# Patient Record
Sex: FEMALE | Race: BLACK OR AFRICAN AMERICAN | NOT HISPANIC OR LATINO | Employment: OTHER | URBAN - METROPOLITAN AREA
[De-identification: names, ages, dates, MRNs, and addresses within clinical notes are randomized per-mention and may not be internally consistent; named-entity substitution may affect disease eponyms.]

---

## 2017-01-20 ENCOUNTER — INFUSION (OUTPATIENT)
Dept: ONCOLOGY | Facility: HOSPITAL | Age: 52
End: 2017-01-20

## 2017-01-20 VITALS
RESPIRATION RATE: 20 BRPM | HEIGHT: 67 IN | DIASTOLIC BLOOD PRESSURE: 79 MMHG | TEMPERATURE: 97.8 F | BODY MASS INDEX: 26.21 KG/M2 | WEIGHT: 167 LBS | SYSTOLIC BLOOD PRESSURE: 118 MMHG | HEART RATE: 94 BPM

## 2017-01-20 DIAGNOSIS — IMO0001 ADVERSE EFFECT OF IRON OR ITS COMPOUND, SUBSEQUENT ENCOUNTER: ICD-10-CM

## 2017-01-20 DIAGNOSIS — D50.9 IRON DEFICIENCY ANEMIA, UNSPECIFIED IRON DEFICIENCY ANEMIA TYPE: Primary | ICD-10-CM

## 2017-01-20 DIAGNOSIS — D50.9 IRON DEFICIENCY ANEMIA: ICD-10-CM

## 2017-01-20 LAB
ERYTHROCYTE [DISTWIDTH] IN BLOOD BY AUTOMATED COUNT: 13.9 % (ref 11.3–14.5)
HCT VFR BLD AUTO: 36.5 % (ref 34.5–44)
HGB BLD-MCNC: 11.9 G/DL (ref 11.5–15.5)
LYMPHOCYTES # BLD AUTO: 2.1 10*3/MM3 (ref 0.6–4.8)
LYMPHOCYTES NFR BLD AUTO: 47.5 % (ref 24–44)
MCH RBC QN AUTO: 27 PG (ref 27–31)
MCHC RBC AUTO-ENTMCNC: 32.7 G/DL (ref 32–36)
MCV RBC AUTO: 82.7 FL (ref 80–99)
MONOCYTES # BLD AUTO: 0.3 10*3/MM3 (ref 0–1)
MONOCYTES NFR BLD AUTO: 7 % (ref 0–12)
NEUTROPHILS # BLD AUTO: 2 10*3/MM3 (ref 1.5–8.3)
NEUTROPHILS NFR BLD AUTO: 45.5 % (ref 41–71)
PLATELET # BLD AUTO: 254 10*3/MM3 (ref 150–450)
PMV BLD AUTO: 7.8 FL (ref 6–12)
RBC # BLD AUTO: 4.42 10*6/MM3 (ref 3.89–5.14)
WBC NRBC COR # BLD: 4.4 10*3/MM3 (ref 3.5–10.8)

## 2017-01-20 PROCEDURE — 96523 IRRIG DRUG DELIVERY DEVICE: CPT

## 2017-01-20 PROCEDURE — 25010000002 HEPARIN FLUSH (PORCINE) 100 UNIT/ML SOLUTION: Performed by: NURSE PRACTITIONER

## 2017-01-20 PROCEDURE — 36591 DRAW BLOOD OFF VENOUS DEVICE: CPT

## 2017-01-20 RX ORDER — 0.9 % SODIUM CHLORIDE 0.9 %
10 VIAL (ML) INJECTION AS NEEDED
Status: CANCELLED | OUTPATIENT
Start: 2017-01-20

## 2017-01-20 RX ORDER — HEPARIN SODIUM (PORCINE) LOCK FLUSH IV SOLN 100 UNIT/ML 100 UNIT/ML
300 SOLUTION INTRAVENOUS ONCE
Status: CANCELLED | OUTPATIENT
Start: 2017-01-20

## 2017-01-20 RX ADMIN — HEPARIN 500 UNITS: 100 SYRINGE at 13:40

## 2017-02-24 ENCOUNTER — INFUSION (OUTPATIENT)
Dept: ONCOLOGY | Facility: HOSPITAL | Age: 52
End: 2017-02-24

## 2017-02-24 VITALS
HEART RATE: 93 BPM | SYSTOLIC BLOOD PRESSURE: 110 MMHG | DIASTOLIC BLOOD PRESSURE: 80 MMHG | BODY MASS INDEX: 26.53 KG/M2 | HEIGHT: 67 IN | WEIGHT: 169 LBS | RESPIRATION RATE: 20 BRPM | TEMPERATURE: 98.1 F

## 2017-02-24 DIAGNOSIS — D50.9 IRON DEFICIENCY ANEMIA, UNSPECIFIED IRON DEFICIENCY ANEMIA TYPE: Primary | ICD-10-CM

## 2017-02-24 DIAGNOSIS — IMO0001 ADVERSE EFFECT OF IRON OR ITS COMPOUND, SUBSEQUENT ENCOUNTER: ICD-10-CM

## 2017-02-24 DIAGNOSIS — D50.9 IRON DEFICIENCY ANEMIA: ICD-10-CM

## 2017-02-24 LAB
ERYTHROCYTE [DISTWIDTH] IN BLOOD BY AUTOMATED COUNT: 13.5 % (ref 11.3–14.5)
HCT VFR BLD AUTO: 37 % (ref 34.5–44)
HGB BLD-MCNC: 12 G/DL (ref 11.5–15.5)
LYMPHOCYTES # BLD AUTO: 2.1 10*3/MM3 (ref 0.6–4.8)
LYMPHOCYTES NFR BLD AUTO: 47.4 % (ref 24–44)
MCH RBC QN AUTO: 26.4 PG (ref 27–31)
MCHC RBC AUTO-ENTMCNC: 32.4 G/DL (ref 32–36)
MCV RBC AUTO: 81.6 FL (ref 80–99)
MONOCYTES # BLD AUTO: 0.3 10*3/MM3 (ref 0–1)
MONOCYTES NFR BLD AUTO: 6.1 % (ref 0–12)
NEUTROPHILS # BLD AUTO: 2.1 10*3/MM3 (ref 1.5–8.3)
NEUTROPHILS NFR BLD AUTO: 46.5 % (ref 41–71)
PLATELET # BLD AUTO: 163 10*3/MM3 (ref 150–450)
PMV BLD AUTO: 8 FL (ref 6–12)
RBC # BLD AUTO: 4.54 10*6/MM3 (ref 3.89–5.14)
WBC NRBC COR # BLD: 4.5 10*3/MM3 (ref 3.5–10.8)

## 2017-02-24 PROCEDURE — 36593 DECLOT VASCULAR DEVICE: CPT

## 2017-02-24 PROCEDURE — 25010000002 HEPARIN FLUSH (PORCINE) 100 UNIT/ML SOLUTION: Performed by: NURSE PRACTITIONER

## 2017-02-24 PROCEDURE — 25010000002 ALTEPLASE 2 MG RECONSTITUTED SOLUTION 1 EACH VIAL: Performed by: NURSE PRACTITIONER

## 2017-02-24 PROCEDURE — 96523 IRRIG DRUG DELIVERY DEVICE: CPT

## 2017-02-24 PROCEDURE — 36591 DRAW BLOOD OFF VENOUS DEVICE: CPT

## 2017-02-24 RX ORDER — 0.9 % SODIUM CHLORIDE 0.9 %
10 VIAL (ML) INJECTION AS NEEDED
Status: CANCELLED | OUTPATIENT
Start: 2017-02-24

## 2017-02-24 RX ORDER — HEPARIN SODIUM (PORCINE) LOCK FLUSH IV SOLN 100 UNIT/ML 100 UNIT/ML
300 SOLUTION INTRAVENOUS ONCE
Status: CANCELLED | OUTPATIENT
Start: 2017-02-24

## 2017-02-24 RX ADMIN — ALTEPLASE 2 MG: 2.2 INJECTION, POWDER, LYOPHILIZED, FOR SOLUTION INTRAVENOUS at 13:50

## 2017-02-24 RX ADMIN — HEPARIN 500 UNITS: 100 SYRINGE at 15:09

## 2017-03-31 ENCOUNTER — INFUSION (OUTPATIENT)
Dept: ONCOLOGY | Facility: HOSPITAL | Age: 52
End: 2017-03-31

## 2017-03-31 ENCOUNTER — OFFICE VISIT (OUTPATIENT)
Dept: ONCOLOGY | Facility: CLINIC | Age: 52
End: 2017-03-31

## 2017-03-31 VITALS
TEMPERATURE: 97.5 F | WEIGHT: 166 LBS | BODY MASS INDEX: 26.06 KG/M2 | HEART RATE: 101 BPM | SYSTOLIC BLOOD PRESSURE: 125 MMHG | RESPIRATION RATE: 20 BRPM | DIASTOLIC BLOOD PRESSURE: 57 MMHG | HEIGHT: 67 IN

## 2017-03-31 VITALS
BODY MASS INDEX: 26.06 KG/M2 | HEIGHT: 67 IN | HEART RATE: 101 BPM | TEMPERATURE: 97.5 F | DIASTOLIC BLOOD PRESSURE: 57 MMHG | RESPIRATION RATE: 20 BRPM | WEIGHT: 166 LBS | SYSTOLIC BLOOD PRESSURE: 125 MMHG

## 2017-03-31 DIAGNOSIS — D50.8 OTHER IRON DEFICIENCY ANEMIA: Primary | Chronic | ICD-10-CM

## 2017-03-31 DIAGNOSIS — D50.9 IRON DEFICIENCY ANEMIA, UNSPECIFIED IRON DEFICIENCY ANEMIA TYPE: Primary | ICD-10-CM

## 2017-03-31 DIAGNOSIS — C50.412 MALIGNANT NEOPLASM OF UPPER-OUTER QUADRANT OF LEFT FEMALE BREAST (HCC): ICD-10-CM

## 2017-03-31 DIAGNOSIS — D50.8 OTHER IRON DEFICIENCY ANEMIA: Primary | ICD-10-CM

## 2017-03-31 LAB
ALBUMIN SERPL-MCNC: 4.4 G/DL (ref 3.2–4.8)
ALBUMIN/GLOB SERPL: 1.5 G/DL (ref 1.5–2.5)
ALP SERPL-CCNC: 132 U/L (ref 25–100)
ALT SERPL W P-5'-P-CCNC: 10 U/L (ref 7–40)
ANION GAP SERPL CALCULATED.3IONS-SCNC: 6 MMOL/L (ref 3–11)
AST SERPL-CCNC: 16 U/L (ref 0–33)
BILIRUB SERPL-MCNC: 0.5 MG/DL (ref 0.3–1.2)
BUN BLD-MCNC: 16 MG/DL (ref 9–23)
BUN/CREAT SERPL: 16 (ref 7–25)
CALCIUM SPEC-SCNC: 10.2 MG/DL (ref 8.7–10.4)
CHLORIDE SERPL-SCNC: 105 MMOL/L (ref 99–109)
CO2 SERPL-SCNC: 27 MMOL/L (ref 20–31)
CREAT BLD-MCNC: 1 MG/DL (ref 0.6–1.3)
ERYTHROCYTE [DISTWIDTH] IN BLOOD BY AUTOMATED COUNT: 13.7 % (ref 11.3–14.5)
FERRITIN SERPL-MCNC: 117 NG/ML (ref 10–291)
GFR SERPL CREATININE-BSD FRML MDRD: 71 ML/MIN/1.73
GLOBULIN UR ELPH-MCNC: 3 GM/DL
GLUCOSE BLD-MCNC: 111 MG/DL (ref 70–100)
HCT VFR BLD AUTO: 37.4 % (ref 34.5–44)
HGB BLD-MCNC: 12 G/DL (ref 11.5–15.5)
IRON 24H UR-MRATE: 74 MCG/DL (ref 50–175)
IRON SATN MFR SERPL: 20 % (ref 15–50)
LYMPHOCYTES # BLD AUTO: 1.4 10*3/MM3 (ref 0.6–4.8)
LYMPHOCYTES NFR BLD AUTO: 30.9 % (ref 24–44)
MCH RBC QN AUTO: 26.7 PG (ref 27–31)
MCHC RBC AUTO-ENTMCNC: 32 G/DL (ref 32–36)
MCV RBC AUTO: 83.4 FL (ref 80–99)
MONOCYTES # BLD AUTO: 0.1 10*3/MM3 (ref 0–1)
MONOCYTES NFR BLD AUTO: 2.3 % (ref 0–12)
NEUTROPHILS # BLD AUTO: 3.1 10*3/MM3 (ref 1.5–8.3)
NEUTROPHILS NFR BLD AUTO: 66.8 % (ref 41–71)
PLATELET # BLD AUTO: 187 10*3/MM3 (ref 150–450)
PMV BLD AUTO: 8.1 FL (ref 6–12)
POTASSIUM BLD-SCNC: 4 MMOL/L (ref 3.5–5.5)
PROT SERPL-MCNC: 7.4 G/DL (ref 5.7–8.2)
RBC # BLD AUTO: 4.48 10*6/MM3 (ref 3.89–5.14)
SODIUM BLD-SCNC: 138 MMOL/L (ref 132–146)
TIBC SERPL-MCNC: 369 MCG/DL (ref 250–450)
WBC NRBC COR # BLD: 4.6 10*3/MM3 (ref 3.5–10.8)

## 2017-03-31 PROCEDURE — 25010000002 HEPARIN FLUSH (PORCINE) 100 UNIT/ML SOLUTION: Performed by: NURSE PRACTITIONER

## 2017-03-31 PROCEDURE — 99212 OFFICE O/P EST SF 10 MIN: CPT | Performed by: NURSE PRACTITIONER

## 2017-03-31 PROCEDURE — 36591 DRAW BLOOD OFF VENOUS DEVICE: CPT

## 2017-03-31 RX ORDER — 0.9 % SODIUM CHLORIDE 0.9 %
10 VIAL (ML) INJECTION AS NEEDED
Status: CANCELLED | OUTPATIENT
Start: 2017-03-31

## 2017-03-31 RX ORDER — HEPARIN SODIUM (PORCINE) LOCK FLUSH IV SOLN 100 UNIT/ML 100 UNIT/ML
300 SOLUTION INTRAVENOUS ONCE
Status: CANCELLED | OUTPATIENT
Start: 2017-03-31

## 2017-03-31 RX ORDER — 0.9 % SODIUM CHLORIDE 0.9 %
10 VIAL (ML) INJECTION AS NEEDED
Status: DISCONTINUED | OUTPATIENT
Start: 2017-03-31 | End: 2017-03-31 | Stop reason: HOSPADM

## 2017-03-31 RX ADMIN — SODIUM CHLORIDE, PRESERVATIVE FREE 10 ML: 5 INJECTION INTRAVENOUS at 10:48

## 2017-03-31 RX ADMIN — HEPARIN 500 UNITS: 100 SYRINGE at 10:48

## 2017-03-31 NOTE — PROGRESS NOTES
"      PROBLEM LIST:  1. Stage I breast cancer, left upper outer quadrant:   a) Original diagnosis 06/20/2010.   b) Left mastectomy with sentinel lymph node sampling and prophylactic right  mastectomy.   c) [id number] stage I.   d) Estrogen receptor and progesterone receptor positive and HER-2 negative  with low risk Oncotype.   e) Completed adjuvant endocrine therapy 07/08/2015.   2. Fibromyalgia and chronic pain.   3. Recurring iron deficiency anemia.   4. Gastroparesis.       CHIEF COMPLAINT: Here about anemia and follow-up for breast cancer.        Subjective     HISTORY OF PRESENT ILLNESS:   Mrs. Haro is here for follow up evaluation of history of breast cancer and iron deficiency anemia. She is maintaining her usual daily activities. She denies any cough or dyspnea. No chest pain or palpitations. No new long bone pain, severe headaches, diplopia, nose or gum bleeding. No melena hematochezia, or hematuria.     Past Medical History, Past Surgical History, Social History, Family History have been reviewed and are without significant changes except as mentioned.    Review of Systems   A comprehensive 14 point review of systems was performed and was negative except as mentioned.    Medications:  The current medication list was reviewed in the EMR    ALLERGIES:    Allergies   Allergen Reactions   • Aspirin    • Gabapentin    • Ibuprofen    • Morphine And Related        Objective      /57  Pulse 101  Temp 97.5 °F (36.4 °C)  Resp 20  Ht 67\" (170.2 cm)  Wt 166 lb (75.3 kg)  BMI 26 kg/m2         General: well appearing, in no acute distress   HEENT: sclera anicteric, oropharynx clear  Lymphatics: no cervical, supraclavicular, or axillary adenopathy  Cardiovascular: regular rate and rhythm, no murmurs  Lungs: clear to auscultation bilaterally  Abdomen: soft, nontender, nondistended.  No palpable masses or organomegaly  Extremeties: no lower extremity edema, cords or calf tenderness  Skin: no rashes, lesions, " bruising, or petechiae    RECENT LABS:  Hematology WBC   Date Value Ref Range Status   03/31/2017 4.60 3.50 - 10.80 10*3/mm3 Final     Hemoglobin   Date Value Ref Range Status   03/31/2017 12.0 11.5 - 15.5 g/dL Final     Hematocrit   Date Value Ref Range Status   03/31/2017 37.4 34.5 - 44.0 % Final     MCV   Date Value Ref Range Status   03/31/2017 83.4 80.0 - 99.0 fL Final     RDW   Date Value Ref Range Status   03/31/2017 13.7 11.3 - 14.5 % Final     MPV   Date Value Ref Range Status   03/31/2017 8.1 6.0 - 12.0 fL Final     Platelets   Date Value Ref Range Status   03/31/2017 187 150 - 450 10*3/mm3 Final     Neutrophils, Absolute   Date Value Ref Range Status   03/31/2017 3.10 1.50 - 8.30 10*3/mm3 Final     Lymphocytes, Absolute   Date Value Ref Range Status   03/31/2017 1.40 0.60 - 4.80 10*3/mm3 Final     Monocytes, Absolute   Date Value Ref Range Status   03/31/2017 0.10 0.00 - 1.00 10*3/mm3 Final     Eosinophils Absolute   Date Value Ref Range Status   05/04/2016 0.08 (L) 0.10 - 0.30 K/mcL Final     Basophils Absolute   Date Value Ref Range Status   05/04/2016 0.02 0.00 - 0.20 K/mcL Final     nRBC   Date Value Ref Range Status   05/14/2014 0.0  Final       Glucose   Date Value Ref Range Status   09/30/2016 83 70 - 100 mg/dL Final     Sodium   Date Value Ref Range Status   09/30/2016 140 132 - 146 mmol/L Final     Potassium   Date Value Ref Range Status   09/30/2016 4.1 3.5 - 5.5 mmol/L Final     CO2   Date Value Ref Range Status   09/30/2016 32.0 (H) 20.0 - 31.0 mmol/L Final     Chloride   Date Value Ref Range Status   09/30/2016 106 99 - 109 mmol/L Final     Anion Gap   Date Value Ref Range Status   09/30/2016 2.0 (L) 3.0 - 11.0 mmol/L Final     Creatinine   Date Value Ref Range Status   09/30/2016 1.00 0.60 - 1.30 mg/dL Final     BUN   Date Value Ref Range Status   09/30/2016 12 9 - 23 mg/dL Final     BUN/Creatinine Ratio   Date Value Ref Range Status   09/30/2016 12.0 7.0 - 25.0 Final     Calcium   Date  Value Ref Range Status   09/30/2016 9.8 8.7 - 10.4 mg/dL Final     Alkaline Phosphatase   Date Value Ref Range Status   09/30/2016 114 (H) 25 - 100 U/L Final     Total Protein   Date Value Ref Range Status   09/30/2016 6.9 5.7 - 8.2 g/dL Final     ALT (SGPT)   Date Value Ref Range Status   09/30/2016 6 (L) 7 - 40 U/L Final     AST (SGOT)   Date Value Ref Range Status   09/30/2016 12 0 - 33 U/L Final     Total Bilirubin   Date Value Ref Range Status   09/30/2016 0.2 (L) 0.3 - 1.2 mg/dL Final     Albumin   Date Value Ref Range Status   09/30/2016 4.00 3.20 - 4.80 g/dL Final     Globulin   Date Value Ref Range Status   09/30/2016 2.9 gm/dL Final     A/G Ratio   Date Value Ref Range Status   09/30/2016 1.4 g/dL Final       LDH   Date Value Ref Range Status   06/19/2015 174 120 - 246 Units/L Final          Assessment/Plan   IMPRESSION:   1. Anemia. Improved. She has had recurring iron deficiency anemia. She has responded to parenteral iron in the past.   2. History of breast cancer. She has done well after adjuvant endocrine therapy does not have any symptoms to suggest recurrence.   3. Other problems as listed above.       PLAN:   1. I will check her CBC, CMP, and iron studies today and call if they are adversely changed.   2. If these are acceptable she will check a CBC every other month along with port flush.    3. I will see her back in about 6 months or sooner if new problems or findings develop.                 Michelle Drake, [facility] and On[facility]    3/31/2017

## 2017-05-12 ENCOUNTER — INFUSION (OUTPATIENT)
Dept: ONCOLOGY | Facility: HOSPITAL | Age: 52
End: 2017-05-12

## 2017-05-12 DIAGNOSIS — D50.9 IRON DEFICIENCY ANEMIA, UNSPECIFIED IRON DEFICIENCY ANEMIA TYPE: Primary | ICD-10-CM

## 2017-05-12 DIAGNOSIS — D50.8 OTHER IRON DEFICIENCY ANEMIA: ICD-10-CM

## 2017-05-12 LAB
ERYTHROCYTE [DISTWIDTH] IN BLOOD BY AUTOMATED COUNT: 14.3 % (ref 11.3–14.5)
HCT VFR BLD AUTO: 38.3 % (ref 34.5–44)
HGB BLD-MCNC: 12 G/DL (ref 11.5–15.5)
LYMPHOCYTES # BLD AUTO: 1.7 10*3/MM3 (ref 0.6–4.8)
LYMPHOCYTES NFR BLD AUTO: 44.9 % (ref 24–44)
MCH RBC QN AUTO: 26.2 PG (ref 27–31)
MCHC RBC AUTO-ENTMCNC: 31.3 G/DL (ref 32–36)
MCV RBC AUTO: 83.5 FL (ref 80–99)
MONOCYTES # BLD AUTO: 0.3 10*3/MM3 (ref 0–1)
MONOCYTES NFR BLD AUTO: 8.9 % (ref 0–12)
NEUTROPHILS # BLD AUTO: 1.7 10*3/MM3 (ref 1.5–8.3)
NEUTROPHILS NFR BLD AUTO: 46.2 % (ref 41–71)
PLATELET # BLD AUTO: 218 10*3/MM3 (ref 150–450)
PMV BLD AUTO: 7.8 FL (ref 6–12)
RBC # BLD AUTO: 4.58 10*6/MM3 (ref 3.89–5.14)
WBC NRBC COR # BLD: 3.7 10*3/MM3 (ref 3.5–10.8)

## 2017-05-12 PROCEDURE — 25010000002 HEPARIN FLUSH (PORCINE) 100 UNIT/ML SOLUTION: Performed by: NURSE PRACTITIONER

## 2017-05-12 PROCEDURE — 85025 COMPLETE CBC W/AUTO DIFF WBC: CPT

## 2017-05-12 PROCEDURE — 96523 IRRIG DRUG DELIVERY DEVICE: CPT

## 2017-05-12 PROCEDURE — 36591 DRAW BLOOD OFF VENOUS DEVICE: CPT

## 2017-05-12 RX ORDER — 0.9 % SODIUM CHLORIDE 0.9 %
10 VIAL (ML) INJECTION AS NEEDED
Status: CANCELLED | OUTPATIENT
Start: 2017-05-12

## 2017-05-12 RX ORDER — HEPARIN SODIUM (PORCINE) LOCK FLUSH IV SOLN 100 UNIT/ML 100 UNIT/ML
300 SOLUTION INTRAVENOUS ONCE
Status: CANCELLED | OUTPATIENT
Start: 2017-05-12

## 2017-05-12 RX ADMIN — HEPARIN 500 UNITS: 100 SYRINGE at 13:18

## 2017-06-09 ENCOUNTER — INFUSION (OUTPATIENT)
Dept: ONCOLOGY | Facility: HOSPITAL | Age: 52
End: 2017-06-09

## 2017-06-09 VITALS
WEIGHT: 164 LBS | HEART RATE: 84 BPM | TEMPERATURE: 97.3 F | BODY MASS INDEX: 25.74 KG/M2 | HEIGHT: 67 IN | SYSTOLIC BLOOD PRESSURE: 117 MMHG | RESPIRATION RATE: 18 BRPM | DIASTOLIC BLOOD PRESSURE: 68 MMHG

## 2017-06-09 DIAGNOSIS — C50.412 MALIGNANT NEOPLASM OF UPPER-OUTER QUADRANT OF LEFT FEMALE BREAST (HCC): ICD-10-CM

## 2017-06-09 DIAGNOSIS — T82.598D MALFUNCTION OF PERIPHERAL INSERTED CENTRAL CATHETER, SUBSEQUENT ENCOUNTER: ICD-10-CM

## 2017-06-09 DIAGNOSIS — D50.8 OTHER IRON DEFICIENCY ANEMIA: Primary | ICD-10-CM

## 2017-06-09 PROCEDURE — 25010000002 HEPARIN FLUSH (PORCINE) 100 UNIT/ML SOLUTION: Performed by: INTERNAL MEDICINE

## 2017-06-09 PROCEDURE — 96523 IRRIG DRUG DELIVERY DEVICE: CPT

## 2017-06-09 RX ORDER — SODIUM CHLORIDE 0.9 % (FLUSH) 0.9 %
10 SYRINGE (ML) INJECTION AS NEEDED
Status: CANCELLED | OUTPATIENT
Start: 2017-06-09

## 2017-06-09 RX ADMIN — HEPARIN 500 UNITS: 100 SYRINGE at 13:35

## 2017-06-12 ENCOUNTER — OFFICE VISIT (OUTPATIENT)
Dept: ONCOLOGY | Facility: CLINIC | Age: 52
End: 2017-06-12

## 2017-06-12 VITALS
DIASTOLIC BLOOD PRESSURE: 79 MMHG | BODY MASS INDEX: 25.37 KG/M2 | HEART RATE: 94 BPM | WEIGHT: 162 LBS | TEMPERATURE: 97.6 F | SYSTOLIC BLOOD PRESSURE: 119 MMHG | RESPIRATION RATE: 18 BRPM

## 2017-06-12 DIAGNOSIS — M67.40 GANGLION CYST: Primary | ICD-10-CM

## 2017-06-12 PROCEDURE — 99212 OFFICE O/P EST SF 10 MIN: CPT | Performed by: NURSE PRACTITIONER

## 2017-06-12 NOTE — PROGRESS NOTES
PROBLEM LIST:  1. Stage I breast cancer, left upper outer quadrant:   a) Original diagnosis 06/20/2010.   b) Left mastectomy with sentinel lymph node sampling and prophylactic right  mastectomy.   c) [id number] stage I.   d) Estrogen receptor and progesterone receptor positive and HER-2 negative  with low risk Oncotype.   e) Completed adjuvant endocrine therapy 07/08/2015.   2. Fibromyalgia and chronic pain.   3. Recurring iron deficiency anemia.   4. Gastroparesis.       CHIEF COMPLAINT: Here about cyst on right foot.     Subjective     HISTORY OF PRESENT ILLNESS:   Mrs. Haro is here for interm visit regarding a cyst on the top of her right foot.  She repots the cyst has been enlarging over the past year. She says it causes pain with her shoes.      Past Medical History, Past Surgical History, Social History, Family History have been reviewed and are without significant changes except as mentioned.    Review of Systems   A comprehensive 14 point review of systems was performed and was negative except as mentioned.    Medications:  The current medication list was reviewed in the EMR    ALLERGIES:    Allergies   Allergen Reactions   • Aspirin    • Gabapentin    • Ibuprofen    • Morphine And Related        Objective      /79  Pulse 94  Temp 97.6 °F (36.4 °C)  Resp 18  Wt 162 lb (73.5 kg)  BMI 25.37 kg/m2         General: well appearing, in no acute distress   HEENT: sclera anicteric, oropharynx clear  Lymphatics: no cervical, supraclavicular, or axillary adenopathy  Cardiovascular: regular rate and rhythm, no murmurs  Lungs: clear to auscultation bilaterally  Abdomen: soft, nontender, nondistended.  No palpable masses or organomegaly  Extremeties: no lower extremity edema, cords or calf tenderness  Skin: no rashes, lesions, bruising, or petechiae. Soft fluctuant cyst on top of right foot.     RECENT LABS:  Hematology WBC   Date Value Ref Range Status   05/12/2017 3.70 3.50 - 10.80 10*3/mm3 Final      Hemoglobin   Date Value Ref Range Status   05/12/2017 12.0 11.5 - 15.5 g/dL Final     Hematocrit   Date Value Ref Range Status   05/12/2017 38.3 34.5 - 44.0 % Final     MCV   Date Value Ref Range Status   05/12/2017 83.5 80.0 - 99.0 fL Final     RDW   Date Value Ref Range Status   05/12/2017 14.3 11.3 - 14.5 % Final     MPV   Date Value Ref Range Status   05/12/2017 7.8 6.0 - 12.0 fL Final     Platelets   Date Value Ref Range Status   05/12/2017 218 150 - 450 10*3/mm3 Final     Neutrophils, Absolute   Date Value Ref Range Status   05/12/2017 1.70 1.50 - 8.30 10*3/mm3 Final     Lymphocytes, Absolute   Date Value Ref Range Status   05/12/2017 1.70 0.60 - 4.80 10*3/mm3 Final     Monocytes, Absolute   Date Value Ref Range Status   05/12/2017 0.30 0.00 - 1.00 10*3/mm3 Final     Eosinophils Absolute   Date Value Ref Range Status   05/04/2016 0.08 (L) 0.10 - 0.30 K/mcL Final     Basophils Absolute   Date Value Ref Range Status   05/04/2016 0.02 0.00 - 0.20 K/mcL Final     nRBC   Date Value Ref Range Status   05/14/2014 0.0  Final       Glucose   Date Value Ref Range Status   03/31/2017 111 (H) 70 - 100 mg/dL Final     Sodium   Date Value Ref Range Status   03/31/2017 138 132 - 146 mmol/L Final     Potassium   Date Value Ref Range Status   03/31/2017 4.0 3.5 - 5.5 mmol/L Final     CO2   Date Value Ref Range Status   03/31/2017 27.0 20.0 - 31.0 mmol/L Final     Chloride   Date Value Ref Range Status   03/31/2017 105 99 - 109 mmol/L Final     Anion Gap   Date Value Ref Range Status   03/31/2017 6.0 3.0 - 11.0 mmol/L Final     Creatinine   Date Value Ref Range Status   03/31/2017 1.00 0.60 - 1.30 mg/dL Final     BUN   Date Value Ref Range Status   03/31/2017 16 9 - 23 mg/dL Final     BUN/Creatinine Ratio   Date Value Ref Range Status   03/31/2017 16.0 7.0 - 25.0 Final     Calcium   Date Value Ref Range Status   03/31/2017 10.2 8.7 - 10.4 mg/dL Final     Alkaline Phosphatase   Date Value Ref Range Status   03/31/2017 132  (H) 25 - 100 U/L Final     Total Protein   Date Value Ref Range Status   03/31/2017 7.4 5.7 - 8.2 g/dL Final     ALT (SGPT)   Date Value Ref Range Status   03/31/2017 10 7 - 40 U/L Final     AST (SGOT)   Date Value Ref Range Status   03/31/2017 16 0 - 33 U/L Final     Total Bilirubin   Date Value Ref Range Status   03/31/2017 0.5 0.3 - 1.2 mg/dL Final     Albumin   Date Value Ref Range Status   03/31/2017 4.40 3.20 - 4.80 g/dL Final     Globulin   Date Value Ref Range Status   03/31/2017 3.0 gm/dL Final     A/G Ratio   Date Value Ref Range Status   03/31/2017 1.5 1.5 - 2.5 g/dL Final       LDH   Date Value Ref Range Status   06/19/2015 174 120 - 246 Units/L Final          Assessment/Plan   IMPRESSION/Plan:   1. Ganglion cyst. Will refer her to Dr. Jimena Baumann to see about having cyst removed.  I will have her keep her scheduled appointment with me in September to follow up on anemia and breast cancer.                     Michelle Drake, APRDALLIN  [facility] Hematology and Oncology    6/12/2017          CC:

## 2017-07-21 ENCOUNTER — INFUSION (OUTPATIENT)
Dept: ONCOLOGY | Facility: HOSPITAL | Age: 52
End: 2017-07-21

## 2017-07-21 VITALS
BODY MASS INDEX: 26.47 KG/M2 | TEMPERATURE: 97.6 F | RESPIRATION RATE: 16 BRPM | SYSTOLIC BLOOD PRESSURE: 105 MMHG | WEIGHT: 169 LBS | DIASTOLIC BLOOD PRESSURE: 75 MMHG | HEART RATE: 104 BPM

## 2017-07-21 DIAGNOSIS — D50.8 OTHER IRON DEFICIENCY ANEMIA: Primary | ICD-10-CM

## 2017-07-21 DIAGNOSIS — T82.598D MALFUNCTION OF PERIPHERAL INSERTED CENTRAL CATHETER, SUBSEQUENT ENCOUNTER: ICD-10-CM

## 2017-07-21 DIAGNOSIS — C50.412 MALIGNANT NEOPLASM OF UPPER-OUTER QUADRANT OF LEFT FEMALE BREAST (HCC): ICD-10-CM

## 2017-07-21 LAB
ERYTHROCYTE [DISTWIDTH] IN BLOOD BY AUTOMATED COUNT: 14.8 % (ref 11.3–14.5)
HCT VFR BLD AUTO: 36.1 % (ref 34.5–44)
HGB BLD-MCNC: 11.5 G/DL (ref 11.5–15.5)
LYMPHOCYTES # BLD AUTO: 2 10*3/MM3 (ref 0.6–4.8)
LYMPHOCYTES NFR BLD AUTO: 44.2 % (ref 24–44)
MCH RBC QN AUTO: 26.5 PG (ref 27–31)
MCHC RBC AUTO-ENTMCNC: 31.7 G/DL (ref 32–36)
MCV RBC AUTO: 83.5 FL (ref 80–99)
MONOCYTES # BLD AUTO: 0.3 10*3/MM3 (ref 0–1)
MONOCYTES NFR BLD AUTO: 7 % (ref 0–12)
NEUTROPHILS # BLD AUTO: 2.2 10*3/MM3 (ref 1.5–8.3)
NEUTROPHILS NFR BLD AUTO: 48.8 % (ref 41–71)
PLATELET # BLD AUTO: 213 10*3/MM3 (ref 150–450)
PMV BLD AUTO: 7.7 FL (ref 6–12)
RBC # BLD AUTO: 4.33 10*6/MM3 (ref 3.89–5.14)
WBC NRBC COR # BLD: 4.6 10*3/MM3 (ref 3.5–10.8)

## 2017-07-21 PROCEDURE — 85025 COMPLETE CBC W/AUTO DIFF WBC: CPT

## 2017-07-21 PROCEDURE — 36591 DRAW BLOOD OFF VENOUS DEVICE: CPT

## 2017-07-21 PROCEDURE — 25010000002 HEPARIN FLUSH (PORCINE) 100 UNIT/ML SOLUTION: Performed by: INTERNAL MEDICINE

## 2017-07-21 RX ORDER — SODIUM CHLORIDE 0.9 % (FLUSH) 0.9 %
10 SYRINGE (ML) INJECTION AS NEEDED
Status: CANCELLED | OUTPATIENT
Start: 2017-07-21

## 2017-07-21 RX ADMIN — HEPARIN 500 UNITS: 100 SYRINGE at 13:08

## 2017-08-02 ENCOUNTER — OFFICE VISIT (OUTPATIENT)
Dept: ORTHOPEDIC SURGERY | Facility: CLINIC | Age: 52
End: 2017-08-02

## 2017-08-02 VITALS
HEIGHT: 66 IN | HEART RATE: 77 BPM | WEIGHT: 170 LBS | BODY MASS INDEX: 27.32 KG/M2 | SYSTOLIC BLOOD PRESSURE: 102 MMHG | DIASTOLIC BLOOD PRESSURE: 74 MMHG

## 2017-08-02 DIAGNOSIS — F17.200 SMOKER: ICD-10-CM

## 2017-08-02 DIAGNOSIS — M19.079 ARTHRITIS OF FOOT: Primary | ICD-10-CM

## 2017-08-02 PROCEDURE — 20605 DRAIN/INJ JOINT/BURSA W/O US: CPT | Performed by: ORTHOPAEDIC SURGERY

## 2017-08-02 PROCEDURE — 99406 BEHAV CHNG SMOKING 3-10 MIN: CPT | Performed by: ORTHOPAEDIC SURGERY

## 2017-08-02 PROCEDURE — 99204 OFFICE O/P NEW MOD 45 MIN: CPT | Performed by: ORTHOPAEDIC SURGERY

## 2017-08-02 RX ORDER — CETIRIZINE HYDROCHLORIDE 10 MG/1
10 TABLET ORAL DAILY
COMMUNITY
End: 2020-11-16 | Stop reason: SDUPTHER

## 2017-08-02 RX ORDER — ESOMEPRAZOLE MAGNESIUM 40 MG/1
40 CAPSULE, DELAYED RELEASE ORAL 2 TIMES DAILY
COMMUNITY
End: 2019-03-07

## 2017-08-02 RX ORDER — METHYLPREDNISOLONE ACETATE 40 MG/ML
80 INJECTION, SUSPENSION INTRA-ARTICULAR; INTRALESIONAL; INTRAMUSCULAR; SOFT TISSUE
Status: COMPLETED | OUTPATIENT
Start: 2017-08-02 | End: 2017-08-02

## 2017-08-02 RX ORDER — BUPIVACAINE HYDROCHLORIDE 2.5 MG/ML
1 INJECTION, SOLUTION INFILTRATION; PERINEURAL
Status: COMPLETED | OUTPATIENT
Start: 2017-08-02 | End: 2017-08-02

## 2017-08-02 RX ADMIN — METHYLPREDNISOLONE ACETATE 80 MG: 40 INJECTION, SUSPENSION INTRA-ARTICULAR; INTRALESIONAL; INTRAMUSCULAR; SOFT TISSUE at 11:42

## 2017-08-02 RX ADMIN — BUPIVACAINE HYDROCHLORIDE 1 ML: 2.5 INJECTION, SOLUTION INFILTRATION; PERINEURAL at 11:42

## 2017-08-02 NOTE — PROGRESS NOTES
Procedure   Small Joint Arthrocentesis  Consent given by: patient  Site marked: site marked  Timeout: Immediately prior to procedure a time out was called to verify the correct patient, procedure, equipment, support staff and site/side marked as required   Supporting Documentation  Indications: pain   Procedure Details  Location: foot - R intertarsal  Needle size: 22 G  Medications administered: 1 mL bupivacaine; 80 mg methylPREDNISolone acetate 40 MG/ML  Patient tolerance: patient tolerated the procedure well with no immediate complications

## 2017-08-02 NOTE — PROGRESS NOTES
NEW PATIENT    Patient: Geneva Haro  : 1965    Primary Care Provider: No Known Provider    Requesting Provider: As above    Pain of the Right Foot (Ganglion cyst/)      History    Chief Complaint: Pain dorsal right foot, possible cyst    History of Present Illness: This is a 52-year-old woman here today with her .  She has a several year history of a mass on the dorsum of the right foot.  It rubs in her shoes and is painful.  She has had a ganglion cyst on her wrist and is wondering if this is the same thing.  She has a smaller nodule on the left foot.  She reports that when she is push on it the pain as a 10 out of 10.  It's sharp and aching.  She is wearing some shoes today that push directly on the mass and I suggested that was not a good idea.  We discussed shoes that will avoid pressure on that area.  We also showed her how to lace her athletic shoes so they don't push on that area.  She has not had any treatment for the foot.  She is followed by oncology for breast cancer.  She has chronic pain from fibromyalgia.    She smokes, I strongly recommended that she quit.I explained the risks of smoking, including hardening of the arteries, gangrene, amputation.  I explained smoking poisons bone cells and increases the risk of nonunion of fractures and fusions.  I explained that studies show that smokers have FOUR times the risk of the general population when they have foot or ankle surgery.  (5min)      Current Outpatient Prescriptions on File Prior to Visit   Medication Sig Dispense Refill   • citalopram (CeleXA) 20 MG tablet Take 20 mg by mouth daily.     • lidocaine-prilocaine (EMLA) 2.5-2.5 % cream Apply 1 application topically 1 (one) time.     • traMADol (ULTRAM) 50 MG tablet Take 50 mg by mouth 3 (three) times a day.       No current facility-administered medications on file prior to visit.       Allergies   Allergen Reactions   • Aspirin    • Gabapentin    • Ibuprofen    • Morphine And  "Related       Past Medical History:   Diagnosis Date   • Anxiety    • Arthritis    • Depression    • Diabetes mellitus    • Gall stones    • History of stomach ulcers    • Stomach problems      Past Surgical History:   Procedure Laterality Date   • CHOLECYSTECTOMY     • GASTRIC BYPASS      x2   • HERNIA REPAIR     • MASTECTOMY Bilateral     Left With sentinel lymph node sampling and prophylactic right mastectomy   • PACEMAKER IMPLANTATION     • PORTACATH PLACEMENT       Family History   Problem Relation Age of Onset   • Breast cancer Maternal Aunt    • Pancreatic cancer Cousin    • Breast cancer Cousin    • Breast cancer Cousin       Social History     Social History   • Marital status:      Spouse name: N/A   • Number of children: N/A   • Years of education: N/A     Occupational History   • Not on file.     Social History Main Topics   • Smoking status: Current Every Day Smoker   • Smokeless tobacco: Never Used   • Alcohol use No   • Drug use: Yes     Special: Marijuana   • Sexual activity: Not on file     Other Topics Concern   • Not on file     Social History Narrative        Review of Systems   Constitutional: Positive for activity change.   HENT: Negative.    Eyes: Negative.    Respiratory: Negative.    Cardiovascular: Negative.    Gastrointestinal: Negative.    Endocrine: Negative.    Genitourinary: Negative.    Musculoskeletal: Positive for arthralgias and joint swelling.   Skin: Negative.    Allergic/Immunologic: Positive for environmental allergies.   Neurological: Negative.    Hematological: Negative.    Psychiatric/Behavioral: Negative.        The following portions of the patient's history were reviewed and updated as appropriate: allergies, current medications, past family history, past medical history, past social history, past surgical history and problem list.    Physical Exam:   /74  Pulse 77  Ht 65.5\" (166.4 cm)  Wt 170 lb (77.1 kg)  BMI 27.86 kg/m2  GENERAL: Body habitus: normal " weight for height    Lower extremity edema: Left: none; Right: none    Varicose veins:  Left: none; Right: none    Gait: normal     Mental Status:  awake and alert; oriented to person, place, and time    Voice:  clear  SKIN:  Normal and warm and dry    Hair Growth:  Right:normal; Left:  normal  NAILS: Toenails: normal  HEENT: Head: Normocephalic, atraumatic,  without obvious abnormality.  eye: normal external eye, no icterus  ears: normal external ears  nose: normal external nose  PULM:  Repiratory effort normal  CV:  Dorsalis Pedis:  Right: 2+; Left:2+    Posterior Tibial: Right:2+; Left:2+    Capillary Refill:  Brisk  MSK:  Hand:right handed and sensation intact      Tibia:  Right:  non tender; Left:  non tender      Ankle:  Right: non tender, ROM  normal and symmetric and motor function  normal; Left:  non tender, ROM  normal and symmetric and motor function  normal      Foot:  Right:  She is tender over the second tarsometatarsal joint, there is a pseudocyst over osteophytes here.  Otherwise nontender.  She has pain here with a squeeze of the forefoot.  Mild Tinel's when I tap on the nodule.  It is not pulsatile.; Left:  non tender and She has a smaller nodule over the second tarsometatarsal joint, not tender      NEURO: Heel Walking:  Right:  normal; Left:  normal    Toe Walking:  Right:  limited ability, painful; Left:  limited ability, painful     [place]-Nate 5.07 monofilament test: normal    Lower extremity sensation: intact     Reflexes:  Biceps:  Right:  1+; Left:  1+           Quads:  Right:  2+; Left:  2+           Ankle:  Right:  1+; Left:  1+      Calf Atrophy:none    Motor Function: all 5/5         Medical Decision Making    Data Review:   ordered and reviewed x-rays today    Assessment and Plan/ Diagnosis/Treatment options:   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  I explained that the mass is not a ganglion cyst.  This is a pseudocyst, which is essentially  "thickening and swelling in the joint capsule due to the arthritis.  I explained that arthritis is a loss of cartilage.  The body often responds by creating a pseudocyst in the osteophytes.  We discussed the options.  One thing I think would be helpful is custom orthotics.  I gave her prescription for this.  I used the foot model to show her where the joints are.  We also discussed cortisone injection.  I explained that sometimes this will help the arthritis called down and will shrink the pseudocyst.  I explained that if it helps we can do it every 6 months.  I also explained that surgically you cannot just \"cut out\" the cyst.  If it came to surgery what he would have to do is fuse the joint.  Again this is because it is not a ganglion cyst, it's a pseudocyst off the arthritic joint.  If you do not take care of the arthritis with fusion, this is generally recurs.  Also, just removing the cyst can leave a very painful incision as it irritates the deep peroneal nerve.  I explained the risk of injection including a permanent white spot on her dark skin.  She would like to try this.  We gave her prescription for the orthotics, showed her how to change her laces etc.  After discussing the risks (including infection, skin atrophy, etc), time out was called,  the right tarsalmetatarsal joints were prepped and using sterile technique injected with 1cc of 0.5% marcaine and 2cc (80mg) DepoMedrol.  A band aid was applied.  No complications.     I also again recommended she quit smoking.  I explained that if it ever came to surgery she would have similarly have to quit.  I explained I do not do elective surgery on smokers.      I'll see her again if this does not help.                  "

## 2017-09-01 ENCOUNTER — INFUSION (OUTPATIENT)
Dept: ONCOLOGY | Facility: HOSPITAL | Age: 52
End: 2017-09-01

## 2017-09-01 VITALS
SYSTOLIC BLOOD PRESSURE: 109 MMHG | HEART RATE: 123 BPM | DIASTOLIC BLOOD PRESSURE: 84 MMHG | BODY MASS INDEX: 27.64 KG/M2 | WEIGHT: 172 LBS | RESPIRATION RATE: 16 BRPM | HEIGHT: 66 IN | TEMPERATURE: 97.9 F

## 2017-09-01 DIAGNOSIS — T82.598D MALFUNCTION OF PERIPHERAL INSERTED CENTRAL CATHETER, SUBSEQUENT ENCOUNTER: ICD-10-CM

## 2017-09-01 DIAGNOSIS — D50.8 OTHER IRON DEFICIENCY ANEMIA: Primary | ICD-10-CM

## 2017-09-01 DIAGNOSIS — C50.412 MALIGNANT NEOPLASM OF UPPER-OUTER QUADRANT OF LEFT FEMALE BREAST (HCC): ICD-10-CM

## 2017-09-01 PROCEDURE — 96523 IRRIG DRUG DELIVERY DEVICE: CPT

## 2017-09-01 PROCEDURE — 25010000002 HEPARIN FLUSH (PORCINE) 100 UNIT/ML SOLUTION: Performed by: INTERNAL MEDICINE

## 2017-09-01 RX ORDER — SODIUM CHLORIDE 0.9 % (FLUSH) 0.9 %
10 SYRINGE (ML) INJECTION AS NEEDED
Status: DISCONTINUED | OUTPATIENT
Start: 2017-09-01 | End: 2017-09-01 | Stop reason: HOSPADM

## 2017-09-01 RX ORDER — SODIUM CHLORIDE 0.9 % (FLUSH) 0.9 %
10 SYRINGE (ML) INJECTION AS NEEDED
Status: CANCELLED | OUTPATIENT
Start: 2017-09-01

## 2017-09-01 RX ADMIN — HEPARIN 500 UNITS: 100 SYRINGE at 13:14

## 2017-09-01 RX ADMIN — Medication 10 ML: at 13:14

## 2017-10-06 ENCOUNTER — INFUSION (OUTPATIENT)
Dept: ONCOLOGY | Facility: HOSPITAL | Age: 52
End: 2017-10-06

## 2017-10-06 ENCOUNTER — OFFICE VISIT (OUTPATIENT)
Dept: ONCOLOGY | Facility: CLINIC | Age: 52
End: 2017-10-06

## 2017-10-06 VITALS
DIASTOLIC BLOOD PRESSURE: 85 MMHG | HEART RATE: 18 BPM | RESPIRATION RATE: 18 BRPM | HEIGHT: 66 IN | WEIGHT: 169 LBS | SYSTOLIC BLOOD PRESSURE: 117 MMHG | TEMPERATURE: 97.8 F | BODY MASS INDEX: 27.16 KG/M2

## 2017-10-06 DIAGNOSIS — C50.412 MALIGNANT NEOPLASM OF UPPER-OUTER QUADRANT OF LEFT BREAST IN FEMALE, ESTROGEN RECEPTOR POSITIVE (HCC): ICD-10-CM

## 2017-10-06 DIAGNOSIS — Z17.0 MALIGNANT NEOPLASM OF UPPER-OUTER QUADRANT OF LEFT BREAST IN FEMALE, ESTROGEN RECEPTOR POSITIVE (HCC): Primary | ICD-10-CM

## 2017-10-06 DIAGNOSIS — D50.8 OTHER IRON DEFICIENCY ANEMIA: ICD-10-CM

## 2017-10-06 DIAGNOSIS — C50.412 MALIGNANT NEOPLASM OF UPPER-OUTER QUADRANT OF LEFT BREAST IN FEMALE, ESTROGEN RECEPTOR POSITIVE (HCC): Primary | ICD-10-CM

## 2017-10-06 DIAGNOSIS — Z17.0 MALIGNANT NEOPLASM OF UPPER-OUTER QUADRANT OF LEFT BREAST IN FEMALE, ESTROGEN RECEPTOR POSITIVE (HCC): ICD-10-CM

## 2017-10-06 DIAGNOSIS — D50.8 OTHER IRON DEFICIENCY ANEMIA: Primary | ICD-10-CM

## 2017-10-06 DIAGNOSIS — T82.598D MALFUNCTION OF PERIPHERAL INSERTED CENTRAL CATHETER, SUBSEQUENT ENCOUNTER: ICD-10-CM

## 2017-10-06 LAB
ALBUMIN SERPL-MCNC: 4.4 G/DL (ref 3.2–4.8)
ALBUMIN/GLOB SERPL: 1.6 G/DL (ref 1.5–2.5)
ALP SERPL-CCNC: 132 U/L (ref 25–100)
ALT SERPL W P-5'-P-CCNC: 10 U/L (ref 7–40)
ANION GAP SERPL CALCULATED.3IONS-SCNC: 12 MMOL/L (ref 3–11)
AST SERPL-CCNC: 15 U/L (ref 0–33)
BILIRUB SERPL-MCNC: 0.3 MG/DL (ref 0.3–1.2)
BUN BLD-MCNC: 16 MG/DL (ref 9–23)
BUN/CREAT SERPL: 13.3 (ref 7–25)
CALCIUM SPEC-SCNC: 9.3 MG/DL (ref 8.7–10.4)
CHLORIDE SERPL-SCNC: 102 MMOL/L (ref 99–109)
CO2 SERPL-SCNC: 26 MMOL/L (ref 20–31)
CREAT BLD-MCNC: 1.2 MG/DL (ref 0.6–1.3)
ERYTHROCYTE [DISTWIDTH] IN BLOOD BY AUTOMATED COUNT: 13.4 % (ref 11.3–14.5)
FERRITIN SERPL-MCNC: 104 NG/ML (ref 10–291)
GFR SERPL CREATININE-BSD FRML MDRD: 57 ML/MIN/1.73
GLOBULIN UR ELPH-MCNC: 2.8 GM/DL
GLUCOSE BLD-MCNC: 91 MG/DL (ref 70–100)
HCT VFR BLD AUTO: 41.5 % (ref 34.5–44)
HGB BLD-MCNC: 13.2 G/DL (ref 11.5–15.5)
IRON 24H UR-MRATE: 72 MCG/DL (ref 50–175)
IRON SATN MFR SERPL: 20 % (ref 15–50)
LYMPHOCYTES # BLD AUTO: 1.7 10*3/MM3 (ref 0.6–4.8)
LYMPHOCYTES NFR BLD AUTO: 36.1 % (ref 24–44)
MCH RBC QN AUTO: 26.8 PG (ref 27–31)
MCHC RBC AUTO-ENTMCNC: 31.8 G/DL (ref 32–36)
MCV RBC AUTO: 84.2 FL (ref 80–99)
MONOCYTES # BLD AUTO: 0.1 10*3/MM3 (ref 0–1)
MONOCYTES NFR BLD AUTO: 2.8 % (ref 0–12)
NEUTROPHILS # BLD AUTO: 2.9 10*3/MM3 (ref 1.5–8.3)
NEUTROPHILS NFR BLD AUTO: 61.1 % (ref 41–71)
PLATELET # BLD AUTO: 205 10*3/MM3 (ref 150–450)
PMV BLD AUTO: 7.7 FL (ref 6–12)
POTASSIUM BLD-SCNC: 3.4 MMOL/L (ref 3.5–5.5)
PROT SERPL-MCNC: 7.2 G/DL (ref 5.7–8.2)
RBC # BLD AUTO: 4.92 10*6/MM3 (ref 3.89–5.14)
SODIUM BLD-SCNC: 140 MMOL/L (ref 132–146)
TIBC SERPL-MCNC: 366 MCG/DL (ref 250–450)
WBC NRBC COR # BLD: 4.7 10*3/MM3 (ref 3.5–10.8)

## 2017-10-06 PROCEDURE — 80053 COMPREHEN METABOLIC PANEL: CPT | Performed by: NURSE PRACTITIONER

## 2017-10-06 PROCEDURE — 83540 ASSAY OF IRON: CPT | Performed by: NURSE PRACTITIONER

## 2017-10-06 PROCEDURE — 82728 ASSAY OF FERRITIN: CPT | Performed by: NURSE PRACTITIONER

## 2017-10-06 PROCEDURE — 85025 COMPLETE CBC W/AUTO DIFF WBC: CPT | Performed by: NURSE PRACTITIONER

## 2017-10-06 PROCEDURE — 96523 IRRIG DRUG DELIVERY DEVICE: CPT

## 2017-10-06 PROCEDURE — 83550 IRON BINDING TEST: CPT | Performed by: NURSE PRACTITIONER

## 2017-10-06 PROCEDURE — 36591 DRAW BLOOD OFF VENOUS DEVICE: CPT

## 2017-10-06 PROCEDURE — 25010000002 HEPARIN FLUSH (PORCINE) 100 UNIT/ML SOLUTION: Performed by: INTERNAL MEDICINE

## 2017-10-06 PROCEDURE — 99213 OFFICE O/P EST LOW 20 MIN: CPT | Performed by: NURSE PRACTITIONER

## 2017-10-06 RX ORDER — SODIUM CHLORIDE 0.9 % (FLUSH) 0.9 %
10 SYRINGE (ML) INJECTION AS NEEDED
Status: CANCELLED | OUTPATIENT
Start: 2017-10-06

## 2017-10-06 RX ADMIN — HEPARIN 500 UNITS: 100 SYRINGE at 14:23

## 2017-10-06 NOTE — PROGRESS NOTES
"      PROBLEM LIST:  1. Stage I breast cancer, left upper outer quadrant:   a) Original diagnosis 06/20/2010.   b) Left mastectomy with sentinel lymph node sampling and prophylactic right  mastectomy.   c) [id number] stage I.   d) Estrogen receptor and progesterone receptor positive and HER-2 negative  with low risk Oncotype.   e) Completed adjuvant endocrine therapy 07/08/2015.   2. Fibromyalgia and chronic pain.   3. Recurring iron deficiency anemia.   4. Gastroparesis.       CHIEF COMPLAINT:    Subjective     HISTORY OF PRESENT ILLNESS:   Mrs. Haro is here for follow up evaluation of history of breast cancer and iron deficiency anemia. She is maintaining her usual daily activities. She denies any cough or dyspnea. No chest pain or palpitations. No new long bone pain, severe headaches, diplopia, nose or gum bleeding. No melena hematochezia, or hematuria.     Past Medical History, Past Surgical History, Social History, Family History have been reviewed and are without significant changes except as mentioned.    Review of Systems   A comprehensive 14 point review of systems was performed and was negative except as mentioned.    Medications:  The current medication list was reviewed in the EMR    ALLERGIES:    Allergies   Allergen Reactions   • Aspirin    • Gabapentin    • Ibuprofen    • Morphine And Related        Objective      /85  Pulse (!) 18  Temp 97.8 °F (36.6 °C)  Resp 18  Ht 65.5\" (166.4 cm)  Wt 169 lb (76.7 kg)  BMI 27.7 kg/m2         General: well appearing, in no acute distress   HEENT: sclera anicteric, oropharynx clear  Lymphatics: no cervical, supraclavicular, or axillary adenopathy  Cardiovascular: regular rate and rhythm, no murmurs  Lungs: clear to auscultation bilaterally  Abdomen: soft, nontender, nondistended.  No palpable masses or organomegaly  Extremeties: no lower extremity edema, cords or calf tenderness  Skin: no rashes, lesions, bruising, or petechiae    RECENT " LABS:  Hematology WBC   Date Value Ref Range Status   07/21/2017 4.60 3.50 - 10.80 10*3/mm3 Final     Hemoglobin   Date Value Ref Range Status   07/21/2017 11.5 11.5 - 15.5 g/dL Final     Hematocrit   Date Value Ref Range Status   07/21/2017 36.1 34.5 - 44.0 % Final     MCV   Date Value Ref Range Status   07/21/2017 83.5 80.0 - 99.0 fL Final     RDW   Date Value Ref Range Status   07/21/2017 14.8 (H) 11.3 - 14.5 % Final     MPV   Date Value Ref Range Status   07/21/2017 7.7 6.0 - 12.0 fL Final     Platelets   Date Value Ref Range Status   07/21/2017 213 150 - 450 10*3/mm3 Final     Neutrophils, Absolute   Date Value Ref Range Status   07/21/2017 2.20 1.50 - 8.30 10*3/mm3 Final     Lymphocytes, Absolute   Date Value Ref Range Status   07/21/2017 2.00 0.60 - 4.80 10*3/mm3 Final     Monocytes, Absolute   Date Value Ref Range Status   07/21/2017 0.30 0.00 - 1.00 10*3/mm3 Final     Eosinophils Absolute   Date Value Ref Range Status   05/04/2016 0.08 (L) 0.10 - 0.30 K/mcL Final     Basophils Absolute   Date Value Ref Range Status   05/04/2016 0.02 0.00 - 0.20 K/mcL Final     nRBC   Date Value Ref Range Status   05/14/2014 0.0  Final       Glucose   Date Value Ref Range Status   03/31/2017 111 (H) 70 - 100 mg/dL Final     Sodium   Date Value Ref Range Status   03/31/2017 138 132 - 146 mmol/L Final     Potassium   Date Value Ref Range Status   03/31/2017 4.0 3.5 - 5.5 mmol/L Final     CO2   Date Value Ref Range Status   03/31/2017 27.0 20.0 - 31.0 mmol/L Final     Chloride   Date Value Ref Range Status   03/31/2017 105 99 - 109 mmol/L Final     Anion Gap   Date Value Ref Range Status   03/31/2017 6.0 3.0 - 11.0 mmol/L Final     Creatinine   Date Value Ref Range Status   03/31/2017 1.00 0.60 - 1.30 mg/dL Final     BUN   Date Value Ref Range Status   03/31/2017 16 9 - 23 mg/dL Final     BUN/Creatinine Ratio   Date Value Ref Range Status   03/31/2017 16.0 7.0 - 25.0 Final     Calcium   Date Value Ref Range Status   03/31/2017  10.2 8.7 - 10.4 mg/dL Final     Alkaline Phosphatase   Date Value Ref Range Status   03/31/2017 132 (H) 25 - 100 U/L Final     Total Protein   Date Value Ref Range Status   03/31/2017 7.4 5.7 - 8.2 g/dL Final     ALT (SGPT)   Date Value Ref Range Status   03/31/2017 10 7 - 40 U/L Final     AST (SGOT)   Date Value Ref Range Status   03/31/2017 16 0 - 33 U/L Final     Total Bilirubin   Date Value Ref Range Status   03/31/2017 0.5 0.3 - 1.2 mg/dL Final     Albumin   Date Value Ref Range Status   03/31/2017 4.40 3.20 - 4.80 g/dL Final     Globulin   Date Value Ref Range Status   03/31/2017 3.0 gm/dL Final     A/G Ratio   Date Value Ref Range Status   03/31/2017 1.5 1.5 - 2.5 g/dL Final       LDH   Date Value Ref Range Status   06/19/2015 174 120 - 246 Units/L Final          Assessment/Plan   IMPRESSION:   1. Anemia. Improved. She has had recurring iron deficiency anemia. She has responded to parenteral iron in the past.   2. History of breast cancer. She has done well after adjuvant endocrine therapy does not have any symptoms to suggest recurrence.        PLAN:   1. I will check her CBC, CMP, and iron studies today and call if they are adversely changed.   2. If these are acceptable she will check a CBC every other month along with port flush.    3. I will see her back in about 6 months or sooner if new problems or findings develop.                 Michelle Drake, [facility] Hematology and Oncology    10/6/2017          CC:

## 2017-11-03 ENCOUNTER — APPOINTMENT (OUTPATIENT)
Dept: ONCOLOGY | Facility: HOSPITAL | Age: 52
End: 2017-11-03

## 2017-11-10 ENCOUNTER — INFUSION (OUTPATIENT)
Dept: ONCOLOGY | Facility: HOSPITAL | Age: 52
End: 2017-11-10

## 2017-11-10 VITALS
DIASTOLIC BLOOD PRESSURE: 81 MMHG | BODY MASS INDEX: 27.64 KG/M2 | HEART RATE: 95 BPM | SYSTOLIC BLOOD PRESSURE: 129 MMHG | WEIGHT: 172 LBS | TEMPERATURE: 97.5 F | RESPIRATION RATE: 18 BRPM | HEIGHT: 66 IN

## 2017-11-10 DIAGNOSIS — T82.598D MALFUNCTION OF PERIPHERAL INSERTED CENTRAL CATHETER, SUBSEQUENT ENCOUNTER: ICD-10-CM

## 2017-11-10 DIAGNOSIS — Z17.0 MALIGNANT NEOPLASM OF UPPER-OUTER QUADRANT OF LEFT BREAST IN FEMALE, ESTROGEN RECEPTOR POSITIVE (HCC): ICD-10-CM

## 2017-11-10 DIAGNOSIS — D50.8 OTHER IRON DEFICIENCY ANEMIA: Primary | ICD-10-CM

## 2017-11-10 DIAGNOSIS — C50.412 MALIGNANT NEOPLASM OF UPPER-OUTER QUADRANT OF LEFT BREAST IN FEMALE, ESTROGEN RECEPTOR POSITIVE (HCC): ICD-10-CM

## 2017-11-10 LAB
ERYTHROCYTE [DISTWIDTH] IN BLOOD BY AUTOMATED COUNT: 13.5 % (ref 11.3–14.5)
HCT VFR BLD AUTO: 39.2 % (ref 34.5–44)
HGB BLD-MCNC: 12.7 G/DL (ref 11.5–15.5)
LYMPHOCYTES # BLD AUTO: 1.9 10*3/MM3 (ref 0.6–4.8)
LYMPHOCYTES NFR BLD AUTO: 41.3 % (ref 24–44)
MCH RBC QN AUTO: 27.2 PG (ref 27–31)
MCHC RBC AUTO-ENTMCNC: 32.4 G/DL (ref 32–36)
MCV RBC AUTO: 83.8 FL (ref 80–99)
MONOCYTES # BLD AUTO: 0.2 10*3/MM3 (ref 0–1)
MONOCYTES NFR BLD AUTO: 3.8 % (ref 0–12)
NEUTROPHILS # BLD AUTO: 2.5 10*3/MM3 (ref 1.5–8.3)
NEUTROPHILS NFR BLD AUTO: 54.9 % (ref 41–71)
PLATELET # BLD AUTO: 166 10*3/MM3 (ref 150–450)
PMV BLD AUTO: 8.3 FL (ref 6–12)
RBC # BLD AUTO: 4.68 10*6/MM3 (ref 3.89–5.14)
WBC NRBC COR # BLD: 4.5 10*3/MM3 (ref 3.5–10.8)

## 2017-11-10 PROCEDURE — 36591 DRAW BLOOD OFF VENOUS DEVICE: CPT

## 2017-11-10 PROCEDURE — 85025 COMPLETE CBC W/AUTO DIFF WBC: CPT

## 2017-11-10 RX ORDER — SODIUM CHLORIDE 0.9 % (FLUSH) 0.9 %
10 SYRINGE (ML) INJECTION AS NEEDED
Status: CANCELLED | OUTPATIENT
Start: 2017-11-10

## 2017-12-08 ENCOUNTER — INFUSION (OUTPATIENT)
Dept: ONCOLOGY | Facility: HOSPITAL | Age: 52
End: 2017-12-08

## 2017-12-08 VITALS
DIASTOLIC BLOOD PRESSURE: 81 MMHG | SYSTOLIC BLOOD PRESSURE: 117 MMHG | WEIGHT: 171 LBS | HEART RATE: 116 BPM | RESPIRATION RATE: 18 BRPM | TEMPERATURE: 97.7 F | BODY MASS INDEX: 27.48 KG/M2 | HEIGHT: 66 IN

## 2017-12-08 DIAGNOSIS — D50.8 OTHER IRON DEFICIENCY ANEMIA: Primary | ICD-10-CM

## 2017-12-08 DIAGNOSIS — C50.412 MALIGNANT NEOPLASM OF UPPER-OUTER QUADRANT OF LEFT BREAST IN FEMALE, ESTROGEN RECEPTOR POSITIVE (HCC): ICD-10-CM

## 2017-12-08 DIAGNOSIS — Z17.0 MALIGNANT NEOPLASM OF UPPER-OUTER QUADRANT OF LEFT BREAST IN FEMALE, ESTROGEN RECEPTOR POSITIVE (HCC): ICD-10-CM

## 2017-12-08 DIAGNOSIS — T82.598D MALFUNCTION OF PERIPHERAL INSERTED CENTRAL CATHETER, SUBSEQUENT ENCOUNTER: ICD-10-CM

## 2017-12-08 PROCEDURE — 25010000002 HEPARIN FLUSH (PORCINE) 100 UNIT/ML SOLUTION: Performed by: INTERNAL MEDICINE

## 2017-12-08 PROCEDURE — 96523 IRRIG DRUG DELIVERY DEVICE: CPT

## 2017-12-08 RX ORDER — SODIUM CHLORIDE 0.9 % (FLUSH) 0.9 %
10 SYRINGE (ML) INJECTION AS NEEDED
Status: DISCONTINUED | OUTPATIENT
Start: 2017-12-08 | End: 2017-12-08 | Stop reason: HOSPADM

## 2017-12-08 RX ORDER — SODIUM CHLORIDE 0.9 % (FLUSH) 0.9 %
10 SYRINGE (ML) INJECTION AS NEEDED
Status: CANCELLED | OUTPATIENT
Start: 2017-12-08

## 2017-12-08 RX ADMIN — Medication 10 ML: at 14:23

## 2017-12-08 RX ADMIN — HEPARIN 500 UNITS: 100 SYRINGE at 14:23

## 2018-01-19 ENCOUNTER — INFUSION (OUTPATIENT)
Dept: ONCOLOGY | Facility: HOSPITAL | Age: 53
End: 2018-01-19

## 2018-01-19 VITALS
RESPIRATION RATE: 20 BRPM | HEART RATE: 110 BPM | TEMPERATURE: 98.1 F | BODY MASS INDEX: 28.32 KG/M2 | HEIGHT: 65 IN | SYSTOLIC BLOOD PRESSURE: 137 MMHG | WEIGHT: 170 LBS | DIASTOLIC BLOOD PRESSURE: 95 MMHG

## 2018-01-19 DIAGNOSIS — T82.598D MALFUNCTION OF PERIPHERAL INSERTED CENTRAL CATHETER, SUBSEQUENT ENCOUNTER: ICD-10-CM

## 2018-01-19 DIAGNOSIS — D50.8 OTHER IRON DEFICIENCY ANEMIA: Primary | ICD-10-CM

## 2018-01-19 DIAGNOSIS — Z17.0 MALIGNANT NEOPLASM OF UPPER-OUTER QUADRANT OF LEFT BREAST IN FEMALE, ESTROGEN RECEPTOR POSITIVE (HCC): ICD-10-CM

## 2018-01-19 DIAGNOSIS — C50.412 MALIGNANT NEOPLASM OF UPPER-OUTER QUADRANT OF LEFT BREAST IN FEMALE, ESTROGEN RECEPTOR POSITIVE (HCC): ICD-10-CM

## 2018-01-19 PROCEDURE — 25010000002 HEPARIN FLUSH (PORCINE) 100 UNIT/ML SOLUTION: Performed by: INTERNAL MEDICINE

## 2018-01-19 PROCEDURE — 96523 IRRIG DRUG DELIVERY DEVICE: CPT

## 2018-01-19 RX ORDER — SODIUM CHLORIDE 0.9 % (FLUSH) 0.9 %
10 SYRINGE (ML) INJECTION AS NEEDED
Status: CANCELLED | OUTPATIENT
Start: 2018-01-19

## 2018-01-19 RX ORDER — SODIUM CHLORIDE 0.9 % (FLUSH) 0.9 %
10 SYRINGE (ML) INJECTION AS NEEDED
Status: DISCONTINUED | OUTPATIENT
Start: 2018-01-19 | End: 2018-01-19 | Stop reason: HOSPADM

## 2018-01-19 RX ADMIN — HEPARIN 500 UNITS: 100 SYRINGE at 14:24

## 2018-01-19 RX ADMIN — Medication 10 ML: at 14:23

## 2018-02-16 ENCOUNTER — APPOINTMENT (OUTPATIENT)
Dept: ONCOLOGY | Facility: HOSPITAL | Age: 53
End: 2018-02-16

## 2018-02-19 ENCOUNTER — INFUSION (OUTPATIENT)
Dept: ONCOLOGY | Facility: HOSPITAL | Age: 53
End: 2018-02-19

## 2018-02-19 VITALS
HEIGHT: 65 IN | BODY MASS INDEX: 27.99 KG/M2 | WEIGHT: 168 LBS | SYSTOLIC BLOOD PRESSURE: 122 MMHG | HEART RATE: 116 BPM | RESPIRATION RATE: 20 BRPM | TEMPERATURE: 97.3 F | DIASTOLIC BLOOD PRESSURE: 75 MMHG

## 2018-02-19 DIAGNOSIS — Z17.0 MALIGNANT NEOPLASM OF UPPER-OUTER QUADRANT OF LEFT BREAST IN FEMALE, ESTROGEN RECEPTOR POSITIVE (HCC): ICD-10-CM

## 2018-02-19 DIAGNOSIS — D50.8 OTHER IRON DEFICIENCY ANEMIA: Primary | ICD-10-CM

## 2018-02-19 DIAGNOSIS — C50.412 MALIGNANT NEOPLASM OF UPPER-OUTER QUADRANT OF LEFT BREAST IN FEMALE, ESTROGEN RECEPTOR POSITIVE (HCC): ICD-10-CM

## 2018-02-19 DIAGNOSIS — T82.598D MALFUNCTION OF PERIPHERAL INSERTED CENTRAL CATHETER, SUBSEQUENT ENCOUNTER: ICD-10-CM

## 2018-02-19 PROCEDURE — 96523 IRRIG DRUG DELIVERY DEVICE: CPT

## 2018-02-19 PROCEDURE — 25010000002 HEPARIN FLUSH (PORCINE) 100 UNIT/ML SOLUTION: Performed by: INTERNAL MEDICINE

## 2018-02-19 RX ORDER — SODIUM CHLORIDE 0.9 % (FLUSH) 0.9 %
10 SYRINGE (ML) INJECTION AS NEEDED
Status: CANCELLED | OUTPATIENT
Start: 2018-02-19

## 2018-02-19 RX ADMIN — HEPARIN 500 UNITS: 100 SYRINGE at 15:37

## 2018-04-23 ENCOUNTER — INFUSION (OUTPATIENT)
Dept: ONCOLOGY | Facility: HOSPITAL | Age: 53
End: 2018-04-23

## 2018-04-23 ENCOUNTER — OFFICE VISIT (OUTPATIENT)
Dept: ONCOLOGY | Facility: CLINIC | Age: 53
End: 2018-04-23

## 2018-04-23 VITALS
HEIGHT: 65 IN | SYSTOLIC BLOOD PRESSURE: 100 MMHG | DIASTOLIC BLOOD PRESSURE: 70 MMHG | WEIGHT: 166 LBS | HEART RATE: 74 BPM | BODY MASS INDEX: 27.66 KG/M2 | RESPIRATION RATE: 16 BRPM | TEMPERATURE: 97.3 F

## 2018-04-23 VITALS — WEIGHT: 163 LBS | BODY MASS INDEX: 27.12 KG/M2

## 2018-04-23 DIAGNOSIS — T82.598D MALFUNCTION OF PERIPHERAL INSERTED CENTRAL CATHETER, SUBSEQUENT ENCOUNTER: ICD-10-CM

## 2018-04-23 DIAGNOSIS — D50.8 OTHER IRON DEFICIENCY ANEMIA: Primary | ICD-10-CM

## 2018-04-23 DIAGNOSIS — C50.412 MALIGNANT NEOPLASM OF UPPER-OUTER QUADRANT OF LEFT BREAST IN FEMALE, ESTROGEN RECEPTOR POSITIVE (HCC): ICD-10-CM

## 2018-04-23 DIAGNOSIS — Z17.0 MALIGNANT NEOPLASM OF UPPER-OUTER QUADRANT OF LEFT BREAST IN FEMALE, ESTROGEN RECEPTOR POSITIVE (HCC): Primary | ICD-10-CM

## 2018-04-23 DIAGNOSIS — C50.412 MALIGNANT NEOPLASM OF UPPER-OUTER QUADRANT OF LEFT BREAST IN FEMALE, ESTROGEN RECEPTOR POSITIVE (HCC): Primary | ICD-10-CM

## 2018-04-23 DIAGNOSIS — D50.8 OTHER IRON DEFICIENCY ANEMIA: ICD-10-CM

## 2018-04-23 DIAGNOSIS — Z17.0 MALIGNANT NEOPLASM OF UPPER-OUTER QUADRANT OF LEFT BREAST IN FEMALE, ESTROGEN RECEPTOR POSITIVE (HCC): ICD-10-CM

## 2018-04-23 LAB
ALBUMIN SERPL-MCNC: 4.2 G/DL (ref 3.2–4.8)
ALBUMIN/GLOB SERPL: 1.7 G/DL (ref 1.5–2.5)
ALP SERPL-CCNC: 140 U/L (ref 25–100)
ALT SERPL W P-5'-P-CCNC: 13 U/L (ref 7–40)
ANION GAP SERPL CALCULATED.3IONS-SCNC: 5 MMOL/L (ref 3–11)
AST SERPL-CCNC: 18 U/L (ref 0–33)
BILIRUB SERPL-MCNC: 0.3 MG/DL (ref 0.3–1.2)
BUN BLD-MCNC: 10 MG/DL (ref 9–23)
BUN/CREAT SERPL: 11.1 (ref 7–25)
CALCIUM SPEC-SCNC: 9.5 MG/DL (ref 8.7–10.4)
CHLORIDE SERPL-SCNC: 105 MMOL/L (ref 99–109)
CO2 SERPL-SCNC: 30 MMOL/L (ref 20–31)
CREAT BLD-MCNC: 0.9 MG/DL (ref 0.6–1.3)
ERYTHROCYTE [DISTWIDTH] IN BLOOD BY AUTOMATED COUNT: 13.5 % (ref 11.3–14.5)
FERRITIN SERPL-MCNC: 82 NG/ML (ref 10–291)
GFR SERPL CREATININE-BSD FRML MDRD: 79 ML/MIN/1.73
GLOBULIN UR ELPH-MCNC: 2.5 GM/DL
GLUCOSE BLD-MCNC: 88 MG/DL (ref 70–100)
HCT VFR BLD AUTO: 38 % (ref 34.5–44)
HGB BLD-MCNC: 12 G/DL (ref 11.5–15.5)
IRON 24H UR-MRATE: 72 MCG/DL (ref 50–175)
IRON SATN MFR SERPL: 20 % (ref 15–50)
LYMPHOCYTES # BLD AUTO: 1.3 10*3/MM3 (ref 0.6–4.8)
LYMPHOCYTES NFR BLD AUTO: 37 % (ref 24–44)
MCH RBC QN AUTO: 26.3 PG (ref 27–31)
MCHC RBC AUTO-ENTMCNC: 31.5 G/DL (ref 32–36)
MCV RBC AUTO: 83.4 FL (ref 80–99)
MONOCYTES # BLD AUTO: 0.2 10*3/MM3 (ref 0–1)
MONOCYTES NFR BLD AUTO: 6.2 % (ref 0–12)
NEUTROPHILS # BLD AUTO: 1.9 10*3/MM3 (ref 1.5–8.3)
NEUTROPHILS NFR BLD AUTO: 56.8 % (ref 41–71)
PLATELET # BLD AUTO: 148 10*3/MM3 (ref 150–450)
PMV BLD AUTO: 8.4 FL (ref 6–12)
POTASSIUM BLD-SCNC: 4.4 MMOL/L (ref 3.5–5.5)
PROT SERPL-MCNC: 6.7 G/DL (ref 5.7–8.2)
RBC # BLD AUTO: 4.56 10*6/MM3 (ref 3.89–5.14)
SODIUM BLD-SCNC: 140 MMOL/L (ref 132–146)
TIBC SERPL-MCNC: 366 MCG/DL (ref 250–450)
WBC NRBC COR # BLD: 3.4 10*3/MM3 (ref 3.5–10.8)

## 2018-04-23 PROCEDURE — 80053 COMPREHEN METABOLIC PANEL: CPT | Performed by: NURSE PRACTITIONER

## 2018-04-23 PROCEDURE — 85025 COMPLETE CBC W/AUTO DIFF WBC: CPT | Performed by: NURSE PRACTITIONER

## 2018-04-23 PROCEDURE — 82728 ASSAY OF FERRITIN: CPT | Performed by: NURSE PRACTITIONER

## 2018-04-23 PROCEDURE — 36591 DRAW BLOOD OFF VENOUS DEVICE: CPT

## 2018-04-23 PROCEDURE — 25010000002 HEPARIN FLUSH (PORCINE) 100 UNIT/ML SOLUTION: Performed by: INTERNAL MEDICINE

## 2018-04-23 PROCEDURE — 83550 IRON BINDING TEST: CPT | Performed by: NURSE PRACTITIONER

## 2018-04-23 PROCEDURE — 99213 OFFICE O/P EST LOW 20 MIN: CPT | Performed by: NURSE PRACTITIONER

## 2018-04-23 PROCEDURE — 83540 ASSAY OF IRON: CPT | Performed by: NURSE PRACTITIONER

## 2018-04-23 RX ORDER — SODIUM CHLORIDE 0.9 % (FLUSH) 0.9 %
10 SYRINGE (ML) INJECTION AS NEEDED
Status: CANCELLED | OUTPATIENT
Start: 2018-04-23

## 2018-04-23 RX ORDER — SODIUM CHLORIDE 0.9 % (FLUSH) 0.9 %
10 SYRINGE (ML) INJECTION AS NEEDED
Status: DISCONTINUED | OUTPATIENT
Start: 2018-04-23 | End: 2018-04-23 | Stop reason: HOSPADM

## 2018-04-23 RX ADMIN — HEPARIN 500 UNITS: 100 SYRINGE at 12:30

## 2018-04-23 RX ADMIN — Medication 10 ML: at 12:30

## 2018-04-23 NOTE — PROGRESS NOTES
"      PROBLEM LIST:  1. Stage I breast cancer, left upper outer quadrant:   a) Original diagnosis 06/20/2010.   b) Left mastectomy with sentinel lymph node sampling and prophylactic right  mastectomy.   c) [id number] stage I.   d) Estrogen receptor and progesterone receptor positive and HER-2 negative  with low risk Oncotype.   e) Completed adjuvant endocrine therapy 07/08/2015.   2. Fibromyalgia and chronic pain.   3. Recurring iron deficiency anemia.   4. Gastroparesis.       CHIEF COMPLAINT:    Subjective     HISTORY OF PRESENT ILLNESS:   Mrs. Haro is here for follow up evaluation of history of breast cancer and iron deficiency anemia.  She complains of chronic bilateral hip pain, she states this is unchanged from previous exams.  She is maintaining her usual daily activities.  She reports a three-week history of sharp stabbing pain around her implanted port.  The pain lasted for approximately 3-4 minutes and goes away if she rubs the area.  She said the pain occurs 2-3 times a day.  She has had this port for approximately 3 years.  She has been more physical with her upper body over the last few weeks with more pulling in tugging motions.  She denies any persistent cough or dyspnea.  No chest pain or palpitations.  No nose or gum bleeding.  No melena or hematochezia.  No new long bone pain, severe headaches, or diplopia.    Past Medical History, Past Surgical History, Social History, Family History have been reviewed and are without significant changes except as mentioned.    Review of Systems   A comprehensive 14 point review of systems was performed and was negative except as mentioned.    Medications:  The current medication list was reviewed in the EMR    ALLERGIES:    Allergies   Allergen Reactions   • Aspirin    • Gabapentin    • Ibuprofen    • Morphine And Related        Objective      /70   Pulse 74   Temp 97.3 °F (36.3 °C)   Resp 16   Ht 165.1 cm (65\")   Wt 75.3 kg (166 lb)   BMI 27.62 kg/m² "          General: well appearing, in no acute distress   HEENT: sclera anicteric, oropharynx clear  Lymphatics: no cervical, supraclavicular, or axillary adenopathy  Cardiovascular: regular rate and rhythm, no murmurs  Lungs: clear to auscultation bilaterally  Abdomen: soft, nontender, nondistended.  No palpable masses or organomegaly  Extremeties: no lower extremity edema, cords or calf tenderness  Skin: no rashes, lesions, bruising, or petechiae    RECENT LABS:  Hematology WBC   Date Value Ref Range Status   11/10/2017 4.50 3.50 - 10.80 10*3/mm3 Final     Hemoglobin   Date Value Ref Range Status   11/10/2017 12.7 11.5 - 15.5 g/dL Final     Hematocrit   Date Value Ref Range Status   11/10/2017 39.2 34.5 - 44.0 % Final     MCV   Date Value Ref Range Status   11/10/2017 83.8 80.0 - 99.0 fL Final     RDW   Date Value Ref Range Status   11/10/2017 13.5 11.3 - 14.5 % Final     MPV   Date Value Ref Range Status   11/10/2017 8.3 6.0 - 12.0 fL Final     Platelets   Date Value Ref Range Status   11/10/2017 166 150 - 450 10*3/mm3 Final     Neutrophils, Absolute   Date Value Ref Range Status   11/10/2017 2.50 1.50 - 8.30 10*3/mm3 Final     Lymphocytes, Absolute   Date Value Ref Range Status   11/10/2017 1.90 0.60 - 4.80 10*3/mm3 Final     Monocytes, Absolute   Date Value Ref Range Status   11/10/2017 0.20 0.00 - 1.00 10*3/mm3 Final     Eosinophils Absolute   Date Value Ref Range Status   05/04/2016 0.08 (L) 0.10 - 0.30 K/mcL Final     Basophils Absolute   Date Value Ref Range Status   05/04/2016 0.02 0.00 - 0.20 K/mcL Final     nRBC   Date Value Ref Range Status   05/14/2014 0.0  Final       Glucose   Date Value Ref Range Status   10/06/2017 91 70 - 100 mg/dL Final     Sodium   Date Value Ref Range Status   10/06/2017 140 132 - 146 mmol/L Final     Potassium   Date Value Ref Range Status   10/06/2017 3.4 (L) 3.5 - 5.5 mmol/L Final     CO2   Date Value Ref Range Status   10/06/2017 26.0 20.0 - 31.0 mmol/L Final     Chloride    Date Value Ref Range Status   10/06/2017 102 99 - 109 mmol/L Final     Anion Gap   Date Value Ref Range Status   10/06/2017 12.0 (H) 3.0 - 11.0 mmol/L Final     Creatinine   Date Value Ref Range Status   10/06/2017 1.20 0.60 - 1.30 mg/dL Final     BUN   Date Value Ref Range Status   10/06/2017 16 9 - 23 mg/dL Final     BUN/Creatinine Ratio   Date Value Ref Range Status   10/06/2017 13.3 7.0 - 25.0 Final     Calcium   Date Value Ref Range Status   10/06/2017 9.3 8.7 - 10.4 mg/dL Final     Alkaline Phosphatase   Date Value Ref Range Status   10/06/2017 132 (H) 25 - 100 U/L Final     Total Protein   Date Value Ref Range Status   10/06/2017 7.2 5.7 - 8.2 g/dL Final     ALT (SGPT)   Date Value Ref Range Status   10/06/2017 10 7 - 40 U/L Final     AST (SGOT)   Date Value Ref Range Status   10/06/2017 15 0 - 33 U/L Final     Total Bilirubin   Date Value Ref Range Status   10/06/2017 0.3 0.3 - 1.2 mg/dL Final     Albumin   Date Value Ref Range Status   10/06/2017 4.40 3.20 - 4.80 g/dL Final     Globulin   Date Value Ref Range Status   10/06/2017 2.8 gm/dL Final     A/G Ratio   Date Value Ref Range Status   10/06/2017 1.6 1.5 - 2.5 g/dL Final       LDH   Date Value Ref Range Status   06/19/2015 174 120 - 246 Units/L Final          Assessment/Plan   IMPRESSION:   1. Anemia. Improved. She has had recurring iron deficiency anemia. She has responded to parenteral iron in the past. Last hemoglobin normal.   2. History of breast cancer. She has done well after adjuvant endocrine therapy. She currently does not have any symptoms to suggest recurrence.        PLAN:   1. I will check her CBC, CMP, and iron studies today and call if they are adversely changed.   2. If these are acceptable she will check a CBC every other month along with port flush.    3. I will see her back in about 6 months or sooner if new problems or findings develop.                 Michelle Drake, [facility]    4/23/2018          CC:

## 2018-05-12 ENCOUNTER — HOSPITAL ENCOUNTER (EMERGENCY)
Facility: HOSPITAL | Age: 53
Discharge: HOME | End: 2018-05-12
Attending: EMERGENCY MEDICINE | Admitting: EMERGENCY MEDICINE

## 2018-05-12 VITALS
DIASTOLIC BLOOD PRESSURE: 84 MMHG | RESPIRATION RATE: 16 BRPM | OXYGEN SATURATION: 100 % | WEIGHT: 163 LBS | SYSTOLIC BLOOD PRESSURE: 136 MMHG | HEART RATE: 64 BPM | HEIGHT: 67 IN | TEMPERATURE: 98 F | BODY MASS INDEX: 25.58 KG/M2

## 2018-05-12 DIAGNOSIS — K31.84 GASTROPARESIS: ICD-10-CM

## 2018-05-12 DIAGNOSIS — R11.15 INTRACTABLE CYCLICAL VOMITING WITH NAUSEA: Primary | ICD-10-CM

## 2018-05-12 DIAGNOSIS — R11.2 INTRACTABLE VOMITING WITH NAUSEA, UNSPECIFIED VOMITING TYPE: ICD-10-CM

## 2018-05-12 LAB
ALBUMIN SERPL-MCNC: 4.6 G/DL (ref 3.2–4.8)
ALBUMIN/GLOB SERPL: 1.3 G/DL (ref 1.5–2.5)
ALP SERPL-CCNC: 149 U/L (ref 25–100)
ALT SERPL W P-5'-P-CCNC: 22 U/L (ref 7–40)
ANION GAP SERPL CALCULATED.3IONS-SCNC: 9 MMOL/L (ref 3–11)
AST SERPL-CCNC: 20 U/L (ref 0–33)
BASOPHILS # BLD AUTO: 0.02 10*3/MM3 (ref 0–0.2)
BASOPHILS NFR BLD AUTO: 0.3 % (ref 0–1)
BILIRUB SERPL-MCNC: 0.4 MG/DL (ref 0.3–1.2)
BUN BLD-MCNC: 13 MG/DL (ref 9–23)
BUN/CREAT SERPL: 10.8 (ref 7–25)
CALCIUM SPEC-SCNC: 10.2 MG/DL (ref 8.7–10.4)
CHLORIDE SERPL-SCNC: 110 MMOL/L (ref 99–109)
CO2 SERPL-SCNC: 24 MMOL/L (ref 20–31)
CREAT BLD-MCNC: 1.2 MG/DL (ref 0.6–1.3)
DEPRECATED RDW RBC AUTO: 42.6 FL (ref 37–54)
EOSINOPHIL # BLD AUTO: 0.11 10*3/MM3 (ref 0–0.3)
EOSINOPHIL NFR BLD AUTO: 1.6 % (ref 0–3)
ERYTHROCYTE [DISTWIDTH] IN BLOOD BY AUTOMATED COUNT: 14.2 % (ref 11.3–14.5)
GFR SERPL CREATININE-BSD FRML MDRD: 57 ML/MIN/1.73
GLOBULIN UR ELPH-MCNC: 3.5 GM/DL
GLUCOSE BLD-MCNC: 141 MG/DL (ref 70–100)
HCT VFR BLD AUTO: 41.3 % (ref 34.5–44)
HGB BLD-MCNC: 13.9 G/DL (ref 11.5–15.5)
HOLD SPECIMEN: NORMAL
HOLD SPECIMEN: NORMAL
IMM GRANULOCYTES # BLD: 0.01 10*3/MM3 (ref 0–0.03)
IMM GRANULOCYTES NFR BLD: 0.1 % (ref 0–0.6)
LIPASE SERPL-CCNC: 25 U/L (ref 6–51)
LYMPHOCYTES # BLD AUTO: 1.37 10*3/MM3 (ref 0.6–4.8)
LYMPHOCYTES NFR BLD AUTO: 19.4 % (ref 24–44)
MCH RBC QN AUTO: 27.6 PG (ref 27–31)
MCHC RBC AUTO-ENTMCNC: 33.7 G/DL (ref 32–36)
MCV RBC AUTO: 81.9 FL (ref 80–99)
MONOCYTES # BLD AUTO: 0.41 10*3/MM3 (ref 0–1)
MONOCYTES NFR BLD AUTO: 5.8 % (ref 0–12)
NEUTROPHILS # BLD AUTO: 5.16 10*3/MM3 (ref 1.5–8.3)
NEUTROPHILS NFR BLD AUTO: 72.9 % (ref 41–71)
PLATELET # BLD AUTO: 201 10*3/MM3 (ref 150–450)
PMV BLD AUTO: 9.4 FL (ref 6–12)
POTASSIUM BLD-SCNC: 3.8 MMOL/L (ref 3.5–5.5)
PROT SERPL-MCNC: 8.1 G/DL (ref 5.7–8.2)
RBC # BLD AUTO: 5.04 10*6/MM3 (ref 3.89–5.14)
SODIUM BLD-SCNC: 143 MMOL/L (ref 132–146)
WBC NRBC COR # BLD: 7.07 10*3/MM3 (ref 3.5–10.8)
WHOLE BLOOD HOLD SPECIMEN: NORMAL
WHOLE BLOOD HOLD SPECIMEN: NORMAL

## 2018-05-12 PROCEDURE — 96376 TX/PRO/DX INJ SAME DRUG ADON: CPT

## 2018-05-12 PROCEDURE — 25010000002 PROCHLORPERAZINE EDISYLATE PER 10 MG: Performed by: EMERGENCY MEDICINE

## 2018-05-12 PROCEDURE — 99284 EMERGENCY DEPT VISIT MOD MDM: CPT

## 2018-05-12 PROCEDURE — 96372 THER/PROPH/DIAG INJ SC/IM: CPT

## 2018-05-12 PROCEDURE — 25010000002 HYDROMORPHONE PER 4 MG: Performed by: EMERGENCY MEDICINE

## 2018-05-12 PROCEDURE — 83690 ASSAY OF LIPASE: CPT | Performed by: EMERGENCY MEDICINE

## 2018-05-12 PROCEDURE — 25010000002 DICYCLOMINE PER 20 MG: Performed by: EMERGENCY MEDICINE

## 2018-05-12 PROCEDURE — 80053 COMPREHEN METABOLIC PANEL: CPT | Performed by: EMERGENCY MEDICINE

## 2018-05-12 PROCEDURE — 25010000002 HALOPERIDOL LACTATE PER 5 MG: Performed by: EMERGENCY MEDICINE

## 2018-05-12 PROCEDURE — 25010000002 ONDANSETRON PER 1 MG: Performed by: EMERGENCY MEDICINE

## 2018-05-12 PROCEDURE — 96375 TX/PRO/DX INJ NEW DRUG ADDON: CPT

## 2018-05-12 PROCEDURE — 96374 THER/PROPH/DIAG INJ IV PUSH: CPT

## 2018-05-12 PROCEDURE — 25010000002 PROMETHAZINE PER 50 MG: Performed by: EMERGENCY MEDICINE

## 2018-05-12 PROCEDURE — 85025 COMPLETE CBC W/AUTO DIFF WBC: CPT | Performed by: EMERGENCY MEDICINE

## 2018-05-12 RX ORDER — HYDROMORPHONE HYDROCHLORIDE 1 MG/ML
0.5 INJECTION, SOLUTION INTRAMUSCULAR; INTRAVENOUS; SUBCUTANEOUS ONCE
Status: COMPLETED | OUTPATIENT
Start: 2018-05-12 | End: 2018-05-12

## 2018-05-12 RX ORDER — METOCLOPRAMIDE 10 MG/1
10 TABLET ORAL
Qty: 21 TABLET | Refills: 0 | Status: SHIPPED | OUTPATIENT
Start: 2018-05-12 | End: 2018-06-18

## 2018-05-12 RX ORDER — SODIUM CHLORIDE 0.9 % (FLUSH) 0.9 %
10 SYRINGE (ML) INJECTION AS NEEDED
Status: DISCONTINUED | OUTPATIENT
Start: 2018-05-12 | End: 2018-05-12 | Stop reason: HOSPADM

## 2018-05-12 RX ORDER — HALOPERIDOL 5 MG/ML
2 INJECTION INTRAMUSCULAR ONCE
Status: COMPLETED | OUTPATIENT
Start: 2018-05-12 | End: 2018-05-12

## 2018-05-12 RX ORDER — ONDANSETRON 2 MG/ML
4 INJECTION INTRAMUSCULAR; INTRAVENOUS ONCE
Status: COMPLETED | OUTPATIENT
Start: 2018-05-12 | End: 2018-05-12

## 2018-05-12 RX ORDER — HALOPERIDOL 5 MG/ML
1 INJECTION INTRAMUSCULAR ONCE
Status: COMPLETED | OUTPATIENT
Start: 2018-05-12 | End: 2018-05-12

## 2018-05-12 RX ORDER — PROMETHAZINE HYDROCHLORIDE 25 MG/ML
12.5 INJECTION, SOLUTION INTRAMUSCULAR; INTRAVENOUS ONCE
Status: COMPLETED | OUTPATIENT
Start: 2018-05-12 | End: 2018-05-12

## 2018-05-12 RX ORDER — DICYCLOMINE HYDROCHLORIDE 10 MG/ML
20 INJECTION INTRAMUSCULAR ONCE
Status: COMPLETED | OUTPATIENT
Start: 2018-05-12 | End: 2018-05-12

## 2018-05-12 RX ADMIN — HALOPERIDOL LACTATE 1 MG: 5 INJECTION, SOLUTION INTRAMUSCULAR at 15:26

## 2018-05-12 RX ADMIN — SODIUM CHLORIDE 500 ML: 9 INJECTION, SOLUTION INTRAVENOUS at 14:03

## 2018-05-12 RX ADMIN — HALOPERIDOL LACTATE 2 MG: 5 INJECTION, SOLUTION INTRAMUSCULAR at 13:58

## 2018-05-12 RX ADMIN — ONDANSETRON 4 MG: 2 INJECTION INTRAMUSCULAR; INTRAVENOUS at 15:27

## 2018-05-12 RX ADMIN — PROCHLORPERAZINE EDISYLATE 10 MG: 5 INJECTION INTRAMUSCULAR; INTRAVENOUS at 14:00

## 2018-05-12 RX ADMIN — PROMETHAZINE HYDROCHLORIDE 12.5 MG: 25 INJECTION INTRAMUSCULAR; INTRAVENOUS at 17:57

## 2018-05-12 RX ADMIN — HYDROMORPHONE HYDROCHLORIDE 0.5 MG: 1 INJECTION, SOLUTION INTRAMUSCULAR; INTRAVENOUS; SUBCUTANEOUS at 15:28

## 2018-05-12 RX ADMIN — DICYCLOMINE HYDROCHLORIDE 20 MG: 20 INJECTION, SOLUTION INTRAMUSCULAR at 19:17

## 2018-05-12 NOTE — ED PROVIDER NOTES
Subjective   Ms. Geneva Haro is a 53 y.o. female with a hx of gastroparesis who presents to the ED with c/o vomiting with onset this morning. She reports that she has had nausea, vomiting, and abdominal pain, which is consistent with past episodes of gastroparesis. No other acute complaints at this time.  The patient reports that same as prior exacerbations of cyclic vomiting in gastroparesis.  She does not feel any further evaluation is necessary other than management of her symptoms.        History provided by:  Patient  Vomiting   The primary symptoms include abdominal pain, nausea and vomiting. The illness began today. The problem has not changed since onset.  The abdominal pain began today. The abdominal pain is generalized.   Nausea began today.   The vomiting began today.       Review of Systems   Gastrointestinal: Positive for abdominal pain, nausea and vomiting.   All other systems reviewed and are negative.      Past Medical History:   Diagnosis Date   • Anxiety    • Arthritis    • Depression    • Diabetes mellitus    • Gall stones    • History of stomach ulcers    • Stomach problems        Allergies   Allergen Reactions   • Aspirin    • Gabapentin    • Ibuprofen    • Morphine And Related        Past Surgical History:   Procedure Laterality Date   • CHOLECYSTECTOMY     • GASTRIC BYPASS      x2   • HERNIA REPAIR     • MASTECTOMY Bilateral     Left With sentinel lymph node sampling and prophylactic right mastectomy   • PACEMAKER IMPLANTATION     • PORTACATH PLACEMENT         Family History   Problem Relation Age of Onset   • Breast cancer Maternal Aunt    • Pancreatic cancer Cousin    • Breast cancer Cousin    • Breast cancer Cousin        Social History     Social History   • Marital status:      Social History Main Topics   • Smoking status: Current Every Day Smoker   • Smokeless tobacco: Never Used   • Alcohol use No   • Drug use:      Types: Marijuana     Other Topics Concern   • Not on file          Objective   Physical Exam   Constitutional: She is oriented to person, place, and time. She appears well-developed and well-nourished. No distress.   HENT:   Head: Normocephalic and atraumatic.   Nose: Nose normal.   Eyes: Conjunctivae are normal. No scleral icterus.   Neck: Normal range of motion. Neck supple.   Cardiovascular: Normal rate, regular rhythm, normal heart sounds and intact distal pulses.    No murmur heard.  Pulmonary/Chest: Effort normal and breath sounds normal. No respiratory distress.   Abdominal: Soft. There is tenderness. There is no rigidity and no guarding.   Mild diffuse abdominal tenderness.   Musculoskeletal: Normal range of motion.   Neurological: She is alert and oriented to person, place, and time.   Skin: Skin is warm and dry. She is not diaphoretic.   Psychiatric: She has a normal mood and affect. Her behavior is normal.   Nursing note and vitals reviewed.      Procedures         ED Course  ED Course   Comment By Time   The patient has rested comfortably throughout her ER course.  She has had minimal episodes of vomiting and only after we wake her up that she have any attempts at vomiting.  When she is asleep she states without any symptoms or issues.  At this point, I do not believe we will have any further benefit in further interventions with this patient.  We will discharge her home follow-up with her doctor as soon as possible.  She is to return the ER for concerns.  She was understanding and agrees. Willian Mcneal MD 05/12 1857     Recent Results (from the past 24 hour(s))   Comprehensive Metabolic Panel    Collection Time: 05/12/18  1:55 PM   Result Value Ref Range    Glucose 141 (H) 70 - 100 mg/dL    BUN 13 9 - 23 mg/dL    Creatinine 1.20 0.60 - 1.30 mg/dL    Sodium 143 132 - 146 mmol/L    Potassium 3.8 3.5 - 5.5 mmol/L    Chloride 110 (H) 99 - 109 mmol/L    CO2 24.0 20.0 - 31.0 mmol/L    Calcium 10.2 8.7 - 10.4 mg/dL    Total Protein 8.1 5.7 - 8.2 g/dL    Albumin  4.60 3.20 - 4.80 g/dL    ALT (SGPT) 22 7 - 40 U/L    AST (SGOT) 20 0 - 33 U/L    Alkaline Phosphatase 149 (H) 25 - 100 U/L    Total Bilirubin 0.4 0.3 - 1.2 mg/dL    eGFR  African Amer 57 (L) >60 mL/min/1.73    Globulin 3.5 gm/dL    A/G Ratio 1.3 (L) 1.5 - 2.5 g/dL    BUN/Creatinine Ratio 10.8 7.0 - 25.0    Anion Gap 9.0 3.0 - 11.0 mmol/L   Lipase    Collection Time: 05/12/18  1:55 PM   Result Value Ref Range    Lipase 25 6 - 51 U/L   Light Blue Top    Collection Time: 05/12/18  1:55 PM   Result Value Ref Range    Extra Tube hold for add-on    Green Top (Gel)    Collection Time: 05/12/18  1:55 PM   Result Value Ref Range    Extra Tube Hold for add-ons.    Lavender Top    Collection Time: 05/12/18  1:55 PM   Result Value Ref Range    Extra Tube hold for add-on    Gold Top - SST    Collection Time: 05/12/18  1:55 PM   Result Value Ref Range    Extra Tube Hold for add-ons.    CBC Auto Differential    Collection Time: 05/12/18  1:55 PM   Result Value Ref Range    WBC 7.07 3.50 - 10.80 10*3/mm3    RBC 5.04 3.89 - 5.14 10*6/mm3    Hemoglobin 13.9 11.5 - 15.5 g/dL    Hematocrit 41.3 34.5 - 44.0 %    MCV 81.9 80.0 - 99.0 fL    MCH 27.6 27.0 - 31.0 pg    MCHC 33.7 32.0 - 36.0 g/dL    RDW 14.2 11.3 - 14.5 %    RDW-SD 42.6 37.0 - 54.0 fl    MPV 9.4 6.0 - 12.0 fL    Platelets 201 150 - 450 10*3/mm3    Neutrophil % 72.9 (H) 41.0 - 71.0 %    Lymphocyte % 19.4 (L) 24.0 - 44.0 %    Monocyte % 5.8 0.0 - 12.0 %    Eosinophil % 1.6 0.0 - 3.0 %    Basophil % 0.3 0.0 - 1.0 %    Immature Grans % 0.1 0.0 - 0.6 %    Neutrophils, Absolute 5.16 1.50 - 8.30 10*3/mm3    Lymphocytes, Absolute 1.37 0.60 - 4.80 10*3/mm3    Monocytes, Absolute 0.41 0.00 - 1.00 10*3/mm3    Eosinophils, Absolute 0.11 0.00 - 0.30 10*3/mm3    Basophils, Absolute 0.02 0.00 - 0.20 10*3/mm3    Immature Grans, Absolute 0.01 0.00 - 0.03 10*3/mm3     Note: In addition to lab results from this visit, the labs listed above may include labs taken at another facility or during a  different encounter within the last 24 hours. Please correlate lab times with ED admission and discharge times for further clarification of the services performed during this visit.    No orders to display     Vitals:    05/12/18 1746 05/12/18 1845 05/12/18 1900 05/12/18 1915   BP:  138/93 144/88 136/84   BP Location:       Patient Position:       Pulse: 60 70  64   Resp:       Temp:       TempSrc:       SpO2:    100%   Weight:       Height:         Medications   sodium chloride 0.9 % flush 10 mL (not administered)   prochlorperazine (COMPAZINE) injection 10 mg (10 mg Intravenous Given 5/12/18 1400)   haloperidol lactate (HALDOL) injection 2 mg (2 mg Intravenous Given 5/12/18 1358)   sodium chloride 0.9 % bolus 500 mL (0 mL Intravenous Stopped 5/12/18 1754)   haloperidol lactate (HALDOL) injection 1 mg (1 mg Intravenous Given 5/12/18 1526)   ondansetron (ZOFRAN) injection 4 mg (4 mg Intravenous Given 5/12/18 1527)   HYDROmorphone (DILAUDID) injection 0.5 mg (0.5 mg Intravenous Given 5/12/18 1528)   promethazine (PHENERGAN) injection 12.5 mg (12.5 mg Intravenous Given 5/12/18 1757)   dicyclomine (BENTYL) injection 20 mg (20 mg Intramuscular Given 5/12/18 1917)     ECG/EMG Results (last 24 hours)     ** No results found for the last 24 hours. **                          MDM  Number of Diagnoses or Management Options  Gastroparesis:   Intractable cyclical vomiting with nausea:   Intractable vomiting with nausea, unspecified vomiting type:      Amount and/or Complexity of Data Reviewed  Clinical lab tests: reviewed  Review and summarize past medical records: yes        Final diagnoses:   Intractable cyclical vomiting with nausea   Gastroparesis   Intractable vomiting with nausea, unspecified vomiting type       Documentation assistance provided by erick May.  Information recorded by the scribcarroll was done at my direction and has been verified and validated by me.     Leeann May  05/12/18 1833       Willian  Vanessa Mcneal MD  05/12/18 1941

## 2018-05-12 NOTE — DISCHARGE INSTRUCTIONS
Follow up with one of the physician centers below to setup primary care.    [facility], [phone], [address], [address], [place] [address]    [facility], [phone], [address], [address] [place] [address], [phone], [address] [place] [address] [address] [place] [address];     [facility], [phone], [address] [place] [address]

## 2018-05-21 ENCOUNTER — INFUSION (OUTPATIENT)
Dept: ONCOLOGY | Facility: HOSPITAL | Age: 53
End: 2018-05-21

## 2018-05-21 VITALS
DIASTOLIC BLOOD PRESSURE: 63 MMHG | TEMPERATURE: 98 F | RESPIRATION RATE: 20 BRPM | SYSTOLIC BLOOD PRESSURE: 131 MMHG | BODY MASS INDEX: 26.37 KG/M2 | HEART RATE: 100 BPM | WEIGHT: 168 LBS | HEIGHT: 67 IN

## 2018-05-21 DIAGNOSIS — Z17.0 MALIGNANT NEOPLASM OF UPPER-OUTER QUADRANT OF LEFT BREAST IN FEMALE, ESTROGEN RECEPTOR POSITIVE (HCC): ICD-10-CM

## 2018-05-21 DIAGNOSIS — C50.412 MALIGNANT NEOPLASM OF UPPER-OUTER QUADRANT OF LEFT BREAST IN FEMALE, ESTROGEN RECEPTOR POSITIVE (HCC): ICD-10-CM

## 2018-05-21 DIAGNOSIS — T82.598D MALFUNCTION OF PERIPHERAL INSERTED CENTRAL CATHETER, SUBSEQUENT ENCOUNTER: ICD-10-CM

## 2018-05-21 DIAGNOSIS — D50.8 OTHER IRON DEFICIENCY ANEMIA: Primary | ICD-10-CM

## 2018-05-21 PROCEDURE — 25010000002 HEPARIN FLUSH (PORCINE) 100 UNIT/ML SOLUTION: Performed by: INTERNAL MEDICINE

## 2018-05-21 PROCEDURE — 96523 IRRIG DRUG DELIVERY DEVICE: CPT

## 2018-05-21 RX ORDER — SODIUM CHLORIDE 0.9 % (FLUSH) 0.9 %
10 SYRINGE (ML) INJECTION AS NEEDED
Status: DISCONTINUED | OUTPATIENT
Start: 2018-05-21 | End: 2018-05-21 | Stop reason: HOSPADM

## 2018-05-21 RX ORDER — SODIUM CHLORIDE 0.9 % (FLUSH) 0.9 %
10 SYRINGE (ML) INJECTION AS NEEDED
Status: CANCELLED | OUTPATIENT
Start: 2018-05-21

## 2018-05-21 RX ADMIN — Medication 10 ML: at 14:12

## 2018-05-21 RX ADMIN — SODIUM CHLORIDE, PRESERVATIVE FREE 500 UNITS: 5 INJECTION INTRAVENOUS at 14:12

## 2018-06-16 ENCOUNTER — APPOINTMENT (OUTPATIENT)
Dept: CT IMAGING | Facility: HOSPITAL | Age: 53
End: 2018-06-16

## 2018-06-16 ENCOUNTER — HOSPITAL ENCOUNTER (EMERGENCY)
Facility: HOSPITAL | Age: 53
Discharge: HOME | End: 2018-06-16
Attending: EMERGENCY MEDICINE | Admitting: EMERGENCY MEDICINE

## 2018-06-16 VITALS
WEIGHT: 160 LBS | DIASTOLIC BLOOD PRESSURE: 68 MMHG | RESPIRATION RATE: 16 BRPM | BODY MASS INDEX: 25.11 KG/M2 | OXYGEN SATURATION: 94 % | HEART RATE: 58 BPM | TEMPERATURE: 98.6 F | SYSTOLIC BLOOD PRESSURE: 93 MMHG | HEIGHT: 67 IN

## 2018-06-16 DIAGNOSIS — R31.9 URINARY TRACT INFECTION WITH HEMATURIA, SITE UNSPECIFIED: ICD-10-CM

## 2018-06-16 DIAGNOSIS — R11.2 NON-INTRACTABLE VOMITING WITH NAUSEA, UNSPECIFIED VOMITING TYPE: Primary | ICD-10-CM

## 2018-06-16 DIAGNOSIS — N39.0 URINARY TRACT INFECTION WITH HEMATURIA, SITE UNSPECIFIED: ICD-10-CM

## 2018-06-16 LAB
ALBUMIN SERPL-MCNC: 4 G/DL (ref 3.2–4.8)
ALBUMIN/GLOB SERPL: 1.4 G/DL (ref 1.5–2.5)
ALP SERPL-CCNC: 131 U/L (ref 25–100)
ALT SERPL W P-5'-P-CCNC: 14 U/L (ref 7–40)
ANION GAP SERPL CALCULATED.3IONS-SCNC: 7 MMOL/L (ref 3–11)
AST SERPL-CCNC: 17 U/L (ref 0–33)
B-HCG UR QL: NEGATIVE
BACTERIA UR QL AUTO: ABNORMAL /HPF
BASOPHILS # BLD AUTO: 0.01 10*3/MM3 (ref 0–0.2)
BASOPHILS NFR BLD AUTO: 0.2 % (ref 0–1)
BILIRUB SERPL-MCNC: 0.3 MG/DL (ref 0.3–1.2)
BILIRUB UR QL STRIP: NEGATIVE
BUN BLD-MCNC: 9 MG/DL (ref 9–23)
BUN/CREAT SERPL: 9.3 (ref 7–25)
CALCIUM SPEC-SCNC: 9.4 MG/DL (ref 8.7–10.4)
CHLORIDE SERPL-SCNC: 112 MMOL/L (ref 99–109)
CLARITY UR: CLEAR
CO2 SERPL-SCNC: 24 MMOL/L (ref 20–31)
COLOR UR: YELLOW
CREAT BLD-MCNC: 0.97 MG/DL (ref 0.6–1.3)
DEPRECATED RDW RBC AUTO: 41.8 FL (ref 37–54)
EOSINOPHIL # BLD AUTO: 0.18 10*3/MM3 (ref 0–0.3)
EOSINOPHIL NFR BLD AUTO: 3.8 % (ref 0–3)
ERYTHROCYTE [DISTWIDTH] IN BLOOD BY AUTOMATED COUNT: 13.7 % (ref 11.3–14.5)
GFR SERPL CREATININE-BSD FRML MDRD: 73 ML/MIN/1.73
GLOBULIN UR ELPH-MCNC: 2.8 GM/DL
GLUCOSE BLD-MCNC: 97 MG/DL (ref 70–100)
GLUCOSE UR STRIP-MCNC: NEGATIVE MG/DL
HCT VFR BLD AUTO: 37.5 % (ref 34.5–44)
HGB BLD-MCNC: 12.4 G/DL (ref 11.5–15.5)
HGB UR QL STRIP.AUTO: NEGATIVE
HOLD SPECIMEN: NORMAL
HOLD SPECIMEN: NORMAL
HYALINE CASTS UR QL AUTO: ABNORMAL /LPF
IMM GRANULOCYTES # BLD: 0 10*3/MM3 (ref 0–0.03)
IMM GRANULOCYTES NFR BLD: 0 % (ref 0–0.6)
INTERNAL NEGATIVE CONTROL: NEGATIVE
INTERNAL POSITIVE CONTROL: POSITIVE
KETONES UR QL STRIP: NEGATIVE
LEUKOCYTE ESTERASE UR QL STRIP.AUTO: ABNORMAL
LIPASE SERPL-CCNC: 23 U/L (ref 6–51)
LYMPHOCYTES # BLD AUTO: 1.76 10*3/MM3 (ref 0.6–4.8)
LYMPHOCYTES NFR BLD AUTO: 37.2 % (ref 24–44)
Lab: NORMAL
MCH RBC QN AUTO: 27.3 PG (ref 27–31)
MCHC RBC AUTO-ENTMCNC: 33.1 G/DL (ref 32–36)
MCV RBC AUTO: 82.6 FL (ref 80–99)
MONOCYTES # BLD AUTO: 0.39 10*3/MM3 (ref 0–1)
MONOCYTES NFR BLD AUTO: 8.2 % (ref 0–12)
NEUTROPHILS # BLD AUTO: 2.39 10*3/MM3 (ref 1.5–8.3)
NEUTROPHILS NFR BLD AUTO: 50.6 % (ref 41–71)
NITRITE UR QL STRIP: POSITIVE
PH UR STRIP.AUTO: 6.5 [PH] (ref 5–8)
PLATELET # BLD AUTO: 159 10*3/MM3 (ref 150–450)
PMV BLD AUTO: 10 FL (ref 6–12)
POTASSIUM BLD-SCNC: 4 MMOL/L (ref 3.5–5.5)
PROT SERPL-MCNC: 6.8 G/DL (ref 5.7–8.2)
PROT UR QL STRIP: NEGATIVE
RBC # BLD AUTO: 4.54 10*6/MM3 (ref 3.89–5.14)
RBC # UR: ABNORMAL /HPF
REF LAB TEST METHOD: ABNORMAL
SODIUM BLD-SCNC: 143 MMOL/L (ref 132–146)
SP GR UR STRIP: 1.01 (ref 1–1.03)
SQUAMOUS #/AREA URNS HPF: ABNORMAL /HPF
UROBILINOGEN UR QL STRIP: ABNORMAL
WBC NRBC COR # BLD: 4.73 10*3/MM3 (ref 3.5–10.8)
WBC UR QL AUTO: ABNORMAL /HPF
WHOLE BLOOD HOLD SPECIMEN: NORMAL
WHOLE BLOOD HOLD SPECIMEN: NORMAL

## 2018-06-16 PROCEDURE — 85025 COMPLETE CBC W/AUTO DIFF WBC: CPT | Performed by: EMERGENCY MEDICINE

## 2018-06-16 PROCEDURE — 74176 CT ABD & PELVIS W/O CONTRAST: CPT

## 2018-06-16 PROCEDURE — 25010000002 CEFTRIAXONE PER 250 MG: Performed by: EMERGENCY MEDICINE

## 2018-06-16 PROCEDURE — 83690 ASSAY OF LIPASE: CPT | Performed by: EMERGENCY MEDICINE

## 2018-06-16 PROCEDURE — 25010000002 HYDROMORPHONE PER 4 MG: Performed by: EMERGENCY MEDICINE

## 2018-06-16 PROCEDURE — 96365 THER/PROPH/DIAG IV INF INIT: CPT

## 2018-06-16 PROCEDURE — 96375 TX/PRO/DX INJ NEW DRUG ADDON: CPT

## 2018-06-16 PROCEDURE — 81001 URINALYSIS AUTO W/SCOPE: CPT | Performed by: EMERGENCY MEDICINE

## 2018-06-16 PROCEDURE — 80053 COMPREHEN METABOLIC PANEL: CPT | Performed by: EMERGENCY MEDICINE

## 2018-06-16 PROCEDURE — 25010000002 HEPARIN FLUSH (PORCINE) 100 UNIT/ML SOLUTION: Performed by: EMERGENCY MEDICINE

## 2018-06-16 PROCEDURE — 25010000002 PROMETHAZINE PER 50 MG: Performed by: EMERGENCY MEDICINE

## 2018-06-16 PROCEDURE — 99284 EMERGENCY DEPT VISIT MOD MDM: CPT

## 2018-06-16 PROCEDURE — 25010000002 METOCLOPRAMIDE PER 10 MG: Performed by: EMERGENCY MEDICINE

## 2018-06-16 RX ORDER — NITROFURANTOIN 25; 75 MG/1; MG/1
100 CAPSULE ORAL 2 TIMES DAILY
Qty: 14 CAPSULE | Refills: 0 | Status: SHIPPED | OUTPATIENT
Start: 2018-06-16 | End: 2019-01-17

## 2018-06-16 RX ORDER — SODIUM CHLORIDE 0.9 % (FLUSH) 0.9 %
10 SYRINGE (ML) INJECTION AS NEEDED
Status: DISCONTINUED | OUTPATIENT
Start: 2018-06-16 | End: 2018-06-16 | Stop reason: HOSPADM

## 2018-06-16 RX ORDER — METOCLOPRAMIDE HYDROCHLORIDE 5 MG/ML
5 INJECTION INTRAMUSCULAR; INTRAVENOUS ONCE
Status: COMPLETED | OUTPATIENT
Start: 2018-06-16 | End: 2018-06-16

## 2018-06-16 RX ORDER — PROMETHAZINE HYDROCHLORIDE 25 MG/ML
6.25 INJECTION, SOLUTION INTRAMUSCULAR; INTRAVENOUS ONCE
Status: COMPLETED | OUTPATIENT
Start: 2018-06-16 | End: 2018-06-16

## 2018-06-16 RX ORDER — CEFTRIAXONE SODIUM 1 G/50ML
1 INJECTION, SOLUTION INTRAVENOUS ONCE
Status: COMPLETED | OUTPATIENT
Start: 2018-06-16 | End: 2018-06-16

## 2018-06-16 RX ADMIN — METOCLOPRAMIDE 5 MG: 5 INJECTION, SOLUTION INTRAMUSCULAR; INTRAVENOUS at 17:51

## 2018-06-16 RX ADMIN — CEFTRIAXONE SODIUM 1 G: 1 INJECTION, SOLUTION INTRAVENOUS at 19:56

## 2018-06-16 RX ADMIN — HEPARIN 500 UNITS: 100 SYRINGE at 20:45

## 2018-06-16 RX ADMIN — PROMETHAZINE HYDROCHLORIDE 6.25 MG: 25 INJECTION INTRAMUSCULAR; INTRAVENOUS at 17:50

## 2018-06-16 RX ADMIN — HYDROMORPHONE HYDROCHLORIDE 0.25 MG: 1 INJECTION, SOLUTION INTRAMUSCULAR; INTRAVENOUS; SUBCUTANEOUS at 18:27

## 2018-06-16 RX ADMIN — SODIUM CHLORIDE 1000 ML: 9 INJECTION, SOLUTION INTRAVENOUS at 17:50

## 2018-06-16 NOTE — ED PROVIDER NOTES
Subjective   Ms. Geneva Haro is a 53 y.o. female who presents to the ED with c/o vomiting onset approximately 0600. Pt reports at approximately 0600 she woke up and drank a glass of juice and had sudden onset of nausea and vomiting that has persisted since. She reports about 4 vomiting episodes since initial onset. And notes she took Phenergan, which provided no relief. She states she felt at baseline last night and she did not have any heavy or late meals last night as well. Pt notes she has hx of gastroparesis and current sx are consistent with prior episodes. Pt also complains of abd cramping, L hip pain onset about 3 weeks ago and difficulty ambulating secondary to L hip pain, however she denies diarrhea, constipation, fever, chills and any other acute sx at this time. Her last normal BM was about 1 hour ago. Pt has pain stimulator in pace and has hx of fibromyalgia, gall stones, anxiety, DM, arthritis, depression.           History provided by:  Patient  Vomiting   The primary symptoms include abdominal pain, nausea and vomiting. Primary symptoms do not include fever or diarrhea. The illness began today. The onset was sudden.   The illness does not include constipation.       Review of Systems   Constitutional: Negative for fever.   Gastrointestinal: Positive for abdominal pain, nausea and vomiting. Negative for constipation and diarrhea.   All other systems reviewed and are negative.      Past Medical History:   Diagnosis Date   • Anxiety    • Arthritis    • Depression    • Diabetes mellitus    • Gall stones    • History of stomach ulcers    • Stomach problems        Allergies   Allergen Reactions   • Aspirin    • Gabapentin    • Ibuprofen    • Morphine And Related        Past Surgical History:   Procedure Laterality Date   • CHOLECYSTECTOMY     • GASTRIC BYPASS      x2   • HERNIA REPAIR     • MASTECTOMY Bilateral     Left With sentinel lymph node sampling and prophylactic right mastectomy   • PACEMAKER  IMPLANTATION     • PORTACATH PLACEMENT         Family History   Problem Relation Age of Onset   • Breast cancer Maternal Aunt    • Pancreatic cancer Cousin    • Breast cancer Cousin    • Breast cancer Cousin        Social History     Social History   • Marital status:      Social History Main Topics   • Smoking status: Current Every Day Smoker   • Smokeless tobacco: Never Used   • Alcohol use No   • Drug use: Yes     Types: Marijuana   • Sexual activity: Defer     Other Topics Concern   • Not on file         Objective   Physical Exam   Constitutional: She is oriented to person, place, and time. She appears well-developed and well-nourished.  Non-toxic appearance. No distress.   Pt appears somewhat nauseated.    HENT:   Head: Normocephalic and atraumatic.   Right Ear: External ear normal.   Left Ear: External ear normal.   Nose: Nose normal.   Eyes: Conjunctivae are normal. No scleral icterus.   Neck: Normal range of motion.   Cardiovascular: Normal rate, regular rhythm and normal heart sounds.  Exam reveals no gallop and no friction rub.    No murmur heard.  Pulmonary/Chest: Effort normal and breath sounds normal. No respiratory distress. She has no wheezes. She has no rales. She exhibits no tenderness.   Abdominal: Soft. Bowel sounds are normal. There is tenderness (moderate ) in the right lower quadrant. There is no rigidity, no rebound and no guarding.   Musculoskeletal: Normal range of motion.   Neurological: She is alert and oriented to person, place, and time.   Skin: Skin is warm and dry. She is not diaphoretic.   Psychiatric: She has a normal mood and affect. Her behavior is normal.   Nursing note and vitals reviewed.      Procedures         ED Course  ED Course as of Jun 16 2022   Sat Jun 16, 2018 1954 Dr. Vargas re-examined and updated pt on findings. Pt is feeling better and is ready for d/c. All questions answered.   [AT]      ED Course User Index  [AT] Simon Beltran     Recent Results  (from the past 24 hour(s))   Comprehensive Metabolic Panel    Collection Time: 06/16/18  4:24 PM   Result Value Ref Range    Glucose 97 70 - 100 mg/dL    BUN 9 9 - 23 mg/dL    Creatinine 0.97 0.60 - 1.30 mg/dL    Sodium 143 132 - 146 mmol/L    Potassium 4.0 3.5 - 5.5 mmol/L    Chloride 112 (H) 99 - 109 mmol/L    CO2 24.0 20.0 - 31.0 mmol/L    Calcium 9.4 8.7 - 10.4 mg/dL    Total Protein 6.8 5.7 - 8.2 g/dL    Albumin 4.00 3.20 - 4.80 g/dL    ALT (SGPT) 14 7 - 40 U/L    AST (SGOT) 17 0 - 33 U/L    Alkaline Phosphatase 131 (H) 25 - 100 U/L    Total Bilirubin 0.3 0.3 - 1.2 mg/dL    eGFR  African Amer 73 >60 mL/min/1.73    Globulin 2.8 gm/dL    A/G Ratio 1.4 (L) 1.5 - 2.5 g/dL    BUN/Creatinine Ratio 9.3 7.0 - 25.0    Anion Gap 7.0 3.0 - 11.0 mmol/L   Lipase    Collection Time: 06/16/18  4:24 PM   Result Value Ref Range    Lipase 23 6 - 51 U/L   Light Blue Top    Collection Time: 06/16/18  4:24 PM   Result Value Ref Range    Extra Tube hold for add-on    Green Top (Gel)    Collection Time: 06/16/18  4:24 PM   Result Value Ref Range    Extra Tube Hold for add-ons.    Lavender Top    Collection Time: 06/16/18  4:24 PM   Result Value Ref Range    Extra Tube hold for add-on    Gold Top - SST    Collection Time: 06/16/18  4:24 PM   Result Value Ref Range    Extra Tube Hold for add-ons.    CBC Auto Differential    Collection Time: 06/16/18  4:24 PM   Result Value Ref Range    WBC 4.73 3.50 - 10.80 10*3/mm3    RBC 4.54 3.89 - 5.14 10*6/mm3    Hemoglobin 12.4 11.5 - 15.5 g/dL    Hematocrit 37.5 34.5 - 44.0 %    MCV 82.6 80.0 - 99.0 fL    MCH 27.3 27.0 - 31.0 pg    MCHC 33.1 32.0 - 36.0 g/dL    RDW 13.7 11.3 - 14.5 %    RDW-SD 41.8 37.0 - 54.0 fl    MPV 10.0 6.0 - 12.0 fL    Platelets 159 150 - 450 10*3/mm3    Neutrophil % 50.6 41.0 - 71.0 %    Lymphocyte % 37.2 24.0 - 44.0 %    Monocyte % 8.2 0.0 - 12.0 %    Eosinophil % 3.8 (H) 0.0 - 3.0 %    Basophil % 0.2 0.0 - 1.0 %    Immature Grans % 0.0 0.0 - 0.6 %    Neutrophils,  Absolute 2.39 1.50 - 8.30 10*3/mm3    Lymphocytes, Absolute 1.76 0.60 - 4.80 10*3/mm3    Monocytes, Absolute 0.39 0.00 - 1.00 10*3/mm3    Eosinophils, Absolute 0.18 0.00 - 0.30 10*3/mm3    Basophils, Absolute 0.01 0.00 - 0.20 10*3/mm3    Immature Grans, Absolute 0.00 0.00 - 0.03 10*3/mm3   Urinalysis With / Microscopic If Indicated (No Culture) - Urine, Clean Catch    Collection Time: 06/16/18  7:28 PM   Result Value Ref Range    Color, UA Yellow Yellow, Straw    Appearance, UA Clear Clear    pH, UA 6.5 5.0 - 8.0    Specific Gravity, UA 1.009 1.001 - 1.030    Glucose, UA Negative Negative    Ketones, UA Negative Negative    Bilirubin, UA Negative Negative    Blood, UA Negative Negative    Protein, UA Negative Negative    Leuk Esterase, UA Small (1+) (A) Negative    Nitrite, UA Positive (A) Negative    Urobilinogen, UA 0.2 E.U./dL 0.2 - 1.0 E.U./dL   Urinalysis, Microscopic Only - Urine, Clean Catch    Collection Time: 06/16/18  7:28 PM   Result Value Ref Range    RBC, UA 3-6 (A) None Seen, 0-2 /HPF    WBC, UA 13-20 (A) None Seen, 0-2 /HPF    Bacteria, UA 4+ (A) None Seen, Trace /HPF    Squamous Epithelial Cells, UA 0-2 None Seen, 0-2 /HPF    Hyaline Casts, UA None Seen 0 - 6 /LPF    Methodology Automated Microscopy    POCT pregnancy, urine    Collection Time: 06/16/18  7:30 PM   Result Value Ref Range    HCG, Urine, QL Negative Negative    Lot Number [id number]     Internal Positive Control Positive     Internal Negative Control Negative      Note: In addition to lab results from this visit, the labs listed above may include labs taken at another facility or during a different encounter within the last 24 hours. Please correlate lab times with ED admission and discharge times for further clarification of the services performed during this visit.    CT Abdomen Pelvis Without Contrast   Final Result   No acute intra-abdominal or intrapelvic abnormality;   specifically, no evidence for acute appendicitis with appendix  "  visualized and unremarkable. No loculated intra-abdominal fluid   collection.       DICTATED:   6/16/2018   EDITED/ls :   6/16/2018        This report was finalized on 6/16/2018 6:41 PM by Dr. Ramón Mendoza.            Vitals:    06/16/18 1538 06/16/18 1846 06/16/18 1959   BP: 105/69 125/84 104/75   BP Location: Right arm     Patient Position: Sitting     Pulse: 88 54 59   Resp: 12     Temp: 98.6 °F (37 °C)     TempSrc: Oral     SpO2: 96% 96% 99%   Weight: 72.6 kg (160 lb)     Height: 170.2 cm (67\")       Medications   sodium chloride 0.9 % flush 10 mL (not administered)   cefTRIAXone (ROCEPHIN) IVPB 1 g (1 g Intravenous New Bag 6/16/18 1956)   sodium chloride 0.9 % bolus 1,000 mL (0 mL Intravenous Stopped 6/16/18 1952)   promethazine (PHENERGAN) injection 6.25 mg (6.25 mg Intravenous Given 6/16/18 1750)   metoclopramide (REGLAN) injection 5 mg (5 mg Intravenous Given 6/16/18 1751)   HYDROmorphone (DILAUDID) injection 0.25 mg (0.25 mg Intravenous Given 6/16/18 1827)     ECG/EMG Results (last 24 hours)     ** No results found for the last 24 hours. **                      MDM    Final diagnoses:   Non-intractable vomiting with nausea, unspecified vomiting type   Urinary tract infection with hematuria, site unspecified       Documentation assistance provided by erick Beltran.  Information recorded by the scribe was done at my direction and has been verified and validated by me.     Simon Beltran  06/16/18 1617       Simon Beltran  06/16/18 2022       Arsalan Vargas MD  06/19/18 0009    "

## 2018-06-17 NOTE — DISCHARGE INSTRUCTIONS
Follow up with Dr. Kc at next available. Call to schedule an appointment.     Push fluids for the next couple of days.     Take the antibiotics, as I have prescribed.     Please review the medications you are supposed to be taking according to prior physician recommendations. I have not changed your home medications during this visit. If your discharge instructions indicate that I have changed your home medications, this is not the case, and you should disregard. If you have any questions about the medication you should be taking at home, please call your physician.       Immediately return to the emergency department for any new or worsening symptoms.

## 2018-06-18 ENCOUNTER — HOSPITAL ENCOUNTER (OUTPATIENT)
Facility: HOSPITAL | Age: 53
Discharge: HOME | End: 2018-06-20
Attending: EMERGENCY MEDICINE | Admitting: HOSPITALIST

## 2018-06-18 ENCOUNTER — INFUSION (OUTPATIENT)
Dept: ONCOLOGY | Facility: HOSPITAL | Age: 53
End: 2018-06-18

## 2018-06-18 VITALS
HEART RATE: 85 BPM | DIASTOLIC BLOOD PRESSURE: 90 MMHG | WEIGHT: 163 LBS | BODY MASS INDEX: 25.58 KG/M2 | TEMPERATURE: 97.4 F | SYSTOLIC BLOOD PRESSURE: 143 MMHG | HEIGHT: 67 IN | RESPIRATION RATE: 22 BRPM

## 2018-06-18 DIAGNOSIS — R11.15 INTRACTABLE CYCLICAL VOMITING WITH NAUSEA: ICD-10-CM

## 2018-06-18 DIAGNOSIS — G89.29 CHRONIC ABDOMINAL PAIN: ICD-10-CM

## 2018-06-18 DIAGNOSIS — K31.84 GASTROPARESIS: Primary | ICD-10-CM

## 2018-06-18 DIAGNOSIS — R10.9 CHRONIC ABDOMINAL PAIN: ICD-10-CM

## 2018-06-18 PROBLEM — Z72.0 TOBACCO ABUSE: Status: ACTIVE | Noted: 2018-06-18

## 2018-06-18 PROBLEM — K21.9 GERD (GASTROESOPHAGEAL REFLUX DISEASE): Status: ACTIVE | Noted: 2018-06-18

## 2018-06-18 PROBLEM — E11.9 TYPE 2 DIABETES MELLITUS: Status: ACTIVE | Noted: 2018-06-18

## 2018-06-18 PROBLEM — F12.10 MARIJUANA ABUSE: Status: ACTIVE | Noted: 2018-06-18

## 2018-06-18 PROBLEM — F32.A ANXIETY AND DEPRESSION: Status: ACTIVE | Noted: 2018-06-18

## 2018-06-18 PROBLEM — F41.9 ANXIETY AND DEPRESSION: Status: ACTIVE | Noted: 2018-06-18

## 2018-06-18 PROBLEM — R11.2 INTRACTABLE NAUSEA AND VOMITING: Status: ACTIVE | Noted: 2018-06-18

## 2018-06-18 LAB
ALBUMIN SERPL-MCNC: 4.38 G/DL (ref 3.2–4.8)
ALBUMIN/GLOB SERPL: 1.5 G/DL (ref 1.5–2.5)
ALP SERPL-CCNC: 145 U/L (ref 25–100)
ALT SERPL W P-5'-P-CCNC: 22 U/L (ref 7–40)
AMPHET+METHAMPHET UR QL: NEGATIVE
AMPHETAMINES UR QL: NEGATIVE
ANION GAP SERPL CALCULATED.3IONS-SCNC: 8 MMOL/L (ref 3–11)
AST SERPL-CCNC: 28 U/L (ref 0–33)
BACTERIA UR QL AUTO: ABNORMAL /HPF
BARBITURATES UR QL SCN: NEGATIVE
BASOPHILS # BLD AUTO: 0.03 10*3/MM3 (ref 0–0.2)
BASOPHILS NFR BLD AUTO: 0.7 % (ref 0–1)
BENZODIAZ UR QL SCN: NEGATIVE
BILIRUB SERPL-MCNC: 0.3 MG/DL (ref 0.3–1.2)
BILIRUB UR QL STRIP: NEGATIVE
BUN BLD-MCNC: 8 MG/DL (ref 9–23)
BUN/CREAT SERPL: 7.3 (ref 7–25)
BUPRENORPHINE SERPL-MCNC: NEGATIVE NG/ML
CALCIUM SPEC-SCNC: 9.7 MG/DL (ref 8.7–10.4)
CANNABINOIDS SERPL QL: POSITIVE
CHLORIDE SERPL-SCNC: 111 MMOL/L (ref 99–109)
CLARITY UR: CLEAR
CO2 SERPL-SCNC: 26 MMOL/L (ref 20–31)
COCAINE UR QL: POSITIVE
COLOR UR: YELLOW
CREAT BLD-MCNC: 1.1 MG/DL (ref 0.6–1.3)
D-LACTATE SERPL-SCNC: 1.4 MMOL/L (ref 0.5–2)
DEPRECATED RDW RBC AUTO: 40.7 FL (ref 37–54)
EOSINOPHIL # BLD AUTO: 0.06 10*3/MM3 (ref 0–0.3)
EOSINOPHIL NFR BLD AUTO: 1.3 % (ref 0–3)
ERYTHROCYTE [DISTWIDTH] IN BLOOD BY AUTOMATED COUNT: 13.4 % (ref 11.3–14.5)
GFR SERPL CREATININE-BSD FRML MDRD: 63 ML/MIN/1.73
GLOBULIN UR ELPH-MCNC: 3 GM/DL
GLUCOSE BLD-MCNC: 119 MG/DL (ref 70–100)
GLUCOSE UR STRIP-MCNC: NEGATIVE MG/DL
HCT VFR BLD AUTO: 38.8 % (ref 34.5–44)
HGB BLD-MCNC: 12.8 G/DL (ref 11.5–15.5)
HGB UR QL STRIP.AUTO: ABNORMAL
HOLD SPECIMEN: NORMAL
HOLD SPECIMEN: NORMAL
HYALINE CASTS UR QL AUTO: ABNORMAL /LPF
IMM GRANULOCYTES # BLD: 0.01 10*3/MM3 (ref 0–0.03)
IMM GRANULOCYTES NFR BLD: 0.2 % (ref 0–0.6)
KETONES UR QL STRIP: NEGATIVE
LEUKOCYTE ESTERASE UR QL STRIP.AUTO: NEGATIVE
LIPASE SERPL-CCNC: 21 U/L (ref 6–51)
LYMPHOCYTES # BLD AUTO: 1.22 10*3/MM3 (ref 0.6–4.8)
LYMPHOCYTES NFR BLD AUTO: 26.8 % (ref 24–44)
MCH RBC QN AUTO: 27.5 PG (ref 27–31)
MCHC RBC AUTO-ENTMCNC: 33 G/DL (ref 32–36)
MCV RBC AUTO: 83.3 FL (ref 80–99)
METHADONE UR QL SCN: NEGATIVE
MONOCYTES # BLD AUTO: 0.23 10*3/MM3 (ref 0–1)
MONOCYTES NFR BLD AUTO: 5.1 % (ref 0–12)
NEUTROPHILS # BLD AUTO: 3 10*3/MM3 (ref 1.5–8.3)
NEUTROPHILS NFR BLD AUTO: 65.9 % (ref 41–71)
NITRITE UR QL STRIP: NEGATIVE
OPIATES UR QL: POSITIVE
OXYCODONE UR QL SCN: NEGATIVE
PCP UR QL SCN: NEGATIVE
PH UR STRIP.AUTO: 6 [PH] (ref 5–8)
PLATELET # BLD AUTO: 164 10*3/MM3 (ref 150–450)
PMV BLD AUTO: 10 FL (ref 6–12)
POTASSIUM BLD-SCNC: 3.7 MMOL/L (ref 3.5–5.5)
PROPOXYPH UR QL: NEGATIVE
PROT SERPL-MCNC: 7.4 G/DL (ref 5.7–8.2)
PROT UR QL STRIP: NEGATIVE
RBC # BLD AUTO: 4.66 10*6/MM3 (ref 3.89–5.14)
RBC # UR: ABNORMAL /HPF
REF LAB TEST METHOD: ABNORMAL
SODIUM BLD-SCNC: 145 MMOL/L (ref 132–146)
SP GR UR STRIP: 1.01 (ref 1–1.03)
SQUAMOUS #/AREA URNS HPF: ABNORMAL /HPF
TRICYCLICS UR QL SCN: NEGATIVE
UROBILINOGEN UR QL STRIP: ABNORMAL
WBC NRBC COR # BLD: 4.55 10*3/MM3 (ref 3.5–10.8)
WBC UR QL AUTO: ABNORMAL /HPF
WHOLE BLOOD HOLD SPECIMEN: NORMAL
WHOLE BLOOD HOLD SPECIMEN: NORMAL

## 2018-06-18 PROCEDURE — 25010000002 DIPHENHYDRAMINE PER 50 MG: Performed by: PHYSICIAN ASSISTANT

## 2018-06-18 PROCEDURE — G0378 HOSPITAL OBSERVATION PER HR: HCPCS

## 2018-06-18 PROCEDURE — 96361 HYDRATE IV INFUSION ADD-ON: CPT

## 2018-06-18 PROCEDURE — 25010000002 PROCHLORPERAZINE EDISYLATE PER 10 MG: Performed by: PHYSICIAN ASSISTANT

## 2018-06-18 PROCEDURE — 99284 EMERGENCY DEPT VISIT MOD MDM: CPT

## 2018-06-18 PROCEDURE — 80053 COMPREHEN METABOLIC PANEL: CPT | Performed by: EMERGENCY MEDICINE

## 2018-06-18 PROCEDURE — 25010000002 DICYCLOMINE PER 20 MG: Performed by: PHYSICIAN ASSISTANT

## 2018-06-18 PROCEDURE — G0463 HOSPITAL OUTPT CLINIC VISIT: HCPCS

## 2018-06-18 PROCEDURE — 85025 COMPLETE CBC W/AUTO DIFF WBC: CPT | Performed by: EMERGENCY MEDICINE

## 2018-06-18 PROCEDURE — 25010000002 HYDROMORPHONE PER 4 MG: Performed by: EMERGENCY MEDICINE

## 2018-06-18 PROCEDURE — 83605 ASSAY OF LACTIC ACID: CPT | Performed by: EMERGENCY MEDICINE

## 2018-06-18 PROCEDURE — 96375 TX/PRO/DX INJ NEW DRUG ADDON: CPT

## 2018-06-18 PROCEDURE — 25010000002 LORAZEPAM PER 2 MG: Performed by: EMERGENCY MEDICINE

## 2018-06-18 PROCEDURE — 81001 URINALYSIS AUTO W/SCOPE: CPT | Performed by: EMERGENCY MEDICINE

## 2018-06-18 PROCEDURE — 25010000002 PROMETHAZINE PER 50 MG: Performed by: EMERGENCY MEDICINE

## 2018-06-18 PROCEDURE — 96372 THER/PROPH/DIAG INJ SC/IM: CPT

## 2018-06-18 PROCEDURE — 99406 BEHAV CHNG SMOKING 3-10 MIN: CPT | Performed by: PHYSICIAN ASSISTANT

## 2018-06-18 PROCEDURE — 83690 ASSAY OF LIPASE: CPT | Performed by: EMERGENCY MEDICINE

## 2018-06-18 PROCEDURE — 99219 PR INITIAL OBSERVATION CARE/DAY 50 MINUTES: CPT | Performed by: FAMILY MEDICINE

## 2018-06-18 PROCEDURE — 80306 DRUG TEST PRSMV INSTRMNT: CPT | Performed by: PHYSICIAN ASSISTANT

## 2018-06-18 PROCEDURE — 96374 THER/PROPH/DIAG INJ IV PUSH: CPT

## 2018-06-18 PROCEDURE — 25010000002 METOCLOPRAMIDE PER 10 MG: Performed by: PHYSICIAN ASSISTANT

## 2018-06-18 RX ORDER — DIPHENHYDRAMINE HYDROCHLORIDE 50 MG/ML
25 INJECTION INTRAMUSCULAR; INTRAVENOUS ONCE
Status: COMPLETED | OUTPATIENT
Start: 2018-06-18 | End: 2018-06-18

## 2018-06-18 RX ORDER — SODIUM CHLORIDE 9 MG/ML
100 INJECTION, SOLUTION INTRAVENOUS CONTINUOUS
Status: DISCONTINUED | OUTPATIENT
Start: 2018-06-18 | End: 2018-06-20

## 2018-06-18 RX ORDER — HEPARIN SODIUM 5000 [USP'U]/ML
5000 INJECTION, SOLUTION INTRAVENOUS; SUBCUTANEOUS EVERY 8 HOURS SCHEDULED
Status: DISCONTINUED | OUTPATIENT
Start: 2018-06-19 | End: 2018-06-20 | Stop reason: HOSPADM

## 2018-06-18 RX ORDER — PANTOPRAZOLE SODIUM 40 MG/1
40 TABLET, DELAYED RELEASE ORAL EVERY MORNING
Status: DISCONTINUED | OUTPATIENT
Start: 2018-06-19 | End: 2018-06-20 | Stop reason: HOSPADM

## 2018-06-18 RX ORDER — ACETAMINOPHEN 325 MG/1
650 TABLET ORAL EVERY 4 HOURS PRN
Status: DISCONTINUED | OUTPATIENT
Start: 2018-06-18 | End: 2018-06-20 | Stop reason: HOSPADM

## 2018-06-18 RX ORDER — PROMETHAZINE HYDROCHLORIDE 25 MG/1
25 SUPPOSITORY RECTAL EVERY 6 HOURS PRN
Qty: 12 SUPPOSITORY | Refills: 0 | Status: SHIPPED | OUTPATIENT
Start: 2018-06-18 | End: 2019-01-17 | Stop reason: SDUPTHER

## 2018-06-18 RX ORDER — METOCLOPRAMIDE 10 MG/1
10 TABLET ORAL
Qty: 12 TABLET | Refills: 0 | Status: SHIPPED | OUTPATIENT
Start: 2018-06-18 | End: 2019-01-17 | Stop reason: SDUPTHER

## 2018-06-18 RX ORDER — ONDANSETRON HYDROCHLORIDE 8 MG/1
8 TABLET, FILM COATED ORAL EVERY 8 HOURS PRN
COMMUNITY
End: 2018-06-20 | Stop reason: HOSPADM

## 2018-06-18 RX ORDER — LORAZEPAM 2 MG/ML
1 INJECTION INTRAMUSCULAR ONCE
Status: COMPLETED | OUTPATIENT
Start: 2018-06-18 | End: 2018-06-18

## 2018-06-18 RX ORDER — ONDANSETRON 2 MG/ML
4 INJECTION INTRAMUSCULAR; INTRAVENOUS EVERY 6 HOURS PRN
Status: DISCONTINUED | OUTPATIENT
Start: 2018-06-18 | End: 2018-06-20 | Stop reason: HOSPADM

## 2018-06-18 RX ORDER — LORAZEPAM 1 MG/1
1 TABLET ORAL ONCE
Status: DISCONTINUED | OUTPATIENT
Start: 2018-06-18 | End: 2018-06-18

## 2018-06-18 RX ORDER — CETIRIZINE HYDROCHLORIDE 10 MG/1
10 TABLET ORAL DAILY
Status: DISCONTINUED | OUTPATIENT
Start: 2018-06-19 | End: 2018-06-20 | Stop reason: HOSPADM

## 2018-06-18 RX ORDER — PROMETHAZINE HYDROCHLORIDE 12.5 MG/1
12.5 TABLET ORAL EVERY 6 HOURS PRN
Qty: 12 TABLET | Refills: 0 | Status: SHIPPED | OUTPATIENT
Start: 2018-06-18 | End: 2019-01-17 | Stop reason: SDUPTHER

## 2018-06-18 RX ORDER — CITALOPRAM 20 MG/1
20 TABLET ORAL DAILY
Status: DISCONTINUED | OUTPATIENT
Start: 2018-06-19 | End: 2018-06-20 | Stop reason: HOSPADM

## 2018-06-18 RX ORDER — SODIUM CHLORIDE 0.9 % (FLUSH) 0.9 %
10 SYRINGE (ML) INJECTION AS NEEDED
Status: DISCONTINUED | OUTPATIENT
Start: 2018-06-18 | End: 2018-06-20 | Stop reason: HOSPADM

## 2018-06-18 RX ORDER — SODIUM CHLORIDE 0.9 % (FLUSH) 0.9 %
1-10 SYRINGE (ML) INJECTION AS NEEDED
Status: DISCONTINUED | OUTPATIENT
Start: 2018-06-18 | End: 2018-06-20 | Stop reason: HOSPADM

## 2018-06-18 RX ORDER — ONDANSETRON 4 MG/1
4 TABLET, ORALLY DISINTEGRATING ORAL EVERY 6 HOURS PRN
Qty: 15 TABLET | Refills: 0 | Status: SHIPPED | OUTPATIENT
Start: 2018-06-18 | End: 2019-01-17 | Stop reason: SDUPTHER

## 2018-06-18 RX ORDER — DICYCLOMINE HYDROCHLORIDE 10 MG/ML
20 INJECTION INTRAMUSCULAR ONCE
Status: COMPLETED | OUTPATIENT
Start: 2018-06-18 | End: 2018-06-18

## 2018-06-18 RX ORDER — METOCLOPRAMIDE HYDROCHLORIDE 5 MG/ML
10 INJECTION INTRAMUSCULAR; INTRAVENOUS ONCE
Status: COMPLETED | OUTPATIENT
Start: 2018-06-18 | End: 2018-06-18

## 2018-06-18 RX ORDER — PROMETHAZINE HYDROCHLORIDE 25 MG/ML
6.25 INJECTION, SOLUTION INTRAMUSCULAR; INTRAVENOUS ONCE
Status: COMPLETED | OUTPATIENT
Start: 2018-06-18 | End: 2018-06-18

## 2018-06-18 RX ADMIN — DICYCLOMINE HYDROCHLORIDE 20 MG: 20 INJECTION, SOLUTION INTRAMUSCULAR at 20:24

## 2018-06-18 RX ADMIN — PROCHLORPERAZINE EDISYLATE 10 MG: 5 INJECTION INTRAMUSCULAR; INTRAVENOUS at 17:31

## 2018-06-18 RX ADMIN — HYDROMORPHONE HYDROCHLORIDE 0.5 MG: 1 INJECTION, SOLUTION INTRAMUSCULAR; INTRAVENOUS; SUBCUTANEOUS at 15:27

## 2018-06-18 RX ADMIN — LORAZEPAM 1 MG: 2 INJECTION INTRAMUSCULAR; INTRAVENOUS at 15:32

## 2018-06-18 RX ADMIN — SODIUM CHLORIDE 100 ML/HR: 9 INJECTION, SOLUTION INTRAVENOUS at 23:16

## 2018-06-18 RX ADMIN — SODIUM CHLORIDE 1000 ML: 9 INJECTION, SOLUTION INTRAVENOUS at 17:31

## 2018-06-18 RX ADMIN — SODIUM CHLORIDE 1000 ML: 9 INJECTION, SOLUTION INTRAVENOUS at 15:27

## 2018-06-18 RX ADMIN — PROMETHAZINE HYDROCHLORIDE 6.25 MG: 25 INJECTION INTRAMUSCULAR; INTRAVENOUS at 15:27

## 2018-06-18 RX ADMIN — METOCLOPRAMIDE 10 MG: 5 INJECTION, SOLUTION INTRAMUSCULAR; INTRAVENOUS at 19:44

## 2018-06-18 RX ADMIN — DIPHENHYDRAMINE HYDROCHLORIDE 25 MG: 50 INJECTION INTRAMUSCULAR; INTRAVENOUS at 20:24

## 2018-06-18 NOTE — ED PROVIDER NOTES
Subjective   Ms. Geneva Haro is a 53 y.o. female who presents to the ED with c/o nausea and vomiting onset approximately 0600. Pt reports at approximately 0600 she woke up with sudden onset of nausea and vomiting that has persisted since. She states she has hx of gastroparesis and current sx are consistent with prior episodes. She believes her gastric stimulator needs to be replaced. She had been followed by GI specialist in [place] who placed the stimulator about 8 years ago but can't recall the name and hasn't been to them in years. She tried Phenergan and Zofran at home which provided no relief.     Pt was seen in this ED 2 days ago for current sx and workup revealed UTI. She was d/c with Nitrofurantoin which she did not fill at her pharmacy secondary to finance issues.     Pt also complains abd pain (chronic, denies new pain), diarrhea (recurent chronic issue), L hip pain worse than baseline from fibromyalgia, and difficulty ambulating secondary to L hip pain. She states she has been told she needs a hip replacement and is waiting to get back into Ortho. She denies chest pain, SoA, constipation, urinary sx, blood in stool or vomit, fever, chills and any other acute sx at this time. She also has hx of breast cancer, anxiety, DM, arthritis, depression. Her prior abdominal surgeries include cholecystectomy, gastric bypass, hernia repair and gastric stimulator.         History provided by:  Patient  Vomiting   The primary symptoms include abdominal pain, nausea, vomiting, diarrhea and arthralgias (L hip). Primary symptoms do not include fever. The illness began today. The onset was sudden. The problem has been gradually worsening.   The illness does not include chills or constipation.       Review of Systems   Constitutional: Negative for chills and fever.   Respiratory: Negative for shortness of breath.    Cardiovascular: Negative for chest pain.   Gastrointestinal: Positive for abdominal pain, diarrhea,  nausea and vomiting. Negative for constipation.   Musculoskeletal: Positive for arthralgias (L hip) and gait problem (secondary to L hip pain).   All other systems reviewed and are negative.      Past Medical History:   Diagnosis Date   • Anxiety    • Arthritis    • Depression    • Diabetes mellitus    • Gall stones    • History of stomach ulcers    • Stomach problems        Allergies   Allergen Reactions   • Aspirin    • Gabapentin    • Ibuprofen    • Morphine And Related        Past Surgical History:   Procedure Laterality Date   • CHOLECYSTECTOMY     • GASTRIC BYPASS      x2   • HERNIA REPAIR     • MASTECTOMY Bilateral     Left With sentinel lymph node sampling and prophylactic right mastectomy   • PACEMAKER IMPLANTATION     • PORTACATH PLACEMENT         Family History   Problem Relation Age of Onset   • Breast cancer Maternal Aunt    • Pancreatic cancer Cousin    • Breast cancer Cousin    • Breast cancer Cousin        Social History     Social History   • Marital status:      Social History Main Topics   • Smoking status: Current Every Day Smoker   • Smokeless tobacco: Never Used   • Alcohol use No   • Drug use: Yes     Types: Marijuana   • Sexual activity: Defer     Other Topics Concern   • Not on file         Objective   Physical Exam   Constitutional: She is oriented to person, place, and time. She appears well-developed and well-nourished.   HENT:   Head: Normocephalic and atraumatic.   Right Ear: External ear normal.   Left Ear: External ear normal.   Nose: Nose normal.   Eyes: Conjunctivae are normal. No scleral icterus.   Neck: Normal range of motion.   Cardiovascular: Normal rate, regular rhythm and normal heart sounds.  Exam reveals no gallop and no friction rub.    No murmur heard.  Pulmonary/Chest: Effort normal and breath sounds normal. No respiratory distress. She has no wheezes. She has no rales. She exhibits no tenderness.   Abdominal: Soft. Bowel sounds are normal. She exhibits no  distension. There is tenderness (mild left sided tenderness). There is no rebound and no guarding.   Musculoskeletal: Normal range of motion.   Neurological: She is alert and oriented to person, place, and time.   Skin: Skin is warm and dry. She is not diaphoretic.   Psychiatric: Her mood appears anxious.   Nursing note and vitals reviewed.      Procedures         ED Course     Reviewed old records. No urine culture from ED visit 6-16-18.  CT ABD PELVIS 6-16-18  IMPRESSION:  No acute intra-abdominal or intrapelvic abnormality;  specifically, no evidence for acute appendicitis with appendix  visualized and unremarkable. No loculated intra-abdominal fluid  collection.    Re-examined patient several times in ED. Multiple rounds of N/V meds given. Intermittently patient resting comfortably but then the vomiting returned. Pt denies new abdominal pain from baseline. She has had no diarrhea in ED. Discussed discharge home with meds but patient does not feel she can go home and control her vomiting at this time.     Discussed patient with Dr. Askew.     Discussed admission with Dr. Vazquez.      Recent Results (from the past 24 hour(s))   Comprehensive Metabolic Panel    Collection Time: 06/18/18  3:28 PM   Result Value Ref Range    Glucose 119 (H) 70 - 100 mg/dL    BUN 8 (L) 9 - 23 mg/dL    Creatinine 1.10 0.60 - 1.30 mg/dL    Sodium 145 132 - 146 mmol/L    Potassium 3.7 3.5 - 5.5 mmol/L    Chloride 111 (H) 99 - 109 mmol/L    CO2 26.0 20.0 - 31.0 mmol/L    Calcium 9.7 8.7 - 10.4 mg/dL    Total Protein 7.4 5.7 - 8.2 g/dL    Albumin 4.38 3.20 - 4.80 g/dL    ALT (SGPT) 22 7 - 40 U/L    AST (SGOT) 28 0 - 33 U/L    Alkaline Phosphatase 145 (H) 25 - 100 U/L    Total Bilirubin 0.3 0.3 - 1.2 mg/dL    eGFR  African Amer 63 >60 mL/min/1.73    Globulin 3.0 gm/dL    A/G Ratio 1.5 1.5 - 2.5 g/dL    BUN/Creatinine Ratio 7.3 7.0 - 25.0    Anion Gap 8.0 3.0 - 11.0 mmol/L   Lipase    Collection Time: 06/18/18  3:28 PM   Result Value Ref Range     Lipase 21 6 - 51 U/L   Lactic Acid, Plasma    Collection Time: 06/18/18  3:28 PM   Result Value Ref Range    Lactate 1.4 0.5 - 2.0 mmol/L   CBC Auto Differential    Collection Time: 06/18/18  3:28 PM   Result Value Ref Range    WBC 4.55 3.50 - 10.80 10*3/mm3    RBC 4.66 3.89 - 5.14 10*6/mm3    Hemoglobin 12.8 11.5 - 15.5 g/dL    Hematocrit 38.8 34.5 - 44.0 %    MCV 83.3 80.0 - 99.0 fL    MCH 27.5 27.0 - 31.0 pg    MCHC 33.0 32.0 - 36.0 g/dL    RDW 13.4 11.3 - 14.5 %    RDW-SD 40.7 37.0 - 54.0 fl    MPV 10.0 6.0 - 12.0 fL    Platelets 164 150 - 450 10*3/mm3    Neutrophil % 65.9 41.0 - 71.0 %    Lymphocyte % 26.8 24.0 - 44.0 %    Monocyte % 5.1 0.0 - 12.0 %    Eosinophil % 1.3 0.0 - 3.0 %    Basophil % 0.7 0.0 - 1.0 %    Immature Grans % 0.2 0.0 - 0.6 %    Neutrophils, Absolute 3.00 1.50 - 8.30 10*3/mm3    Lymphocytes, Absolute 1.22 0.60 - 4.80 10*3/mm3    Monocytes, Absolute 0.23 0.00 - 1.00 10*3/mm3    Eosinophils, Absolute 0.06 0.00 - 0.30 10*3/mm3    Basophils, Absolute 0.03 0.00 - 0.20 10*3/mm3    Immature Grans, Absolute 0.01 0.00 - 0.03 10*3/mm3   Light Blue Top    Collection Time: 06/18/18  3:28 PM   Result Value Ref Range    Extra Tube hold for add-on    Green Top (Gel)    Collection Time: 06/18/18  3:28 PM   Result Value Ref Range    Extra Tube Hold for add-ons.    Lavender Top    Collection Time: 06/18/18  3:28 PM   Result Value Ref Range    Extra Tube hold for add-on    Gold Top - SST    Collection Time: 06/18/18  3:28 PM   Result Value Ref Range    Extra Tube Hold for add-ons.    Urinalysis With / Culture If Indicated - Urine, Catheter    Collection Time: 06/18/18  3:52 PM   Result Value Ref Range    Color, UA Yellow Yellow, Straw    Appearance, UA Clear Clear    pH, UA 6.0 5.0 - 8.0    Specific Gravity, UA 1.014 1.001 - 1.030    Glucose, UA Negative Negative    Ketones, UA Negative Negative    Bilirubin, UA Negative Negative    Blood, UA Trace (A) Negative    Protein, UA Negative Negative    Leuk  "Esterase, UA Negative Negative    Nitrite, UA Negative Negative    Urobilinogen, UA 0.2 E.U./dL 0.2 - 1.0 E.U./dL   Urinalysis, Microscopic Only - Urine, Clean Catch    Collection Time: 06/18/18  3:52 PM   Result Value Ref Range    RBC, UA 3-6 (A) None Seen, 0-2 /HPF    WBC, UA None Seen None Seen, 0-2 /HPF    Bacteria, UA None Seen None Seen, Trace /HPF    Squamous Epithelial Cells, UA 0-2 None Seen, 0-2 /HPF    Hyaline Casts, UA 0-6 0 - 6 /LPF    Methodology Automated Microscopy      Note: In addition to lab results from this visit, the labs listed above may include labs taken at another facility or during a different encounter within the last 24 hours. Please correlate lab times with ED admission and discharge times for further clarification of the services performed during this visit.    No orders to display     Vitals:    06/18/18 1419 06/18/18 1800   BP: 162/94 (!) 151/107   BP Location: Left arm    Patient Position: Sitting    Pulse: 95 109   Resp: 26    Temp: 98.5 °F (36.9 °C)    TempSrc: Oral    SpO2: 99% 98%   Weight: 73.9 kg (163 lb)    Height: 170.2 cm (67\")      Medications   sodium chloride 0.9 % flush 10 mL (not administered)   HYDROmorphone (DILAUDID) injection 0.5 mg (0.5 mg Intravenous Given 6/18/18 1527)   heparin flush (porcine) 100 UNIT/ML injection 500 Units (not administered)   diphenhydrAMINE (BENADRYL) injection 25 mg (not administered)   dicyclomine (BENTYL) injection 20 mg (not administered)   sodium chloride 0.9 % bolus 1,000 mL (0 mL Intravenous Stopped 6/18/18 1731)   promethazine (PHENERGAN) injection 6.25 mg (6.25 mg Intravenous Given 6/18/18 1527)   LORazepam (ATIVAN) injection 1 mg (1 mg Intravenous Given 6/18/18 1532)   prochlorperazine (COMPAZINE) injection 10 mg (10 mg Intravenous Given 6/18/18 1731)   sodium chloride 0.9 % bolus 1,000 mL (0 mL Intravenous Stopped 6/18/18 1946)   metoclopramide (REGLAN) injection 10 mg (10 mg Intravenous Given 6/18/18 1944)              "         MDM    Final diagnoses:   Gastroparesis   Chronic abdominal pain   Intractable cyclical vomiting with nausea       Documentation assistance provided by erick Beltran.  Information recorded by the saydaibcarroll was done at my direction and has been verified and validated by me.     Simon Beltran  06/18/18 1448       Simon Beltran  06/18/18 1449       ARASH Fermin  06/19/18 1204

## 2018-06-18 NOTE — PROGRESS NOTES
"1340 Pt arrived to infusion for port flush apt, pt crying stating she is severe pain and vomiting. Pt was transported to private room via wheelchair. Pt started vomiting again once in the bed and stated, \"I'm tired of living like this.\" Pt stated her pain was 10/10. Pt experienced an episode of incontinence while vomiting, pt was provided with a clean gown and undergarments. V/S 145/90 HR 85 T 98.4. Called Mariaa Drake NP to report pt's status, she instructed pt to be evaluated in ED for pain assessment and control. Pt agreed with plan. 1350 Transport team present to transport pt to ED. Anni Ellis RN  "

## 2018-06-19 ENCOUNTER — ANESTHESIA EVENT (OUTPATIENT)
Dept: GASTROENTEROLOGY | Facility: HOSPITAL | Age: 53
End: 2018-06-19

## 2018-06-19 PROBLEM — R11.15 INTRACTABLE CYCLICAL VOMITING WITH NAUSEA: Status: ACTIVE | Noted: 2018-06-18

## 2018-06-19 PROCEDURE — 93010 ELECTROCARDIOGRAM REPORT: CPT | Performed by: INTERNAL MEDICINE

## 2018-06-19 PROCEDURE — 25010000002 HYDROMORPHONE PER 4 MG: Performed by: EMERGENCY MEDICINE

## 2018-06-19 PROCEDURE — G0378 HOSPITAL OBSERVATION PER HR: HCPCS

## 2018-06-19 PROCEDURE — 99244 OFF/OP CNSLTJ NEW/EST MOD 40: CPT | Performed by: PHYSICIAN ASSISTANT

## 2018-06-19 PROCEDURE — 25010000002 ONDANSETRON PER 1 MG: Performed by: PHYSICIAN ASSISTANT

## 2018-06-19 PROCEDURE — 96376 TX/PRO/DX INJ SAME DRUG ADON: CPT

## 2018-06-19 PROCEDURE — 96372 THER/PROPH/DIAG INJ SC/IM: CPT

## 2018-06-19 PROCEDURE — 25010000002 HEPARIN (PORCINE) PER 1000 UNITS: Performed by: PHYSICIAN ASSISTANT

## 2018-06-19 PROCEDURE — 93005 ELECTROCARDIOGRAM TRACING: CPT | Performed by: HOSPITALIST

## 2018-06-19 PROCEDURE — 25010000002 HYDROMORPHONE PER 4 MG: Performed by: FAMILY MEDICINE

## 2018-06-19 PROCEDURE — 96361 HYDRATE IV INFUSION ADD-ON: CPT

## 2018-06-19 PROCEDURE — 99226 PR SBSQ OBSERVATION CARE/DAY 35 MINUTES: CPT | Performed by: HOSPITALIST

## 2018-06-19 RX ADMIN — CETIRIZINE HYDROCHLORIDE 10 MG: 10 TABLET, FILM COATED ORAL at 08:40

## 2018-06-19 RX ADMIN — HYDROMORPHONE HYDROCHLORIDE 0.5 MG: 1 INJECTION, SOLUTION INTRAMUSCULAR; INTRAVENOUS; SUBCUTANEOUS at 20:39

## 2018-06-19 RX ADMIN — HEPARIN SODIUM 5000 UNITS: 5000 INJECTION, SOLUTION INTRAVENOUS; SUBCUTANEOUS at 23:00

## 2018-06-19 RX ADMIN — HEPARIN SODIUM 5000 UNITS: 5000 INJECTION, SOLUTION INTRAVENOUS; SUBCUTANEOUS at 05:54

## 2018-06-19 RX ADMIN — SODIUM CHLORIDE 100 ML/HR: 9 INJECTION, SOLUTION INTRAVENOUS at 16:15

## 2018-06-19 RX ADMIN — CITALOPRAM HYDROBROMIDE 20 MG: 20 TABLET ORAL at 08:40

## 2018-06-19 RX ADMIN — HYDROMORPHONE HYDROCHLORIDE 0.5 MG: 1 INJECTION, SOLUTION INTRAMUSCULAR; INTRAVENOUS; SUBCUTANEOUS at 06:08

## 2018-06-19 RX ADMIN — ONDANSETRON 4 MG: 2 INJECTION INTRAMUSCULAR; INTRAVENOUS at 10:47

## 2018-06-19 RX ADMIN — HYDROMORPHONE HYDROCHLORIDE 0.5 MG: 1 INJECTION, SOLUTION INTRAMUSCULAR; INTRAVENOUS; SUBCUTANEOUS at 16:20

## 2018-06-19 RX ADMIN — SODIUM CHLORIDE 100 ML/HR: 9 INJECTION, SOLUTION INTRAVENOUS at 06:35

## 2018-06-19 RX ADMIN — PANTOPRAZOLE SODIUM 40 MG: 40 TABLET, DELAYED RELEASE ORAL at 05:53

## 2018-06-19 RX ADMIN — HYDROMORPHONE HYDROCHLORIDE 0.5 MG: 1 INJECTION, SOLUTION INTRAMUSCULAR; INTRAVENOUS; SUBCUTANEOUS at 10:47

## 2018-06-19 RX ADMIN — ONDANSETRON 4 MG: 2 INJECTION INTRAMUSCULAR; INTRAVENOUS at 20:39

## 2018-06-19 RX ADMIN — HEPARIN SODIUM 5000 UNITS: 5000 INJECTION, SOLUTION INTRAVENOUS; SUBCUTANEOUS at 15:24

## 2018-06-19 NOTE — PLAN OF CARE
Problem: Patient Care Overview  Goal: Plan of Care Review  Outcome: Ongoing (interventions implemented as appropriate)   06/19/18 1640   Coping/Psychosocial   Plan of Care Reviewed With patient   Plan of Care Review   Progress improving   OTHER   Outcome Summary pt denies any c/o at this time

## 2018-06-19 NOTE — PLAN OF CARE
Problem: Nausea/Vomiting (Adult)  Goal: Identify Related Risk Factors and Signs and Symptoms  Outcome: Ongoing (interventions implemented as appropriate)   06/19/18 0009   Nausea/Vomiting (Adult)   Related Risk Factors (Nausea/Vomiting) female gender;gastrointestinal dysfunction   Signs and Symptoms (Nausea/Vomiting) abdominal discomfort/pain;report of emesis;retching/gagging

## 2018-06-19 NOTE — CONSULTS
[facility]    Referring Provider: Teresita Villanueva MD   PCP: No Known Provider    Reason for Consultation: Intractable nausea and vomiting     Chief complaint: Intractable nausea and vomiting     History of present illness:    Geneva Haro is a 53 y.o. female who is admitted with intractable nausea and vomiting.  She has a history of gastroparesis with gastric stimulator (2008 at [facility]), remote hiatal hernia repair and breast cancer (20016) s/p mastectomy.  She has not followed up with the motility clinic at [facility] in two years.  She reports intermittent nausea and vomiting episodes over the last several months. Some the emesis has streaks of mild blood.   She reports associated epigastric pain and heart burn.    She admits to use of marijuana but reports decreased use over the last month.      Allergies:  Aspirin; Gabapentin; Ibuprofen; and Morphine and related    Scheduled Meds:    cetirizine 10 mg Oral Daily   citalopram 20 mg Oral Daily   heparin (porcine) 5,000 Units Subcutaneous Q8H   heparin flush (porcine) 500 Units Intravenous Once   pantoprazole 40 mg Oral QAM        Infusions:    sodium chloride 100 mL/hr Last Rate: 100 mL/hr (06/19/18 1235)       PRN Meds:  •  acetaminophen  •  HYDROmorphone  •  HYDROmorphone  •  ondansetron  •  sodium chloride  •  Insert peripheral IV **AND** sodium chloride    Home Meds:  Prescriptions Prior to Admission   Medication Sig Dispense Refill Last Dose   • cetirizine (zyrTEC) 10 MG tablet Take 10 mg by mouth Daily.   6/18/2018   • citalopram (CeleXA) 20 MG tablet Take 20 mg by mouth daily.   6/18/2018   • esomeprazole (nexIUM) 40 MG capsule Take 40 mg by mouth 2 (Two) Times a Day.   6/18/2018   • hyoscyamine (LEVSIN) 0.125 MG SL tablet Take 1 tablet by mouth Every 4 (Four) Hours As Needed for Cramping. 15 tablet 0 6/17/2018 at Unknown time   • lidocaine-prilocaine (EMLA) 2.5-2.5 % cream Apply 1 application topically 1 (one) time.   6/18/2018   •  nitrofurantoin, macrocrystal-monohydrate, (MACROBID) 100 MG capsule Take 1 capsule by mouth 2 (Two) Times a Day. 14 capsule 0 6/18/2018   • ondansetron (ZOFRAN) 8 MG tablet Take 8 mg by mouth Every 8 (Eight) Hours As Needed for Nausea or Vomiting.   6/18/2018 at Unknown time   • traMADol (ULTRAM) 50 MG tablet Take 50 mg by mouth 3 (three) times a day.   6/18/2018       ROS: Review of Systems   Constitutional: Positive for fatigue.   HENT: Negative for trouble swallowing and voice change.    Eyes: Negative.    Respiratory: Negative.    Cardiovascular: Negative.    Gastrointestinal: Positive for abdominal pain, nausea and vomiting.   Endocrine: Negative.    Genitourinary: Negative.    Musculoskeletal: Negative.    Skin: Negative.    Neurological: Positive for weakness.   Hematological: Negative.    Psychiatric/Behavioral: Negative.        PAST MED HX:  Past Medical History:   Diagnosis Date   • Anxiety    • Arthritis    • Depression    • Diabetes mellitus    • Gall stones    • History of stomach ulcers    • Stomach problems        PAST SURG HX:  Past Surgical History:   Procedure Laterality Date   • CHOLECYSTECTOMY     • GASTRIC BYPASS      x2   • HERNIA REPAIR     • MASTECTOMY Bilateral     Left With sentinel lymph node sampling and prophylactic right mastectomy   • PACEMAKER IMPLANTATION     • PORTACATH PLACEMENT         FAM HX:  Family History   Problem Relation Age of Onset   • Breast cancer Maternal Aunt    • Pancreatic cancer Cousin    • Breast cancer Cousin    • Breast cancer Cousin        SOC HX:  Social History     Social History   • Marital status:      Spouse name: N/A   • Number of children: N/A   • Years of education: N/A     Occupational History   • Not on file.     Social History Main Topics   • Smoking status: Current Every Day Smoker   • Smokeless tobacco: Never Used   • Alcohol use No   • Drug use: Yes     Types: Marijuana   • Sexual activity: Defer     Other Topics Concern   • Not on file  "    Social History Narrative   • No narrative on file       PHYSICAL EXAM  /75 (BP Location: Right arm, Patient Position: Lying)   Pulse 79   Temp 98.1 °F (36.7 °C) (Oral)   Resp 16   Ht 170.2 cm (67\")   Wt 72.6 kg (160 lb)   SpO2 98%   BMI 25.06 kg/m²   Wt Readings from Last 3 Encounters:   06/18/18 72.6 kg (160 lb)   06/18/18 73.9 kg (163 lb)   06/16/18 72.6 kg (160 lb)   ,body mass index is 25.06 kg/m².  Physical Exam   Constitutional: She is oriented to person, place, and time. She appears well-developed.   HENT:   Head: Normocephalic and atraumatic.   Eyes: No scleral icterus.   Neck: Normal range of motion.   Cardiovascular: Normal rate and regular rhythm.    Pulmonary/Chest: Effort normal. No respiratory distress.   Abdominal: Soft. Bowel sounds are normal. She exhibits no distension. There is no tenderness.   Musculoskeletal: She exhibits no edema.   Neurological: She is alert and oriented to person, place, and time.   Skin: Skin is warm and dry.   Psychiatric: Her behavior is normal.   Nursing note and vitals reviewed.      Results Review:   I reviewed the patient's new clinical results.    Lab Results   Component Value Date    WBC 4.55 06/18/2018    HGB 12.8 06/18/2018    HGB 12.4 06/16/2018    HGB 13.9 05/12/2018    HCT 38.8 06/18/2018    MCV 83.3 06/18/2018     06/18/2018       No results found for: INR    Lab Results   Component Value Date    GLUCOSE 119 (H) 06/18/2018    BUN 8 (L) 06/18/2018    CREATININE 1.10 06/18/2018    EGFRIFAFRI 63 06/18/2018    BCR 7.3 06/18/2018    CO2 26.0 06/18/2018    CALCIUM 9.7 06/18/2018    PROTENTOTREF 6.2 06/19/2015    ALBUMIN 4.38 06/18/2018    ALKPHOS 145 (H) 06/18/2018    BILITOT 0.3 06/18/2018    ALT 22 06/18/2018    AST 28 06/18/2018       ASSESSMENTS/PLANS    1. Intractable nausea and vomiting  2. Gastroparesis s/p gastric stimulator  3. Polysubstance use (UDS positive for THC, opiates and cocaine)    Nausea and vomiting likely secondary to " underlying known gastroparesis.  Differential includes complicated reflux  >>> Needs outpatient follow up at [facility] motility clinic.  She may need a new battery or adjustment of her gastric stimulator  >>> Recommend Tori Root 500mg BID   >>> Recommend EGD tomorrow to evaluate for differential of possible complicated reflux   >>> Continue antiemetics and supportive care.  Will add Clear Boost     I discussed the patients findings and my recommendations with patient    ARASH Marin  06/19/18  1:40 PM

## 2018-06-19 NOTE — PROGRESS NOTES
[facility] Medicine Services  PROGRESS NOTE    Patient Name: Geneva Haro  : 1965  MRN: [id number]    Date of Admission: 2018  Length of Stay: 0  Primary Care Physician: No Known Provider    Subjective   Subjective     CC:  FU Intractable N/V    HPI:  Nausea better controlled. Sometimes, she would see streaks of blood when she has a lot of emesis. Hard to keep down even water. N/V does not improve with hot showers. Liquid stools as well.     Review of Systems  Gen- No fevers, chills  CV- No chest pain, palpitations  Resp- No cough, dyspnea  GI- +N/V/D, +abd pain    Otherwise ROS is negative except as mentioned in the HPI.    Objective   Objective     Vital Signs:   Temp:  [97.4 °F (36.3 °C)-99 °F (37.2 °C)] 98.1 °F (36.7 °C)  Heart Rate:  [] 79  Resp:  [16-26] 16  BP: (112-162)/() 116/75        Physical Exam:  Constitutional: No acute distress, awake, alert on RA  Eyes: PERRLA, sclerae anicteric, no conjunctival injection  HENT: NCAT, mucous membranes moist  Neck: Supple, no thyromegaly, no lymphadenopathy, trachea midline  Respiratory: Clear to auscultation bilaterally, nonlabored respirations   Cardiovascular: RRR, no murmurs, rubs, or gallops, palpable pedal pulses bilaterally  Gastrointestinal: Positive bowel sounds, soft, mildly TTP diffusely, nondistended  Musculoskeletal: No bilateral ankle edema, no clubbing or cyanosis to extremities  Psychiatric: Appropriate affect, cooperative  Neurologic: Oriented x 3, strength symmetric in all extremities, Cranial Nerves grossly intact to confrontation, speech clear  Skin: No rashes    Results Reviewed:  I have personally reviewed current lab, radiology, and data and agree.      Results from last 7 days  Lab Units 18  1528 18  1624   WBC 10*3/mm3 4.55 4.73   HEMOGLOBIN g/dL 12.8 12.4   HEMATOCRIT % 38.8 37.5   PLATELETS 10*3/mm3 164 159       Results from last 7 days  Lab Units 18  1528  06/16/18  1624   SODIUM mmol/L 145 143   POTASSIUM mmol/L 3.7 4.0   CHLORIDE mmol/L 111* 112*   CO2 mmol/L 26.0 24.0   BUN mg/dL 8* 9   CREATININE mg/dL 1.10 0.97   GLUCOSE mg/dL 119* 97   CALCIUM mg/dL 9.7 9.4   ALT (SGPT) U/L 22 14   AST (SGOT) U/L 28 17     No results found for: BNP  No results found for: PHART    Microbiology Results Abnormal     None          Imaging Results (last 24 hours)     ** No results found for the last 24 hours. **             I have reviewed the medications.    Assessment/Plan   Assessment / Plan     Hospital Problem List     * (Principal)Intractable nausea and vomiting    Iron deficiency anemia (Chronic)    Overview Addendum 9/27/2016 11:29 AM by Janeen Ferraro     Recurring. Has responded to parenteral iron.         Malignant neoplasm of upper-outer quadrant of left female breast    Overview Addendum 9/27/2016 11:30 AM by Janeen Ferraro       Has done well after adjuvant endocrine therapy does not have any symptoms to suggest recurrence.         Fibromyalgia (Chronic)    Overview Signed 9/27/2016 11:27 AM by Janeen Ferraro     And chronic pain         Gastroparesis (Chronic)    Anxiety and depression    Tobacco abuse    Marijuana abuse    GERD (gastroesophageal reflux disease)    Type 2 diabetes mellitus           Brief Hospital Course to date:  Geneva Haro is a 53 y.o. female with a past medical history significant for breast cancer s/p bilateral mastectomy, gastroparesis s/p gastric stimulator placement in [facility], DM, iron deficiency anemia and fibromyalgia who presents with complaints of intractable nausea and vomiting.     Assessment & Plan:    - Intractable N/V: ?gastric stimulator malfunction. Labs and imaging unremarkable. Once symptoms better controlled, will need to go to [facility] for possible adjustments. GI on board  - Renal insufficiency: Continue mIVF  - Drug use: Admits to smoking THC. Denies cocaine use or any other drug use    DVT Prophylaxis:  SQH    CODE  STATUS: Full Code    Disposition: I expect the patient to be discharged home in 1-2 days    Teresita Villanueva MD  06/19/18  12:53 PM

## 2018-06-19 NOTE — H&P
"    [facility] Medicine Services  HISTORY AND PHYSICAL    Patient Name: Geneva Haro  : 1965  MRN: [id number]  Primary Care Physician: No Known Provider    Subjective   Subjective     Chief Complaint:  N/V    HPI:  Geneva Haro is a 53 y.o. female with a past medical history significant for gastroparesis, DM, iron deficiency anemia, breast cancer, and fibromyalgia who presents with complaints of intractable nausea and vomiting. States it awoke her this morning around 6 am. There is associated diffuse abdominal pain which radiates into left hip. She reports that her symptoms are similar to previous presentations of gastroparesis flares. He has tried phenergan and zofran without relief. No complaints of cough, congestion, fever, diarrhea, or dysuria. Patient adds that she needs a \"follow up\" on her gastric stimulator. States she was being followed by a GI specialist at [facility] and had a gastric stimulator placed around 8 years ago. She is concerned that it needs to be replaced as she has fallen out of touch with this service. She presented with similar symptoms to ED two days ago. Underwent CT abdomen which was negative and was eventually diagnosed with a UTI. Patient was subsequently discharged on Macrobid which she failed to get filled secondary to financial reasons. Patient adds that she has chronic left hip pain and was supposed to follow up with ortho as outpatient. She is s/p  cholecystectomy, gastric bypass, hernia repair and gastric stimulator. Will admit for further evaluation and treatment.    Emergency Department Evaluation; vitals stable. Chemistry and hematology favorable. UA negative.    Review of Systems   Otherwise 10-system ROS reviewed and is negative except as mentioned in the HPI.    Personal History     Past Medical History:   Diagnosis Date   • Anxiety    • Arthritis    • Depression    • Diabetes mellitus    • Gall stones    • History of stomach ulcers    • " Stomach problems        Past Surgical History:   Procedure Laterality Date   • CHOLECYSTECTOMY     • GASTRIC BYPASS      x2   • HERNIA REPAIR     • MASTECTOMY Bilateral     Left With sentinel lymph node sampling and prophylactic right mastectomy   • PACEMAKER IMPLANTATION     • PORTACATH PLACEMENT         Family History: family history includes Breast cancer in her cousin, cousin, and maternal aunt; Pancreatic cancer in her cousin.     Social History:  reports that she has been smoking.  She has never used smokeless tobacco. She reports that she uses drugs, including Marijuana. She reports that she does not drink alcohol.  Social History     Social History Narrative   • No narrative on file       Medications:    (Not in a hospital admission)    Allergies   Allergen Reactions   • Aspirin    • Gabapentin    • Ibuprofen    • Morphine And Related        Objective   Objective     Vital Signs:   Temp:  [97.4 °F (36.3 °C)-98.5 °F (36.9 °C)] 98.5 °F (36.9 °C)  Heart Rate:  [] 109  Resp:  [16-26] 26  BP: (143-162)/() 151/107        Physical Exam   Constitutional: No acute distress, awake, alert, avoidant, diaphoretic  Eyes: PERRLA, sclerae anicteric, no conjunctival injection  HENT: NCAT, mucous membranes moist  Neck: Supple, no thyromegaly, no lymphadenopathy, trachea midline  Respiratory: Clear to auscultation bilaterally, nonlabored respirations   Cardiovascular: RRR, no murmurs, rubs, or gallops, palpable pedal pulses bilaterally  Gastrointestinal: Positive bowel sounds, soft, mild tenderness to palpation  Musculoskeletal: No bilateral ankle edema, no clubbing or cyanosis to extremities  Psychiatric: Appropriate affect, cooperative  Neurologic: Oriented x 3, strength symmetric in all extremities, Cranial Nerves grossly intact to confrontation, speech clear  Skin: No rashes      Results Reviewed:  I have personally reviewed current lab, radiology, and data and agree.      Results from last 7 days  Lab Units  06/18/18  1528   WBC 10*3/mm3 4.55   HEMOGLOBIN g/dL 12.8   HEMATOCRIT % 38.8   PLATELETS 10*3/mm3 164       Results from last 7 days  Lab Units 06/18/18  1528   SODIUM mmol/L 145   POTASSIUM mmol/L 3.7   CHLORIDE mmol/L 111*   CO2 mmol/L 26.0   BUN mg/dL 8*   CREATININE mg/dL 1.10   GLUCOSE mg/dL 119*   CALCIUM mg/dL 9.7   ALT (SGPT) U/L 22   AST (SGOT) U/L 28     Estimated Creatinine Clearance: 69 mL/min (by C-G formula based on SCr of 1.1 mg/dL).  Brief Urine Lab Results  (Last result in the past 365 days)      Color   Clarity   Blood   Leuk Est   Nitrite   Protein   CREAT   Urine HCG        06/18/18 1552 Yellow Clear Trace(A) Negative Negative Negative             No results found for: BNP  No results found for: PHART  Imaging Results (last 24 hours)     ** No results found for the last 24 hours. **             Assessment/Plan   Assessment / Plan     Hospital Problem List     * (Principal)Intractable nausea and vomiting    Gastroparesis (Chronic)    Type 2 diabetes mellitus    Iron deficiency anemia (Chronic)    Overview Addendum 9/27/2016 11:29 AM by Janeen Ferraro     Recurring. Has responded to parenteral iron.         Malignant neoplasm of upper-outer quadrant of left female breast    Overview Addendum 9/27/2016 11:30 AM by Janeen Ferraro       Has done well after adjuvant endocrine therapy does not have any symptoms to suggest recurrence.         Fibromyalgia (Chronic)    Overview Signed 9/27/2016 11:27 AM by Janeen Ferraro     And chronic pain         Anxiety and depression    Tobacco abuse    Marijuana abuse    GERD (gastroesophageal reflux disease)            Assessment & Plan:  1. Intractable nausea and vomiting:  - gastroparesis vs cyclic vomiting associated with sporadic marijuana abuse - UDS positive for TSH, cocaine and opiates  - failed treatment in ED with reglan, zofran, phenergan, and ativan; has now had additionally benadryl and bentyl  - consult to GI, appreciate input  - saline  100 cc/hr with anti-emetics, pain control as needed  - consider repeat Ct abdomen in am  - Zofran ordered PRN  - Needs follow up at [place] [facility] - was lost to follow up at some point after gastric PM placed.  Please make appointment prior to D/C.    2. DM2:  - diet controlled. S/p gastric bypass. Monitor.    3. Breast cancer:  - in remission. S/p bilateral mastectomy with adjuvant hormone therapy  - followed by Dr. Gamez    4. Tobacco abuse:  - nicotine patch as needed. Smokes 1/2 ppd. Tobacco Cessation Counselin  minutes of tobacco cessation counseling provided, including but not limited to, risks of ongoing tobacco use, pertinence to current or future health status and availability/examples of cessation resources.  Patient expresses desire to quit.      DVT prophylaxis: H    CODE STATUS:  Full Code    Admission Status:  I believe this patient meets OBSERVATION status, however if further evaluation or treatment plans warrant, status may change.  Based upon current information, I predict patient's care encounter to be less than or equal to 2 midnights.    Electronically signed by Nehemiah Iyer PA-C, 18, 8:40 PM.      Brief Attending Admission Attestation     I have seen and examined the patient, performing an independent face-to-face diagnostic evaluation with plan of care reviewed and developed with the advanced practice clinician RADHA Iyer PA-C.      Brief Summary Statement/HPI:   Geneva Haro is a 53 y.o. female with PMH significant for gastroparesis, DM, iron deficiency anemia, breast cancer, fibromyalgia, cholecystectomy, gastric bypass, hernia repair and gastric stimulator (about 8 years ago - lost to follow up).  She has had N/V all day starting suddenly at 0600.  She has used zofran and phenergan at home without relief.  Since presentation to the ER she has had zofran, phenergan, compazine and ativan without relief.  She has been constantly retching and tachycardic.   Hospital medicine was consulted for admission and further treatment.      She was at our ER 2 days ago with similar symptoms and treated symptomatically, CT negative, UTI diagnosed and prescribed macrobid which she has not filled.  Her UA today is clear.  Labs nonrevealing except UDS positive for cocaine, THC and opiates.      Attending Physical Exam:  Constitutional: Drowsy but awakens and retches when I awaken her  Eyes: PERRLA, sclerae anicteric, no conjunctival injection  HENT: NCAT, mucous membranes dry  Neck: Supple, no thyromegaly, no lymphadenopathy, trachea midline  Respiratory: Clear to auscultation bilaterally, nonlabored respirations   Cardiovascular: Regular, mildly tachy, palpable pedal pulses bilaterally  Gastrointestinal: Positive bowel sounds, soft, nontender, nondistended  Musculoskeletal: No bilateral ankle edema, no clubbing or cyanosis to extremities  Psychiatric: Appropriate affect, cooperative  Neurologic: Oriented x 3, strength symmetric in all extremities, Cranial Nerves grossly intact to confrontation, speech clear  Skin: No rashes    Brief Assessment/Plan :  See above for further detailed assessment and plan developed with APC which I have reviewed and/or edited.      Electronically signed by Shama Vazquez MD, 06/18/18, 10:53 PM.

## 2018-06-19 NOTE — PROGRESS NOTES
Discharge Planning Assessment  [facility]     Patient Name: Geneva Haro  MRN: [id number]  Today's Date: 6/19/2018    Admit Date: 6/18/2018          Discharge Needs Assessment     Row Name 06/19/18 1600       Living Environment    Lives With spouse    Current Living Arrangements home/apartment/condo    Primary Care Provided by self    Provides Primary Care For no one    Family Caregiver if Needed spouse    Quality of Family Relationships helpful;involved;supportive    Able to Return to Prior Arrangements yes    Living Arrangement Comments Patient lives with spouse in a one story home in [place].       Resource/Environmental Concerns    Transportation Concerns car, none       Transition Planning    Patient/Family Anticipates Transition to home with family    Transportation Anticipated family or friend will provide       Discharge Needs Assessment    Readmission Within the Last 30 Days no previous admission in last 30 days    Concerns to be Addressed adjustment to diagnosis/illness;discharge planning;financial/insurance    Equipment Currently Used at Home cane, straight;walker, rolling            Discharge Plan     Row Name 06/19/18 1602       Plan    Plan Home with spouse    Plan Comments Spoke to patient and  at bedside.  She lives with him in a one story home in [place].  She has been independent at home.  Denies HH.  Has rolling walker and cane at home.  Patient has Medicare Part A only.  She does not have Rx drug coverage.  She is currently taking about 5-6 meds which she pays out of pocket for.  She uses [facility] on [address]  Plan is to return home with spouse.  He will transport.  CM will continue to follow and assess for needs.      Final Discharge Disposition Code 01 - home or self-care        Destination     No service coordination in this encounter.      Durable Medical Equipment     No service coordination in this encounter.      Dialysis/Infusion     No service  coordination in this encounter.      Home Medical Care     No service coordination in this encounter.      Social Care     No service coordination in this encounter.                Demographic Summary     Row Name 06/19/18 1600       General Information    Admission Type observation    Preferred Language English            Functional Status    No documentation.           Psychosocial    No documentation.           Abuse/Neglect    No documentation.           Legal    No documentation.           Substance Abuse    No documentation.           Patient Forms    No documentation.         Clover Howard

## 2018-06-20 ENCOUNTER — ANESTHESIA (OUTPATIENT)
Dept: GASTROENTEROLOGY | Facility: HOSPITAL | Age: 53
End: 2018-06-20

## 2018-06-20 VITALS
HEIGHT: 67 IN | TEMPERATURE: 99 F | OXYGEN SATURATION: 99 % | HEART RATE: 63 BPM | WEIGHT: 160 LBS | BODY MASS INDEX: 25.11 KG/M2 | SYSTOLIC BLOOD PRESSURE: 114 MMHG | RESPIRATION RATE: 16 BRPM | DIASTOLIC BLOOD PRESSURE: 76 MMHG

## 2018-06-20 LAB
ALBUMIN SERPL-MCNC: 3.56 G/DL (ref 3.2–4.8)
ANION GAP SERPL CALCULATED.3IONS-SCNC: 8 MMOL/L (ref 3–11)
BUN BLD-MCNC: 9 MG/DL (ref 9–23)
BUN/CREAT SERPL: 8.5 (ref 7–25)
CALCIUM SPEC-SCNC: 8.6 MG/DL (ref 8.7–10.4)
CHLORIDE SERPL-SCNC: 110 MMOL/L (ref 99–109)
CO2 SERPL-SCNC: 26 MMOL/L (ref 20–31)
CREAT BLD-MCNC: 1.06 MG/DL (ref 0.6–1.3)
GFR SERPL CREATININE-BSD FRML MDRD: 66 ML/MIN/1.73
GLUCOSE BLD-MCNC: 89 MG/DL (ref 70–100)
PHOSPHATE SERPL-MCNC: 2.9 MG/DL (ref 2.4–5.1)
POTASSIUM BLD-SCNC: 3.7 MMOL/L (ref 3.5–5.5)
SODIUM BLD-SCNC: 144 MMOL/L (ref 132–146)

## 2018-06-20 PROCEDURE — 63710000001 CETIRIZINE 10 MG TABLET: Performed by: PHYSICIAN ASSISTANT

## 2018-06-20 PROCEDURE — G0378 HOSPITAL OBSERVATION PER HR: HCPCS

## 2018-06-20 PROCEDURE — 96372 THER/PROPH/DIAG INJ SC/IM: CPT

## 2018-06-20 PROCEDURE — A9270 NON-COVERED ITEM OR SERVICE: HCPCS | Performed by: PHYSICIAN ASSISTANT

## 2018-06-20 PROCEDURE — 25010000002 PROPOFOL 10 MG/ML EMULSION: Performed by: NURSE ANESTHETIST, CERTIFIED REGISTERED

## 2018-06-20 PROCEDURE — 96361 HYDRATE IV INFUSION ADD-ON: CPT

## 2018-06-20 PROCEDURE — 25010000002 HYDROMORPHONE PER 4 MG: Performed by: FAMILY MEDICINE

## 2018-06-20 PROCEDURE — 25010000002 HEPARIN (PORCINE) PER 1000 UNITS: Performed by: PHYSICIAN ASSISTANT

## 2018-06-20 PROCEDURE — 88305 TISSUE EXAM BY PATHOLOGIST: CPT | Performed by: INTERNAL MEDICINE

## 2018-06-20 PROCEDURE — 63710000001 OXYCODONE 5 MG TABLET: Performed by: HOSPITALIST

## 2018-06-20 PROCEDURE — A9270 NON-COVERED ITEM OR SERVICE: HCPCS | Performed by: HOSPITALIST

## 2018-06-20 PROCEDURE — 25010000002 HEPARIN FLUSH (PORCINE) 100 UNIT/ML SOLUTION

## 2018-06-20 PROCEDURE — 63710000001 CITALOPRAM 20 MG TABLET: Performed by: PHYSICIAN ASSISTANT

## 2018-06-20 PROCEDURE — 80069 RENAL FUNCTION PANEL: CPT | Performed by: HOSPITALIST

## 2018-06-20 PROCEDURE — 99217 PR OBSERVATION CARE DISCHARGE MANAGEMENT: CPT | Performed by: HOSPITALIST

## 2018-06-20 RX ORDER — SODIUM CHLORIDE, SODIUM LACTATE, POTASSIUM CHLORIDE, CALCIUM CHLORIDE 600; 310; 30; 20 MG/100ML; MG/100ML; MG/100ML; MG/100ML
INJECTION, SOLUTION INTRAVENOUS CONTINUOUS PRN
Status: DISCONTINUED | OUTPATIENT
Start: 2018-06-20 | End: 2018-06-20 | Stop reason: SURG

## 2018-06-20 RX ORDER — ONDANSETRON 4 MG/1
8 TABLET, FILM COATED ORAL EVERY 8 HOURS PRN
Status: DISCONTINUED | OUTPATIENT
Start: 2018-06-20 | End: 2018-06-20 | Stop reason: HOSPADM

## 2018-06-20 RX ORDER — SODIUM CHLORIDE 0.9 % (FLUSH) 0.9 %
1-10 SYRINGE (ML) INJECTION AS NEEDED
Status: DISCONTINUED | OUTPATIENT
Start: 2018-06-20 | End: 2018-06-20 | Stop reason: HOSPADM

## 2018-06-20 RX ORDER — LIDOCAINE HYDROCHLORIDE 10 MG/ML
0.5 INJECTION, SOLUTION EPIDURAL; INFILTRATION; INTRACAUDAL; PERINEURAL ONCE AS NEEDED
Status: DISCONTINUED | OUTPATIENT
Start: 2018-06-20 | End: 2018-06-20 | Stop reason: HOSPADM

## 2018-06-20 RX ORDER — LIDOCAINE HYDROCHLORIDE 10 MG/ML
INJECTION, SOLUTION INFILTRATION; PERINEURAL AS NEEDED
Status: DISCONTINUED | OUTPATIENT
Start: 2018-06-20 | End: 2018-06-20 | Stop reason: SURG

## 2018-06-20 RX ORDER — SODIUM CHLORIDE, SODIUM LACTATE, POTASSIUM CHLORIDE, CALCIUM CHLORIDE 600; 310; 30; 20 MG/100ML; MG/100ML; MG/100ML; MG/100ML
9 INJECTION, SOLUTION INTRAVENOUS CONTINUOUS
Status: DISCONTINUED | OUTPATIENT
Start: 2018-06-20 | End: 2018-06-20

## 2018-06-20 RX ORDER — FAMOTIDINE 20 MG/1
20 TABLET, FILM COATED ORAL ONCE
Status: DISCONTINUED | OUTPATIENT
Start: 2018-06-20 | End: 2018-06-20 | Stop reason: HOSPADM

## 2018-06-20 RX ORDER — PROPOFOL 10 MG/ML
VIAL (ML) INTRAVENOUS AS NEEDED
Status: DISCONTINUED | OUTPATIENT
Start: 2018-06-20 | End: 2018-06-20 | Stop reason: SURG

## 2018-06-20 RX ORDER — OXYCODONE HYDROCHLORIDE 5 MG/1
5 TABLET ORAL EVERY 6 HOURS PRN
Status: DISCONTINUED | OUTPATIENT
Start: 2018-06-20 | End: 2018-06-20 | Stop reason: HOSPADM

## 2018-06-20 RX ADMIN — PROPOFOL 20 MG: 10 INJECTION, EMULSION INTRAVENOUS at 09:17

## 2018-06-20 RX ADMIN — PANTOPRAZOLE SODIUM 40 MG: 40 TABLET, DELAYED RELEASE ORAL at 06:52

## 2018-06-20 RX ADMIN — PROPOFOL 50 MG: 10 INJECTION, EMULSION INTRAVENOUS at 09:14

## 2018-06-20 RX ADMIN — LIDOCAINE HYDROCHLORIDE 100 MG: 10 INJECTION, SOLUTION INFILTRATION; PERINEURAL at 09:14

## 2018-06-20 RX ADMIN — CITALOPRAM HYDROBROMIDE 20 MG: 20 TABLET ORAL at 09:56

## 2018-06-20 RX ADMIN — HEPARIN SODIUM 5000 UNITS: 5000 INJECTION, SOLUTION INTRAVENOUS; SUBCUTANEOUS at 13:41

## 2018-06-20 RX ADMIN — PROPOFOL 50 MG: 10 INJECTION, EMULSION INTRAVENOUS at 09:16

## 2018-06-20 RX ADMIN — SODIUM CHLORIDE, POTASSIUM CHLORIDE, SODIUM LACTATE AND CALCIUM CHLORIDE: 600; 310; 30; 20 INJECTION, SOLUTION INTRAVENOUS at 09:14

## 2018-06-20 RX ADMIN — HEPARIN SODIUM 5000 UNITS: 5000 INJECTION, SOLUTION INTRAVENOUS; SUBCUTANEOUS at 06:52

## 2018-06-20 RX ADMIN — OXYCODONE HYDROCHLORIDE 5 MG: 5 TABLET ORAL at 16:52

## 2018-06-20 RX ADMIN — CETIRIZINE HYDROCHLORIDE 10 MG: 10 TABLET, FILM COATED ORAL at 09:56

## 2018-06-20 RX ADMIN — SODIUM CHLORIDE, PRESERVATIVE FREE 500 UNITS: 5 INJECTION INTRAVENOUS at 16:53

## 2018-06-20 RX ADMIN — HYDROMORPHONE HYDROCHLORIDE 0.5 MG: 1 INJECTION, SOLUTION INTRAMUSCULAR; INTRAVENOUS; SUBCUTANEOUS at 10:01

## 2018-06-20 NOTE — ANESTHESIA POSTPROCEDURE EVALUATION
Patient: Geneva Haro    Procedure Summary     Date:  06/20/18 Room / Location:   [facility] 1 /  [facility]    Anesthesia Start:  0908 Anesthesia Stop:      Procedure:  ESOPHAGOGASTRODUODENOSCOPY (N/A ) Diagnosis:       Gastroparesis      Intractable cyclical vomiting with nausea      (Gastroparesis [K31.84])      (Intractable cyclical vomiting with nausea [G43.A1])    Surgeon:  Mark I Brunner, MD Provider:  Charanjit Mott MD    Anesthesia Type:  general ASA Status:  2          Anesthesia Type: general  Last vitals  BP   119/71 (06/20/18 0926)   Temp   97 °F (36.1 °C) (06/20/18 0926)   Pulse   92 (06/20/18 0926)   Resp   16 (06/20/18 0926)     SpO2   100 % (06/20/18 0926)     Post Anesthesia Care and Evaluation    Patient location during evaluation: PACU  Patient participation: complete - patient participated  Level of consciousness: awake and awake and alert  Pain score: 0  Pain management: adequate  Airway patency: patent  Anesthetic complications: No anesthetic complications  PONV Status: none  Cardiovascular status: acceptable  Respiratory status: acceptable and nasal cannula  Hydration status: acceptable

## 2018-06-20 NOTE — DISCHARGE SUMMARY
[facility] Medicine Services  DISCHARGE SUMMARY    Patient Name: Geneva Haro  : 1965  MRN: [id number]    Date of Admission: 2018  Date of Discharge:  2018  Length of Stay: 0  Primary Care Physician: No Known Provider    Consults     Date and Time Order Name Status Description    2018 2207 Inpatient Gastroenterology Consult Completed         Hospital Course     Presenting Problem:   Intractable nausea and vomiting [R11.2]    Active Hospital Problems (** Indicates Principal Problem)    Diagnosis Date Noted   • **Intractable nausea and vomiting [R11.2] 2018   • Anxiety and depression [F41.8] 2018   • Tobacco abuse [Z72.0] 2018   • Marijuana abuse [F12.10] 2018   • GERD (gastroesophageal reflux disease) [K21.9] 2018   • Type 2 diabetes mellitus [E11.9] 2018   • Intractable cyclical vomiting with nausea [G43.A1] 2018   • Fibromyalgia [M79.7] 2016   • Gastroparesis [K31.84] 2016   • Iron deficiency anemia [D50.9] 2016   • Malignant neoplasm of upper-outer quadrant of left female breast [C50.412] 2016      Resolved Hospital Problems    Diagnosis Date Noted Date Resolved   No resolved problems to display.          Hospital Course:  Geneva Haro is a 53 y.o. female with a past medical history significant for breast cancer s/p bilateral mastectomy, gastroparesis s/p gastric stimulator placement in [facility], DM, iron deficiency anemia and fibromyalgia who presents with complaints of intractable nausea and vomiting. Pt was given supportive care with anti-emetics. Underwent EGD on  for possible complicated reflux or small tear. EGD shows loose Nissen and mild atrophic gastritis. No esophagitis seen.. Pt's symptoms are likely most related to non-functioning gastric stimulator and pt is to follow-up with [facility] as soon as possible.     Procedure(s):  ESOPHAGOGASTRODUODENOSCOPY   905 UPPER GI  ENDOSCOPY   Day of Discharge     HPI:   Reports feeling well. Tolerated some mashed potatoes well for lunch, after EGD.     Review of Systems  Gen- No fevers or chills  CV- No chest pain or palpitations  Resp- No cough or dyspnea  GI- No N/V/D or abd pain    Otherwise ROS is negative except as mentioned in the HPI.    Vital Signs:   Temp:  [97 °F (36.1 °C)-98.8 °F (37.1 °C)] 98.1 °F (36.7 °C)  Heart Rate:  [55-92] 66  Resp:  [15-18] 16  BP: (103-136)/(66-90) 136/77     Physical Exam:  Constitutional: No acute distress, awake, alert on RA  Eyes: PERRLA, sclerae anicteric, no conjunctival injection  HENT: NCAT, mucous membranes moist  Neck: Supple, no thyromegaly, no lymphadenopathy, trachea midline  Respiratory: Clear to auscultation bilaterally, nonlabored respirations   Cardiovascular: RRR, no murmurs, rubs, or gallops, palpable pedal pulses bilaterally  Gastrointestinal: Positive bowel sounds, soft, nontender, nondistended  Musculoskeletal: No bilateral ankle edema, no clubbing or cyanosis to extremities  Psychiatric: Appropriate affect, cooperative  Neurologic: Oriented x 3, strength symmetric in all extremities, Cranial Nerves grossly intact to confrontation, speech clear  Skin: No rashes    Pertinent  and/or Most Recent Results         Results from last 7 days  Lab Units 06/20/18  0658 06/18/18  1528 06/16/18  1624   WBC 10*3/mm3  --  4.55 4.73   HEMOGLOBIN g/dL  --  12.8 12.4   HEMATOCRIT %  --  38.8 37.5   PLATELETS 10*3/mm3  --  164 159   SODIUM mmol/L 144 145 143   POTASSIUM mmol/L 3.7 3.7 4.0   CHLORIDE mmol/L 110* 111* 112*   CO2 mmol/L 26.0 26.0 24.0   BUN mg/dL 9 8* 9   CREATININE mg/dL 1.06 1.10 0.97   GLUCOSE mg/dL 89 119* 97   CALCIUM mg/dL 8.6* 9.7 9.4       Results from last 7 days  Lab Units 06/18/18  1528 06/16/18  1624   BILIRUBIN mg/dL 0.3 0.3   ALK PHOS U/L 145* 131*   ALT (SGPT) U/L 22 14   AST (SGOT) U/L 28 17           Invalid input(s): TG, LDLCALC, LDLREALC      Brief Urine Lab Results   (Last result in the past 365 days)      Color   Clarity   Blood   Leuk Est   Nitrite   Protein   CREAT   Urine HCG        06/18/18 1552 Yellow Clear Trace(A) Negative Negative Negative               Microbiology Results Abnormal     None          Imaging Results (all)     None                Order Current Status    Tissue Pathology Exam In process        Discharge Details        Discharge Medications      New Medications      Instructions Start Date   ondansetron ODT 4 MG disintegrating tablet  Commonly known as:  ZOFRAN-ODT   4 mg, Oral, Every 6 Hours PRN      promethazine 25 MG suppository  Commonly known as:  PHENERGAN   25 mg, Rectal, Every 6 Hours PRN      promethazine 12.5 MG tablet  Commonly known as:  PHENERGAN   12.5 mg, Oral, Every 6 Hours PRN         Continue These Medications      Instructions Start Date   cetirizine 10 MG tablet  Commonly known as:  zyrTEC   10 mg, Oral, Daily      citalopram 20 MG tablet  Commonly known as:  CeleXA   20 mg, Oral, Daily      esomeprazole 40 MG capsule  Commonly known as:  nexIUM   40 mg, Oral, 2 Times Daily      hyoscyamine 0.125 MG SL tablet  Commonly known as:  LEVSIN   0.125 mg, Oral, Every 4 Hours PRN      metoclopramide 10 MG tablet  Commonly known as:  REGLAN   10 mg, Oral, 4 Times Daily Before Meals & Nightly PRN      nitrofurantoin (macrocrystal-monohydrate) 100 MG capsule  Commonly known as:  MACROBID   100 mg, Oral, 2 Times Daily         Stop These Medications    ondansetron 8 MG tablet  Commonly known as:  ZOFRAN              Discharge Disposition:  Home or Self Care    Discharge Diet:  Diet Instructions     Diet as tolerated                 Discharge Activity:  Activity Instructions     Activity as tolerated               Future Appointments  Date Time Provider Department Center   7/16/2018 2:00 PM CHAIR 16  [facility]   10/22/2018 1:30 PM Darrell Gamez MD [facility]   10/22/2018 2:30 PM CHAIR 6  [facility]       Additional Instructions for  the Follow-ups that You Need to Schedule     Discharge Follow-up with PCP    As directed      Currently Documented PCP:  No Known Provider  PCP Phone Number:  [phone]    Follow Up Details:  1-2 weeks         Discharge Follow-up with Specified Provider: [facility] - as soon as possible    As directed      To:  [facility] - as soon as possible               Time Spent on Discharge:  >35min    Teresita Villanueva MD  06/20/18  3:42 PM

## 2018-06-20 NOTE — PLAN OF CARE
Problem: Patient Care Overview  Goal: Plan of Care Review  Outcome: Ongoing (interventions implemented as appropriate)    Goal: Individualization and Mutuality  Outcome: Ongoing (interventions implemented as appropriate)    Goal: Discharge Needs Assessment  Outcome: Ongoing (interventions implemented as appropriate)    Goal: Interprofessional Rounds/Family Conf  Outcome: Ongoing (interventions implemented as appropriate)    Goal: Plan of Care Review  Outcome: Ongoing (interventions implemented as appropriate)    Goal: Individualization and Mutuality  Outcome: Ongoing (interventions implemented as appropriate)    Goal: Discharge Needs Assessment  Outcome: Ongoing (interventions implemented as appropriate)    Goal: Interprofessional Rounds/Family Conf  Outcome: Ongoing (interventions implemented as appropriate)      Problem: Nausea/Vomiting (Adult)  Goal: Identify Related Risk Factors and Signs and Symptoms  Outcome: Ongoing (interventions implemented as appropriate)    Goal: Symptom Relief  Outcome: Ongoing (interventions implemented as appropriate)    Goal: Adequate Hydration  Outcome: Ongoing (interventions implemented as appropriate)

## 2018-06-20 NOTE — BRIEF OP NOTE
ESOPHAGOGASTRODUODENOSCOPY  Progress Note    Geneva Haro  6/18/2018 - 6/20/2018    EGD shows loose Nissen and mild atrophic gastritis. No esophagitis seen.    >> Will communicate H pylori results to patient, if positive.  >> Patient is stable for discharge from GI standpoint.    Mark I. Brunner, MD     Date: 6/20/2018  Time: 9:20 AM

## 2018-06-20 NOTE — ANESTHESIA PREPROCEDURE EVALUATION
Anesthesia Evaluation                  Airway   Mallampati: I  TM distance: >3 FB  Neck ROM: full  No difficulty expected  Dental      Pulmonary    Cardiovascular     ECG reviewed        Neuro/Psych  GI/Hepatic/Renal/Endo    (+)  GERD,  diabetes mellitus,     Musculoskeletal     Abdominal    Substance History      OB/GYN          Other   (+) arthritis                     Anesthesia Plan    ASA 2     general     intravenous induction   Anesthetic plan and risks discussed with patient.    Plan discussed with CRNA.

## 2018-06-21 LAB
CYTO UR: NORMAL
LAB AP CASE REPORT: NORMAL
LAB AP CLINICAL INFORMATION: NORMAL
PATH REPORT.FINAL DX SPEC: NORMAL
PATH REPORT.GROSS SPEC: NORMAL

## 2018-07-16 ENCOUNTER — INFUSION (OUTPATIENT)
Dept: ONCOLOGY | Facility: HOSPITAL | Age: 53
End: 2018-07-16

## 2018-07-16 VITALS
RESPIRATION RATE: 16 BRPM | BODY MASS INDEX: 26.06 KG/M2 | TEMPERATURE: 97.6 F | HEART RATE: 101 BPM | SYSTOLIC BLOOD PRESSURE: 118 MMHG | HEIGHT: 67 IN | DIASTOLIC BLOOD PRESSURE: 82 MMHG | WEIGHT: 166 LBS

## 2018-07-16 DIAGNOSIS — D50.8 OTHER IRON DEFICIENCY ANEMIA: ICD-10-CM

## 2018-07-16 DIAGNOSIS — K31.84 GASTROPARESIS: Primary | ICD-10-CM

## 2018-07-16 PROCEDURE — 25010000002 HEPARIN FLUSH (PORCINE) 100 UNIT/ML SOLUTION: Performed by: INTERNAL MEDICINE

## 2018-07-16 PROCEDURE — 96523 IRRIG DRUG DELIVERY DEVICE: CPT

## 2018-07-16 RX ORDER — SODIUM CHLORIDE 0.9 % (FLUSH) 0.9 %
10 SYRINGE (ML) INJECTION AS NEEDED
Status: DISCONTINUED | OUTPATIENT
Start: 2018-07-16 | End: 2018-07-16 | Stop reason: HOSPADM

## 2018-07-16 RX ORDER — SODIUM CHLORIDE 0.9 % (FLUSH) 0.9 %
10 SYRINGE (ML) INJECTION AS NEEDED
Status: CANCELLED | OUTPATIENT
Start: 2018-07-16

## 2018-07-16 RX ADMIN — HEPARIN 500 UNITS: 100 SYRINGE at 14:30

## 2018-07-16 RX ADMIN — Medication 10 ML: at 14:30

## 2018-08-20 ENCOUNTER — INFUSION (OUTPATIENT)
Dept: ONCOLOGY | Facility: HOSPITAL | Age: 53
End: 2018-08-20

## 2018-08-20 VITALS
WEIGHT: 173 LBS | RESPIRATION RATE: 16 BRPM | TEMPERATURE: 97.5 F | HEIGHT: 67 IN | DIASTOLIC BLOOD PRESSURE: 76 MMHG | SYSTOLIC BLOOD PRESSURE: 129 MMHG | HEART RATE: 97 BPM | BODY MASS INDEX: 27.15 KG/M2

## 2018-08-20 DIAGNOSIS — D50.8 OTHER IRON DEFICIENCY ANEMIA: ICD-10-CM

## 2018-08-20 DIAGNOSIS — K31.84 GASTROPARESIS: Primary | ICD-10-CM

## 2018-08-20 PROCEDURE — 25010000002 HEPARIN FLUSH (PORCINE) 100 UNIT/ML SOLUTION: Performed by: INTERNAL MEDICINE

## 2018-08-20 PROCEDURE — 96523 IRRIG DRUG DELIVERY DEVICE: CPT

## 2018-08-20 RX ORDER — SODIUM CHLORIDE 0.9 % (FLUSH) 0.9 %
10 SYRINGE (ML) INJECTION AS NEEDED
Status: CANCELLED | OUTPATIENT
Start: 2018-08-20

## 2018-08-20 RX ADMIN — HEPARIN 500 UNITS: 100 SYRINGE at 13:41

## 2018-09-17 ENCOUNTER — INFUSION (OUTPATIENT)
Dept: ONCOLOGY | Facility: HOSPITAL | Age: 53
End: 2018-09-17

## 2018-09-17 VITALS
RESPIRATION RATE: 16 BRPM | WEIGHT: 174 LBS | HEIGHT: 67 IN | BODY MASS INDEX: 27.31 KG/M2 | TEMPERATURE: 98 F | SYSTOLIC BLOOD PRESSURE: 133 MMHG | HEART RATE: 113 BPM | DIASTOLIC BLOOD PRESSURE: 83 MMHG

## 2018-09-17 DIAGNOSIS — D50.8 OTHER IRON DEFICIENCY ANEMIA: ICD-10-CM

## 2018-09-17 DIAGNOSIS — K31.84 GASTROPARESIS: Primary | ICD-10-CM

## 2018-09-17 PROCEDURE — 25010000002 HEPARIN FLUSH (PORCINE) 100 UNIT/ML SOLUTION: Performed by: INTERNAL MEDICINE

## 2018-09-17 PROCEDURE — 96523 IRRIG DRUG DELIVERY DEVICE: CPT

## 2018-09-17 RX ORDER — SODIUM CHLORIDE 0.9 % (FLUSH) 0.9 %
10 SYRINGE (ML) INJECTION AS NEEDED
Status: CANCELLED | OUTPATIENT
Start: 2018-09-17

## 2018-09-17 RX ORDER — SODIUM CHLORIDE 0.9 % (FLUSH) 0.9 %
10 SYRINGE (ML) INJECTION AS NEEDED
Status: DISCONTINUED | OUTPATIENT
Start: 2018-09-17 | End: 2018-09-17 | Stop reason: HOSPADM

## 2018-09-17 RX ADMIN — HEPARIN 500 UNITS: 100 SYRINGE at 14:31

## 2018-09-17 RX ADMIN — Medication 10 ML: at 14:31

## 2018-10-22 ENCOUNTER — OFFICE VISIT (OUTPATIENT)
Dept: ONCOLOGY | Facility: CLINIC | Age: 53
End: 2018-10-22

## 2018-10-22 ENCOUNTER — INFUSION (OUTPATIENT)
Dept: ONCOLOGY | Facility: HOSPITAL | Age: 53
End: 2018-10-22

## 2018-10-22 VITALS
HEART RATE: 86 BPM | TEMPERATURE: 97.8 F | RESPIRATION RATE: 12 BRPM | BODY MASS INDEX: 26.68 KG/M2 | HEIGHT: 67 IN | OXYGEN SATURATION: 100 % | DIASTOLIC BLOOD PRESSURE: 68 MMHG | WEIGHT: 170 LBS | SYSTOLIC BLOOD PRESSURE: 111 MMHG

## 2018-10-22 DIAGNOSIS — Z17.0 MALIGNANT NEOPLASM OF UPPER-OUTER QUADRANT OF LEFT BREAST IN FEMALE, ESTROGEN RECEPTOR POSITIVE (HCC): Primary | ICD-10-CM

## 2018-10-22 DIAGNOSIS — C50.412 MALIGNANT NEOPLASM OF UPPER-OUTER QUADRANT OF LEFT BREAST IN FEMALE, ESTROGEN RECEPTOR POSITIVE (HCC): Primary | ICD-10-CM

## 2018-10-22 DIAGNOSIS — K31.84 GASTROPARESIS: Primary | ICD-10-CM

## 2018-10-22 DIAGNOSIS — D50.8 OTHER IRON DEFICIENCY ANEMIA: ICD-10-CM

## 2018-10-22 LAB
ERYTHROCYTE [DISTWIDTH] IN BLOOD BY AUTOMATED COUNT: 13.9 % (ref 11.3–14.5)
FERRITIN SERPL-MCNC: 57 NG/ML (ref 10–291)
HCT VFR BLD AUTO: 37.5 % (ref 34.5–44)
HGB BLD-MCNC: 12.3 G/DL (ref 11.5–15.5)
IRON 24H UR-MRATE: 98 MCG/DL (ref 50–175)
IRON SATN MFR SERPL: 26 % (ref 15–50)
LYMPHOCYTES # BLD AUTO: 1.5 10*3/MM3 (ref 0.6–4.8)
LYMPHOCYTES NFR BLD AUTO: 49.1 % (ref 24–44)
MCH RBC QN AUTO: 27.8 PG (ref 27–31)
MCHC RBC AUTO-ENTMCNC: 32.8 G/DL (ref 32–36)
MCV RBC AUTO: 84.8 FL (ref 80–99)
MONOCYTES # BLD AUTO: 0.1 10*3/MM3 (ref 0–1)
MONOCYTES NFR BLD AUTO: 1.7 % (ref 0–12)
NEUTROPHILS # BLD AUTO: 1.5 10*3/MM3 (ref 1.5–8.3)
NEUTROPHILS NFR BLD AUTO: 49.2 % (ref 41–71)
PLATELET # BLD AUTO: 194 10*3/MM3 (ref 150–450)
PMV BLD AUTO: 8 FL (ref 6–12)
RBC # BLD AUTO: 4.42 10*6/MM3 (ref 3.89–5.14)
TIBC SERPL-MCNC: 374 MCG/DL (ref 250–450)
WBC NRBC COR # BLD: 3.1 10*3/MM3 (ref 3.5–10.8)

## 2018-10-22 PROCEDURE — 83550 IRON BINDING TEST: CPT | Performed by: NURSE PRACTITIONER

## 2018-10-22 PROCEDURE — 85025 COMPLETE CBC W/AUTO DIFF WBC: CPT | Performed by: NURSE PRACTITIONER

## 2018-10-22 PROCEDURE — 36591 DRAW BLOOD OFF VENOUS DEVICE: CPT

## 2018-10-22 PROCEDURE — 82728 ASSAY OF FERRITIN: CPT | Performed by: NURSE PRACTITIONER

## 2018-10-22 PROCEDURE — 83540 ASSAY OF IRON: CPT | Performed by: NURSE PRACTITIONER

## 2018-10-22 PROCEDURE — 96523 IRRIG DRUG DELIVERY DEVICE: CPT

## 2018-10-22 PROCEDURE — 99213 OFFICE O/P EST LOW 20 MIN: CPT | Performed by: NURSE PRACTITIONER

## 2018-10-22 RX ORDER — SODIUM CHLORIDE 0.9 % (FLUSH) 0.9 %
10 SYRINGE (ML) INJECTION AS NEEDED
Status: CANCELLED | OUTPATIENT
Start: 2018-10-22

## 2018-10-22 RX ORDER — ESTRADIOL 0.1 MG/G
1 CREAM VAGINAL DAILY
Qty: 42.5 G | Refills: 6 | Status: SHIPPED | OUTPATIENT
Start: 2018-10-22 | End: 2019-03-07

## 2018-10-22 RX ADMIN — HEPARIN 500 UNITS: 100 SYRINGE at 14:45

## 2018-10-22 NOTE — PROGRESS NOTES
"      PROBLEM LIST:  1. Stage I breast cancer, left upper outer quadrant:   a) Original diagnosis 06/20/2010.   b) Left mastectomy with sentinel lymph node sampling and prophylactic right  mastectomy.   c) [id number] stage I.   d) Estrogen receptor and progesterone receptor positive and HER-2 negative  with low risk Oncotype.   e) Completed adjuvant endocrine therapy 07/08/2015.   2. Fibromyalgia and chronic pain.   3. Recurring iron deficiency anemia.   4. Gastroparesis.       CHIEF COMPLAINT:    Subjective     HISTORY OF PRESENT ILLNESS:   Mrs. Haro is here for follow up evaluation of history of breast cancer and iron deficiency anemia.  She is maintaining her usual daily activities.  She continues to have chronic pain that is unchanged from prior assessments. She complains of dyspareunia and vaginal dryness. She says this is effecting her physical relationship with her .   She denies any persistent cough or dyspnea.  No chest pain or palpitations.  No nose or gum bleeding.  No melena or hematochezia.  No new long bone pain, severe headaches, or diplopia.    Past Medical History, Past Surgical History, Social History, Family History have been reviewed and are without significant changes except as mentioned.    Review of Systems   A comprehensive 14 point review of systems was performed and was negative except as mentioned.    Medications:  The current medication list was reviewed in the EMR    ALLERGIES:    Allergies   Allergen Reactions   • Gabapentin Other (See Comments)     Seizure   • Aspirin GI Intolerance   • Ibuprofen GI Intolerance   • Morphine And Related Itching and Swelling       Objective      /68   Pulse 86   Temp 97.8 °F (36.6 °C) (Temporal Artery )   Resp 12   Ht 170.2 cm (67.01\")   Wt 77.1 kg (170 lb)   SpO2 100%   BMI 26.62 kg/m²          General: well appearing, in no acute distress   HEENT: sclera anicteric, oropharynx clear  Lymphatics: no cervical, supraclavicular, or " axillary adenopathy  Cardiovascular: regular rate and rhythm, no murmurs  Lungs: clear to auscultation bilaterally  Abdomen: soft, nontender, nondistended.  No palpable masses or organomegaly  Extremeties: no lower extremity edema, cords or calf tenderness  Skin: no rashes, lesions, bruising, or petechiae  Breasts: bilateral mastectomy incisions well healed with no masses or skin changes noted      RECENT LABS:  Hematology WBC   Date Value Ref Range Status   06/18/2018 4.55 3.50 - 10.80 10*3/mm3 Final     Hemoglobin   Date Value Ref Range Status   06/18/2018 12.8 11.5 - 15.5 g/dL Final     Hematocrit   Date Value Ref Range Status   06/18/2018 38.8 34.5 - 44.0 % Final     MCV   Date Value Ref Range Status   06/18/2018 83.3 80.0 - 99.0 fL Final     RDW   Date Value Ref Range Status   06/18/2018 13.4 11.3 - 14.5 % Final     MPV   Date Value Ref Range Status   06/18/2018 10.0 6.0 - 12.0 fL Final     Platelets   Date Value Ref Range Status   06/18/2018 164 150 - 450 10*3/mm3 Final     Immature Grans %   Date Value Ref Range Status   06/18/2018 0.2 0.0 - 0.6 % Final     Neutrophils, Absolute   Date Value Ref Range Status   06/18/2018 3.00 1.50 - 8.30 10*3/mm3 Final     Lymphocytes, Absolute   Date Value Ref Range Status   06/18/2018 1.22 0.60 - 4.80 10*3/mm3 Final     Monocytes, Absolute   Date Value Ref Range Status   06/18/2018 0.23 0.00 - 1.00 10*3/mm3 Final     Eosinophils, Absolute   Date Value Ref Range Status   06/18/2018 0.06 0.00 - 0.30 10*3/mm3 Final     Basophils, Absolute   Date Value Ref Range Status   06/18/2018 0.03 0.00 - 0.20 10*3/mm3 Final     Immature Grans, Absolute   Date Value Ref Range Status   06/18/2018 0.01 0.00 - 0.03 10*3/mm3 Final     nRBC   Date Value Ref Range Status   05/14/2014 0.0  Final       Glucose   Date Value Ref Range Status   06/20/2018 89 70 - 100 mg/dL Final     Sodium   Date Value Ref Range Status   06/20/2018 144 132 - 146 mmol/L Final     Potassium   Date Value Ref Range  Status   06/20/2018 3.7 3.5 - 5.5 mmol/L Final     CO2   Date Value Ref Range Status   06/20/2018 26.0 20.0 - 31.0 mmol/L Final     Chloride   Date Value Ref Range Status   06/20/2018 110 (H) 99 - 109 mmol/L Final     Anion Gap   Date Value Ref Range Status   06/20/2018 8.0 3.0 - 11.0 mmol/L Final     Creatinine   Date Value Ref Range Status   06/20/2018 1.06 0.60 - 1.30 mg/dL Final     BUN   Date Value Ref Range Status   06/20/2018 9 9 - 23 mg/dL Final     BUN/Creatinine Ratio   Date Value Ref Range Status   06/20/2018 8.5 7.0 - 25.0 Final     Calcium   Date Value Ref Range Status   06/20/2018 8.6 (L) 8.7 - 10.4 mg/dL Final     Alkaline Phosphatase   Date Value Ref Range Status   06/18/2018 145 (H) 25 - 100 U/L Final     Total Protein   Date Value Ref Range Status   06/18/2018 7.4 5.7 - 8.2 g/dL Final     ALT (SGPT)   Date Value Ref Range Status   06/18/2018 22 7 - 40 U/L Final     AST (SGOT)   Date Value Ref Range Status   06/18/2018 28 0 - 33 U/L Final     Total Bilirubin   Date Value Ref Range Status   06/18/2018 0.3 0.3 - 1.2 mg/dL Final     Albumin   Date Value Ref Range Status   06/20/2018 3.56 3.20 - 4.80 g/dL Final     Globulin   Date Value Ref Range Status   06/18/2018 3.0 gm/dL Final     A/G Ratio   Date Value Ref Range Status   06/19/2015 1.6 0.7 - 2.0 Final       LDH   Date Value Ref Range Status   06/19/2015 174 120 - 246 Units/L Final          Assessment/Plan   IMPRESSION:   1. Anemia. Improved. She has had recurring iron deficiency anemia. She has responded to parenteral iron in the past. Last hemoglobin normal.   2. History of breast cancer. She has done well after adjuvant endocrine therapy. She currently does not have any symptoms to suggest recurrence.     3. Dyspareunia with vaginal dryness. I will prescribe her a low dose estrace intravaginally to see if this helps with symptoms.       PLAN:   1. I will check her CBC, ferritin and iron studies today and call if they are adversely changed.   2.  Estrace 1 g intravaginally twice a week at bedtime.     3. I will see her back in about 6 months or sooner if new problems or findings develop.                 Michelle Drake, [facility]    10/22/2018          CC:

## 2019-01-17 ENCOUNTER — HOSPITAL ENCOUNTER (EMERGENCY)
Facility: HOSPITAL | Age: 54
Discharge: HOME | End: 2019-01-17
Attending: EMERGENCY MEDICINE | Admitting: EMERGENCY MEDICINE

## 2019-01-17 VITALS
DIASTOLIC BLOOD PRESSURE: 98 MMHG | TEMPERATURE: 98.8 F | SYSTOLIC BLOOD PRESSURE: 130 MMHG | HEIGHT: 67 IN | RESPIRATION RATE: 18 BRPM | OXYGEN SATURATION: 98 % | WEIGHT: 169 LBS | HEART RATE: 101 BPM | BODY MASS INDEX: 26.53 KG/M2

## 2019-01-17 DIAGNOSIS — K31.84 GASTROPARESIS: ICD-10-CM

## 2019-01-17 DIAGNOSIS — E86.0 DEHYDRATION: ICD-10-CM

## 2019-01-17 DIAGNOSIS — R11.2 INTRACTABLE VOMITING WITH NAUSEA, UNSPECIFIED VOMITING TYPE: Primary | ICD-10-CM

## 2019-01-17 LAB
ALBUMIN SERPL-MCNC: 4.99 G/DL (ref 3.2–4.8)
ALBUMIN/GLOB SERPL: 1.7 G/DL (ref 1.5–2.5)
ALP SERPL-CCNC: 125 U/L (ref 25–100)
ALT SERPL W P-5'-P-CCNC: 18 U/L (ref 7–40)
ANION GAP SERPL CALCULATED.3IONS-SCNC: 14 MMOL/L (ref 3–11)
AST SERPL-CCNC: 30 U/L (ref 0–33)
BACTERIA UR QL AUTO: NORMAL /HPF
BASOPHILS # BLD AUTO: 0.02 10*3/MM3 (ref 0–0.2)
BASOPHILS NFR BLD AUTO: 0.3 % (ref 0–1)
BILIRUB SERPL-MCNC: 0.7 MG/DL (ref 0.3–1.2)
BILIRUB UR QL STRIP: NEGATIVE
BUN BLD-MCNC: 20 MG/DL (ref 9–23)
BUN/CREAT SERPL: 17.7 (ref 7–25)
CALCIUM SPEC-SCNC: 10.1 MG/DL (ref 8.7–10.4)
CHLORIDE SERPL-SCNC: 101 MMOL/L (ref 99–109)
CLARITY UR: CLEAR
CO2 SERPL-SCNC: 24 MMOL/L (ref 20–31)
COLOR UR: YELLOW
CREAT BLD-MCNC: 1.13 MG/DL (ref 0.6–1.3)
D-LACTATE SERPL-SCNC: 1.5 MMOL/L (ref 0.5–2)
DEPRECATED RDW RBC AUTO: 42.5 FL (ref 37–54)
EOSINOPHIL # BLD AUTO: 0.01 10*3/MM3 (ref 0–0.3)
EOSINOPHIL NFR BLD AUTO: 0.2 % (ref 0–3)
ERYTHROCYTE [DISTWIDTH] IN BLOOD BY AUTOMATED COUNT: 14.2 % (ref 11.3–14.5)
GFR SERPL CREATININE-BSD FRML MDRD: 61 ML/MIN/1.73
GLOBULIN UR ELPH-MCNC: 3 GM/DL
GLUCOSE BLD-MCNC: 127 MG/DL (ref 70–100)
GLUCOSE UR STRIP-MCNC: NEGATIVE MG/DL
HCT VFR BLD AUTO: 41.9 % (ref 34.5–44)
HGB BLD-MCNC: 14.2 G/DL (ref 11.5–15.5)
HGB UR QL STRIP.AUTO: ABNORMAL
HOLD SPECIMEN: NORMAL
HOLD SPECIMEN: NORMAL
HYALINE CASTS UR QL AUTO: NORMAL /LPF
IMM GRANULOCYTES # BLD AUTO: 0 10*3/MM3 (ref 0–0.03)
IMM GRANULOCYTES NFR BLD AUTO: 0 % (ref 0–0.6)
KETONES UR QL STRIP: ABNORMAL
LEUKOCYTE ESTERASE UR QL STRIP.AUTO: NEGATIVE
LIPASE SERPL-CCNC: 25 U/L (ref 6–51)
LYMPHOCYTES # BLD AUTO: 1.54 10*3/MM3 (ref 0.6–4.8)
LYMPHOCYTES NFR BLD AUTO: 26.5 % (ref 24–44)
MAGNESIUM SERPL-MCNC: 2.3 MG/DL (ref 1.3–2.7)
MCH RBC QN AUTO: 27.9 PG (ref 27–31)
MCHC RBC AUTO-ENTMCNC: 33.9 G/DL (ref 32–36)
MCV RBC AUTO: 82.3 FL (ref 80–99)
MONOCYTES # BLD AUTO: 0.41 10*3/MM3 (ref 0–1)
MONOCYTES NFR BLD AUTO: 7.1 % (ref 0–12)
NEUTROPHILS # BLD AUTO: 3.83 10*3/MM3 (ref 1.5–8.3)
NEUTROPHILS NFR BLD AUTO: 65.9 % (ref 41–71)
NITRITE UR QL STRIP: NEGATIVE
PH UR STRIP.AUTO: <=5 [PH] (ref 5–8)
PLATELET # BLD AUTO: 214 10*3/MM3 (ref 150–450)
PMV BLD AUTO: 10.2 FL (ref 6–12)
POTASSIUM BLD-SCNC: 3.4 MMOL/L (ref 3.5–5.5)
PROT SERPL-MCNC: 8 G/DL (ref 5.7–8.2)
PROT UR QL STRIP: NEGATIVE
RBC # BLD AUTO: 5.09 10*6/MM3 (ref 3.89–5.14)
RBC # UR: NORMAL /HPF
REF LAB TEST METHOD: NORMAL
SODIUM BLD-SCNC: 139 MMOL/L (ref 132–146)
SP GR UR STRIP: 1.02 (ref 1–1.03)
SQUAMOUS #/AREA URNS HPF: NORMAL /HPF
TROPONIN I SERPL-MCNC: 0 NG/ML (ref 0–0.07)
UROBILINOGEN UR QL STRIP: ABNORMAL
WBC NRBC COR # BLD: 5.81 10*3/MM3 (ref 3.5–10.8)
WBC UR QL AUTO: NORMAL /HPF
WHOLE BLOOD HOLD SPECIMEN: NORMAL
WHOLE BLOOD HOLD SPECIMEN: NORMAL

## 2019-01-17 PROCEDURE — 25010000002 DIPHENHYDRAMINE PER 50 MG: Performed by: EMERGENCY MEDICINE

## 2019-01-17 PROCEDURE — 83735 ASSAY OF MAGNESIUM: CPT | Performed by: EMERGENCY MEDICINE

## 2019-01-17 PROCEDURE — 96374 THER/PROPH/DIAG INJ IV PUSH: CPT

## 2019-01-17 PROCEDURE — 84484 ASSAY OF TROPONIN QUANT: CPT

## 2019-01-17 PROCEDURE — 85025 COMPLETE CBC W/AUTO DIFF WBC: CPT | Performed by: EMERGENCY MEDICINE

## 2019-01-17 PROCEDURE — P9612 CATHETERIZE FOR URINE SPEC: HCPCS

## 2019-01-17 PROCEDURE — 96361 HYDRATE IV INFUSION ADD-ON: CPT

## 2019-01-17 PROCEDURE — 99284 EMERGENCY DEPT VISIT MOD MDM: CPT

## 2019-01-17 PROCEDURE — 81001 URINALYSIS AUTO W/SCOPE: CPT | Performed by: EMERGENCY MEDICINE

## 2019-01-17 PROCEDURE — 96376 TX/PRO/DX INJ SAME DRUG ADON: CPT

## 2019-01-17 PROCEDURE — 25010000002 LORAZEPAM PER 2 MG: Performed by: EMERGENCY MEDICINE

## 2019-01-17 PROCEDURE — 83605 ASSAY OF LACTIC ACID: CPT | Performed by: EMERGENCY MEDICINE

## 2019-01-17 PROCEDURE — 25010000002 ONDANSETRON PER 1 MG: Performed by: EMERGENCY MEDICINE

## 2019-01-17 PROCEDURE — 96375 TX/PRO/DX INJ NEW DRUG ADDON: CPT

## 2019-01-17 PROCEDURE — 80053 COMPREHEN METABOLIC PANEL: CPT | Performed by: EMERGENCY MEDICINE

## 2019-01-17 PROCEDURE — 83690 ASSAY OF LIPASE: CPT | Performed by: EMERGENCY MEDICINE

## 2019-01-17 PROCEDURE — 25010000002 METOCLOPRAMIDE PER 10 MG: Performed by: EMERGENCY MEDICINE

## 2019-01-17 PROCEDURE — 93005 ELECTROCARDIOGRAM TRACING: CPT | Performed by: EMERGENCY MEDICINE

## 2019-01-17 RX ORDER — PROMETHAZINE HYDROCHLORIDE 25 MG/ML
12.5 INJECTION, SOLUTION INTRAMUSCULAR; INTRAVENOUS ONCE
Status: DISCONTINUED | OUTPATIENT
Start: 2019-01-17 | End: 2019-01-17

## 2019-01-17 RX ORDER — SODIUM CHLORIDE 0.9 % (FLUSH) 0.9 %
10 SYRINGE (ML) INJECTION AS NEEDED
Status: DISCONTINUED | OUTPATIENT
Start: 2019-01-17 | End: 2019-01-17 | Stop reason: HOSPADM

## 2019-01-17 RX ORDER — PANTOPRAZOLE SODIUM 40 MG/10ML
80 INJECTION, POWDER, LYOPHILIZED, FOR SOLUTION INTRAVENOUS ONCE
Status: COMPLETED | OUTPATIENT
Start: 2019-01-17 | End: 2019-01-17

## 2019-01-17 RX ORDER — PROMETHAZINE HYDROCHLORIDE 25 MG/1
25 SUPPOSITORY RECTAL EVERY 6 HOURS PRN
Qty: 12 SUPPOSITORY | Refills: 0 | Status: SHIPPED | OUTPATIENT
Start: 2019-01-17 | End: 2020-10-20 | Stop reason: SDUPTHER

## 2019-01-17 RX ORDER — DIPHENHYDRAMINE HYDROCHLORIDE 50 MG/ML
25 INJECTION INTRAMUSCULAR; INTRAVENOUS ONCE
Status: COMPLETED | OUTPATIENT
Start: 2019-01-17 | End: 2019-01-17

## 2019-01-17 RX ORDER — PROMETHAZINE HYDROCHLORIDE 12.5 MG/1
12.5 TABLET ORAL EVERY 6 HOURS PRN
Qty: 15 TABLET | Refills: 0 | Status: SHIPPED | OUTPATIENT
Start: 2019-01-17 | End: 2020-10-19

## 2019-01-17 RX ORDER — ONDANSETRON 2 MG/ML
4 INJECTION INTRAMUSCULAR; INTRAVENOUS ONCE
Status: COMPLETED | OUTPATIENT
Start: 2019-01-17 | End: 2019-01-17

## 2019-01-17 RX ORDER — METOCLOPRAMIDE 10 MG/1
10 TABLET ORAL
Qty: 12 TABLET | Refills: 0 | Status: SHIPPED | OUTPATIENT
Start: 2019-01-17 | End: 2021-07-06

## 2019-01-17 RX ORDER — LORAZEPAM 2 MG/ML
0.5 INJECTION INTRAMUSCULAR ONCE
Status: COMPLETED | OUTPATIENT
Start: 2019-01-17 | End: 2019-01-17

## 2019-01-17 RX ORDER — ONDANSETRON 4 MG/1
8 TABLET, ORALLY DISINTEGRATING ORAL EVERY 6 HOURS PRN
Qty: 20 TABLET | Refills: 0 | Status: SHIPPED | OUTPATIENT
Start: 2019-01-17 | End: 2020-10-26 | Stop reason: ALTCHOICE

## 2019-01-17 RX ORDER — METOCLOPRAMIDE HYDROCHLORIDE 5 MG/ML
10 INJECTION INTRAMUSCULAR; INTRAVENOUS ONCE
Status: COMPLETED | OUTPATIENT
Start: 2019-01-17 | End: 2019-01-17

## 2019-01-17 RX ORDER — LORAZEPAM 2 MG/ML
1 INJECTION INTRAMUSCULAR ONCE
Status: COMPLETED | OUTPATIENT
Start: 2019-01-17 | End: 2019-01-17

## 2019-01-17 RX ORDER — FAMOTIDINE 10 MG/ML
20 INJECTION, SOLUTION INTRAVENOUS ONCE
Status: COMPLETED | OUTPATIENT
Start: 2019-01-17 | End: 2019-01-17

## 2019-01-17 RX ORDER — SODIUM CHLORIDE 9 MG/ML
125 INJECTION, SOLUTION INTRAVENOUS CONTINUOUS
Status: DISCONTINUED | OUTPATIENT
Start: 2019-01-17 | End: 2019-01-17 | Stop reason: HOSPADM

## 2019-01-17 RX ADMIN — ONDANSETRON 4 MG: 2 INJECTION INTRAMUSCULAR; INTRAVENOUS at 14:39

## 2019-01-17 RX ADMIN — LORAZEPAM 1 MG: 2 INJECTION, SOLUTION INTRAMUSCULAR; INTRAVENOUS at 10:23

## 2019-01-17 RX ADMIN — SODIUM CHLORIDE 2301 ML: 9 INJECTION, SOLUTION INTRAVENOUS at 10:21

## 2019-01-17 RX ADMIN — METOCLOPRAMIDE 10 MG: 5 INJECTION, SOLUTION INTRAMUSCULAR; INTRAVENOUS at 10:29

## 2019-01-17 RX ADMIN — LORAZEPAM 0.5 MG: 2 INJECTION, SOLUTION INTRAMUSCULAR; INTRAVENOUS at 14:41

## 2019-01-17 RX ADMIN — DIPHENHYDRAMINE HYDROCHLORIDE 25 MG: 50 INJECTION INTRAMUSCULAR; INTRAVENOUS at 10:26

## 2019-01-17 RX ADMIN — SODIUM CHLORIDE 125 ML/HR: 9 INJECTION, SOLUTION INTRAVENOUS at 10:31

## 2019-01-17 RX ADMIN — FAMOTIDINE 20 MG: 10 INJECTION, SOLUTION INTRAVENOUS at 10:24

## 2019-01-17 RX ADMIN — PANTOPRAZOLE SODIUM 80 MG: 40 INJECTION, POWDER, FOR SOLUTION INTRAVENOUS at 12:48

## 2019-01-17 RX ADMIN — ONDANSETRON 4 MG: 2 INJECTION INTRAMUSCULAR; INTRAVENOUS at 12:47

## 2019-01-17 NOTE — ED PROVIDER NOTES
Subjective   Geneva Haro is a 53 y.o.female with a hx of fibromyalgia who presents to the ED with complaints of vomiting and arm pain. The patient reports recurrent nausea and vomiting for the past 3 days and severe shooting left arm pain which onset an hour ago. She has tried taking many oral Phenergans. She found some relief after her first dosage, but her nausea and vomiting has been intractable since. She also endorses associated chills, left sided abdominal pain, and rhinorrhea which she attributes to chronic allergies. Her abdominal pain is similar to previous bouts of gastroparesis. She denies any fever, cough, congestion, shortness of breath, hematuria, hematochezia, chest pain, diarrhea, or urinary symptoms. Her PCP is Michael Reyes. There are no other acute complaints at this time.        History provided by:  Patient  Vomiting   The primary symptoms include abdominal pain, nausea and vomiting. Primary symptoms do not include fever, diarrhea, hematochezia or dysuria. The illness began 3 to 5 days ago. The onset was gradual. The problem has been gradually worsening.   The illness is also significant for chills.       Review of Systems   Constitutional: Positive for chills. Negative for fever.   HENT: Positive for rhinorrhea (chronic allergies). Negative for congestion.    Respiratory: Negative for cough and shortness of breath.    Cardiovascular: Negative for chest pain.   Gastrointestinal: Positive for abdominal pain, nausea and vomiting. Negative for blood in stool, diarrhea and hematochezia.   Genitourinary: Negative for difficulty urinating, dysuria, frequency and hematuria.   All other systems reviewed and are negative.      Past Medical History:   Diagnosis Date   • Anxiety    • Arthritis    • Depression    • Diabetes mellitus (CMS/HCC)    • Gall stones    • History of stomach ulcers    • Stomach problems        Allergies   Allergen Reactions   • Gabapentin Other (See Comments)     Seizure   • Aspirin  GI Intolerance   • Ibuprofen GI Intolerance   • Morphine And Related Itching and Swelling       Past Surgical History:   Procedure Laterality Date   • CHOLECYSTECTOMY     • ENDOSCOPY N/A 6/20/2018    Procedure: ESOPHAGOGASTRODUODENOSCOPY;  Surgeon: Mark I Brunner, MD;  Location: [facility] ENDOSCOPY;  Service: Gastroenterology   • GASTRIC BYPASS      x2   • HERNIA REPAIR     • MASTECTOMY Bilateral     Left With sentinel lymph node sampling and prophylactic right mastectomy   • PACEMAKER IMPLANTATION     • PORTACATH PLACEMENT         Family History   Problem Relation Age of Onset   • Breast cancer Maternal Aunt    • Pancreatic cancer Cousin    • Breast cancer Cousin    • Breast cancer Cousin        Social History     Socioeconomic History   • Marital status:      Spouse name: Not on file   • Number of children: Not on file   • Years of education: Not on file   • Highest education level: Not on file   Tobacco Use   • Smoking status: Current Every Day Smoker   • Smokeless tobacco: Never Used   Substance and Sexual Activity   • Alcohol use: No   • Drug use: Yes     Types: Marijuana   • Sexual activity: Defer         Objective   Physical Exam   Constitutional: She is oriented to person, place, and time. She appears well-developed and well-nourished. No distress.   HENT:   Head: Normocephalic and atraumatic.   Nose: Nose normal.   Eyes: Conjunctivae are normal. No scleral icterus.   Neck: Normal range of motion. Neck supple.   Cardiovascular: Regular rhythm and normal heart sounds. Tachycardia present.   No murmur heard.  She is tachycardic with a rate of 113.    Pulmonary/Chest: Effort normal and breath sounds normal. No respiratory distress.   Abdominal: Soft. Bowel sounds are normal. There is no tenderness. There is no rebound and no guarding.   Musculoskeletal: Normal range of motion. She exhibits no edema.   Neurological: She is alert and oriented to person, place, and time.   Skin: Skin is warm and dry.  "  Psychiatric: She has a normal mood and affect. Her behavior is normal.   Nursing note and vitals reviewed.      Procedures         ED Course  ED Course as of Jan 17 1509   Thu Jan 17, 2019   1013 Reevaluated the patient at bedside.   [TJ]   1046 Patient is reevaluated.  She was sleeping comfortably.  She reports feeling much better.  She reports that her stomach is \"settling\".  I discussed continue to evaluation.  Patient is in agreement.  [HH]   1102 Patient reevaluated.  Heart rate is down at 90.  She's been sleeping comfortably in the ED.  Vitals are stable.  IV hydration continues.  [HH]   1231 Reevaluated the patient at bedside and updated her on findings and plan for further care.   [TJ]   1231 Patient has some recurrent nausea but no emesis so far.  I discussed treatment with Zofran.  [HH]   1411 Reevaluated the patient at bedside and updated her on findings and plan for further care.   [TJ]   1412 This findings with patient.  She reports feeling much improved.  She has some minimal residual nausea, no further emesis and has no tenderness to abdominal palpation and no peritoneal findings.  She feels well enough to go home at the current time.  I discussed another dose of Zofran as small dose of Ativan prior to discharge.  Patient is in agreement.  [HH]      ED Course User Index  [HH] Reinier Jones MD  [TJ] Zelalem Dior     Recent Results (from the past 24 hour(s))   Comprehensive Metabolic Panel    Collection Time: 01/17/19 10:09 AM   Result Value Ref Range    Glucose 127 (H) 70 - 100 mg/dL    BUN 20 9 - 23 mg/dL    Creatinine 1.13 0.60 - 1.30 mg/dL    Sodium 139 132 - 146 mmol/L    Potassium 3.4 (L) 3.5 - 5.5 mmol/L    Chloride 101 99 - 109 mmol/L    CO2 24.0 20.0 - 31.0 mmol/L    Calcium 10.1 8.7 - 10.4 mg/dL    Total Protein 8.0 5.7 - 8.2 g/dL    Albumin 4.99 (H) 3.20 - 4.80 g/dL    ALT (SGPT) 18 7 - 40 U/L    AST (SGOT) 30 0 - 33 U/L    Alkaline Phosphatase 125 (H) 25 - 100 U/L    Total Bilirubin " 0.7 0.3 - 1.2 mg/dL    eGFR  African Amer 61 >60 mL/min/1.73    Globulin 3.0 gm/dL    A/G Ratio 1.7 1.5 - 2.5 g/dL    BUN/Creatinine Ratio 17.7 7.0 - 25.0    Anion Gap 14.0 (H) 3.0 - 11.0 mmol/L   Lipase    Collection Time: 01/17/19 10:09 AM   Result Value Ref Range    Lipase 25 6 - 51 U/L   Lactic Acid, Plasma    Collection Time: 01/17/19 10:09 AM   Result Value Ref Range    Lactate 1.5 0.5 - 2.0 mmol/L   Light Blue Top    Collection Time: 01/17/19 10:09 AM   Result Value Ref Range    Extra Tube hold for add-on    Green Top (Gel)    Collection Time: 01/17/19 10:09 AM   Result Value Ref Range    Extra Tube Hold for add-ons.    Lavender Top    Collection Time: 01/17/19 10:09 AM   Result Value Ref Range    Extra Tube hold for add-on    Gold Top - SST    Collection Time: 01/17/19 10:09 AM   Result Value Ref Range    Extra Tube Hold for add-ons.    CBC Auto Differential    Collection Time: 01/17/19 10:09 AM   Result Value Ref Range    WBC 5.81 3.50 - 10.80 10*3/mm3    RBC 5.09 3.89 - 5.14 10*6/mm3    Hemoglobin 14.2 11.5 - 15.5 g/dL    Hematocrit 41.9 34.5 - 44.0 %    MCV 82.3 80.0 - 99.0 fL    MCH 27.9 27.0 - 31.0 pg    MCHC 33.9 32.0 - 36.0 g/dL    RDW 14.2 11.3 - 14.5 %    RDW-SD 42.5 37.0 - 54.0 fl    MPV 10.2 6.0 - 12.0 fL    Platelets 214 150 - 450 10*3/mm3    Neutrophil % 65.9 41.0 - 71.0 %    Lymphocyte % 26.5 24.0 - 44.0 %    Monocyte % 7.1 0.0 - 12.0 %    Eosinophil % 0.2 0.0 - 3.0 %    Basophil % 0.3 0.0 - 1.0 %    Immature Grans % 0.0 0.0 - 0.6 %    Neutrophils, Absolute 3.83 1.50 - 8.30 10*3/mm3    Lymphocytes, Absolute 1.54 0.60 - 4.80 10*3/mm3    Monocytes, Absolute 0.41 0.00 - 1.00 10*3/mm3    Eosinophils, Absolute 0.01 0.00 - 0.30 10*3/mm3    Basophils, Absolute 0.02 0.00 - 0.20 10*3/mm3    Immature Grans, Absolute 0.00 0.00 - 0.03 10*3/mm3   Magnesium    Collection Time: 01/17/19 10:09 AM   Result Value Ref Range    Magnesium 2.3 1.3 - 2.7 mg/dL   POC Troponin, Rapid    Collection Time: 01/17/19 10:33  AM   Result Value Ref Range    Troponin I 0.00 0.00 - 0.07 ng/mL   Urinalysis With Microscopic If Indicated (No Culture) - Urine, Catheter    Collection Time: 01/17/19 12:53 PM   Result Value Ref Range    Color, UA Yellow Yellow, Straw    Appearance, UA Clear Clear    pH, UA <=5.0 5.0 - 8.0    Specific Gravity, UA 1.018 1.001 - 1.030    Glucose, UA Negative Negative    Ketones, UA 15 mg/dL (1+) (A) Negative    Bilirubin, UA Negative Negative    Blood, UA Trace (A) Negative    Protein, UA Negative Negative    Leuk Esterase, UA Negative Negative    Nitrite, UA Negative Negative    Urobilinogen, UA 0.2 E.U./dL 0.2 - 1.0 E.U./dL   Urinalysis, Microscopic Only - Urine, Catheter    Collection Time: 01/17/19 12:53 PM   Result Value Ref Range    RBC, UA 0-2 None Seen, 0-2 /HPF    WBC, UA 0-2 None Seen, 0-2 /HPF    Bacteria, UA None Seen None Seen, Trace /HPF    Squamous Epithelial Cells, UA 0-2 None Seen, 0-2 /HPF    Hyaline Casts, UA 0-6 0 - 6 /LPF    Methodology Automated Microscopy      Note: In addition to lab results from this visit, the labs listed above may include labs taken at another facility or during a different encounter within the last 24 hours. Please correlate lab times with ED admission and discharge times for further clarification of the services performed during this visit.    No orders to display     Vitals:    01/17/19 1300 01/17/19 1330 01/17/19 1400 01/17/19 1500   BP: (!) 149/109 (!) 159/111 (!) 161/108 130/98   Pulse:   104 101   Resp:   18 18   Temp:       TempSrc:       SpO2: 100% 98% 99% 98%   Weight:       Height:         Medications   sodium chloride 0.9 % flush 10 mL (not administered)   sodium chloride 0.9 % infusion (0 mL/hr Intravenous Stopped 1/17/19 1508)   sodium chloride 0.9 % bolus 2,301 mL (0 mL Intravenous Stopped 1/17/19 1230)   famotidine (PEPCID) injection 20 mg (20 mg Intravenous Given 1/17/19 1024)   LORazepam (ATIVAN) injection 1 mg (1 mg Intravenous Given 1/17/19 1023)    metoclopramide (REGLAN) injection 10 mg (10 mg Intravenous Given 1/17/19 1029)   diphenhydrAMINE (BENADRYL) injection 25 mg (25 mg Intravenous Given 1/17/19 1026)   ondansetron (ZOFRAN) injection 4 mg (4 mg Intravenous Given 1/17/19 1247)   pantoprazole (PROTONIX) injection 80 mg (80 mg Intravenous Given 1/17/19 1248)   ondansetron (ZOFRAN) injection 4 mg (4 mg Intravenous Given 1/17/19 1439)   LORazepam (ATIVAN) injection 0.5 mg (0.5 mg Intravenous Given 1/17/19 1441)     ECG/EMG Results (last 24 hours)     ** No results found for the last 24 hours. **                        MDM    Final diagnoses:   Intractable vomiting with nausea, unspecified vomiting type   Dehydration   Gastroparesis       Documentation assistance provided by erick Dior.  Information recorded by the scribe was done at my direction and has been verified and validated by me.     Zelalem Dior  01/17/19 1012       Zelalem Dior  01/17/19 1506       Reinier Jones MD  01/17/19 2222

## 2019-01-17 NOTE — DISCHARGE INSTRUCTIONS
Follow up with your Primary care provider, Michael Shook, within the next week or immediately if symptoms worsen.     Take Phenergan or Zofran as needed for nausea. Use the Suppositories if you can not keep anything down orally. Use Phenergan either orally or rectally but not together. Do not use Reglan within 4 hours of using Zofran as this may lead to side effects or overdosage. Push fluids and remain very well hydrated.     Please call ASAP to arrange outpatient follow up with one of the following gastroenterologists for evaluation of your GI symptoms and Gastroparesis:    Aiden Justice MD or Quinn Moise MD  [address] [facility]  [place] [id number]  [phone]    Or    If unable to make a timely appointment with Dr. Justice or Dr. Moise, please follow up with one of these gastroenterologists:    Josh Sanchez MD or Shady Majano MD  [address] [facility]  [address]  [place] [id number] [phone]    With your gastroenterologist in coordination with your follow-up with your primary care physician please.    Immediately return to the ED for worsening or new concerning symptoms including intractable vomiting, dehydration, fever or any other acute, urgent, emergent, or progressive symptoms as discussed.     Please review the medications you are supposed to be taking according to prior physician recommendations. I have not changed your home medications during this visit. If your discharge instructions indicate that I have changed your home medications, this is not the case, and you should disregard. If you have any questions about the medication you should be taking at home, please call your physician.

## 2019-03-07 ENCOUNTER — HOSPITAL ENCOUNTER (OUTPATIENT)
Dept: ONCOLOGY | Facility: HOSPITAL | Age: 54
Setting detail: INFUSION SERIES
Discharge: HOME | End: 2019-03-07

## 2019-03-07 VITALS
RESPIRATION RATE: 18 BRPM | BODY MASS INDEX: 26.84 KG/M2 | HEART RATE: 88 BPM | SYSTOLIC BLOOD PRESSURE: 103 MMHG | WEIGHT: 171 LBS | HEIGHT: 67 IN | TEMPERATURE: 98.1 F | DIASTOLIC BLOOD PRESSURE: 72 MMHG

## 2019-03-07 DIAGNOSIS — K31.84 GASTROPARESIS: Primary | ICD-10-CM

## 2019-03-07 DIAGNOSIS — D50.8 OTHER IRON DEFICIENCY ANEMIA: ICD-10-CM

## 2019-03-07 PROCEDURE — 96523 IRRIG DRUG DELIVERY DEVICE: CPT

## 2019-03-07 RX ORDER — SODIUM CHLORIDE 0.9 % (FLUSH) 0.9 %
10 SYRINGE (ML) INJECTION AS NEEDED
Status: DISCONTINUED | OUTPATIENT
Start: 2019-03-07 | End: 2019-03-08 | Stop reason: HOSPADM

## 2019-03-07 RX ORDER — OMEPRAZOLE 40 MG/1
40 CAPSULE, DELAYED RELEASE ORAL DAILY
COMMUNITY
End: 2021-02-09

## 2019-03-07 RX ORDER — MELATONIN
1000 DAILY
Status: ON HOLD | COMMUNITY
End: 2020-10-08

## 2019-03-07 RX ORDER — DULOXETIN HYDROCHLORIDE 30 MG/1
30 CAPSULE, DELAYED RELEASE ORAL 2 TIMES DAILY
COMMUNITY
End: 2020-11-09 | Stop reason: SDUPTHER

## 2019-03-07 RX ORDER — SODIUM CHLORIDE 0.9 % (FLUSH) 0.9 %
10 SYRINGE (ML) INJECTION AS NEEDED
Status: CANCELLED | OUTPATIENT
Start: 2019-03-07

## 2019-03-07 RX ADMIN — HEPARIN 500 UNITS: 100 SYRINGE at 13:19

## 2019-03-07 RX ADMIN — Medication 10 ML: at 13:19

## 2019-04-22 ENCOUNTER — HOSPITAL ENCOUNTER (OUTPATIENT)
Dept: ONCOLOGY | Facility: HOSPITAL | Age: 54
Setting detail: INFUSION SERIES
Discharge: HOME | End: 2019-04-22

## 2019-04-22 ENCOUNTER — OFFICE VISIT (OUTPATIENT)
Dept: ONCOLOGY | Facility: CLINIC | Age: 54
End: 2019-04-22

## 2019-04-22 ENCOUNTER — HOSPITAL ENCOUNTER (OUTPATIENT)
Dept: GENERAL RADIOLOGY | Facility: HOSPITAL | Age: 54
Discharge: HOME | End: 2019-04-22
Admitting: NURSE PRACTITIONER

## 2019-04-22 VITALS
DIASTOLIC BLOOD PRESSURE: 78 MMHG | BODY MASS INDEX: 25.9 KG/M2 | SYSTOLIC BLOOD PRESSURE: 108 MMHG | HEART RATE: 88 BPM | WEIGHT: 165 LBS | TEMPERATURE: 97.8 F | HEIGHT: 67 IN | RESPIRATION RATE: 17 BRPM

## 2019-04-22 DIAGNOSIS — M25.551 BILATERAL HIP PAIN: ICD-10-CM

## 2019-04-22 DIAGNOSIS — K31.84 GASTROPARESIS: Primary | ICD-10-CM

## 2019-04-22 DIAGNOSIS — D50.8 OTHER IRON DEFICIENCY ANEMIA: ICD-10-CM

## 2019-04-22 DIAGNOSIS — M25.552 BILATERAL HIP PAIN: ICD-10-CM

## 2019-04-22 DIAGNOSIS — Z17.0 MALIGNANT NEOPLASM OF UPPER-OUTER QUADRANT OF LEFT BREAST IN FEMALE, ESTROGEN RECEPTOR POSITIVE (HCC): Primary | ICD-10-CM

## 2019-04-22 DIAGNOSIS — C50.412 MALIGNANT NEOPLASM OF UPPER-OUTER QUADRANT OF LEFT BREAST IN FEMALE, ESTROGEN RECEPTOR POSITIVE (HCC): Primary | ICD-10-CM

## 2019-04-22 LAB
ERYTHROCYTE [DISTWIDTH] IN BLOOD BY AUTOMATED COUNT: 13.6 % (ref 12.3–15.4)
FERRITIN SERPL-MCNC: 113.6 NG/ML (ref 13–150)
HCT VFR BLD AUTO: 36.2 % (ref 34–46.6)
HGB BLD-MCNC: 12 G/DL (ref 12–15.9)
IRON 24H UR-MRATE: 78 MCG/DL (ref 37–145)
IRON SATN MFR SERPL: 20 % (ref 20–50)
LYMPHOCYTES # BLD AUTO: 1.8 10*3/MM3 (ref 0.7–3.1)
LYMPHOCYTES NFR BLD AUTO: 40.2 % (ref 19.6–45.3)
MCH RBC QN AUTO: 27.9 PG (ref 26.6–33)
MCHC RBC AUTO-ENTMCNC: 33.3 G/DL (ref 31.5–35.7)
MCV RBC AUTO: 83.9 FL (ref 79–97)
MONOCYTES # BLD AUTO: 0.2 10*3/MM3 (ref 0.1–0.9)
MONOCYTES NFR BLD AUTO: 3.7 % (ref 5–12)
NEUTROPHILS # BLD AUTO: 2.5 10*3/MM3 (ref 1.7–7)
NEUTROPHILS NFR BLD AUTO: 56.1 % (ref 42.7–76)
PLATELET # BLD AUTO: 177 10*3/MM3 (ref 140–450)
PMV BLD AUTO: 8.3 FL (ref 6–12)
RBC # BLD AUTO: 4.32 10*6/MM3 (ref 3.77–5.28)
TIBC SERPL-MCNC: 381 MCG/DL (ref 298–536)
TRANSFERRIN SERPL-MCNC: 256 MG/DL (ref 200–360)
WBC NRBC COR # BLD: 4.4 10*3/MM3 (ref 3.4–10.8)

## 2019-04-22 PROCEDURE — 85025 COMPLETE CBC W/AUTO DIFF WBC: CPT | Performed by: NURSE PRACTITIONER

## 2019-04-22 PROCEDURE — 73521 X-RAY EXAM HIPS BI 2 VIEWS: CPT

## 2019-04-22 PROCEDURE — 83540 ASSAY OF IRON: CPT | Performed by: NURSE PRACTITIONER

## 2019-04-22 PROCEDURE — 36591 DRAW BLOOD OFF VENOUS DEVICE: CPT

## 2019-04-22 PROCEDURE — 99213 OFFICE O/P EST LOW 20 MIN: CPT | Performed by: NURSE PRACTITIONER

## 2019-04-22 PROCEDURE — 84466 ASSAY OF TRANSFERRIN: CPT | Performed by: NURSE PRACTITIONER

## 2019-04-22 PROCEDURE — 82728 ASSAY OF FERRITIN: CPT | Performed by: NURSE PRACTITIONER

## 2019-04-22 RX ORDER — SODIUM CHLORIDE 0.9 % (FLUSH) 0.9 %
10 SYRINGE (ML) INJECTION AS NEEDED
Status: CANCELLED | OUTPATIENT
Start: 2019-04-22

## 2019-04-22 RX ORDER — SODIUM CHLORIDE 0.9 % (FLUSH) 0.9 %
10 SYRINGE (ML) INJECTION AS NEEDED
Status: DISCONTINUED | OUTPATIENT
Start: 2019-04-22 | End: 2019-04-23 | Stop reason: HOSPADM

## 2019-04-22 RX ADMIN — HEPARIN 500 UNITS: 100 SYRINGE at 15:25

## 2019-04-22 RX ADMIN — SODIUM CHLORIDE, PRESERVATIVE FREE 10 ML: 5 INJECTION INTRAVENOUS at 15:25

## 2019-04-22 NOTE — PROGRESS NOTES
"      PROBLEM LIST:  1. Stage I breast cancer, left upper outer quadrant:   a) Original diagnosis 06/20/2010.   b) Left mastectomy with sentinel lymph node sampling and prophylactic right  mastectomy.   c) [id number] stage I.   d) Estrogen receptor and progesterone receptor positive and HER-2 negative  with low risk Oncotype.   e) Completed adjuvant endocrine therapy 07/08/2015.   2. Fibromyalgia and chronic pain.   3. Recurring iron deficiency anemia.   4. Gastroparesis.       CHIEF COMPLAINT:    Subjective     HISTORY OF PRESENT ILLNESS:   Mrs. Haro is here for follow up evaluation of history of breast cancer and iron deficiency anemia.   She continues to have chronic pain but states her hip pain has been increasing over the past few months. She reports the right hip is worse than the left. At times she is having to use a cane to help with ambulation due to the pain. She says the pain is constant but laying or sitting on a hard chair makes it worse.   She denies any persistent cough or dyspnea.  No chest pain or palpitations.  No nose or gum bleeding.  No melena or hematochezia.  No severe headaches, or diplopia.    Past Medical History, Past Surgical History, Social History, Family History have been reviewed and are without significant changes except as mentioned.    Review of Systems   A comprehensive 14 point review of systems was performed and was negative except as mentioned.    Medications:  The current medication list was reviewed in the EMR    ALLERGIES:    Allergies   Allergen Reactions   • Gabapentin Other (See Comments)     Seizure   • Aspirin GI Intolerance   • Ibuprofen GI Intolerance   • Morphine And Related Itching and Swelling       Objective      /78   Pulse 88   Temp 97.8 °F (36.6 °C) (Temporal)   Resp 17   Ht 170.2 cm (67.01\")   Wt 74.8 kg (165 lb)   BMI 25.83 kg/m²          General: well appearing, in no acute distress   HEENT: sclera anicteric, oropharynx clear  Lymphatics: no " cervical, supraclavicular, or axillary adenopathy  Cardiovascular: regular rate and rhythm, no murmurs  Lungs: clear to auscultation bilaterally  Abdomen: soft, nontender, nondistended.  No palpable masses or organomegaly  Extremeties: no lower extremity edema, cords or calf tenderness  Skin: no rashes, lesions, bruising, or petechiae  Breasts: bilateral mastectomy incisions well healed with no masses or skin changes noted      RECENT LABS:  Hematology WBC   Date Value Ref Range Status   01/17/2019 5.81 3.50 - 10.80 10*3/mm3 Final     Hemoglobin   Date Value Ref Range Status   01/17/2019 14.2 11.5 - 15.5 g/dL Final     Hematocrit   Date Value Ref Range Status   01/17/2019 41.9 34.5 - 44.0 % Final     MCV   Date Value Ref Range Status   01/17/2019 82.3 80.0 - 99.0 fL Final     RDW   Date Value Ref Range Status   01/17/2019 14.2 11.3 - 14.5 % Final     MPV   Date Value Ref Range Status   01/17/2019 10.2 6.0 - 12.0 fL Final     Platelets   Date Value Ref Range Status   01/17/2019 214 150 - 450 10*3/mm3 Final     Immature Grans %   Date Value Ref Range Status   01/17/2019 0.0 0.0 - 0.6 % Final     Neutrophils, Absolute   Date Value Ref Range Status   01/17/2019 3.83 1.50 - 8.30 10*3/mm3 Final     Lymphocytes, Absolute   Date Value Ref Range Status   01/17/2019 1.54 0.60 - 4.80 10*3/mm3 Final     Monocytes, Absolute   Date Value Ref Range Status   01/17/2019 0.41 0.00 - 1.00 10*3/mm3 Final     Eosinophils, Absolute   Date Value Ref Range Status   01/17/2019 0.01 0.00 - 0.30 10*3/mm3 Final     Basophils, Absolute   Date Value Ref Range Status   01/17/2019 0.02 0.00 - 0.20 10*3/mm3 Final     Immature Grans, Absolute   Date Value Ref Range Status   01/17/2019 0.00 0.00 - 0.03 10*3/mm3 Final     nRBC   Date Value Ref Range Status   05/14/2014 0.0  Final       Glucose   Date Value Ref Range Status   01/17/2019 127 (H) 70 - 100 mg/dL Final     Sodium   Date Value Ref Range Status   01/17/2019 139 132 - 146 mmol/L Final      Potassium   Date Value Ref Range Status   01/17/2019 3.4 (L) 3.5 - 5.5 mmol/L Final     CO2   Date Value Ref Range Status   01/17/2019 24.0 20.0 - 31.0 mmol/L Final     Chloride   Date Value Ref Range Status   01/17/2019 101 99 - 109 mmol/L Final     Anion Gap   Date Value Ref Range Status   01/17/2019 14.0 (H) 3.0 - 11.0 mmol/L Final     Creatinine   Date Value Ref Range Status   01/17/2019 1.13 0.60 - 1.30 mg/dL Final     BUN   Date Value Ref Range Status   01/17/2019 20 9 - 23 mg/dL Final     BUN/Creatinine Ratio   Date Value Ref Range Status   01/17/2019 17.7 7.0 - 25.0 Final     Calcium   Date Value Ref Range Status   01/17/2019 10.1 8.7 - 10.4 mg/dL Final     Alkaline Phosphatase   Date Value Ref Range Status   01/17/2019 125 (H) 25 - 100 U/L Final     Total Protein   Date Value Ref Range Status   01/17/2019 8.0 5.7 - 8.2 g/dL Final     ALT (SGPT)   Date Value Ref Range Status   01/17/2019 18 7 - 40 U/L Final     AST (SGOT)   Date Value Ref Range Status   01/17/2019 30 0 - 33 U/L Final     Total Bilirubin   Date Value Ref Range Status   01/17/2019 0.7 0.3 - 1.2 mg/dL Final     Albumin   Date Value Ref Range Status   01/17/2019 4.99 (H) 3.20 - 4.80 g/dL Final     Globulin   Date Value Ref Range Status   01/17/2019 3.0 gm/dL Final     A/G Ratio   Date Value Ref Range Status   06/19/2015 1.6 0.7 - 2.0 Final       LDH   Date Value Ref Range Status   06/19/2015 174 120 - 246 Units/L Final          Assessment/Plan   IMPRESSION:   1. Anemia. Improved. She has had recurring iron deficiency anemia. She has responded to parenteral iron in the past. Last hemoglobin normal.   2. History of breast cancer. She has done well after adjuvant endocrine therapy. She currently does not have any symptoms to suggest recurrence.     3. Chronic bilateral hip pain. She has a history of fibromyalgia and has increasing hip pain, right worse than left. I will obtain xray's today and make a referral to Dr. Perez for further  evaluation.       PLAN:   1. I will check her CBC, ferritin and iron studies today and call if they are adversely changed.   2. Bilateral x rays of hips. Referral to orthopedic surgeon Dr. Perez.    3. I will see her back in about 6 months or sooner if new problems or findings develop.                 JAM Diaz  [facility]    4/22/2019          CC:

## 2019-04-25 ENCOUNTER — OFFICE VISIT (OUTPATIENT)
Dept: ORTHOPEDIC SURGERY | Facility: CLINIC | Age: 54
End: 2019-04-25

## 2019-04-25 VITALS — HEIGHT: 67 IN | WEIGHT: 164.9 LBS | BODY MASS INDEX: 25.88 KG/M2 | OXYGEN SATURATION: 99 % | HEART RATE: 92 BPM

## 2019-04-25 DIAGNOSIS — M70.62 TROCHANTERIC BURSITIS OF BOTH HIPS: Primary | ICD-10-CM

## 2019-04-25 DIAGNOSIS — M70.61 TROCHANTERIC BURSITIS OF BOTH HIPS: Primary | ICD-10-CM

## 2019-04-25 PROCEDURE — 99204 OFFICE O/P NEW MOD 45 MIN: CPT | Performed by: ORTHOPAEDIC SURGERY

## 2019-04-25 PROCEDURE — 20610 DRAIN/INJ JOINT/BURSA W/O US: CPT | Performed by: ORTHOPAEDIC SURGERY

## 2019-04-25 RX ORDER — LIDOCAINE HYDROCHLORIDE 10 MG/ML
3 INJECTION, SOLUTION INFILTRATION; PERINEURAL
Status: COMPLETED | OUTPATIENT
Start: 2019-04-25 | End: 2019-04-25

## 2019-04-25 RX ORDER — BUPIVACAINE HYDROCHLORIDE 2.5 MG/ML
3 INJECTION, SOLUTION INFILTRATION; PERINEURAL
Status: COMPLETED | OUTPATIENT
Start: 2019-04-25 | End: 2019-04-25

## 2019-04-25 RX ORDER — TRIAMCINOLONE ACETONIDE 40 MG/ML
80 INJECTION, SUSPENSION INTRA-ARTICULAR; INTRAMUSCULAR
Status: COMPLETED | OUTPATIENT
Start: 2019-04-25 | End: 2019-04-25

## 2019-04-25 RX ADMIN — BUPIVACAINE HYDROCHLORIDE 3 ML: 2.5 INJECTION, SOLUTION INFILTRATION; PERINEURAL at 09:58

## 2019-04-25 RX ADMIN — TRIAMCINOLONE ACETONIDE 80 MG: 40 INJECTION, SUSPENSION INTRA-ARTICULAR; INTRAMUSCULAR at 09:58

## 2019-04-25 RX ADMIN — LIDOCAINE HYDROCHLORIDE 3 ML: 10 INJECTION, SOLUTION INFILTRATION; PERINEURAL at 09:58

## 2019-04-25 NOTE — PROGRESS NOTES
Procedure   Large Joint Arthrocentesis: R greater trochanteric bursa  Date/Time: 4/25/2019 9:58 AM  Consent given by: patient  Site marked: site marked  Timeout: Immediately prior to procedure a time out was called to verify the correct patient, procedure, equipment, support staff and site/side marked as required   Supporting Documentation  Indications: pain   Procedure Details  Location: hip - R greater trochanteric bursa  Preparation: Patient was prepped and draped in the usual sterile fashion  Needle size: 22 G  Approach: lateral  Medications administered: 3 mL bupivacaine 0.25 %; 3 mL lidocaine 1 %; 80 mg triamcinolone acetonide 40 MG/ML      Large Joint Arthrocentesis: L greater trochanteric bursa  Date/Time: 4/25/2019 9:58 AM  Consent given by: patient  Site marked: site marked  Timeout: Immediately prior to procedure a time out was called to verify the correct patient, procedure, equipment, support staff and site/side marked as required   Supporting Documentation  Indications: pain   Procedure Details  Location: hip - L greater trochanteric bursa  Preparation: Patient was prepped and draped in the usual sterile fashion  Needle size: 22 G  Approach: lateral  Medications administered: 3 mL bupivacaine 0.25 %; 3 mL lidocaine 1 %; 80 mg triamcinolone acetonide 40 MG/ML  Patient tolerance: patient tolerated the procedure well with no immediate complications

## 2019-04-25 NOTE — PROGRESS NOTES
Orthopaedic Clinic Note: Hip New Patient    Chief Complaint   Patient presents with   • Right Hip - Pain   • Left Hip - Pain        HPI  Consult from: JAM Burns SHEILA Haro is a 54 y.o. female who presents with bilateral hip pain for 15 year(s). Onset has been atraumatic and gradual nature.  Pain is localized lateral aspect of both hips and the trochanteric bursa region.  She rates the pain 7/10 on the pain scale.  Pain is worse with walking, standing for long periods of time lying on the affected side.  She has attempted conservative treatments with anti-inflammatories, Cymbalta, and use of a cane.  Despite these interventions, her pain is persisting.  She did have a remote history of cortisone injections and trochanteric bursa about 15 years ago.  Those injections did provide some transient relief.  No recent intervention has been tried.  She is here today to discuss treatment options for ongoing pain which is limiting daily activities.      Past Medical History:   Diagnosis Date   • Anxiety    • Arthritis    • Depression    • Diabetes mellitus (CMS/HCC)    • Gall stones    • History of stomach ulcers    • Stomach problems       Past Surgical History:   Procedure Laterality Date   • CHOLECYSTECTOMY     • ENDOSCOPY N/A 6/20/2018    Procedure: ESOPHAGOGASTRODUODENOSCOPY;  Surgeon: Mark I Brunner, MD;  Location: [facility] ENDOSCOPY;  Service: Gastroenterology   • GASTRIC BYPASS      x2   • HERNIA REPAIR     • MASTECTOMY Bilateral     Left With sentinel lymph node sampling and prophylactic right mastectomy   • PACEMAKER IMPLANTATION     • PORTACATH PLACEMENT        Family History   Problem Relation Age of Onset   • Breast cancer Maternal Aunt    • Pancreatic cancer Cousin    • Breast cancer Cousin    • Breast cancer Cousin      Social History     Socioeconomic History   • Marital status:      Spouse name: Not on file   • Number of children: Not on file   • Years of education: Not on file    • Highest education level: Not on file   Tobacco Use   • Smoking status: Current Every Day Smoker   • Smokeless tobacco: Never Used   Substance and Sexual Activity   • Alcohol use: No   • Drug use: Yes     Types: Marijuana   • Sexual activity: Defer      Current Outpatient Medications on File Prior to Visit   Medication Sig Dispense Refill   • cetirizine (zyrTEC) 10 MG tablet Take 10 mg by mouth Daily.     • cholecalciferol (VITAMIN D3) 1000 units tablet Take 1,000 Units by mouth Daily.     • citalopram (CeleXA) 20 MG tablet Take 20 mg by mouth daily.     • DULoxetine (CYMBALTA) 30 MG capsule Take 30 mg by mouth 2 (Two) Times a Day.     • metoclopramide (REGLAN) 10 MG tablet Take 1 tablet by mouth 4 (Four) Times a Day Before Meals & at Bedtime As Needed (N/V) for up to 12 doses. 12 tablet 0   • omeprazole (priLOSEC) 40 MG capsule Take 40 mg by mouth Daily.     • ondansetron ODT (ZOFRAN-ODT) 4 MG disintegrating tablet Take 2 tablets by mouth Every 6 (Six) Hours As Needed for Nausea or Vomiting for up to 20 doses. 20 tablet 0   • promethazine (PHENERGAN) 12.5 MG tablet Take 1 tablet by mouth Every 6 (Six) Hours As Needed for Nausea or Vomiting for up to 12 doses. 15 tablet 0   • promethazine (PHENERGAN) 25 MG suppository Insert 1 suppository into the rectum Every 6 (Six) Hours As Needed for Nausea or Vomiting for up to 12 doses. 12 suppository 0     No current facility-administered medications on file prior to visit.       Allergies   Allergen Reactions   • Gabapentin Other (See Comments)     Seizure   • Aspirin GI Intolerance   • Ibuprofen GI Intolerance   • Morphine And Related Itching and Swelling        Review of Systems   Constitutional: Negative.    HENT: Negative.    Eyes: Negative.    Respiratory: Negative.    Cardiovascular: Negative.    Gastrointestinal: Positive for abdominal distention, abdominal pain, nausea and vomiting.   Endocrine: Negative.    Genitourinary: Negative.    Musculoskeletal: Positive  "for arthralgias.   Skin: Negative.    Allergic/Immunologic: Positive for environmental allergies.   Neurological: Negative.    Hematological: Negative.    Psychiatric/Behavioral: Negative.         Depression        The patient's Review of Systems was personally reviewed and confirmed as accurate.    The following portions of the patient's history were reviewed and updated as appropriate: allergies, current medications, past family history, past medical history, past social history, past surgical history and problem list.    Physical Exam  Pulse 92, height 170.2 cm (67.01\"), weight 74.8 kg (164 lb 14.5 oz), SpO2 99 %.    Body mass index is 25.82 kg/m².    GENERAL APPEARANCE: awake, alert & oriented x 3, in no acute distress and well developed, well nourished  PSYCH: normal affect  LUNGS:  breathing nonlabored  EYES: sclera anicteric  CARDIOVASCULAR: palpable dorsalis pedis, palpable posterior tibial bilaterally. Capillary refill less than 2 seconds  EXTREMITIES: no clubbing, cyanosis  GAIT:  Normal           Right Hip Exam:  RANGE OF MOTION:   FLEXION CONTRACTURE: None   FLEXION: 110 degrees   INTERNAL ROTATION: 20 degrees at 90 degrees of flexion   EXTERNAL ROTATION: 40 degrees at 90 degrees of flexion    PAIN WITH HIP MOTION: no  PAIN WITH LOGROLL: no  STINCHFIELD TEST: negative    KNEE EXAM: full knee ROM (0-120), stable to varus/valgus stress at terminal extension and 30 degrees     STRENGTH:  5/5 hip adduction, abduction, flexion. 5/5 strength knee flexion, extension. 5/5 strength ankle dorsiflexion and plantarflexion.     GREATER TROCHANTER BURSAL PAIN: Yes     REFLEXES:   PATELLAR 2+/4   ACHILLES 2+/4    CLONUS: negative  STRAIGHT LEG TEST:   negative    SENSATION TO LIGHT TOUCH:  DEEP PERONEAL/SUPERFICIAL PERONEAL/SURAL/SAPHENOUS/TIBIAL:  intact    EDEMA:   no  ERYTHEMA:  no  WOUNDS/INCISIONS: none, no overlying skin problems.      Left Hip Exam:   RANGE OF MOTION:   FLEXION CONTRACTURE: None   FLEXION: 110 " degrees   INTERNAL ROTATION: 20 degrees at 90 degrees of flexion   EXTERNAL ROTATION: 40 degrees at 90 degrees of flexion    PAIN WITH HIP MOTION: no  PAIN WITH LOGROLL: no  STINCHFIELD TEST: negative    KNEE EXAM: full knee ROM (0-120), stable to varus/valgus stress at terminal extension and 30 degrees     STRENGTH:  5/5 hip adduction, abduction, flexion. 5/5 strength knee flexion, extension. 5/5 strength ankle dorsiflexion and plantarflexion.     GREATER TROCHANTER BURSAL PAIN: Yes     REFLEXES:   PATELLAR 2+/4   ACHILLES 2+/4    CLONUS: negative  STRAIGHT LEG TEST:   negative    SENSATION TO LIGHT TOUCH:  DEEP PERONEAL/SUPERFICIAL PERONEAL/SURAL/SAPHENOUS/TIBIAL:  intact    EDEMA:   no  ERYTHEMA:  no  WOUNDS/INCISIONS: none, no overlying skin problems.    ------------------------------------------------------------------    LEG LENGTHS:  equal  _____________________________________________________  _____________________________________________________    RADIOGRAPHIC FINDINGS:   Bilateral hip radiographs from 4/22/2019 were personally reviewed.  Radiographs demonstrate mild arthritic changes of bilateral hips with small osteophyte formation.  Joint spaces are relatively well-preserved.    Assessment/Plan:   Diagnosis Plan   1. Trochanteric bursitis of both hips  Large Joint Arthrocentesis: R greater trochanteric bursa    Large Joint Arthrocentesis: L greater trochanteric bursa    Ambulatory Referral to Physical Therapy Evaluate and treat     Patient symptoms are consistent with trochanteric bursitis bilateral hips.  I discussed proceeding with trochanteric bursa injections bilaterally followed by a course of physical therapy for strengthening exercises.  She is agreeable to this.  She will follow-up as needed.    Procedure Note:  I discussed with the patient the potential benefits of performing a therapeutic injections of the bilateral hip trochanteric bursa as well as potential risks including but not limited to  infection, swelling, pain, bleeding, bruising, nerve/vessel damage, skin color changes, transient elevation in blood glucose levels, and fat atrophy. After informed consent and after the areas were prepped with alcohol, ethyl chloride was used to numb the skin. Via the direct lateral approach, 3cc of 1% lidocaine, 3cc of 0.25% marcaine and 2 cc of 40mg/ml of Kenalog were each injected into the bilateral hip trochanteric bursa. The patient tolerated the procedures well. There were no complications. A sterile dressing was placed over each injection site.    Humble Perez MD  04/25/19  10:01 AM

## 2019-05-10 ENCOUNTER — HOSPITAL ENCOUNTER (OUTPATIENT)
Dept: PHYSICAL THERAPY | Facility: HOSPITAL | Age: 54
Setting detail: THERAPIES SERIES
Discharge: HOME | End: 2019-05-10

## 2019-05-10 DIAGNOSIS — M70.62 TROCHANTERIC BURSITIS OF BOTH HIPS: Primary | ICD-10-CM

## 2019-05-10 DIAGNOSIS — M70.61 TROCHANTERIC BURSITIS OF BOTH HIPS: Primary | ICD-10-CM

## 2019-05-10 PROCEDURE — 97161 PT EVAL LOW COMPLEX 20 MIN: CPT | Performed by: PHYSICAL THERAPIST

## 2019-05-10 NOTE — THERAPY EVALUATION
"    Outpatient Physical Therapy Ortho Initial Evaluation   [place]     Patient Name: Geneva Haro  : 1965  MRN: [id number]  Today's Date: 5/10/2019      Visit Date: 05/10/2019    Patient Active Problem List   Diagnosis   • Iron deficiency anemia   • Malfunction of peripheral inserted central catheter (CMS/HCC)   • Malignant neoplasm of upper-outer quadrant of left female breast (CMS/HCC)   • Fibromyalgia   • Gastroparesis   • Adverse effect of iron or its compound   • Ganglion cyst   • Anxiety and depression   • Intractable nausea and vomiting   • Tobacco abuse   • Marijuana abuse   • GERD (gastroesophageal reflux disease)   • Type 2 diabetes mellitus (CMS/HCC)   • Intractable cyclical vomiting with nausea        Past Medical History:   Diagnosis Date   • Anxiety    • Arthritis    • Depression    • Diabetes mellitus (CMS/HCC)    • Gall stones    • History of stomach ulcers    • Stomach problems         Past Surgical History:   Procedure Laterality Date   • CHOLECYSTECTOMY     • ENDOSCOPY N/A 2018    Procedure: ESOPHAGOGASTRODUODENOSCOPY;  Surgeon: Mark I Brunner, MD;  Location: [facility] ENDOSCOPY;  Service: Gastroenterology   • GASTRIC BYPASS      x2   • HERNIA REPAIR     • MASTECTOMY Bilateral     Left With sentinel lymph node sampling and prophylactic right mastectomy   • PACEMAKER IMPLANTATION     • PORTACATH PLACEMENT         Visit Dx:     ICD-10-CM ICD-9-CM   1. Trochanteric bursitis of both hips M70.61 726.5    M70.62          Patient History     Row Name 05/10/19 1000             History    Chief Complaint  Pain  -CP      Type of Pain  Hip pain  -CP      Date Current Problem(s) Began  05/10/09  -CP      Brief Description of Current Complaint  Pt states she was \"diagnosed with fibromyalgia about 10 years ago\", states hip pain began then too. She reports she has h/o advanced OA. Pain has progressed over the past few months, unable to sleep on side. Pt recently had injections to bilateral " hip after f/u with Dr. Perez, noted more pain relief in R hip compared to L hip. Pt states she has both RW and SPC at home to use as needed.   -CP      Previous treatment for THIS PROBLEM  Injections  -CP      Patient/Caregiver Goals  Relieve pain  -CP      Current Tobacco Use  yes  -CP      Smoking Status  daily smoker  -CP      Patient's Rating of General Health  Fair  -CP      Hand Dominance  right-handed  -CP      Occupation/sports/leisure activities  Pt lives in single level home. Indep with household and driving. Pt states she used to walk 4 miles a day .  -CP      Patient seeing anyone else for problem(s)?  Dr. Perez  -CP      How has patient tried to help current problem?  injections  -CP      What clinical tests have you had for this problem?  X-ray  -CP      History of Previous Related Injuries  h/o chronic bilateral hip pain  -CP      Are you or can you be pregnant  No  -CP         Pain     Pain Location  Hip  -CP      Pain at Present  8  -CP      Pain at Best  6  -CP      Pain at Worst  10  -CP      Pain Frequency  Constant/continuous  -CP      Pain Description  Aching;Dull  -CP      Is your sleep disturbed?  Yes  -CP      Is medication used to assist with sleep?  Yes  -CP      What position do you sleep in?  Right sidelying;Left sidelying  -CP         Fall Risk Assessment    Any falls in the past year:  Yes  -CP      Number of falls reported in the last 12 months  1  -CP      Factors that contributed to the fall:  Lost balance  -CP      Does patient have a fear of falling  No  -CP         Daily Activities    Primary Language  English  -CP      Are you able to read  Yes  -CP      Are you able to write  Yes  -CP      Pt Participated in POC and Goals  Yes  -CP         Safety    Are you being hurt, hit, or frightened by anyone at home or in your life?  No  -CP        User Key  (r) = Recorded By, (t) = Taken By, (c) = Cosigned By    Initials Name Provider Type    CP Perkins, Corinne E, PT Physical  Therapist          PT Ortho     Row Name 05/10/19 1000       Subjective Comments    Subjective Comments  Pt presents with c/o bilateral hip pain.   -CP       Subjective Pain    Able to rate subjective pain?  yes  -CP    Pre-Treatment Pain Level  8  -CP    Post-Treatment Pain Level  8  -CP       Special Tests/Palpation    Special Tests/Palpation  Hip  -CP       Hip/Thigh Palpation    Hip/Thigh Palpation?  Yes  -CP    Greater Trochanter  Bilateral:;Guarded/taut;Tender  -CP       Hip Accessory Motions    Hip Accessory Motions Tested?  Yes  -CP       Hip Special Tests    CONCHITA (hip vs SI pathology)  Left:;Positive  -CP    Yudith’s test (tightness of ITB)  Left:;Positive pain  -CP       General ROM    RT Lower Ext  Rt Hip ABduction;Rt Hip ADduction;Rt Hip Extension;Rt Hip External Rotation;Rt Hip Internal Rotation;Rt Hip Flexion  -CP    LT Lower Ext  Lt Hip ABduction;Lt Hip ADduction;Lt Hip Extension;Lt Hip Flexion;Lt Hip External Rotation;Lt Hip Internal Rotation  -CP       Right Lower Ext    Rt Hip ABduction AROM  21 pain  -CP    Rt Hip ADduction AROM  15  -CP    Rt Hip Extension AROM  50% guarded  -CP    Rt Hip Flexion AROM  110  -CP    Rt Hip External Rotation AROM  WFL  -CP    Rt Hip Internal Rotation AROM  WFL  -CP       Left Lower Ext    Lt Hip ABduction AROM  20  -CP    Lt Hip ADduction AROM  50%  -CP    Lt Hip Extension AROM  50%  -CP    Lt Hip Flexion AROM  50%  -CP    Lt Hip External Rotation AROM  50%  -CP    Lt Hip Internal Rotation AROM  50%  -CP    LT Lower Extremity Comments  guarded with all LLE movements  -CP       MMT (Manual Muscle Testing)    Rt Lower Ext  Rt Hip Flexion;Rt Hip Extension;Rt Hip ABduction;Rt Hip ADduction;Rt Knee Extension;Rt Knee Flexion  -CP    Lt Lower Ext  Lt Hip Flexion;Lt Hip Extension;Lt Hip ABduction;Lt Hip ADduction;Lt Knee Extension;Lt Knee Flexion  -CP       MMT Right Lower Ext    Rt Hip Flexion MMT, Gross Movement  (4+/5) good plus  -CP    Rt Hip Extension MMT, Gross  Movement  (4/5) good  -CP    Rt Hip ABduction MMT, Gross Movement  (4-/5) good minus  -CP    Rt Hip ADduction MMT, Gross Movement  (4-/5) good minus  -CP    Rt Knee Extension MMT, Gross Movement  (4-/5) good minus  -CP    Rt Knee Flexion MMT, Gross Movement  (4/5) good  -CP       MMT Left Lower Ext    Lt Hip Flexion MMT, Gross Movement  (4-/5) good minus  -CP    Lt Hip Extension MMT, Gross Movement  (3-/5) fair minus  -CP    Lt Hip ABduction MMT, Gross Movement  (3-/5) fair minus  -CP    Lt Hip ADduction MMT, Gross Movement  (4-/5) good minus  -CP    Lt Knee Extension MMT, Gross Movement  (4-/5) good minus  -CP    Lt Knee Flexion MMT, Gross Movement  (4-/5) good minus  -CP       Sensation    Sensation WNL?  WFL  -CP       Gait/Stairs Assessment/Training    Comment (Gait/Stairs)  bilat trendelenburg noted with gait; ambulated with shorter step length, antalgic gait pattern.   -CP      User Key  (r) = Recorded By, (t) = Taken By, (c) = Cosigned By    Initials Name Provider Type    CP Perkins, Corinne E, PT Physical Therapist                            PT OP Goals     Row Name 05/10/19 1000          PT Short Term Goals    STG Date to Achieve  05/24/19  -CP     STG 1  Patient will demonstrate an independent HEP for core and hip strength and flexibility/ROM.  -CP     STG 1 Progress  New  -CP     STG 2  Patient will ambulate with least restrictive assistive device with near normal gait on level ground short community distances  -CP     STG 2 Progress  New  -CP        Long Term Goals    LTG Date to Achieve  06/09/19  -CP     LTG 1  Patient will demonstrate independent HEP progressed for closed chain strength, proprioception, balance and agility with minimal or no compensations or exacerbations.  -CP     LTG 1 Progress  New  -CP     LTG 2  Patient will report reduced functional limitations on functional outcome measure by 10 points.  -CP     LTG 2 Progress  New  -CP     LTG 3  Patient will restore Hip ROM, Flexibility and  Strength in order to perform ADL without pain.  -CP     LTG 3 Progress  New  -CP        Time Calculation    PT Goal Re-Cert Due Date  08/08/19  -CP       User Key  (r) = Recorded By, (t) = Taken By, (c) = Cosigned By    Initials Name Provider Type    CP Perkins, Corinne E, PT Physical Therapist          PT Assessment/Plan     Row Name 05/10/19 1000          PT Assessment    Functional Limitations  Decreased safety during functional activities;Limitation in home management;Limitations in community activities;Performance in leisure activities  -CP     Impairments  Gait;Impaired flexibility;Pain;Joint mobility;Range of motion;Poor body mechanics  -CP     Assessment Comments  Pt is a 53 yo female referred to PT for bilateral trochanteric bursitis, recently had injections to bilateral hips with relief of R; however increased pain persists in L hip. Pt presents with signs and symptoms consistent with diagnosis along with h/o fibromyalgia, hypersensitivity, and chronic pain. Recommend skilled PT to address hip weakness, improve hip AROM and decrease pain overall with functional mobility.   -CP     Please refer to paper survey for additional self-reported information  Yes  -CP     Rehab Potential  Good  -CP     Patient/caregiver participated in establishment of treatment plan and goals  Yes  -CP     Patient would benefit from skilled therapy intervention  Yes  -CP        PT Plan    PT Frequency  1x/week  -CP     Predicted Duration of Therapy Intervention (Therapy Eval)  6 visits  -CP     Planned CPT's?  PT EVAL LOW COMPLEXITY: 39730;PT RE-EVAL: 32982;PT THER PROC EA 15 MIN: 45107;PT MANUAL THERAPY EA 15 MIN: 43683;PT NEUROMUSC RE-EDUCATION EA 15 MIN: 96515;PT GAIT TRAINING EA 15 MIN: 67542;PT ELECTRICAL STIM UNATTEND: ;PT ULTRASOUND EA 15 MIN: 18384;PT IONTOPHORESIS EA 15 MIN: 63786;PT HOT OR COLD PACK TREAT MCARE  -CP     PT Plan Comments  Assess compliance with HEP. Progress in clinic as tolerated with strengthening  ther ex from both [facility] and [facility] as tolerated. Manual and modalities as indicated for pain mgmt.   -CP       User Key  (r) = Recorded By, (t) = Taken By, (c) = Cosigned By    Initials Name Provider Type    CP Perkins, Corinne E, PT Physical Therapist            Exercises     Row Name 05/10/19 1000             Subjective Comments    Subjective Comments  Pt presents with c/o bilateral hip pain.   -CP         Subjective Pain    Able to rate subjective pain?  yes  -CP      Pre-Treatment Pain Level  8  -CP      Post-Treatment Pain Level  8  -CP        User Key  (r) = Recorded By, (t) = Taken By, (c) = Cosigned By    Initials Name Provider Type    CP Perkins, Corinne E, PT Physical Therapist                        Outcome Measure Options: Lower Extremity Functional Scale (LEFS)  Lower Extremity Functional Index  Any of your usual work, housework or school activities: Moderate difficulty  Your usual hobbies, recreational or sporting activities: Moderate difficulty  Getting into or out of the bath: Moderate difficulty  Walking between rooms: Moderate difficulty  Putting on your shoes or socks: Moderate difficulty  Squatting: Quite a bit of difficulty  Lifting an object, like a bag of groceries from the floor: Quite a bit of difficulty  Performing light activities around your home: Moderate difficulty  Performing heavy activities around your home: Quite a bit of difficulty  Getting into or out of a car: Moderate difficulty  Walking 2 blocks: Quite a bit of difficulty  Walking a mile: Quite a bit of difficulty  Going up or down 10 stairs (about 1 flight of stairs): Quite a bit of difficulty  Standing for 1 hour: Extreme difficulty or unable to perform activity  Sitting for 1 hour: Extreme difficulty or unable to perform activity  Running on even ground: Extreme difficulty or unable to perform activity  Running on uneven ground: Extreme difficulty or unable to perform activity  Making sharp turns while running fast: Extreme  difficulty or unable to perform activity  Hopping: Extreme difficulty or unable to perform activity  Rolling over in bed: Quite a bit of difficulty  Total: 21      Time Calculation:     Start Time: 1030     Therapy Charges for Today     Code Description Service Date Service Provider Modifiers Qty    15064765463  PT EVAL LOW COMPLEXITY 4 5/10/2019 Perkins, Corinne E, PT GP 1          PT G-Codes  Outcome Measure Options: Lower Extremity Functional Scale (LEFS)  Total: 21         Corinne E. Perkins, PT  5/10/2019

## 2019-05-20 ENCOUNTER — HOSPITAL ENCOUNTER (OUTPATIENT)
Dept: ONCOLOGY | Facility: HOSPITAL | Age: 54
Setting detail: INFUSION SERIES
Discharge: HOME | End: 2019-05-20

## 2019-05-20 VITALS
WEIGHT: 168 LBS | TEMPERATURE: 97.6 F | SYSTOLIC BLOOD PRESSURE: 107 MMHG | HEIGHT: 67 IN | RESPIRATION RATE: 16 BRPM | HEART RATE: 91 BPM | BODY MASS INDEX: 26.37 KG/M2 | DIASTOLIC BLOOD PRESSURE: 73 MMHG

## 2019-05-20 DIAGNOSIS — D50.8 OTHER IRON DEFICIENCY ANEMIA: ICD-10-CM

## 2019-05-20 DIAGNOSIS — K31.84 GASTROPARESIS: Primary | ICD-10-CM

## 2019-05-20 PROCEDURE — 96523 IRRIG DRUG DELIVERY DEVICE: CPT

## 2019-05-20 RX ORDER — SODIUM CHLORIDE 0.9 % (FLUSH) 0.9 %
10 SYRINGE (ML) INJECTION AS NEEDED
Status: CANCELLED | OUTPATIENT
Start: 2019-05-20

## 2019-05-20 RX ADMIN — HEPARIN 500 UNITS: 100 SYRINGE at 10:20

## 2019-05-29 ENCOUNTER — HOSPITAL ENCOUNTER (OUTPATIENT)
Dept: PHYSICAL THERAPY | Facility: HOSPITAL | Age: 54
Setting detail: THERAPIES SERIES
Discharge: HOME | End: 2019-05-29

## 2019-05-29 DIAGNOSIS — M70.61 TROCHANTERIC BURSITIS OF BOTH HIPS: Primary | ICD-10-CM

## 2019-05-29 DIAGNOSIS — M70.62 TROCHANTERIC BURSITIS OF BOTH HIPS: Primary | ICD-10-CM

## 2019-05-29 PROCEDURE — 97110 THERAPEUTIC EXERCISES: CPT | Performed by: PHYSICAL THERAPIST

## 2019-05-29 NOTE — THERAPY TREATMENT NOTE
Outpatient Physical Therapy Ortho Treatment Note   [place]     Patient Name: Geneva Haro  : 1965  MRN: [id number]  Today's Date: 2019      Visit Date: 2019    Visit Dx:    ICD-10-CM ICD-9-CM   1. Trochanteric bursitis of both hips M70.61 726.5    M70.62        Patient Active Problem List   Diagnosis   • Iron deficiency anemia   • Malfunction of peripheral inserted central catheter (CMS/HCC)   • Malignant neoplasm of upper-outer quadrant of left female breast (CMS/HCC)   • Fibromyalgia   • Gastroparesis   • Adverse effect of iron or its compound   • Ganglion cyst   • Anxiety and depression   • Intractable nausea and vomiting   • Tobacco abuse   • Marijuana abuse   • GERD (gastroesophageal reflux disease)   • Type 2 diabetes mellitus (CMS/HCC)   • Intractable cyclical vomiting with nausea        Past Medical History:   Diagnosis Date   • Anxiety    • Arthritis    • Depression    • Diabetes mellitus (CMS/HCC)    • Gall stones    • History of stomach ulcers    • Stomach problems         Past Surgical History:   Procedure Laterality Date   • CHOLECYSTECTOMY     • ENDOSCOPY N/A 2018    Procedure: ESOPHAGOGASTRODUODENOSCOPY;  Surgeon: Mark I Brunner, MD;  Location: [facility] ENDOSCOPY;  Service: Gastroenterology   • GASTRIC BYPASS      x2   • HERNIA REPAIR     • MASTECTOMY Bilateral     Left With sentinel lymph node sampling and prophylactic right mastectomy   • PACEMAKER IMPLANTATION     • PORTACATH PLACEMENT                         PT Assessment/Plan     Row Name 19 1100          PT Assessment    Assessment Comments  Pt tolerated initial ther exercises in clinic with improved pain noted in bilateral hips after activity. She verbalized slight increase left lateral hip; however improved with trial of MHP. Pt educated on HEP, given written copy for home.   -CP        PT Plan    PT Plan Comments  Assess compliance with HEP. Reassessment next visit.   -CP       User Key  (r) =  "Recorded By, (t) = Taken By, (c) = Cosigned By    Initials Name Provider Type    CP Perkins, Corinne E, PT Physical Therapist          Modalities     Row Name 05/29/19 1100             Moist Heat    MH Applied  Yes pt in supported sitting position  -CP      Location  left lateral hip  -CP      Rx Minutes  Other: 8 min  -CP      MH Prior to Rx  No  -CP      MH S/P Rx  Yes  -CP        User Key  (r) = Recorded By, (t) = Taken By, (c) = Cosigned By    Initials Name Provider Type    CP Perkins, Corinne E, PT Physical Therapist        Exercises     Row Name 05/29/19 1100             Subjective Comments    Subjective Comments  Pt states she did more stairs recently, noted increased right lateral hip and leg pain along with leg \"spasms.\"   -CP         Subjective Pain    Able to rate subjective pain?  yes  -CP      Pre-Treatment Pain Level  7  -CP      Post-Treatment Pain Level  6  -CP         Total Minutes    70926 - PT Therapeutic Exercise Minutes  32  -CP         Exercise 1    Exercise Name 1  Nustep Level 4  -CP      Time 1  5 minutes  -CP         Exercise 2    Exercise Name 2  quad and ITB stretch  -CP      Reps 2  2x each , PT assisted  -CP      Additional Comments  supine  -CP         Exercise 3    Exercise Name 3  supine clams with GT  -CP      Cueing 3  Verbal;Demo  -CP      Reps 3  15x  -CP      Additional Comments  GTB  -CP         Exercise 4    Exercise Name 4  LTR  -CP      Cueing 4  Verbal;Tactile  -CP      Reps 4  5  -CP      Additional Comments  BLE on mat, not ball  -CP         Exercise 5    Exercise Name 5  hip abd fall out RLE only  -CP      Cueing 5  Verbal  -CP      Reps 5  8x   -CP         Exercise 6    Exercise Name 6  quad set, glute set  -CP      Cueing 6  Verbal  -CP      Reps 6  10x  -CP         Exercise 7    Exercise Name 7  hip add iso  -CP      Cueing 7  Verbal  -CP      Reps 7  10x  -CP      Time 7  2 sec  -CP      Additional Comments  small blue therapy ball  -CP        User Key  (r) = " Recorded By, (t) = Taken By, (c) = Cosigned By    Initials Name Provider Type    CP Perkins, Corinne E, PT Physical Therapist                                          Time Calculation:   Start Time: 1102  Total Timed Code Minutes- PT: 40 minute(s)  Therapy Charges for Today     Code Description Service Date Service Provider Modifiers Qty    [id number]  PT THER PROC EA 15 MIN 5/29/2019 Perkins, Corinne E, PT GP 2    [id number]  PT HOT OR COLD PACK TREAT MCARE 5/29/2019 Perkins, Corinne E, PT GP 1                    Corinne E. Perkins, PT  5/29/2019

## 2019-06-05 ENCOUNTER — HOSPITAL ENCOUNTER (OUTPATIENT)
Dept: PHYSICAL THERAPY | Facility: HOSPITAL | Age: 54
Setting detail: THERAPIES SERIES
Discharge: HOME | End: 2019-06-05

## 2019-06-05 DIAGNOSIS — M70.61 TROCHANTERIC BURSITIS OF BOTH HIPS: Primary | ICD-10-CM

## 2019-06-05 DIAGNOSIS — M70.62 TROCHANTERIC BURSITIS OF BOTH HIPS: Primary | ICD-10-CM

## 2019-06-05 PROCEDURE — 97110 THERAPEUTIC EXERCISES: CPT | Performed by: PHYSICAL THERAPIST

## 2019-06-05 NOTE — THERAPY PROGRESS REPORT/RE-CERT
Outpatient Physical Therapy Ortho Progress Note   [facility]     Patient Name: Geneva Haro  : 1965  MRN: [id number]  Today's Date: 2019      Visit Date: 2019    Visit Dx:    ICD-10-CM ICD-9-CM   1. Trochanteric bursitis of both hips M70.61 726.5    M70.62        Patient Active Problem List   Diagnosis   • Iron deficiency anemia   • Malfunction of peripheral inserted central catheter (CMS/HCC)   • Malignant neoplasm of upper-outer quadrant of left female breast (CMS/HCC)   • Fibromyalgia   • Gastroparesis   • Adverse effect of iron or its compound   • Ganglion cyst   • Anxiety and depression   • Intractable nausea and vomiting   • Tobacco abuse   • Marijuana abuse   • GERD (gastroesophageal reflux disease)   • Type 2 diabetes mellitus (CMS/HCC)   • Intractable cyclical vomiting with nausea        Past Medical History:   Diagnosis Date   • Anxiety    • Arthritis    • Depression    • Diabetes mellitus (CMS/HCC)    • Gall stones    • History of stomach ulcers    • Stomach problems         Past Surgical History:   Procedure Laterality Date   • CHOLECYSTECTOMY     • ENDOSCOPY N/A 2018    Procedure: ESOPHAGOGASTRODUODENOSCOPY;  Surgeon: Mark I Brunner, MD;  Location: [facility] ENDOSCOPY;  Service: Gastroenterology   • GASTRIC BYPASS      x2   • HERNIA REPAIR     • MASTECTOMY Bilateral     Left With sentinel lymph node sampling and prophylactic right mastectomy   • PACEMAKER IMPLANTATION     • PORTACATH PLACEMENT         PT Ortho     Row Name 19 1100       Hip/Thigh Palpation    Greater Trochanter  Bilateral:;Guarded/taut;Tender  -CP       Hip Special Tests    CONCHITA (hip vs SI pathology)  Left:;Positive;Right:;Negative  -CP    Yudith’s test (tightness of ITB)  Left:;Positive  -CP       Right Lower Ext    Rt Hip ABduction AROM  35  -CP    Rt Hip ADduction AROM  18  -CP    Rt Hip Extension AROM  50%  -CP    Rt Hip Flexion AROM  WFL  -CP    Rt Hip External Rotation AROM  WFL  -CP    Rt  Hip Internal Rotation AROM  WFL  -CP       Left Lower Ext    Lt Hip ABduction AROM  22  -CP    Lt Hip ADduction AROM  50%  -CP    Lt Hip Extension AROM  50%  -CP    Lt Hip Flexion AROM  50%  -CP    Lt Hip External Rotation AROM  50%  -CP    Lt Hip Internal Rotation AROM  WFL  -CP    LT Lower Extremity Comments  hypersensitive to light touch lateral left hip  -CP       MMT Right Lower Ext    Rt Hip Flexion MMT, Gross Movement  (4+/5) good plus  -CP    Rt Hip Extension MMT, Gross Movement  (4/5) good  -CP    Rt Hip ABduction MMT, Gross Movement  (4/5) good  -CP    Rt Hip ADduction MMT, Gross Movement  (4/5) good  -CP    Rt Knee Extension MMT, Gross Movement  (4/5) good  -CP    Rt Knee Flexion MMT, Gross Movement  (4+/5) good plus  -CP       MMT Left Lower Ext    Lt Hip Flexion MMT, Gross Movement  (4-/5) good minus  -CP    Lt Hip Extension MMT, Gross Movement  (3-/5) fair minus  -CP    Lt Hip ABduction MMT, Gross Movement  (4-/5) good minus  -CP    Lt Hip ADduction MMT, Gross Movement  (4-/5) good minus  -CP    Lt Knee Extension MMT, Gross Movement  (4-/5) good minus  -CP    Lt Knee Flexion MMT, Gross Movement  (4-/5) good minus  -CP       Sensation    Sensation WNL?  WFL  -CP       Gait/Stairs Assessment/Training    Comment (Gait/Stairs)  Bilateral trendelenburg noted with gait. Antalgic gait pattern.   -CP      User Key  (r) = Recorded By, (t) = Taken By, (c) = Cosigned By    Initials Name Provider Type    CP Perkins, Corinne E, PT Physical Therapist                      PT Assessment/Plan     Row Name 06/05/19 1100          PT Assessment    Functional Limitations  Decreased safety during functional activities;Limitation in home management;Limitations in community activities;Performance in leisure activities  -CP     Impairments  Gait;Impaired flexibility;Pain;Joint mobility;Range of motion;Poor body mechanics  -CP     Assessment Comments  Reassessment completed this date in clinic. Patient has only been seen for PT  "IE and 1 treatment. She verbalized compliance with current HEP, continues to have bilateral hip abduction and extension weakness. TTP left greater trochanter, trial of US performed for improved pain mgmt today.   -CP     Please refer to paper survey for additional self-reported information  Yes  -CP     Rehab Potential  Good  -CP     Patient/caregiver participated in establishment of treatment plan and goals  Yes  -CP     Patient would benefit from skilled therapy intervention  Yes  -CP        PT Plan    PT Frequency  1x/week  -CP     Predicted Duration of Therapy Intervention (Therapy Eval)  4 visits  -CP     Planned CPT's?  PT EVAL LOW COMPLEXITY: 61338;PT RE-EVAL: 91036;PT THER PROC EA 15 MIN: 59418;PT MANUAL THERAPY EA 15 MIN: 67888;PT NEUROMUSC RE-EDUCATION EA 15 MIN: 21610;PT GAIT TRAINING EA 15 MIN: 80357;PT HOT OR COLD PACK TREAT MCARE;PT ELECTRICAL STIM UNATTEND: ;PT ULTRASOUND EA 15 MIN: 12443  -CP     PT Plan Comments  Assess response from US. Recommend continued skilled PT to improve bilateral hip strength, gait mechanics, and decrease overall pain.   -CP       User Key  (r) = Recorded By, (t) = Taken By, (c) = Cosigned By    Initials Name Provider Type    CP Perkins, Corinne E, PT Physical Therapist          Modalities     Row Name 06/05/19 1100             Ultrasound 70340    Location  left lateral hip pt in right sidelying position, pillow between BLE  -CP      Duty Cycle  100  -CP      Frequency  1.0 MHz  -CP      Intensity - Wts/cm  1.2  -CP      07058 - PT Ultrasound Minutes  8  -CP        User Key  (r) = Recorded By, (t) = Taken By, (c) = Cosigned By    Initials Name Provider Type    CP Perkins, Corinne E, PT Physical Therapist        Exercises     Row Name 06/05/19 1100             Subjective Comments    Subjective Comments  Pt states her hip pain is bothering her more today d/t \"the rainy weather.\"   -CP         Subjective Pain    Able to rate subjective pain?  yes  -CP      Pre-Treatment " Pain Level  6  -CP      Post-Treatment Pain Level  6  -CP         Total Minutes    99559 - PT Therapeutic Exercise Minutes  38  -CP         Exercise 1    Exercise Name 1  Nustep Level 4  -CP      Time 1  5 minutes  -CP         Exercise 2    Exercise Name 2  Reassessment completed this date in clinic.   -CP         Exercise 3    Exercise Name 3  supine clams with GTB  -CP      Cueing 3  Verbal  -CP      Reps 3  15x  -CP      Additional Comments  GTB  -CP         Exercise 4    Exercise Name 4  LTR  -CP      Cueing 4  Verbal  -CP      Reps 4  8  -CP         Exercise 5    Exercise Name 5  hip abd fall out LLE only  -CP      Reps 5  10x  -CP         Exercise 6    Exercise Name 6  quad set, glute set  -CP      Reps 6  10x  -CP         Exercise 7    Exercise Name 7  hip add iso  -CP      Reps 7  10x  -CP      Time 7  2 sec  -CP        User Key  (r) = Recorded By, (t) = Taken By, (c) = Cosigned By    Initials Name Provider Type    CP Perkins, Corinne E, PT Physical Therapist                       PT OP Goals     Row Name 06/05/19 1100          PT Short Term Goals    STG Date to Achieve  06/19/19  -CP     STG 1  Patient will demonstrate an independent HEP for core and hip strength and flexibility/ROM.  -CP     STG 1 Progress  Met  -CP     STG 2  Patient will ambulate with least restrictive assistive device with near normal gait on level ground short community distances  -CP     STG 2 Progress  Ongoing  -CP        Long Term Goals    LTG Date to Achieve  07/05/19  -CP     LTG 1  Patient will demonstrate independent HEP progressed for closed chain strength, proprioception, balance and agility with minimal or no compensations or exacerbations.  -CP     LTG 1 Progress  Ongoing  -CP     LTG 2  Patient will report reduced functional limitations on functional outcome measure by 10 points.  -CP     LTG 2 Progress  Ongoing  -CP     LTG 3  Patient will restore Hip ROM, Flexibility and Strength in order to perform ADL without pain.  -CP      LTG 3 Progress  Ongoing  -CP     LTG 4  Pt will demonstrate improved mod oswestry score by 10% for improved mobility.   -CP     LTG 4 Progress  New;Ongoing  -CP        Time Calculation    PT Goal Re-Cert Due Date  08/08/19  -CP       User Key  (r) = Recorded By, (t) = Taken By, (c) = Cosigned By    Initials Name Provider Type    CP Perkins, Corinne E, PT Physical Therapist               Outcome Measure Options: Modifed Owestry  Lower Extremity Functional Index  Any of your usual work, housework or school activities: Moderate difficulty  Your usual hobbies, recreational or sporting activities: Moderate difficulty  Getting into or out of the bath: Moderate difficulty  Walking between rooms: Moderate difficulty  Putting on your shoes or socks: Moderate difficulty  Squatting: Quite a bit of difficulty  Lifting an object, like a bag of groceries from the floor: Moderate difficulty  Performing light activities around your home: Moderate difficulty  Performing heavy activities around your home: Quite a bit of difficulty  Getting into or out of a car: Moderate difficulty  Walking 2 blocks: Moderate difficulty  Walking a mile: Quite a bit of difficulty  Going up or down 10 stairs (about 1 flight of stairs): Quite a bit of difficulty  Standing for 1 hour: Extreme difficulty or unable to perform activity  Sitting for 1 hour: Extreme difficulty or unable to perform activity  Running on even ground: Extreme difficulty or unable to perform activity  Running on uneven ground: Extreme difficulty or unable to perform activity  Making sharp turns while running fast: Extreme difficulty or unable to perform activity  Hopping: Extreme difficulty or unable to perform activity  Rolling over in bed: Quite a bit of difficulty  Total: 23  Modified Oswestry  Modified Oswestry Score/Comments: 50%      Time Calculation:   Start Time: 1102  Total Timed Code Minutes- PT: 46 minute(s)  Therapy Charges for Today     Code Description Service  Date Service Provider Modifiers Qty    86256239906 HC PT THER PROC EA 15 MIN 6/5/2019 Perkins, Corinne E, PT GP 3          PT G-Codes  Outcome Measure Options: Modifed Owestry  Total: 23  Modified Oswestry Score/Comments: 50%         Corinne E. Perkins, PT  6/5/2019

## 2019-06-17 ENCOUNTER — HOSPITAL ENCOUNTER (OUTPATIENT)
Dept: ONCOLOGY | Facility: HOSPITAL | Age: 54
Setting detail: INFUSION SERIES
Discharge: HOME | End: 2019-06-17

## 2019-06-17 VITALS
SYSTOLIC BLOOD PRESSURE: 123 MMHG | HEART RATE: 51 BPM | DIASTOLIC BLOOD PRESSURE: 78 MMHG | WEIGHT: 169 LBS | TEMPERATURE: 97.5 F | BODY MASS INDEX: 26.53 KG/M2 | RESPIRATION RATE: 20 BRPM | HEIGHT: 67 IN

## 2019-06-17 DIAGNOSIS — K31.84 GASTROPARESIS: Primary | ICD-10-CM

## 2019-06-17 DIAGNOSIS — D50.8 OTHER IRON DEFICIENCY ANEMIA: ICD-10-CM

## 2019-06-17 PROCEDURE — G0463 HOSPITAL OUTPT CLINIC VISIT: HCPCS

## 2019-06-17 PROCEDURE — 96523 IRRIG DRUG DELIVERY DEVICE: CPT

## 2019-06-17 RX ORDER — SODIUM CHLORIDE 0.9 % (FLUSH) 0.9 %
10 SYRINGE (ML) INJECTION AS NEEDED
Status: CANCELLED | OUTPATIENT
Start: 2019-06-17

## 2019-06-17 RX ADMIN — HEPARIN 500 UNITS: 100 SYRINGE at 13:45

## 2019-06-20 ENCOUNTER — HOSPITAL ENCOUNTER (OUTPATIENT)
Dept: PHYSICAL THERAPY | Facility: HOSPITAL | Age: 54
Setting detail: THERAPIES SERIES
Discharge: HOME | End: 2019-06-20

## 2019-06-20 DIAGNOSIS — M70.61 TROCHANTERIC BURSITIS OF BOTH HIPS: Primary | ICD-10-CM

## 2019-06-20 DIAGNOSIS — M70.62 TROCHANTERIC BURSITIS OF BOTH HIPS: Primary | ICD-10-CM

## 2019-06-20 PROCEDURE — 97110 THERAPEUTIC EXERCISES: CPT | Performed by: PHYSICAL THERAPIST

## 2019-06-20 NOTE — THERAPY TREATMENT NOTE
Outpatient Physical Therapy Ortho Treatment Note  [facility]     Patient Name: Geneva Haro  : 1965  MRN: [id number]  Today's Date: 2019      Visit Date: 2019    Visit Dx:    ICD-10-CM ICD-9-CM   1. Trochanteric bursitis of both hips M70.61 726.5    M70.62        Patient Active Problem List   Diagnosis   • Iron deficiency anemia   • Malfunction of peripheral inserted central catheter (CMS/HCC)   • Malignant neoplasm of upper-outer quadrant of left female breast (CMS/HCC)   • Fibromyalgia   • Gastroparesis   • Adverse effect of iron or its compound   • Ganglion cyst   • Anxiety and depression   • Intractable nausea and vomiting   • Tobacco abuse   • Marijuana abuse   • GERD (gastroesophageal reflux disease)   • Type 2 diabetes mellitus (CMS/HCC)   • Intractable cyclical vomiting with nausea        Past Medical History:   Diagnosis Date   • Anxiety    • Arthritis    • Depression    • Diabetes mellitus (CMS/HCC)    • Gall stones    • History of stomach ulcers    • Stomach problems         Past Surgical History:   Procedure Laterality Date   • CHOLECYSTECTOMY     • ENDOSCOPY N/A 2018    Procedure: ESOPHAGOGASTRODUODENOSCOPY;  Surgeon: Mark I Brunner, MD;  Location: [facility] ENDOSCOPY;  Service: Gastroenterology   • GASTRIC BYPASS      x2   • HERNIA REPAIR     • MASTECTOMY Bilateral     Left With sentinel lymph node sampling and prophylactic right mastectomy   • PACEMAKER IMPLANTATION     • PORTACATH PLACEMENT                         PT Assessment/Plan     Row Name 19 1400          PT Assessment    Assessment Comments  Pt tolerated progressed ther exercises and resistance without increase of bilateral hip pain.   -CP        PT Plan    PT Plan Comments  Progress next week to include increased time standing for strengthening BLE. Assess compliance with HEP and BTB.   -CP       User Key  (r) = Recorded By, (t) = Taken By, (c) = Cosigned By    Initials Name Provider Type    CP  "Perkins, Corinne E, PT Physical Therapist            Exercises     Row Name 06/20/19 1400             Subjective Comments    Subjective Comments  Pt reports she was able to walk further distance the other day with less pain overall. She states on arrival, bilateral hip pain is hurting \"due to the rain.\"   -CP         Subjective Pain    Able to rate subjective pain?  yes  -CP      Pre-Treatment Pain Level  5  -CP      Post-Treatment Pain Level  5  -CP         Total Minutes    53327 - PT Therapeutic Exercise Minutes  30  -CP         Exercise 1    Exercise Name 1  Nustep Level 4  -CP      Time 1  7 minutes  -CP         Exercise 2    Exercise Name 2  standing 3 way hip  -CP      Reps 2  8x each  -CP         Exercise 3    Exercise Name 3  supine clams with BTB  -CP      Reps 3  15x  -CP      Additional Comments  BTB  -CP         Exercise 4    Exercise Name 4  LTR  -CP      Reps 4  10  -CP         Exercise 5    Exercise Name 5  hip abd fall out LLE only  -CP      Reps 5  10x  -CP         Exercise 6    Exercise Name 6  bridges  -CP      Reps 6  15x  -CP         Exercise 7    Exercise Name 7  hip add iso  -CP      Reps 7  10x  -CP      Time 7  3 seconds  -CP        User Key  (r) = Recorded By, (t) = Taken By, (c) = Cosigned By    Initials Name Provider Type    CP Perkins, Corinne E, PT Physical Therapist                                          Time Calculation:   Start Time: 1430  Total Timed Code Minutes- PT: 30 minute(s)  Therapy Charges for Today     Code Description Service Date Service Provider Modifiers Qty    14493927998  PT THER PROC EA 15 MIN 6/20/2019 Perkins, Corinne E, PT GP 2                    Corinne E. Perkins, PT  6/20/2019     "

## 2019-06-26 ENCOUNTER — HOSPITAL ENCOUNTER (OUTPATIENT)
Dept: PHYSICAL THERAPY | Facility: HOSPITAL | Age: 54
Setting detail: THERAPIES SERIES
Discharge: HOME | End: 2019-06-26

## 2019-06-26 DIAGNOSIS — M70.61 TROCHANTERIC BURSITIS OF BOTH HIPS: Primary | ICD-10-CM

## 2019-06-26 DIAGNOSIS — M70.62 TROCHANTERIC BURSITIS OF BOTH HIPS: Primary | ICD-10-CM

## 2019-06-26 PROCEDURE — 97110 THERAPEUTIC EXERCISES: CPT | Performed by: PHYSICAL THERAPIST

## 2019-06-26 NOTE — THERAPY TREATMENT NOTE
Outpatient Physical Therapy Ortho Treatment Note   [facility]     Patient Name: Geneva Haro  : 1965  MRN: [id number]  Today's Date: 2019      Visit Date: 2019    Visit Dx:    ICD-10-CM ICD-9-CM   1. Trochanteric bursitis of both hips M70.61 726.5    M70.62        Patient Active Problem List   Diagnosis   • Iron deficiency anemia   • Malfunction of peripheral inserted central catheter (CMS/HCC)   • Malignant neoplasm of upper-outer quadrant of left female breast (CMS/HCC)   • Fibromyalgia   • Gastroparesis   • Adverse effect of iron or its compound   • Ganglion cyst   • Anxiety and depression   • Intractable nausea and vomiting   • Tobacco abuse   • Marijuana abuse   • GERD (gastroesophageal reflux disease)   • Type 2 diabetes mellitus (CMS/HCC)   • Intractable cyclical vomiting with nausea        Past Medical History:   Diagnosis Date   • Anxiety    • Arthritis    • Depression    • Diabetes mellitus (CMS/HCC)    • Gall stones    • History of stomach ulcers    • Stomach problems         Past Surgical History:   Procedure Laterality Date   • CHOLECYSTECTOMY     • ENDOSCOPY N/A 2018    Procedure: ESOPHAGOGASTRODUODENOSCOPY;  Surgeon: Mark I Brunner, MD;  Location: [facility] ENDOSCOPY;  Service: Gastroenterology   • GASTRIC BYPASS      x2   • HERNIA REPAIR     • MASTECTOMY Bilateral     Left With sentinel lymph node sampling and prophylactic right mastectomy   • PACEMAKER IMPLANTATION     • PORTACATH PLACEMENT                         PT Assessment/Plan     Row Name 19 1300          PT Assessment    Assessment Comments  Patient verbalized compliance with current HEP and tolerated addition of side stepping; however fatigue noted in bilateral glutes, especially right. No change of pain after manual techniques.   -CP        PT Plan    PT Plan Comments  Assess response from foam rolling and standing ther ex.   -CP       User Key  (r) = Recorded By, (t) = Taken By, (c) = Cosigned By     Initials Name Provider Type    CP Perkins, Corinne E, PT Physical Therapist            Exercises     Row Name 06/26/19 1300             Subjective Comments    Subjective Comments  Pt states she is scheduled for second injection July 26th, states she has been doing her HEP consistently, no change in pain mgmt noted but has been doing more activity overall.   -CP         Subjective Pain    Able to rate subjective pain?  yes  -CP      Pre-Treatment Pain Level  6  -CP      Post-Treatment Pain Level  6  -CP         Total Minutes    56122 - PT Therapeutic Exercise Minutes  34  -CP      06403 - PT Manual Therapy Minutes  4  -CP         Exercise 1    Exercise Name 1  Nustep Level 5  -CP      Time 1  5 minutes  -CP         Exercise 2    Exercise Name 2  standing 3 way hip  -CP      Reps 2  10x each  -CP         Exercise 3    Exercise Name 3  supine clams with BTB  -CP      Reps 3  15x  -CP      Additional Comments  BTB  -CP         Exercise 4    Exercise Name 4  LTR  -CP      Reps 4  12  -CP         Exercise 5    Exercise Name 5  hip abd fall out LLE only  -CP      Reps 5  5x each  -CP         Exercise 6    Exercise Name 6  bridges  -CP      Reps 6  15x  -CP         Exercise 7    Exercise Name 7  hip add iso  -CP      Reps 7  15x  -CP      Time 7  3 seconds  -CP         Exercise 8    Exercise Name 8  side stepping and zig zag stepping  -CP      Reps 8  2 x 30 ft each  -CP      Additional Comments  RTB above knees  -CP         Exercise 9    Exercise Name 9  ITB sidelying stretch  -CP      Cueing 9  Verbal  -CP      Reps 9  3x each  -CP      Additional Comments  PT assisted  -CP        User Key  (r) = Recorded By, (t) = Taken By, (c) = Cosigned By    Initials Name Provider Type    CP Perkins, Corinne E, PT Physical Therapist                      Manual Rx (last 36 hours)      Manual Treatments     Row Name 06/26/19 1300             Total Minutes    59940 - PT Manual Therapy Minutes  4  -CP         Manual Rx 1    Manual Rx 1  Location  right ITB, right glutes  -CP      Manual Rx 1 Type  Foam rolling along right ITB and glutes; pt in left sidelying position  -CP      Manual Rx 1 Duration  4  -CP        User Key  (r) = Recorded By, (t) = Taken By, (c) = Cosigned By    Initials Name Provider Type    CP Perkins, Corinne E, PT Physical Therapist                             Time Calculation:   Start Time: 1330  Total Timed Code Minutes- PT: 38 minute(s)  Therapy Charges for Today     Code Description Service Date Service Provider Modifiers Qty    57454505772  PT THER PROC EA 15 MIN 6/26/2019 Perkins, Corinne E, PT GP 3                    Corinne E. Perkins, PT  6/26/2019

## 2019-07-03 ENCOUNTER — APPOINTMENT (OUTPATIENT)
Dept: PHYSICAL THERAPY | Facility: HOSPITAL | Age: 54
End: 2019-07-03

## 2019-07-15 ENCOUNTER — APPOINTMENT (OUTPATIENT)
Dept: ONCOLOGY | Facility: HOSPITAL | Age: 54
End: 2019-07-15

## 2019-07-18 ENCOUNTER — HOSPITAL ENCOUNTER (OUTPATIENT)
Dept: ONCOLOGY | Facility: HOSPITAL | Age: 54
Setting detail: INFUSION SERIES
Discharge: HOME | End: 2019-07-18

## 2019-07-18 VITALS
BODY MASS INDEX: 24.96 KG/M2 | HEIGHT: 67 IN | RESPIRATION RATE: 18 BRPM | HEART RATE: 125 BPM | SYSTOLIC BLOOD PRESSURE: 104 MMHG | DIASTOLIC BLOOD PRESSURE: 67 MMHG | TEMPERATURE: 97.2 F | WEIGHT: 159 LBS

## 2019-07-18 DIAGNOSIS — Z17.0 MALIGNANT NEOPLASM OF UPPER-OUTER QUADRANT OF LEFT BREAST IN FEMALE, ESTROGEN RECEPTOR POSITIVE (HCC): Primary | ICD-10-CM

## 2019-07-18 DIAGNOSIS — C50.412 MALIGNANT NEOPLASM OF UPPER-OUTER QUADRANT OF LEFT BREAST IN FEMALE, ESTROGEN RECEPTOR POSITIVE (HCC): Primary | ICD-10-CM

## 2019-07-18 PROCEDURE — G0463 HOSPITAL OUTPT CLINIC VISIT: HCPCS

## 2019-07-18 PROCEDURE — 96523 IRRIG DRUG DELIVERY DEVICE: CPT

## 2019-07-18 RX ADMIN — HEPARIN 500 UNITS: 100 SYRINGE at 13:58

## 2019-07-24 ENCOUNTER — HOSPITAL ENCOUNTER (OUTPATIENT)
Dept: PHYSICAL THERAPY | Facility: HOSPITAL | Age: 54
Setting detail: THERAPIES SERIES
Discharge: HOME | End: 2019-07-24

## 2019-07-24 DIAGNOSIS — M70.61 TROCHANTERIC BURSITIS OF BOTH HIPS: Primary | ICD-10-CM

## 2019-07-24 DIAGNOSIS — M70.62 TROCHANTERIC BURSITIS OF BOTH HIPS: Primary | ICD-10-CM

## 2019-07-24 PROCEDURE — 97110 THERAPEUTIC EXERCISES: CPT | Performed by: PHYSICAL THERAPIST

## 2019-07-24 NOTE — THERAPY PROGRESS REPORT/RE-CERT
Outpatient Physical Therapy Ortho Progress Note   [facility]     Patient Name: Geneva Haro  : 1965  MRN: [id number]  Today's Date: 2019      Visit Date: 2019    Visit Dx:    ICD-10-CM ICD-9-CM   1. Trochanteric bursitis of both hips M70.61 726.5    M70.62        Patient Active Problem List   Diagnosis   • Iron deficiency anemia   • Malfunction of peripheral inserted central catheter (CMS/HCC)   • Malignant neoplasm of upper-outer quadrant of left female breast (CMS/HCC)   • Fibromyalgia   • Gastroparesis   • Adverse effect of iron or its compound   • Ganglion cyst   • Anxiety and depression   • Intractable nausea and vomiting   • Tobacco abuse   • Marijuana abuse   • GERD (gastroesophageal reflux disease)   • Type 2 diabetes mellitus (CMS/HCC)   • Intractable cyclical vomiting with nausea        Past Medical History:   Diagnosis Date   • Anxiety    • Arthritis    • Depression    • Diabetes mellitus (CMS/HCC)    • Gall stones    • History of stomach ulcers    • Stomach problems         Past Surgical History:   Procedure Laterality Date   • CHOLECYSTECTOMY     • ENDOSCOPY N/A 2018    Procedure: ESOPHAGOGASTRODUODENOSCOPY;  Surgeon: Mark I Brunner, MD;  Location: [facility] ENDOSCOPY;  Service: Gastroenterology   • GASTRIC BYPASS      x2   • HERNIA REPAIR     • MASTECTOMY Bilateral     Left With sentinel lymph node sampling and prophylactic right mastectomy   • PACEMAKER IMPLANTATION     • PORTACATH PLACEMENT         PT Ortho     Row Name 19 1300       Hip/Thigh Palpation    Hip/Thigh Palpation?  Yes  -CP    Greater Trochanter  Bilateral:;Guarded/taut;Tender  -CP       Hip Special Tests    CONCHITA (hip vs SI pathology)  Right:;Negative;Left:;Positive  -CP    Yudith’s test (tightness of ITB)  Left:;Positive  -CP       General ROM    GENERAL ROM COMMENTS  lumbar AROM flexion/ ext WFL, pain with left lateral flexion, right lateral flexion WFL  -CP       Right Lower Ext    Rt Hip  ABduction AROM  WFL  -CP    Rt Hip ADduction AROM  WFL  -CP    Rt Hip Extension AROM  50%  -CP    Rt Hip Flexion AROM  WFL  -CP    Rt Hip External Rotation AROM  WFL  -CP    Rt Hip Internal Rotation AROM  WFL  -CP       Left Lower Ext    Lt Hip ABduction AROM  22  -CP    Lt Hip ADduction AROM  50%  -CP    Lt Hip Extension AROM  50%  -CP    Lt Hip Flexion AROM  75%  -CP    Lt Hip External Rotation AROM  75%  -CP    Lt Hip Internal Rotation AROM  WFL  -CP       MMT Right Lower Ext    Rt Hip Flexion MMT, Gross Movement  (4+/5) good plus  -CP    Rt Hip Extension MMT, Gross Movement  (4/5) good  -CP    Rt Hip ABduction MMT, Gross Movement  (4/5) good  -CP    Rt Hip ADduction MMT, Gross Movement  (4/5) good  -CP    Rt Knee Extension MMT, Gross Movement  (4/5) good  -CP    Rt Knee Flexion MMT, Gross Movement  (4+/5) good plus  -CP       MMT Left Lower Ext    Lt Hip Flexion MMT, Gross Movement  (4-/5) good minus  -CP    Lt Hip Extension MMT, Gross Movement  (3-/5) fair minus  -CP    Lt Hip ABduction MMT, Gross Movement  (4-/5) good minus  -CP    Lt Hip ADduction MMT, Gross Movement  (4-/5) good minus  -CP    Lt Knee Extension MMT, Gross Movement  (4/5) good  -CP    Lt Knee Flexion MMT, Gross Movement  (4+/5) good plus  -CP       Sensation    Sensation WNL?  WFL  -CP      User Key  (r) = Recorded By, (t) = Taken By, (c) = Cosigned By    Initials Name Provider Type    CP Perkins, Corinne E, PT Physical Therapist                      PT Assessment/Plan     Row Name 07/24/19 1300          PT Assessment    Functional Limitations  Decreased safety during functional activities;Limitation in home management;Limitations in community activities;Performance in leisure activities  -CP     Impairments  Gait;Impaired flexibility;Pain;Joint mobility;Range of motion;Poor body mechanics  -CP     Assessment Comments  Reassessment completed today in clinic. Patient denies improvement in pain mgmt of bilateral hips overall, states left hip  continues to be worse than right hip. She has f/u with Dr. Perez this Friday, states for repeat injection. Due to recent noncompliance with PT POC d/t other illness, could recommend further PT after injections this week. Despite no change in pain mgmt, she did improve 10% on modified oswestry. Improved hip mobility noted of bilateral hips.   -CP     Please refer to paper survey for additional self-reported information  Yes  -CP     Rehab Potential  Fair  -CP     Patient/caregiver participated in establishment of treatment plan and goals  Yes  -CP     Patient would benefit from skilled therapy intervention  Yes  -CP        PT Plan    PT Frequency  1x/week  -CP     Predicted Duration of Therapy Intervention (Therapy Eval)  4 visits  -CP     Planned CPT's?  PT RE-EVAL: 09807;PT THER PROC EA 15 MIN: 55789;PT MANUAL THERAPY EA 15 MIN: 19359;PT NEUROMUSC RE-EDUCATION EA 15 MIN: 49652;PT GAIT TRAINING EA 15 MIN: 27219;PT HOT OR COLD PACK TREAT MCARE;PT ELECTRICAL STIM UNATTEND: ;PT ULTRASOUND EA 15 MIN: 39724  -CP     PT Plan Comments  F/u with patient after MD appt this week. Continue progression of hip strengthening as tolerated.   -CP       User Key  (r) = Recorded By, (t) = Taken By, (c) = Cosigned By    Initials Name Provider Type    CP Perkins, Corinne E, PT Physical Therapist            Exercises     Row Name 19 1300             Subjective Comments    Subjective Comments  Pt states left hip pain has remained high, right improved. She states she has been out of town for , sickness and in ER for GI distress pain.   -CP         Subjective Pain    Able to rate subjective pain?  yes  -CP      Pre-Treatment Pain Level  8 8/10 left hip, 6/10 right hip  -CP      Post-Treatment Pain Level  8  -CP         Total Minutes    60979 - PT Therapeutic Exercise Minutes  40  -CP         Exercise 1    Exercise Name 1  Reassessment completed this date in clinic.   -CP         Exercise 2    Exercise Name 2  Reviewed  current HEP, emphasis on stretching ITB, hip ER and strengthening hip abduction and extension.   -CP         Exercise 4    Exercise Name 4  seated CONCHITA stretch  -CP      Reps 4  2x  -CP         Exercise 5    Exercise Name 5  hip abd fall out BLE  -CP      Reps 5  5x each  -CP      Additional Comments  improved AROM  -CP        User Key  (r) = Recorded By, (t) = Taken By, (c) = Cosigned By    Initials Name Provider Type    CP Perkins, Corinne E, PT Physical Therapist                       PT OP Goals     Row Name 07/24/19 1300          PT Short Term Goals    STG Date to Achieve  06/19/19  -CP     STG 1  Patient will demonstrate an independent HEP for core and hip strength and flexibility/ROM.  -CP     STG 1 Progress  Met  -CP     STG 2  Patient will ambulate with least restrictive assistive device with near normal gait on level ground short community distances  -CP     STG 2 Progress  Met  -CP        Long Term Goals    LTG Date to Achieve  08/23/19  -CP     LTG 1  Patient will demonstrate independent HEP progressed for closed chain strength, proprioception, balance and agility with minimal or no compensations or exacerbations.  -CP     LTG 1 Progress  Ongoing  -CP     LTG 2  Patient will report reduced functional limitations on functional outcome measure by 10 points.  -CP     LTG 2 Progress  Ongoing  -CP     LTG 3  Patient will restore Hip ROM, Flexibility and Strength in order to perform ADL without pain.  -CP     LTG 3 Progress  Ongoing  -CP     LTG 4  Pt will demonstrate improved mod oswestry score by 10% for improved mobility.   -CP     LTG 4 Progress  Met  -CP        Time Calculation    PT Goal Re-Cert Due Date  10/22/19  -CP       User Key  (r) = Recorded By, (t) = Taken By, (c) = Cosigned By    Initials Name Provider Type    CP Perkins, Corinne E, PT Physical Therapist               Outcome Measure Options: Manju Feliz  Modified Oswestry  Modified Oswestry Score/Comments: 40%      Time Calculation:    Start Time: 1302  Total Timed Code Minutes- PT: 40 minute(s)  Therapy Charges for Today     Code Description Service Date Service Provider Modifiers Qty    44989318953 HC PT THER PROC EA 15 MIN 7/24/2019 Perkins, Corinne E, PT GP 3          PT G-Codes  Outcome Measure Options: Modifed Diptiy  Modified Oswestry Score/Comments: 40%         Corinne E. Perkins, PT  7/24/2019

## 2019-07-26 ENCOUNTER — OFFICE VISIT (OUTPATIENT)
Dept: ORTHOPEDIC SURGERY | Facility: CLINIC | Age: 54
End: 2019-07-26

## 2019-07-26 VITALS — OXYGEN SATURATION: 99 % | HEART RATE: 109 BPM | BODY MASS INDEX: 24.95 KG/M2 | HEIGHT: 67 IN | WEIGHT: 158.95 LBS

## 2019-07-26 DIAGNOSIS — M70.61 TROCHANTERIC BURSITIS OF BOTH HIPS: Primary | ICD-10-CM

## 2019-07-26 DIAGNOSIS — M70.62 TROCHANTERIC BURSITIS OF BOTH HIPS: Primary | ICD-10-CM

## 2019-07-26 PROCEDURE — 99213 OFFICE O/P EST LOW 20 MIN: CPT | Performed by: ORTHOPAEDIC SURGERY

## 2019-07-26 NOTE — PROGRESS NOTES
Orthopaedic Clinic Note: Hip Established Patient    Chief Complaint   Patient presents with   • Follow-up     3 months- Trochanteric bursitis of both hips         HPI    It has been 3  month(s) since Ms. Haro's last visit. She returns to clinic today for follow-up bilateral hip trochanteric bursitis.  She rates her pain a 7/10 on the pain scale.  She had a bilateral hip drug enteric bursa injections 3 months ago.  The injections provided 2 months of relief before pain gradually returned.  She is having difficulty with weightbearing and walking activities.  Sitting and resting eases her pain.  She is here to discuss further treatment options.  She is ambulate with no assistive device today.    Past Medical History:   Diagnosis Date   • Anxiety    • Arthritis    • Depression    • Diabetes mellitus (CMS/HCC)    • Gall stones    • History of stomach ulcers    • Stomach problems       Past Surgical History:   Procedure Laterality Date   • CHOLECYSTECTOMY     • ENDOSCOPY N/A 6/20/2018    Procedure: ESOPHAGOGASTRODUODENOSCOPY;  Surgeon: Mark I Brunner, MD;  Location: [facility] ENDOSCOPY;  Service: Gastroenterology   • GASTRIC BYPASS      x2   • HERNIA REPAIR     • MASTECTOMY Bilateral     Left With sentinel lymph node sampling and prophylactic right mastectomy   • PACEMAKER IMPLANTATION     • PORTACATH PLACEMENT        Family History   Problem Relation Age of Onset   • Breast cancer Maternal Aunt    • Pancreatic cancer Cousin    • Breast cancer Cousin    • Breast cancer Cousin      Social History     Socioeconomic History   • Marital status:      Spouse name: Not on file   • Number of children: Not on file   • Years of education: Not on file   • Highest education level: Not on file   Tobacco Use   • Smoking status: Current Every Day Smoker   • Smokeless tobacco: Never Used   Substance and Sexual Activity   • Alcohol use: No   • Drug use: Yes     Types: Marijuana   • Sexual activity: Defer      Current Outpatient  "Medications on File Prior to Visit   Medication Sig Dispense Refill   • cetirizine (zyrTEC) 10 MG tablet Take 10 mg by mouth Daily.     • cholecalciferol (VITAMIN D3) 1000 units tablet Take 1,000 Units by mouth Daily.     • citalopram (CeleXA) 20 MG tablet Take 20 mg by mouth daily.     • DULoxetine (CYMBALTA) 30 MG capsule Take 30 mg by mouth 2 (Two) Times a Day.     • metoclopramide (REGLAN) 10 MG tablet Take 1 tablet by mouth 4 (Four) Times a Day Before Meals & at Bedtime As Needed (N/V) for up to 12 doses. 12 tablet 0   • omeprazole (priLOSEC) 40 MG capsule Take 40 mg by mouth Daily.     • ondansetron ODT (ZOFRAN-ODT) 4 MG disintegrating tablet Take 2 tablets by mouth Every 6 (Six) Hours As Needed for Nausea or Vomiting for up to 20 doses. 20 tablet 0   • promethazine (PHENERGAN) 12.5 MG tablet Take 1 tablet by mouth Every 6 (Six) Hours As Needed for Nausea or Vomiting for up to 12 doses. 15 tablet 0   • promethazine (PHENERGAN) 25 MG suppository Insert 1 suppository into the rectum Every 6 (Six) Hours As Needed for Nausea or Vomiting for up to 12 doses. 12 suppository 0     No current facility-administered medications on file prior to visit.       Allergies   Allergen Reactions   • Gabapentin Other (See Comments)     Seizure   • Aspirin GI Intolerance   • Ibuprofen GI Intolerance   • Morphine And Related Itching and Swelling        Review of Systems   Constitutional: Negative.    HENT: Negative.    Eyes: Negative.    Respiratory: Negative.    Cardiovascular: Negative.    Gastrointestinal: Negative.    Endocrine: Negative.    Genitourinary: Negative.    Musculoskeletal: Positive for arthralgias.   Skin: Negative.    Allergic/Immunologic: Negative.    Neurological: Negative.    Hematological: Negative.    Psychiatric/Behavioral: Negative.         The patient's Review of Systems was personally reviewed and confirmed as accurate.    Physical Exam  Pulse 109, height 170.2 cm (67.01\"), weight 72.1 kg (158 lb 15.2 " oz), SpO2 99 %.    Body mass index is 24.89 kg/m².    GENERAL APPEARANCE: awake, alert, oriented, in no acute distress and well developed, well nourished  LUNGS:  breathing nonlabored  EXTREMITIES: no clubbing, cyanosis  PERIPHERAL PULSES: palpable dorsalis pedis and posterior tibial pulses bilaterally.    GAIT:  Normal            Hip Exam:  Bilateral    RANGE OF MOTION:  EXTENSION/FLEXION:  normal (0-110 degrees)  IR (at 90 degrees of flexion):  20  ER (at 90 degrees of flexion):  40  PAIN WITH HIP MOTION:  no  PAIN WITH LOGROLL:  no     STINCHFIELD TEST: negative    STRENGTH:  ABDUCTOR:  5/5  ADDUCTOR:  5/5  HIP FLEXION:  5/5    GREATER TROCHANTER BURSAL PAIN:  yes    SENSATION TO LIGHT TOUCH:  DEEP PERONEAL/SUPERFICIAL PERONEAL/SURAL/SAPHENOUS/TIBIAL:   intact    EDEMA:  no  ERYTHEMA:  no  WOUNDS/INCISIONS:  no  _________________________________________________________________  _________________________________________________________________    RADIOGRAPHIC FINDINGS:   No new imaging today    Assessment/Plan:   Diagnosis Plan   1. Trochanteric bursitis of both hips  diclofenac (VOLTAREN) 1 % gel gel    Ambulatory Referral to Physical Therapy Evaluate and treat     Patient has persistent trochanteric bursal symptoms.  At this point I recommend referral to physical therapy as well as topical anti-inflammatory.  She is unable to take oral anti-inflammatory secondary to gastroparesis.  She will follow-up as needed.    Humble Perez MD  07/26/19  9:31 AM

## 2019-08-14 ENCOUNTER — HOSPITAL ENCOUNTER (OUTPATIENT)
Dept: PHYSICAL THERAPY | Facility: HOSPITAL | Age: 54
Setting detail: THERAPIES SERIES
Discharge: HOME | End: 2019-08-14

## 2019-08-14 DIAGNOSIS — M70.61 TROCHANTERIC BURSITIS OF BOTH HIPS: Primary | ICD-10-CM

## 2019-08-14 DIAGNOSIS — M70.62 TROCHANTERIC BURSITIS OF BOTH HIPS: Primary | ICD-10-CM

## 2019-08-14 PROCEDURE — 97110 THERAPEUTIC EXERCISES: CPT | Performed by: PHYSICAL THERAPIST

## 2019-08-14 PROCEDURE — 97035 APP MDLTY 1+ULTRASOUND EA 15: CPT | Performed by: PHYSICAL THERAPIST

## 2019-08-14 NOTE — THERAPY TREATMENT NOTE
Outpatient Physical Therapy Ortho Treatment Note   [facility]     Patient Name: Geneva Haro  : 1965  MRN: [id number]  Today's Date: 2019      Visit Date: 2019    Visit Dx:    ICD-10-CM ICD-9-CM   1. Trochanteric bursitis of both hips M70.61 726.5    M70.62        Patient Active Problem List   Diagnosis   • Iron deficiency anemia   • Malfunction of peripheral inserted central catheter (CMS/HCC)   • Malignant neoplasm of upper-outer quadrant of left female breast (CMS/HCC)   • Fibromyalgia   • Gastroparesis   • Adverse effect of iron or its compound   • Ganglion cyst   • Anxiety and depression   • Intractable nausea and vomiting   • Tobacco abuse   • Marijuana abuse   • GERD (gastroesophageal reflux disease)   • Type 2 diabetes mellitus (CMS/HCC)   • Intractable cyclical vomiting with nausea        Past Medical History:   Diagnosis Date   • Anxiety    • Arthritis    • Depression    • Diabetes mellitus (CMS/HCC)    • Gall stones    • History of stomach ulcers    • Stomach problems         Past Surgical History:   Procedure Laterality Date   • CHOLECYSTECTOMY     • ENDOSCOPY N/A 2018    Procedure: ESOPHAGOGASTRODUODENOSCOPY;  Surgeon: Mark I Brunner, MD;  Location: [facility] ENDOSCOPY;  Service: Gastroenterology   • GASTRIC BYPASS      x2   • HERNIA REPAIR     • MASTECTOMY Bilateral     Left With sentinel lymph node sampling and prophylactic right mastectomy   • PACEMAKER IMPLANTATION     • PORTACATH PLACEMENT                         PT Assessment/Plan     Row Name 19 1000          PT Assessment    Assessment Comments  Pt denies improvement in bilateral hip pain; however demonstrated improved hip ER AROM bilaterally. Left lateral hip continues to be reported as 6-7/10 pain compared to right hip. She verbalized compliance with HEP.   -CP        PT Plan    PT Plan Comments  Continue to progress as tolerated next visit. Assess response from .   -CP       User Key  (r) = Recorded  By, (t) = Taken By, (c) = Cosigned By    Initials Name Provider Type    CP Perkins, Corinne E, PT Physical Therapist          Modalities     Row Name 08/14/19 1000             Ultrasound 60141    Location  left lateral hip pt in right sidelying position  -CP      Duty Cycle  100  -CP      Frequency  1.0 MHz  -CP      Intensity - Wts/cm  1.2  -CP      Phonophoresis  No  -CP      53680 - PT Ultrasound Minutes  8  -CP        User Key  (r) = Recorded By, (t) = Taken By, (c) = Cosigned By    Initials Name Provider Type    CP Perkins, Corinne E, PT Physical Therapist        Exercises     Row Name 08/14/19 1000             Subjective Comments    Subjective Comments  Patient states she did not get an injection from Dr. Perez on July 26th, states she has tried a new topical anti inflammatory with mild relief.   -CP         Subjective Pain    Able to rate subjective pain?  yes  -CP      Pre-Treatment Pain Level  7  -CP      Post-Treatment Pain Level  6  -CP         Total Minutes    64059 - PT Therapeutic Exercise Minutes  32  -CP         Exercise 1    Exercise Name 1  Nustep Level 5  -CP      Time 1  5 minutes  -CP         Exercise 2    Exercise Name 2  standing 3 way hip  -CP      Reps 2  12x each  -CP         Exercise 3    Exercise Name 3  side stepping with TB above knees  -CP      Reps 3  2 x 30 ft each  -CP      Additional Comments  fatigued  -CP         Exercise 4    Exercise Name 4  seated CONCHITA stretch  -CP      Reps 4  2x  -CP      Additional Comments  improved hip ER noted  -CP         Exercise 5    Exercise Name 5  hip abd clams with GTB  -CP      Reps 5  10x  -CP         Exercise 7    Exercise Name 7  hip add iso  -CP      Reps 7  15x  -CP      Time 7  3 seconds  -CP         Exercise 8    Exercise Name 8  --  -CP      Reps 8  --  -CP      Additional Comments  --  -CP        User Key  (r) = Recorded By, (t) = Taken By, (c) = Cosigned By    Initials Name Provider Type    CP Perkins, Corinne E, PT Physical  Therapist                       PT OP Goals     Row Name 08/14/19 1000          PT Short Term Goals    STG Date to Achieve  06/19/19  -CP     STG 1  Patient will demonstrate an independent HEP for core and hip strength and flexibility/ROM.  -CP     STG 1 Progress  Met  -CP     STG 2  Patient will ambulate with least restrictive assistive device with near normal gait on level ground short community distances  -CP     STG 2 Progress  Met  -CP        Long Term Goals    LTG Date to Achieve  09/13/19  -CP     LTG 1  Patient will demonstrate independent HEP progressed for closed chain strength, proprioception, balance and agility with minimal or no compensations or exacerbations.  -CP     LTG 1 Progress  Ongoing  -CP     LTG 2  Patient will report reduced functional limitations on functional outcome measure by 10 points.  -CP     LTG 2 Progress  Ongoing  -CP     LTG 3  Patient will restore Hip ROM, Flexibility and Strength in order to perform ADL without pain.  -CP     LTG 3 Progress  Ongoing  -CP     LTG 4  Pt will demonstrate improved mod oswestry score by 10% for improved mobility.   -CP     LTG 4 Progress  Met  -CP        Time Calculation    PT Goal Re-Cert Due Date  10/22/19  -CP       User Key  (r) = Recorded By, (t) = Taken By, (c) = Cosigned By    Initials Name Provider Type    CP Perkins, Corinne E, PT Physical Therapist                         Time Calculation:   Start Time: 1030  Total Timed Code Minutes- PT: 40 minute(s)  Therapy Charges for Today     Code Description Service Date Service Provider Modifiers Qty    59209609305 HC PT THER PROC EA 15 MIN 8/14/2019 Perkins, Corinne E, PT GP 2    28503555296 HC PT ULTRASOUND EA 15 MIN 8/14/2019 Perkins, Corinne E, PT GP 1                    Corinne E. Perkins, PT  8/14/2019

## 2019-08-15 ENCOUNTER — APPOINTMENT (OUTPATIENT)
Dept: ONCOLOGY | Facility: HOSPITAL | Age: 54
End: 2019-08-15

## 2019-08-19 ENCOUNTER — TELEPHONE (OUTPATIENT)
Dept: ONCOLOGY | Facility: CLINIC | Age: 54
End: 2019-08-19

## 2019-08-19 ENCOUNTER — HOSPITAL ENCOUNTER (OUTPATIENT)
Dept: ONCOLOGY | Facility: HOSPITAL | Age: 54
Setting detail: INFUSION SERIES
Discharge: HOME | End: 2019-08-19

## 2019-08-19 VITALS
DIASTOLIC BLOOD PRESSURE: 76 MMHG | SYSTOLIC BLOOD PRESSURE: 121 MMHG | WEIGHT: 170 LBS | RESPIRATION RATE: 20 BRPM | HEART RATE: 102 BPM | BODY MASS INDEX: 26.68 KG/M2 | HEIGHT: 67 IN | TEMPERATURE: 97.3 F

## 2019-08-19 DIAGNOSIS — Z17.0 MALIGNANT NEOPLASM OF UPPER-OUTER QUADRANT OF LEFT BREAST IN FEMALE, ESTROGEN RECEPTOR POSITIVE (HCC): ICD-10-CM

## 2019-08-19 DIAGNOSIS — C50.412 MALIGNANT NEOPLASM OF UPPER-OUTER QUADRANT OF LEFT BREAST IN FEMALE, ESTROGEN RECEPTOR POSITIVE (HCC): ICD-10-CM

## 2019-08-19 PROCEDURE — 96523 IRRIG DRUG DELIVERY DEVICE: CPT

## 2019-08-19 PROCEDURE — G0463 HOSPITAL OUTPT CLINIC VISIT: HCPCS

## 2019-08-19 RX ADMIN — HEPARIN 500 UNITS: 100 SYRINGE at 13:14

## 2019-08-19 NOTE — TELEPHONE ENCOUNTER
----- Message from Bella Farias RN sent at 8/19/2019  1:10 PM EDT -----  Regarding: MARVIN Drake   Ms. Haro is here today complaining of tingling in her entire right arm. It has been this way for a month. No pain just pins and needles and she is very concerned about this. I tried calling Mariaa but she was not available. Could someone please contact her about this issue? She is only here today for a port flush.     Thanks  Bella [phone]

## 2019-08-19 NOTE — TELEPHONE ENCOUNTER
After discussing with RON POLK called and spoke to patient's nurse. Informing her that RON POLK stated that the breast cancer is on the opposite side of the breast cancer that she had previously had and she doesn't believe that it is related to the breast cancer. Informing patient to follow up with her PCP.

## 2019-08-21 ENCOUNTER — HOSPITAL ENCOUNTER (OUTPATIENT)
Dept: PHYSICAL THERAPY | Facility: HOSPITAL | Age: 54
Setting detail: THERAPIES SERIES
Discharge: HOME | End: 2019-08-21

## 2019-08-21 DIAGNOSIS — M70.61 TROCHANTERIC BURSITIS OF BOTH HIPS: Primary | ICD-10-CM

## 2019-08-21 DIAGNOSIS — M70.62 TROCHANTERIC BURSITIS OF BOTH HIPS: Primary | ICD-10-CM

## 2019-08-21 PROCEDURE — 97110 THERAPEUTIC EXERCISES: CPT | Performed by: PHYSICAL THERAPIST

## 2019-08-21 PROCEDURE — 97035 APP MDLTY 1+ULTRASOUND EA 15: CPT | Performed by: PHYSICAL THERAPIST

## 2019-08-21 NOTE — THERAPY PROGRESS REPORT/RE-CERT
"    Outpatient Physical Therapy Ortho Progress Note   [place]     Patient Name: Geneva Haro  : 1965  MRN: [id number]  Today's Date: 2019      Visit Date: 2019    Visit Dx:    ICD-10-CM ICD-9-CM   1. Trochanteric bursitis of both hips M70.61 726.5    M70.62        Patient Active Problem List   Diagnosis   • Iron deficiency anemia   • Malfunction of peripheral inserted central catheter (CMS/HCC)   • Malignant neoplasm of upper-outer quadrant of left female breast (CMS/HCC)   • Fibromyalgia   • Gastroparesis   • Adverse effect of iron or its compound   • Ganglion cyst   • Anxiety and depression   • Intractable nausea and vomiting   • Tobacco abuse   • Marijuana abuse   • GERD (gastroesophageal reflux disease)   • Type 2 diabetes mellitus (CMS/HCC)   • Intractable cyclical vomiting with nausea        Past Medical History:   Diagnosis Date   • Anxiety    • Arthritis    • Depression    • Diabetes mellitus (CMS/HCC)    • Gall stones    • History of stomach ulcers    • Stomach problems         Past Surgical History:   Procedure Laterality Date   • CHOLECYSTECTOMY     • ENDOSCOPY N/A 2018    Procedure: ESOPHAGOGASTRODUODENOSCOPY;  Surgeon: Mark I Brunner, MD;  Location: [facility] ENDOSCOPY;  Service: Gastroenterology   • GASTRIC BYPASS      x2   • HERNIA REPAIR     • MASTECTOMY Bilateral     Left With sentinel lymph node sampling and prophylactic right mastectomy   • PACEMAKER IMPLANTATION     • PORTACATH PLACEMENT         PT Ortho     Row Name 19 1000       Subjective Comments    Subjective Comments  Patient states her topical cream has helped improve bilateral hip pain slightly. She reports \"there is rain in the air so some more pain.\" She reports improvement after US last visit.   -CP       Subjective Pain    Post-Treatment Pain Level  6  -CP       Hip/Thigh Palpation    Hip/Thigh Palpation?  Yes  -CP    Greater Trochanter  Bilateral:;Guarded/taut;Tender  -CP       Hip Special " Tests    CONCHITA (hip vs SI pathology)  Negative  -CP    Yudith’s test (tightness of ITB)  Left:;Positive  -CP       Right Lower Ext    Rt Hip ABduction AROM  WFL  -CP    Rt Hip ADduction AROM  WFL  -CP    Rt Hip Extension AROM  50%  -CP    Rt Hip Flexion AROM  WFL  -CP    Rt Hip External Rotation AROM  WFL  -CP    Rt Hip Internal Rotation AROM  WFL  -CP       Left Lower Ext    Lt Hip ABduction AROM  31  -CP    Lt Hip ADduction AROM  30  -CP    Lt Hip Extension AROM  50%  -CP    Lt Hip Flexion AROM  WFL  -CP    Lt Hip External Rotation AROM  WFL  -CP    Lt Hip Internal Rotation AROM  WFL  -CP       MMT Right Lower Ext    Rt Hip Flexion MMT, Gross Movement  (4+/5) good plus  -CP    Rt Hip Extension MMT, Gross Movement  (4/5) good  -CP    Rt Hip ABduction MMT, Gross Movement  (4/5) good  -CP    Rt Hip ADduction MMT, Gross Movement  (4+/5) good plus  -CP    Rt Knee Extension MMT, Gross Movement  (4+/5) good plus  -CP    Rt Knee Flexion MMT, Gross Movement  (4+/5) good plus  -CP       MMT Left Lower Ext    Lt Hip Flexion MMT, Gross Movement  (4+/5) good plus  -CP    Lt Hip Extension MMT, Gross Movement  (4-/5) good minus  -CP    Lt Hip ABduction MMT, Gross Movement  (4/5) good  -CP    Lt Hip ADduction MMT, Gross Movement  (4/5) good  -CP    Lt Knee Extension MMT, Gross Movement  (4+/5) good plus  -CP    Lt Knee Flexion MMT, Gross Movement  (4+/5) good plus  -CP       Sensation    Sensation WNL?  WFL  -CP      User Key  (r) = Recorded By, (t) = Taken By, (c) = Cosigned By    Initials Name Provider Type    CP Perkins, Corinne E, PT Physical Therapist                      PT Assessment/Plan     Row Name 08/21/19 1000          PT Assessment    Functional Limitations  Limitation in home management;Limitations in community activities;Performance in leisure activities  -CP     Impairments  Gait;Pain;Joint mobility;Poor body mechanics  -CP     Assessment Comments  Patient demonstrates functional bilateral hip AROM; however  "continues to have weakness of bilateral hip extension and hip abduction. She verbalized compliance with current HEP and topical use at home. She denies significant improvement in pain mgmt. Relief noted after US to lateral left hip.   -CP     Please refer to paper survey for additional self-reported information  Yes  -CP     Rehab Potential  Fair  -CP     Patient/caregiver participated in establishment of treatment plan and goals  Yes  -CP     Patient would benefit from skilled therapy intervention  Yes  -CP        PT Plan    PT Frequency  1x/week  -CP     Predicted Duration of Therapy Intervention (Therapy Eval)  1 visit  -CP     Planned CPT's?  PT RE-EVAL: 35439;PT THER PROC EA 15 MIN: 33011;PT MANUAL THERAPY EA 15 MIN: 85268;PT NEUROMUSC RE-EDUCATION EA 15 MIN: 30160;PT GAIT TRAINING EA 15 MIN: 07855;PT ELECTRICAL STIM UNATTEND: ;PT HOT OR COLD PACK TREAT MCARE;PT ULTRASOUND EA 15 MIN: 76333  -CP     PT Plan Comments  Anticipate review of HEP and progression for home next visit and d/c to home afterwards to conserve total visits d/t lack of significant progress with pain mgmt. Pt agreeable to POC.   -CP       User Key  (r) = Recorded By, (t) = Taken By, (c) = Cosigned By    Initials Name Provider Type    CP Perkins, Corinne E, PT Physical Therapist          Modalities     Row Name 08/21/19 1000             Ultrasound 57814    Location  left lateral hip R sidelying position with pillow btw BLE  -CP      Duty Cycle  100  -CP      Frequency  1.0 MHz  -CP      Intensity - Wts/cm  1.2  -CP      Phonophoresis  No  -CP      70571 - PT Ultrasound Minutes  8  -CP        User Key  (r) = Recorded By, (t) = Taken By, (c) = Cosigned By    Initials Name Provider Type    CP Perkins, Corinne E, PT Physical Therapist        Exercises     Row Name 08/21/19 1000             Subjective Comments    Subjective Comments  Patient states her topical cream has helped improve bilateral hip pain slightly. She reports \"there is rain in " "the air so some more pain.\" She reports improvement after US last visit.   -CP         Subjective Pain    Able to rate subjective pain?  yes  -CP      Pre-Treatment Pain Level  6  -CP      Post-Treatment Pain Level  6  -CP         Total Minutes    61600 - PT Therapeutic Exercise Minutes  33  -CP         Exercise 1    Exercise Name 1  Nustep Level 6  -CP      Time 1  5 minutes  -CP         Exercise 2    Exercise Name 2  standing 3 way hip  -CP      Reps 2  HEP  -CP         Exercise 3    Exercise Name 3  side stepping with GTB above knees  -CP      Reps 3  2 x 30 ft each  -CP         Exercise 4    Exercise Name 4  seated CONCHITA stretch  -CP      Reps 4  2x  -CP         Exercise 5    Exercise Name 5  zig zag stpping without theraband above knees  -CP      Cueing 5  Verbal;Demo  -CP      Reps 5  2 x 30 ft  -CP      Additional Comments  forward, backward  -CP         Exercise 6    Exercise Name 6  Standing ITB stretch  -CP      Cueing 6  Verbal  -CP      Reps 6  2x  -CP         Exercise 7    Exercise Name 7  TG squats  -CP      Reps 7  15x  -CP         Exercise 8    Exercise Name 8  Reassessment completed today in clinic.   -CP         Exercise 9    Exercise Name 9  Reviewed current HEP and progression for home/modifications for improved mobility and decreased pain.   -CP        User Key  (r) = Recorded By, (t) = Taken By, (c) = Cosigned By    Initials Name Provider Type    CP Perkins, Corinne E, PT Physical Therapist                       PT OP Goals     Row Name 08/21/19 1000          PT Short Term Goals    STG Date to Achieve  06/19/19  -CP     STG 1  Patient will demonstrate an independent HEP for core and hip strength and flexibility/ROM.  -CP     STG 1 Progress  Met  -CP     STG 2  Patient will ambulate with least restrictive assistive device with near normal gait on level ground short community distances  -CP     STG 2 Progress  Met  -CP        Long Term Goals    LTG Date to Achieve  09/20/19  -CP     LTG 1  " Patient will demonstrate independent HEP progressed for closed chain strength, proprioception, balance and agility with minimal or no compensations or exacerbations.  -CP     LTG 1 Progress  Progressing  -CP     LTG 2  Patient will report reduced functional limitations on functional outcome measure by 10 points.  -CP     LTG 2 Progress  Ongoing  -CP     LTG 3  Patient will restore Hip ROM, Flexibility and Strength in order to perform ADL without pain.  -CP     LTG 3 Progress  Ongoing  -CP     LTG 4  Pt will demonstrate improved mod oswestry score by 10% for improved mobility.   -CP     LTG 4 Progress  Met;Ongoing  -CP     LTG 4 Progress Comments  Met initially; however recent decline. Ongoing goal.   -CP        Time Calculation    PT Goal Re-Cert Due Date  10/22/19  -CP       User Key  (r) = Recorded By, (t) = Taken By, (c) = Cosigned By    Initials Name Provider Type    CP Perkins, Corinne E, PT Physical Therapist               Outcome Measure Options: Modifed Owestry  Modified Oswestry  Modified Oswestry Score/Comments: 44%      Time Calculation:   Start Time: 1030  Total Timed Code Minutes- PT: 41 minute(s)  Therapy Charges for Today     Code Description Service Date Service Provider Modifiers Qty    69409094977  PT THER PROC EA 15 MIN 8/21/2019 Perkins, Corinne E, PT  2    34127616545 HC PT ULTRASOUND EA 15 MIN 8/21/2019 Perkins, Corinne E, PT  1          PT G-Codes  Outcome Measure Options: Modifed Owestry  Modified Oswestry Score/Comments: 44%         Corinne E. Perkins, PT  8/21/2019

## 2019-08-28 ENCOUNTER — TELEPHONE (OUTPATIENT)
Dept: PHYSICAL THERAPY | Facility: HOSPITAL | Age: 54
End: 2019-08-28

## 2019-09-30 ENCOUNTER — HOSPITAL ENCOUNTER (OUTPATIENT)
Dept: ONCOLOGY | Facility: HOSPITAL | Age: 54
Setting detail: INFUSION SERIES
Discharge: HOME | End: 2019-09-30

## 2019-09-30 VITALS
TEMPERATURE: 97.9 F | WEIGHT: 170 LBS | RESPIRATION RATE: 18 BRPM | DIASTOLIC BLOOD PRESSURE: 71 MMHG | HEART RATE: 116 BPM | HEIGHT: 67 IN | BODY MASS INDEX: 26.68 KG/M2 | SYSTOLIC BLOOD PRESSURE: 107 MMHG

## 2019-09-30 DIAGNOSIS — T82.598D MALFUNCTION OF PERIPHERAL INSERTED CENTRAL CATHETER, SUBSEQUENT ENCOUNTER: ICD-10-CM

## 2019-09-30 DIAGNOSIS — Z17.0 MALIGNANT NEOPLASM OF UPPER-OUTER QUADRANT OF LEFT BREAST IN FEMALE, ESTROGEN RECEPTOR POSITIVE (HCC): ICD-10-CM

## 2019-09-30 DIAGNOSIS — C50.412 MALIGNANT NEOPLASM OF UPPER-OUTER QUADRANT OF LEFT BREAST IN FEMALE, ESTROGEN RECEPTOR POSITIVE (HCC): ICD-10-CM

## 2019-09-30 DIAGNOSIS — Z45.2 ENCOUNTER FOR CENTRAL LINE CARE: Primary | ICD-10-CM

## 2019-09-30 DIAGNOSIS — D50.8 OTHER IRON DEFICIENCY ANEMIA: ICD-10-CM

## 2019-09-30 PROCEDURE — G0463 HOSPITAL OUTPT CLINIC VISIT: HCPCS

## 2019-09-30 PROCEDURE — 96523 IRRIG DRUG DELIVERY DEVICE: CPT

## 2019-09-30 RX ADMIN — HEPARIN 500 UNITS: 100 SYRINGE at 14:28

## 2019-10-22 ENCOUNTER — HOSPITAL ENCOUNTER (OUTPATIENT)
Dept: ONCOLOGY | Facility: HOSPITAL | Age: 54
Setting detail: INFUSION SERIES
Discharge: HOME | End: 2019-10-22

## 2019-10-22 ENCOUNTER — OFFICE VISIT (OUTPATIENT)
Dept: ONCOLOGY | Facility: CLINIC | Age: 54
End: 2019-10-22

## 2019-10-22 VITALS
RESPIRATION RATE: 18 BRPM | OXYGEN SATURATION: 98 % | HEIGHT: 67 IN | HEART RATE: 84 BPM | WEIGHT: 167 LBS | SYSTOLIC BLOOD PRESSURE: 118 MMHG | BODY MASS INDEX: 26.21 KG/M2 | DIASTOLIC BLOOD PRESSURE: 72 MMHG | TEMPERATURE: 97.7 F

## 2019-10-22 DIAGNOSIS — Z17.0 MALIGNANT NEOPLASM OF UPPER-OUTER QUADRANT OF LEFT BREAST IN FEMALE, ESTROGEN RECEPTOR POSITIVE (HCC): ICD-10-CM

## 2019-10-22 DIAGNOSIS — Z45.2 ENCOUNTER FOR CENTRAL LINE CARE: Primary | ICD-10-CM

## 2019-10-22 DIAGNOSIS — D50.8 OTHER IRON DEFICIENCY ANEMIA: ICD-10-CM

## 2019-10-22 DIAGNOSIS — D50.8 OTHER IRON DEFICIENCY ANEMIA: Primary | ICD-10-CM

## 2019-10-22 DIAGNOSIS — C50.412 MALIGNANT NEOPLASM OF UPPER-OUTER QUADRANT OF LEFT BREAST IN FEMALE, ESTROGEN RECEPTOR POSITIVE (HCC): ICD-10-CM

## 2019-10-22 DIAGNOSIS — T82.598D MALFUNCTION OF PERIPHERAL INSERTED CENTRAL CATHETER, SUBSEQUENT ENCOUNTER: ICD-10-CM

## 2019-10-22 LAB
ALBUMIN SERPL-MCNC: 4.4 G/DL (ref 3.5–5.2)
ALBUMIN/GLOB SERPL: 1.6 G/DL
ALP SERPL-CCNC: 91 U/L (ref 39–117)
ALT SERPL W P-5'-P-CCNC: 8 U/L (ref 1–33)
ANION GAP SERPL CALCULATED.3IONS-SCNC: 8 MMOL/L (ref 5–15)
AST SERPL-CCNC: 11 U/L (ref 1–32)
BILIRUB SERPL-MCNC: 0.2 MG/DL (ref 0.2–1.2)
BUN BLD-MCNC: 16 MG/DL (ref 6–20)
BUN/CREAT SERPL: 14.3 (ref 7–25)
CALCIUM SPEC-SCNC: 9.2 MG/DL (ref 8.6–10.5)
CHLORIDE SERPL-SCNC: 102 MMOL/L (ref 98–107)
CO2 SERPL-SCNC: 31 MMOL/L (ref 22–29)
CREAT BLD-MCNC: 1.12 MG/DL (ref 0.57–1)
ERYTHROCYTE [DISTWIDTH] IN BLOOD BY AUTOMATED COUNT: 13.8 % (ref 12.3–15.4)
FERRITIN SERPL-MCNC: 106 NG/ML (ref 13–150)
GFR SERPL CREATININE-BSD FRML MDRD: 61 ML/MIN/1.73
GLOBULIN UR ELPH-MCNC: 2.7 GM/DL
GLUCOSE BLD-MCNC: 105 MG/DL (ref 65–99)
HCT VFR BLD AUTO: 38.6 % (ref 34–46.6)
HGB BLD-MCNC: 12.3 G/DL (ref 12–15.9)
IRON 24H UR-MRATE: 70 MCG/DL (ref 37–145)
IRON SATN MFR SERPL: 18 % (ref 20–50)
LYMPHOCYTES # BLD AUTO: 1.9 10*3/MM3 (ref 0.7–3.1)
LYMPHOCYTES NFR BLD AUTO: 39.4 % (ref 19.6–45.3)
MCH RBC QN AUTO: 27.9 PG (ref 26.6–33)
MCHC RBC AUTO-ENTMCNC: 31.8 G/DL (ref 31.5–35.7)
MCV RBC AUTO: 87.7 FL (ref 79–97)
MONOCYTES # BLD AUTO: 0.3 10*3/MM3 (ref 0.1–0.9)
MONOCYTES NFR BLD AUTO: 6.2 % (ref 5–12)
NEUTROPHILS # BLD AUTO: 2.6 10*3/MM3 (ref 1.7–7)
NEUTROPHILS NFR BLD AUTO: 54.4 % (ref 42.7–76)
PLATELET # BLD AUTO: 153 10*3/MM3 (ref 140–450)
PMV BLD AUTO: 7.7 FL (ref 6–12)
POTASSIUM BLD-SCNC: 3.7 MMOL/L (ref 3.5–5.2)
PROT SERPL-MCNC: 7.1 G/DL (ref 6–8.5)
RBC # BLD AUTO: 4.4 10*6/MM3 (ref 3.77–5.28)
SODIUM BLD-SCNC: 141 MMOL/L (ref 136–145)
TIBC SERPL-MCNC: 399 MCG/DL (ref 298–536)
TRANSFERRIN SERPL-MCNC: 268 MG/DL (ref 200–360)
WBC NRBC COR # BLD: 4.7 10*3/MM3 (ref 3.4–10.8)

## 2019-10-22 PROCEDURE — 84466 ASSAY OF TRANSFERRIN: CPT | Performed by: NURSE PRACTITIONER

## 2019-10-22 PROCEDURE — 85025 COMPLETE CBC W/AUTO DIFF WBC: CPT | Performed by: NURSE PRACTITIONER

## 2019-10-22 PROCEDURE — 36591 DRAW BLOOD OFF VENOUS DEVICE: CPT

## 2019-10-22 PROCEDURE — 83540 ASSAY OF IRON: CPT | Performed by: NURSE PRACTITIONER

## 2019-10-22 PROCEDURE — 99213 OFFICE O/P EST LOW 20 MIN: CPT | Performed by: NURSE PRACTITIONER

## 2019-10-22 PROCEDURE — 82728 ASSAY OF FERRITIN: CPT | Performed by: NURSE PRACTITIONER

## 2019-10-22 PROCEDURE — 80053 COMPREHEN METABOLIC PANEL: CPT | Performed by: NURSE PRACTITIONER

## 2019-10-22 RX ADMIN — HEPARIN 500 UNITS: 100 SYRINGE at 15:47

## 2019-10-22 NOTE — PROGRESS NOTES
"      PROBLEM LIST:  1. Stage I breast cancer, left upper outer quadrant:   a) Original diagnosis 06/20/2010.   b) Left mastectomy with sentinel lymph node sampling and prophylactic right  mastectomy.   c) B1vD5G8 stage I.   d) Estrogen receptor and progesterone receptor positive and HER-2 negative  with low risk Oncotype.   e) Completed adjuvant endocrine therapy 07/08/2015.   2. Fibromyalgia and chronic pain.   3. Recurring iron deficiency anemia.   4. Gastroparesis.       CHIEF COMPLAINT:    Subjective     HISTORY OF PRESENT ILLNESS:   Mrs. Haro is here for follow up evaluation of history of breast cancer and iron deficiency anemia.  She continues to have bilateral hip pain. She has had injections in the hips, physical therapy and using cream to the affected area with minimal relief in pain.   She denies any persistent cough or dyspnea.  No chest pain or palpitations.  No nose or gum bleeding.  No melena or hematochezia.  No severe headaches, or diplopia. She complains of increasing muscle cramps in her legs and hands over the last few weeks.     Past Medical History, Past Surgical History, Social History, Family History have been reviewed and are without significant changes except as mentioned.    Review of Systems   A comprehensive 14 point review of systems was performed and was negative except as mentioned.    Medications:  The current medication list was reviewed in the EMR    ALLERGIES:    Allergies   Allergen Reactions   • Gabapentin Other (See Comments)     Seizure   • Aspirin GI Intolerance   • Ibuprofen GI Intolerance   • Morphine And Related Itching and Swelling       Objective      /72   Pulse 84   Temp 97.7 °F (36.5 °C)   Resp 18   Ht 170.2 cm (67\")   Wt 75.8 kg (167 lb)   SpO2 98%   BMI 26.16 kg/m²          General: well appearing, in no acute distress   HEENT: sclera anicteric, oropharynx clear  Lymphatics: no cervical, supraclavicular, or axillary adenopathy  Cardiovascular: " regular rate and rhythm, no murmurs  Lungs: clear to auscultation bilaterally  Abdomen: soft, nontender, nondistended.  No palpable masses or organomegaly  Extremeties: no lower extremity edema, cords or calf tenderness  Skin: no rashes, lesions, bruising, or petechiae  Breasts: bilateral mastectomy incisions well healed with no masses or skin changes noted      RECENT LABS:  Hematology WBC   Date Value Ref Range Status   04/22/2019 4.40 3.40 - 10.80 10*3/mm3 Final     Hemoglobin   Date Value Ref Range Status   04/22/2019 12.0 12.0 - 15.9 g/dL Final     Hematocrit   Date Value Ref Range Status   04/22/2019 36.2 34.0 - 46.6 % Final     MCV   Date Value Ref Range Status   04/22/2019 83.9 79.0 - 97.0 fL Final     RDW   Date Value Ref Range Status   04/22/2019 13.6 12.3 - 15.4 % Final     MPV   Date Value Ref Range Status   04/22/2019 8.3 6.0 - 12.0 fL Final     Platelets   Date Value Ref Range Status   04/22/2019 177 140 - 450 10*3/mm3 Final     Immature Grans %   Date Value Ref Range Status   01/17/2019 0.0 0.0 - 0.6 % Final     Neutrophils, Absolute   Date Value Ref Range Status   04/22/2019 2.50 1.70 - 7.00 10*3/mm3 Final     Lymphocytes, Absolute   Date Value Ref Range Status   04/22/2019 1.80 0.70 - 3.10 10*3/mm3 Final     Monocytes, Absolute   Date Value Ref Range Status   04/22/2019 0.20 0.10 - 0.90 10*3/mm3 Final     Eosinophils, Absolute   Date Value Ref Range Status   01/17/2019 0.01 0.00 - 0.30 10*3/mm3 Final     Basophils, Absolute   Date Value Ref Range Status   01/17/2019 0.02 0.00 - 0.20 10*3/mm3 Final     Immature Grans, Absolute   Date Value Ref Range Status   01/17/2019 0.00 0.00 - 0.03 10*3/mm3 Final     nRBC   Date Value Ref Range Status   05/14/2014 0.0  Final       Glucose   Date Value Ref Range Status   01/17/2019 127 (H) 70 - 100 mg/dL Final     Sodium   Date Value Ref Range Status   01/17/2019 139 132 - 146 mmol/L Final     Potassium   Date Value Ref Range Status   01/17/2019 3.4 (L) 3.5 - 5.5  mmol/L Final     CO2   Date Value Ref Range Status   01/17/2019 24.0 20.0 - 31.0 mmol/L Final     Chloride   Date Value Ref Range Status   01/17/2019 101 99 - 109 mmol/L Final     Anion Gap   Date Value Ref Range Status   01/17/2019 14.0 (H) 3.0 - 11.0 mmol/L Final     Creatinine   Date Value Ref Range Status   01/17/2019 1.13 0.60 - 1.30 mg/dL Final     BUN   Date Value Ref Range Status   01/17/2019 20 9 - 23 mg/dL Final     BUN/Creatinine Ratio   Date Value Ref Range Status   01/17/2019 17.7 7.0 - 25.0 Final     Calcium   Date Value Ref Range Status   01/17/2019 10.1 8.7 - 10.4 mg/dL Final     Alkaline Phosphatase   Date Value Ref Range Status   01/17/2019 125 (H) 25 - 100 U/L Final     Total Protein   Date Value Ref Range Status   01/17/2019 8.0 5.7 - 8.2 g/dL Final     ALT (SGPT)   Date Value Ref Range Status   01/17/2019 18 7 - 40 U/L Final     AST (SGOT)   Date Value Ref Range Status   01/17/2019 30 0 - 33 U/L Final     Total Bilirubin   Date Value Ref Range Status   01/17/2019 0.7 0.3 - 1.2 mg/dL Final     Albumin   Date Value Ref Range Status   01/17/2019 4.99 (H) 3.20 - 4.80 g/dL Final     Globulin   Date Value Ref Range Status   01/17/2019 3.0 gm/dL Final     A/G Ratio   Date Value Ref Range Status   06/19/2015 1.6 0.7 - 2.0 Final       LDH   Date Value Ref Range Status   06/19/2015 174 120 - 246 Units/L Final          Assessment/Plan   IMPRESSION:   1. Anemia. Resolved.  She has had recurring iron deficiency anemia. She has responded to parenteral iron in the past. Last hemoglobin normal.   2. History of breast cancer. She has done well after adjuvant endocrine therapy. She currently does not have any symptoms to suggest recurrence.           PLAN:   1. I will check her CBC, CMP,  ferritin and iron studies today and call if they are adversely changed.   2. Follow up in 1 year or sooner if new problems or findings develop. I have encouraged her to follow up with her PCP for routine health maintenance.                  Michelle Drake [facility]    10/22/2019          CC:

## 2019-12-03 ENCOUNTER — APPOINTMENT (OUTPATIENT)
Dept: ONCOLOGY | Facility: HOSPITAL | Age: 54
End: 2019-12-03

## 2019-12-27 ENCOUNTER — HOSPITAL ENCOUNTER (OUTPATIENT)
Dept: ONCOLOGY | Facility: HOSPITAL | Age: 54
Setting detail: INFUSION SERIES
Discharge: HOME | End: 2019-12-27

## 2019-12-27 VITALS
HEART RATE: 107 BPM | BODY MASS INDEX: 25.9 KG/M2 | SYSTOLIC BLOOD PRESSURE: 127 MMHG | WEIGHT: 165 LBS | HEIGHT: 67 IN | TEMPERATURE: 97.8 F | DIASTOLIC BLOOD PRESSURE: 79 MMHG | RESPIRATION RATE: 16 BRPM

## 2019-12-27 DIAGNOSIS — D50.8 OTHER IRON DEFICIENCY ANEMIA: ICD-10-CM

## 2019-12-27 DIAGNOSIS — Z17.0 MALIGNANT NEOPLASM OF UPPER-OUTER QUADRANT OF LEFT BREAST IN FEMALE, ESTROGEN RECEPTOR POSITIVE (HCC): ICD-10-CM

## 2019-12-27 DIAGNOSIS — T82.598D MALFUNCTION OF PERIPHERAL INSERTED CENTRAL CATHETER, SUBSEQUENT ENCOUNTER: ICD-10-CM

## 2019-12-27 DIAGNOSIS — Z45.2 ENCOUNTER FOR CENTRAL LINE CARE: Primary | ICD-10-CM

## 2019-12-27 DIAGNOSIS — C50.412 MALIGNANT NEOPLASM OF UPPER-OUTER QUADRANT OF LEFT BREAST IN FEMALE, ESTROGEN RECEPTOR POSITIVE (HCC): ICD-10-CM

## 2019-12-27 PROCEDURE — 96523 IRRIG DRUG DELIVERY DEVICE: CPT

## 2019-12-27 RX ORDER — HEPARIN SODIUM (PORCINE) LOCK FLUSH IV SOLN 100 UNIT/ML 100 UNIT/ML
500 SOLUTION INTRAVENOUS AS NEEDED
Status: CANCELLED | OUTPATIENT
Start: 2019-12-27

## 2019-12-27 RX ADMIN — HEPARIN 500 UNITS: 100 SYRINGE at 13:42

## 2020-01-09 ENCOUNTER — APPOINTMENT (OUTPATIENT)
Dept: ONCOLOGY | Facility: HOSPITAL | Age: 55
End: 2020-01-09

## 2020-01-24 ENCOUNTER — APPOINTMENT (OUTPATIENT)
Dept: ONCOLOGY | Facility: HOSPITAL | Age: 55
End: 2020-01-24

## 2020-01-31 ENCOUNTER — HOSPITAL ENCOUNTER (OUTPATIENT)
Dept: ONCOLOGY | Facility: HOSPITAL | Age: 55
Setting detail: INFUSION SERIES
Discharge: HOME | End: 2020-01-31

## 2020-01-31 VITALS
WEIGHT: 163 LBS | BODY MASS INDEX: 25.58 KG/M2 | TEMPERATURE: 98 F | RESPIRATION RATE: 16 BRPM | SYSTOLIC BLOOD PRESSURE: 119 MMHG | HEIGHT: 67 IN | DIASTOLIC BLOOD PRESSURE: 86 MMHG | HEART RATE: 123 BPM

## 2020-01-31 DIAGNOSIS — T82.598D MALFUNCTION OF PERIPHERAL INSERTED CENTRAL CATHETER, SUBSEQUENT ENCOUNTER: ICD-10-CM

## 2020-01-31 DIAGNOSIS — D50.8 OTHER IRON DEFICIENCY ANEMIA: ICD-10-CM

## 2020-01-31 DIAGNOSIS — Z17.0 MALIGNANT NEOPLASM OF UPPER-OUTER QUADRANT OF LEFT BREAST IN FEMALE, ESTROGEN RECEPTOR POSITIVE (HCC): ICD-10-CM

## 2020-01-31 DIAGNOSIS — Z45.2 ENCOUNTER FOR CENTRAL LINE CARE: Primary | ICD-10-CM

## 2020-01-31 DIAGNOSIS — C50.412 MALIGNANT NEOPLASM OF UPPER-OUTER QUADRANT OF LEFT BREAST IN FEMALE, ESTROGEN RECEPTOR POSITIVE (HCC): ICD-10-CM

## 2020-01-31 PROCEDURE — 25010000003 HEPARIN LOCK FLUCH PER 10 UNITS: Performed by: NURSE PRACTITIONER

## 2020-01-31 PROCEDURE — 96523 IRRIG DRUG DELIVERY DEVICE: CPT

## 2020-01-31 RX ORDER — HEPARIN SODIUM (PORCINE) LOCK FLUSH IV SOLN 100 UNIT/ML 100 UNIT/ML
500 SOLUTION INTRAVENOUS AS NEEDED
Status: CANCELLED | OUTPATIENT
Start: 2020-01-31

## 2020-01-31 RX ORDER — HEPARIN SODIUM (PORCINE) LOCK FLUSH IV SOLN 100 UNIT/ML 100 UNIT/ML
500 SOLUTION INTRAVENOUS AS NEEDED
Status: DISCONTINUED | OUTPATIENT
Start: 2020-01-31 | End: 2020-02-01 | Stop reason: HOSPADM

## 2020-01-31 RX ADMIN — HEPARIN 500 UNITS: 100 SYRINGE at 14:07

## 2020-02-28 ENCOUNTER — APPOINTMENT (OUTPATIENT)
Dept: ONCOLOGY | Facility: HOSPITAL | Age: 55
End: 2020-02-28

## 2020-03-06 ENCOUNTER — HOSPITAL ENCOUNTER (OUTPATIENT)
Dept: ONCOLOGY | Facility: HOSPITAL | Age: 55
Setting detail: INFUSION SERIES
Discharge: HOME | End: 2020-03-06

## 2020-03-06 VITALS
SYSTOLIC BLOOD PRESSURE: 123 MMHG | WEIGHT: 166 LBS | TEMPERATURE: 97.6 F | RESPIRATION RATE: 16 BRPM | HEIGHT: 67 IN | HEART RATE: 119 BPM | BODY MASS INDEX: 26.06 KG/M2 | DIASTOLIC BLOOD PRESSURE: 85 MMHG

## 2020-03-06 DIAGNOSIS — Z17.0 MALIGNANT NEOPLASM OF UPPER-OUTER QUADRANT OF LEFT BREAST IN FEMALE, ESTROGEN RECEPTOR POSITIVE (HCC): ICD-10-CM

## 2020-03-06 DIAGNOSIS — Z45.2 ENCOUNTER FOR CENTRAL LINE CARE: Primary | ICD-10-CM

## 2020-03-06 DIAGNOSIS — C50.412 MALIGNANT NEOPLASM OF UPPER-OUTER QUADRANT OF LEFT BREAST IN FEMALE, ESTROGEN RECEPTOR POSITIVE (HCC): ICD-10-CM

## 2020-03-06 DIAGNOSIS — T82.598D MALFUNCTION OF PERIPHERAL INSERTED CENTRAL CATHETER, SUBSEQUENT ENCOUNTER: ICD-10-CM

## 2020-03-06 DIAGNOSIS — D50.8 OTHER IRON DEFICIENCY ANEMIA: ICD-10-CM

## 2020-03-06 PROCEDURE — 25010000003 HEPARIN LOCK FLUCH PER 10 UNITS: Performed by: NURSE PRACTITIONER

## 2020-03-06 PROCEDURE — 96523 IRRIG DRUG DELIVERY DEVICE: CPT

## 2020-03-06 RX ORDER — ATORVASTATIN CALCIUM 20 MG/1
20 TABLET, FILM COATED ORAL NIGHTLY
COMMUNITY
End: 2020-10-09 | Stop reason: HOSPADM

## 2020-03-06 RX ORDER — HEPARIN SODIUM (PORCINE) LOCK FLUSH IV SOLN 100 UNIT/ML 100 UNIT/ML
500 SOLUTION INTRAVENOUS AS NEEDED
Status: CANCELLED | OUTPATIENT
Start: 2020-03-06

## 2020-03-06 RX ORDER — HEPARIN SODIUM (PORCINE) LOCK FLUSH IV SOLN 100 UNIT/ML 100 UNIT/ML
500 SOLUTION INTRAVENOUS AS NEEDED
Status: DISCONTINUED | OUTPATIENT
Start: 2020-03-06 | End: 2020-03-07 | Stop reason: HOSPADM

## 2020-03-06 RX ADMIN — HEPARIN 500 UNITS: 100 SYRINGE at 13:33

## 2020-04-03 ENCOUNTER — HOSPITAL ENCOUNTER (OUTPATIENT)
Dept: ONCOLOGY | Facility: HOSPITAL | Age: 55
Setting detail: INFUSION SERIES
Discharge: HOME | End: 2020-04-03

## 2020-04-03 VITALS
RESPIRATION RATE: 16 BRPM | TEMPERATURE: 98 F | SYSTOLIC BLOOD PRESSURE: 119 MMHG | DIASTOLIC BLOOD PRESSURE: 75 MMHG | BODY MASS INDEX: 26.21 KG/M2 | HEART RATE: 114 BPM | HEIGHT: 67 IN | WEIGHT: 167 LBS

## 2020-04-03 DIAGNOSIS — D50.8 OTHER IRON DEFICIENCY ANEMIA: ICD-10-CM

## 2020-04-03 DIAGNOSIS — Z45.2 ENCOUNTER FOR CENTRAL LINE CARE: Primary | ICD-10-CM

## 2020-04-03 DIAGNOSIS — T82.598D MALFUNCTION OF PERIPHERAL INSERTED CENTRAL CATHETER, SUBSEQUENT ENCOUNTER: ICD-10-CM

## 2020-04-03 DIAGNOSIS — C50.412 MALIGNANT NEOPLASM OF UPPER-OUTER QUADRANT OF LEFT BREAST IN FEMALE, ESTROGEN RECEPTOR POSITIVE (HCC): ICD-10-CM

## 2020-04-03 DIAGNOSIS — Z17.0 MALIGNANT NEOPLASM OF UPPER-OUTER QUADRANT OF LEFT BREAST IN FEMALE, ESTROGEN RECEPTOR POSITIVE (HCC): ICD-10-CM

## 2020-04-03 PROCEDURE — 96523 IRRIG DRUG DELIVERY DEVICE: CPT

## 2020-04-03 PROCEDURE — 25010000003 HEPARIN LOCK FLUCH PER 10 UNITS: Performed by: NURSE PRACTITIONER

## 2020-04-03 RX ORDER — HEPARIN SODIUM (PORCINE) LOCK FLUSH IV SOLN 100 UNIT/ML 100 UNIT/ML
500 SOLUTION INTRAVENOUS AS NEEDED
Status: CANCELLED | OUTPATIENT
Start: 2020-04-03

## 2020-04-03 RX ORDER — HEPARIN SODIUM (PORCINE) LOCK FLUSH IV SOLN 100 UNIT/ML 100 UNIT/ML
500 SOLUTION INTRAVENOUS AS NEEDED
Status: DISCONTINUED | OUTPATIENT
Start: 2020-04-03 | End: 2020-04-04 | Stop reason: HOSPADM

## 2020-04-03 RX ADMIN — HEPARIN 500 UNITS: 100 SYRINGE at 13:22

## 2020-05-15 ENCOUNTER — HOSPITAL ENCOUNTER (OUTPATIENT)
Dept: ONCOLOGY | Facility: HOSPITAL | Age: 55
Setting detail: INFUSION SERIES
Discharge: HOME | End: 2020-05-15

## 2020-05-15 VITALS
DIASTOLIC BLOOD PRESSURE: 81 MMHG | RESPIRATION RATE: 16 BRPM | SYSTOLIC BLOOD PRESSURE: 116 MMHG | BODY MASS INDEX: 26.53 KG/M2 | TEMPERATURE: 98 F | HEART RATE: 110 BPM | WEIGHT: 169 LBS | HEIGHT: 67 IN

## 2020-05-15 DIAGNOSIS — Z17.0 MALIGNANT NEOPLASM OF UPPER-OUTER QUADRANT OF LEFT BREAST IN FEMALE, ESTROGEN RECEPTOR POSITIVE (HCC): ICD-10-CM

## 2020-05-15 DIAGNOSIS — D50.8 OTHER IRON DEFICIENCY ANEMIA: ICD-10-CM

## 2020-05-15 DIAGNOSIS — T82.598D MALFUNCTION OF PERIPHERAL INSERTED CENTRAL CATHETER, SUBSEQUENT ENCOUNTER: ICD-10-CM

## 2020-05-15 DIAGNOSIS — Z45.2 ENCOUNTER FOR CENTRAL LINE CARE: Primary | ICD-10-CM

## 2020-05-15 DIAGNOSIS — C50.412 MALIGNANT NEOPLASM OF UPPER-OUTER QUADRANT OF LEFT BREAST IN FEMALE, ESTROGEN RECEPTOR POSITIVE (HCC): ICD-10-CM

## 2020-05-15 PROCEDURE — 25010000003 HEPARIN LOCK FLUSH PER 10 UNITS: Performed by: NURSE PRACTITIONER

## 2020-05-15 PROCEDURE — 96523 IRRIG DRUG DELIVERY DEVICE: CPT

## 2020-05-15 RX ORDER — HEPARIN SODIUM (PORCINE) LOCK FLUSH IV SOLN 100 UNIT/ML 100 UNIT/ML
500 SOLUTION INTRAVENOUS AS NEEDED
Status: DISCONTINUED | OUTPATIENT
Start: 2020-05-15 | End: 2020-05-16 | Stop reason: HOSPADM

## 2020-05-15 RX ORDER — HEPARIN SODIUM (PORCINE) LOCK FLUSH IV SOLN 100 UNIT/ML 100 UNIT/ML
500 SOLUTION INTRAVENOUS AS NEEDED
Status: CANCELLED | OUTPATIENT
Start: 2020-05-15

## 2020-05-15 RX ADMIN — HEPARIN 500 UNITS: 100 SYRINGE at 13:58

## 2020-06-26 ENCOUNTER — HOSPITAL ENCOUNTER (OUTPATIENT)
Dept: ONCOLOGY | Facility: HOSPITAL | Age: 55
Setting detail: INFUSION SERIES
Discharge: HOME | End: 2020-06-26

## 2020-06-26 VITALS
SYSTOLIC BLOOD PRESSURE: 112 MMHG | WEIGHT: 172 LBS | TEMPERATURE: 97.4 F | DIASTOLIC BLOOD PRESSURE: 77 MMHG | BODY MASS INDEX: 27 KG/M2 | HEART RATE: 103 BPM | RESPIRATION RATE: 18 BRPM | HEIGHT: 67 IN

## 2020-06-26 DIAGNOSIS — Z45.2 ENCOUNTER FOR CENTRAL LINE CARE: Primary | ICD-10-CM

## 2020-06-26 DIAGNOSIS — T82.598D MALFUNCTION OF PERIPHERAL INSERTED CENTRAL CATHETER, SUBSEQUENT ENCOUNTER: ICD-10-CM

## 2020-06-26 DIAGNOSIS — Z17.0 MALIGNANT NEOPLASM OF UPPER-OUTER QUADRANT OF LEFT BREAST IN FEMALE, ESTROGEN RECEPTOR POSITIVE (HCC): ICD-10-CM

## 2020-06-26 DIAGNOSIS — D50.8 OTHER IRON DEFICIENCY ANEMIA: ICD-10-CM

## 2020-06-26 DIAGNOSIS — C50.412 MALIGNANT NEOPLASM OF UPPER-OUTER QUADRANT OF LEFT BREAST IN FEMALE, ESTROGEN RECEPTOR POSITIVE (HCC): ICD-10-CM

## 2020-06-26 PROCEDURE — 25010000003 HEPARIN LOCK FLUSH PER 10 UNITS: Performed by: NURSE PRACTITIONER

## 2020-06-26 PROCEDURE — 96523 IRRIG DRUG DELIVERY DEVICE: CPT

## 2020-06-26 RX ORDER — HEPARIN SODIUM (PORCINE) LOCK FLUSH IV SOLN 100 UNIT/ML 100 UNIT/ML
500 SOLUTION INTRAVENOUS AS NEEDED
Status: DISCONTINUED | OUTPATIENT
Start: 2020-06-26 | End: 2020-06-27 | Stop reason: HOSPADM

## 2020-06-26 RX ORDER — HEPARIN SODIUM (PORCINE) LOCK FLUSH IV SOLN 100 UNIT/ML 100 UNIT/ML
500 SOLUTION INTRAVENOUS AS NEEDED
Status: CANCELLED | OUTPATIENT
Start: 2020-06-26

## 2020-06-26 RX ADMIN — HEPARIN 500 UNITS: 100 SYRINGE at 15:00

## 2020-08-07 ENCOUNTER — APPOINTMENT (OUTPATIENT)
Dept: ONCOLOGY | Facility: HOSPITAL | Age: 55
End: 2020-08-07

## 2020-08-12 ENCOUNTER — HOSPITAL ENCOUNTER (OUTPATIENT)
Dept: ONCOLOGY | Facility: HOSPITAL | Age: 55
Setting detail: INFUSION SERIES
Discharge: HOME | End: 2020-08-12

## 2020-08-12 VITALS
DIASTOLIC BLOOD PRESSURE: 76 MMHG | TEMPERATURE: 97.6 F | BODY MASS INDEX: 26.53 KG/M2 | HEART RATE: 106 BPM | WEIGHT: 169 LBS | HEIGHT: 67 IN | RESPIRATION RATE: 16 BRPM | SYSTOLIC BLOOD PRESSURE: 118 MMHG

## 2020-08-12 DIAGNOSIS — Z45.2 ENCOUNTER FOR CENTRAL LINE CARE: Primary | ICD-10-CM

## 2020-08-12 DIAGNOSIS — C50.412 MALIGNANT NEOPLASM OF UPPER-OUTER QUADRANT OF LEFT BREAST IN FEMALE, ESTROGEN RECEPTOR POSITIVE (HCC): ICD-10-CM

## 2020-08-12 DIAGNOSIS — D50.8 OTHER IRON DEFICIENCY ANEMIA: ICD-10-CM

## 2020-08-12 DIAGNOSIS — T82.598D MALFUNCTION OF PERIPHERAL INSERTED CENTRAL CATHETER, SUBSEQUENT ENCOUNTER: ICD-10-CM

## 2020-08-12 DIAGNOSIS — Z17.0 MALIGNANT NEOPLASM OF UPPER-OUTER QUADRANT OF LEFT BREAST IN FEMALE, ESTROGEN RECEPTOR POSITIVE (HCC): ICD-10-CM

## 2020-08-12 PROCEDURE — 96523 IRRIG DRUG DELIVERY DEVICE: CPT

## 2020-08-12 PROCEDURE — 25010000003 HEPARIN LOCK FLUSH PER 10 UNITS: Performed by: NURSE PRACTITIONER

## 2020-08-12 PROCEDURE — G0463 HOSPITAL OUTPT CLINIC VISIT: HCPCS

## 2020-08-12 RX ORDER — HEPARIN SODIUM (PORCINE) LOCK FLUSH IV SOLN 100 UNIT/ML 100 UNIT/ML
500 SOLUTION INTRAVENOUS AS NEEDED
Status: CANCELLED | OUTPATIENT
Start: 2020-08-12

## 2020-08-12 RX ORDER — HEPARIN SODIUM (PORCINE) LOCK FLUSH IV SOLN 100 UNIT/ML 100 UNIT/ML
500 SOLUTION INTRAVENOUS AS NEEDED
Status: DISCONTINUED | OUTPATIENT
Start: 2020-08-12 | End: 2020-08-13 | Stop reason: HOSPADM

## 2020-08-12 RX ADMIN — HEPARIN 500 UNITS: 100 SYRINGE at 11:16

## 2020-09-08 ENCOUNTER — APPOINTMENT (OUTPATIENT)
Dept: ONCOLOGY | Facility: HOSPITAL | Age: 55
End: 2020-09-08

## 2020-09-11 ENCOUNTER — HOSPITAL ENCOUNTER (OUTPATIENT)
Dept: ONCOLOGY | Facility: HOSPITAL | Age: 55
Setting detail: INFUSION SERIES
Discharge: HOME | End: 2020-09-11

## 2020-09-11 VITALS
SYSTOLIC BLOOD PRESSURE: 118 MMHG | BODY MASS INDEX: 27.94 KG/M2 | HEART RATE: 97 BPM | DIASTOLIC BLOOD PRESSURE: 82 MMHG | HEIGHT: 67 IN | RESPIRATION RATE: 16 BRPM | WEIGHT: 178 LBS | TEMPERATURE: 97.9 F

## 2020-09-11 DIAGNOSIS — Z17.0 MALIGNANT NEOPLASM OF UPPER-OUTER QUADRANT OF LEFT BREAST IN FEMALE, ESTROGEN RECEPTOR POSITIVE (HCC): ICD-10-CM

## 2020-09-11 DIAGNOSIS — T82.598D MALFUNCTION OF PERIPHERAL INSERTED CENTRAL CATHETER, SUBSEQUENT ENCOUNTER: ICD-10-CM

## 2020-09-11 DIAGNOSIS — Z45.2 ENCOUNTER FOR CENTRAL LINE CARE: Primary | ICD-10-CM

## 2020-09-11 DIAGNOSIS — D50.8 OTHER IRON DEFICIENCY ANEMIA: ICD-10-CM

## 2020-09-11 DIAGNOSIS — C50.412 MALIGNANT NEOPLASM OF UPPER-OUTER QUADRANT OF LEFT BREAST IN FEMALE, ESTROGEN RECEPTOR POSITIVE (HCC): ICD-10-CM

## 2020-09-11 PROCEDURE — 25010000003 HEPARIN LOCK FLUSH PER 10 UNITS: Performed by: NURSE PRACTITIONER

## 2020-09-11 RX ORDER — HEPARIN SODIUM (PORCINE) LOCK FLUSH IV SOLN 100 UNIT/ML 100 UNIT/ML
500 SOLUTION INTRAVENOUS AS NEEDED
Status: CANCELLED | OUTPATIENT
Start: 2020-09-11

## 2020-09-11 RX ORDER — HEPARIN SODIUM (PORCINE) LOCK FLUSH IV SOLN 100 UNIT/ML 100 UNIT/ML
500 SOLUTION INTRAVENOUS AS NEEDED
Status: DISCONTINUED | OUTPATIENT
Start: 2020-09-11 | End: 2020-09-12 | Stop reason: HOSPADM

## 2020-09-11 RX ADMIN — HEPARIN 500 UNITS: 100 SYRINGE at 14:30

## 2020-10-07 ENCOUNTER — HOSPITAL ENCOUNTER (OUTPATIENT)
Facility: HOSPITAL | Age: 55
Setting detail: OBSERVATION
Discharge: HOME | End: 2020-10-09
Attending: EMERGENCY MEDICINE | Admitting: INTERNAL MEDICINE

## 2020-10-07 ENCOUNTER — APPOINTMENT (OUTPATIENT)
Dept: GENERAL RADIOLOGY | Facility: HOSPITAL | Age: 55
End: 2020-10-07

## 2020-10-07 DIAGNOSIS — R11.15 CYCLICAL VOMITING SYNDROME NOT ASSOCIATED WITH MIGRAINE: ICD-10-CM

## 2020-10-07 DIAGNOSIS — R10.84 GENERALIZED ABDOMINAL PAIN: Primary | ICD-10-CM

## 2020-10-07 DIAGNOSIS — R45.851 SUICIDAL IDEATIONS: ICD-10-CM

## 2020-10-07 LAB
ALBUMIN SERPL-MCNC: 5.1 G/DL (ref 3.5–5.2)
ALBUMIN/GLOB SERPL: 1.5 G/DL
ALP SERPL-CCNC: 133 U/L (ref 39–117)
ALT SERPL W P-5'-P-CCNC: 18 U/L (ref 1–33)
AMPHET+METHAMPHET UR QL: NEGATIVE
AMPHETAMINES UR QL: NEGATIVE
ANION GAP SERPL CALCULATED.3IONS-SCNC: 19 MMOL/L (ref 5–15)
AST SERPL-CCNC: 31 U/L (ref 1–32)
BACTERIA UR QL AUTO: ABNORMAL /HPF
BARBITURATES UR QL SCN: NEGATIVE
BASOPHILS # BLD AUTO: 0.04 10*3/MM3 (ref 0–0.2)
BASOPHILS NFR BLD AUTO: 0.5 % (ref 0–1.5)
BENZODIAZ UR QL SCN: NEGATIVE
BILIRUB SERPL-MCNC: 0.7 MG/DL (ref 0–1.2)
BILIRUB UR QL STRIP: NEGATIVE
BUN SERPL-MCNC: 21 MG/DL (ref 6–20)
BUN/CREAT SERPL: 17.8 (ref 7–25)
BUPRENORPHINE SERPL-MCNC: NEGATIVE NG/ML
CALCIUM SPEC-SCNC: 10.2 MG/DL (ref 8.6–10.5)
CANNABINOIDS SERPL QL: POSITIVE
CHLORIDE SERPL-SCNC: 96 MMOL/L (ref 98–107)
CLARITY UR: CLEAR
CO2 SERPL-SCNC: 23 MMOL/L (ref 22–29)
COCAINE UR QL: POSITIVE
COLOR UR: YELLOW
CREAT SERPL-MCNC: 1.18 MG/DL (ref 0.57–1)
D-LACTATE SERPL-SCNC: 1.1 MMOL/L (ref 0.5–2)
DEPRECATED RDW RBC AUTO: 38.6 FL (ref 37–54)
EOSINOPHIL # BLD AUTO: 0.02 10*3/MM3 (ref 0–0.4)
EOSINOPHIL NFR BLD AUTO: 0.3 % (ref 0.3–6.2)
ERYTHROCYTE [DISTWIDTH] IN BLOOD BY AUTOMATED COUNT: 13.2 % (ref 12.3–15.4)
ETHANOL BLD-MCNC: <10 MG/DL (ref 0–10)
GFR SERPL CREATININE-BSD FRML MDRD: 58 ML/MIN/1.73
GLOBULIN UR ELPH-MCNC: 3.4 GM/DL
GLUCOSE SERPL-MCNC: 118 MG/DL (ref 65–99)
GLUCOSE UR STRIP-MCNC: NEGATIVE MG/DL
HCT VFR BLD AUTO: 46.4 % (ref 34–46.6)
HGB BLD-MCNC: 15.6 G/DL (ref 12–15.9)
HGB UR QL STRIP.AUTO: ABNORMAL
HYALINE CASTS UR QL AUTO: ABNORMAL /LPF
IMM GRANULOCYTES # BLD AUTO: 0.02 10*3/MM3 (ref 0–0.05)
IMM GRANULOCYTES NFR BLD AUTO: 0.3 % (ref 0–0.5)
KETONES UR QL STRIP: ABNORMAL
LEUKOCYTE ESTERASE UR QL STRIP.AUTO: ABNORMAL
LIPASE SERPL-CCNC: 10 U/L (ref 13–60)
LYMPHOCYTES # BLD AUTO: 1.88 10*3/MM3 (ref 0.7–3.1)
LYMPHOCYTES NFR BLD AUTO: 24.4 % (ref 19.6–45.3)
MAGNESIUM SERPL-MCNC: 2.2 MG/DL (ref 1.6–2.6)
MCH RBC QN AUTO: 27.3 PG (ref 26.6–33)
MCHC RBC AUTO-ENTMCNC: 33.6 G/DL (ref 31.5–35.7)
MCV RBC AUTO: 81.3 FL (ref 79–97)
METHADONE UR QL SCN: NEGATIVE
MONOCYTES # BLD AUTO: 0.78 10*3/MM3 (ref 0.1–0.9)
MONOCYTES NFR BLD AUTO: 10.1 % (ref 5–12)
NEUTROPHILS NFR BLD AUTO: 4.97 10*3/MM3 (ref 1.7–7)
NEUTROPHILS NFR BLD AUTO: 64.4 % (ref 42.7–76)
NITRITE UR QL STRIP: POSITIVE
NRBC BLD AUTO-RTO: 0 /100 WBC (ref 0–0.2)
OPIATES UR QL: NEGATIVE
OXYCODONE UR QL SCN: NEGATIVE
PCP UR QL SCN: NEGATIVE
PH UR STRIP.AUTO: <=5 [PH] (ref 5–8)
PLATELET # BLD AUTO: 216 10*3/MM3 (ref 140–450)
PMV BLD AUTO: 10.2 FL (ref 6–12)
POTASSIUM SERPL-SCNC: 3.2 MMOL/L (ref 3.5–5.2)
PROPOXYPH UR QL: NEGATIVE
PROT SERPL-MCNC: 8.5 G/DL (ref 6–8.5)
PROT UR QL STRIP: ABNORMAL
RBC # BLD AUTO: 5.71 10*6/MM3 (ref 3.77–5.28)
RBC # UR: ABNORMAL /HPF
REF LAB TEST METHOD: ABNORMAL
SODIUM SERPL-SCNC: 138 MMOL/L (ref 136–145)
SP GR UR STRIP: 1.01 (ref 1–1.03)
SQUAMOUS #/AREA URNS HPF: ABNORMAL /HPF
TRICYCLICS UR QL SCN: NEGATIVE
TROPONIN T SERPL-MCNC: <0.01 NG/ML (ref 0–0.03)
UROBILINOGEN UR QL STRIP: ABNORMAL
WBC # BLD AUTO: 7.71 10*3/MM3 (ref 3.4–10.8)
WBC UR QL AUTO: ABNORMAL /HPF

## 2020-10-07 PROCEDURE — 83735 ASSAY OF MAGNESIUM: CPT | Performed by: INTERNAL MEDICINE

## 2020-10-07 PROCEDURE — 84100 ASSAY OF PHOSPHORUS: CPT | Performed by: INTERNAL MEDICINE

## 2020-10-07 PROCEDURE — 85025 COMPLETE CBC W/AUTO DIFF WBC: CPT | Performed by: EMERGENCY MEDICINE

## 2020-10-07 PROCEDURE — 99285 EMERGENCY DEPT VISIT HI MDM: CPT

## 2020-10-07 PROCEDURE — 80053 COMPREHEN METABOLIC PANEL: CPT | Performed by: EMERGENCY MEDICINE

## 2020-10-07 PROCEDURE — 96376 TX/PRO/DX INJ SAME DRUG ADON: CPT

## 2020-10-07 PROCEDURE — 25010000003 POTASSIUM CHLORIDE 10 MEQ/100ML SOLUTION: Performed by: INTERNAL MEDICINE

## 2020-10-07 PROCEDURE — 96375 TX/PRO/DX INJ NEW DRUG ADDON: CPT

## 2020-10-07 PROCEDURE — 74018 RADEX ABDOMEN 1 VIEW: CPT

## 2020-10-07 PROCEDURE — 99219 PR INITIAL OBSERVATION CARE/DAY 50 MINUTES: CPT | Performed by: INTERNAL MEDICINE

## 2020-10-07 PROCEDURE — 25010000002 HALOPERIDOL LACTATE PER 5 MG: Performed by: EMERGENCY MEDICINE

## 2020-10-07 PROCEDURE — 84484 ASSAY OF TROPONIN QUANT: CPT | Performed by: EMERGENCY MEDICINE

## 2020-10-07 PROCEDURE — 93005 ELECTROCARDIOGRAM TRACING: CPT | Performed by: EMERGENCY MEDICINE

## 2020-10-07 PROCEDURE — G0378 HOSPITAL OBSERVATION PER HR: HCPCS

## 2020-10-07 PROCEDURE — 80307 DRUG TEST PRSMV CHEM ANLYZR: CPT | Performed by: INTERNAL MEDICINE

## 2020-10-07 PROCEDURE — 0 POTASSIUM CHLORIDE 10 MEQ/100ML SOLUTION: Performed by: INTERNAL MEDICINE

## 2020-10-07 PROCEDURE — 83605 ASSAY OF LACTIC ACID: CPT | Performed by: INTERNAL MEDICINE

## 2020-10-07 PROCEDURE — 83690 ASSAY OF LIPASE: CPT | Performed by: EMERGENCY MEDICINE

## 2020-10-07 PROCEDURE — 81001 URINALYSIS AUTO W/SCOPE: CPT | Performed by: INTERNAL MEDICINE

## 2020-10-07 PROCEDURE — 96365 THER/PROPH/DIAG IV INF INIT: CPT

## 2020-10-07 RX ORDER — PANTOPRAZOLE SODIUM 40 MG/10ML
40 INJECTION, POWDER, LYOPHILIZED, FOR SOLUTION INTRAVENOUS NIGHTLY
Status: DISCONTINUED | OUTPATIENT
Start: 2020-10-07 | End: 2020-10-09 | Stop reason: HOSPADM

## 2020-10-07 RX ORDER — HALOPERIDOL 5 MG/ML
5 INJECTION INTRAMUSCULAR ONCE
Status: COMPLETED | OUTPATIENT
Start: 2020-10-07 | End: 2020-10-07

## 2020-10-07 RX ORDER — MAGNESIUM SULFATE 1 G/100ML
1 INJECTION INTRAVENOUS ONCE
Status: DISCONTINUED | OUTPATIENT
Start: 2020-10-07 | End: 2020-10-09 | Stop reason: HOSPADM

## 2020-10-07 RX ORDER — ONDANSETRON 2 MG/ML
4 INJECTION INTRAMUSCULAR; INTRAVENOUS EVERY 6 HOURS PRN
Status: DISCONTINUED | OUTPATIENT
Start: 2020-10-08 | End: 2020-10-09 | Stop reason: HOSPADM

## 2020-10-07 RX ORDER — ERYTHROMYCIN ETHYLSUCCINATE 200 MG/5ML
200 SUSPENSION ORAL
Status: DISCONTINUED | OUTPATIENT
Start: 2020-10-07 | End: 2020-10-09 | Stop reason: HOSPADM

## 2020-10-07 RX ORDER — SODIUM CHLORIDE, SODIUM LACTATE, POTASSIUM CHLORIDE, CALCIUM CHLORIDE 600; 310; 30; 20 MG/100ML; MG/100ML; MG/100ML; MG/100ML
999 INJECTION, SOLUTION INTRAVENOUS CONTINUOUS
Status: ACTIVE | OUTPATIENT
Start: 2020-10-07 | End: 2020-10-07

## 2020-10-07 RX ORDER — POTASSIUM CHLORIDE 7.45 MG/ML
10 INJECTION INTRAVENOUS
Status: DISPENSED | OUTPATIENT
Start: 2020-10-07 | End: 2020-10-08

## 2020-10-07 RX ORDER — METOCLOPRAMIDE HYDROCHLORIDE 5 MG/ML
5 INJECTION INTRAMUSCULAR; INTRAVENOUS ONCE
Status: DISCONTINUED | OUTPATIENT
Start: 2020-10-07 | End: 2020-10-07

## 2020-10-07 RX ADMIN — PANTOPRAZOLE SODIUM 40 MG: 40 INJECTION, POWDER, FOR SOLUTION INTRAVENOUS at 22:34

## 2020-10-07 RX ADMIN — POTASSIUM CHLORIDE 10 MEQ: 7.46 INJECTION, SOLUTION INTRAVENOUS at 22:34

## 2020-10-07 RX ADMIN — HALOPERIDOL LACTATE 5 MG: 5 INJECTION, SOLUTION INTRAMUSCULAR at 15:50

## 2020-10-07 RX ADMIN — POTASSIUM CHLORIDE 10 MEQ: 7.46 INJECTION, SOLUTION INTRAVENOUS at 23:48

## 2020-10-07 RX ADMIN — SODIUM CHLORIDE 1000 ML: 9 INJECTION, SOLUTION INTRAVENOUS at 16:02

## 2020-10-07 RX ADMIN — ERYTHROMYCIN ETHYLSUCCINATE 200 MG: 200 GRANULE, FOR SUSPENSION ORAL at 22:34

## 2020-10-07 RX ADMIN — HALOPERIDOL LACTATE 5 MG: 5 INJECTION, SOLUTION INTRAMUSCULAR at 20:40

## 2020-10-07 NOTE — ED PROVIDER NOTES
Subjective   Ms. Haro is a 56 yo female afflicted with recurring abdominal pain and mental health problems. She has even gotten to the point where a GI stimulator was implanted. She will periodically have episodes of pain and vomiting that she normally toughs out at home. However, her current episode of pain and vomiting started three days ago and has been unrelenting. Her prescription antinauseants haven't helped. Nothing makes her misery worse or better, she is simply miserable. Her pain covers her entire anterior torso. She suspects that her stimulator may need a new battery, but her [facility] doctor has moved to another state, and she doesn't have a new one. In addition to that, her normal depression is worsening, nearing the anniversary of her mother's death. She has become more suicidal and is now afraid that she may actually act on her suicidal thoughts. She does not see a psychiatrist.      History provided by:  Patient and medical records  Nausea  The primary symptoms include abdominal pain, nausea and vomiting. Primary symptoms do not include fever. The illness began 3 to 5 days ago. The onset was sudden. The problem has not changed since onset.  The illness does not include chills or back pain. Significant associated medical issues include gastric bypass.   Vomiting  The primary symptoms include abdominal pain, nausea and vomiting. Primary symptoms do not include fever. The illness began 3 to 5 days ago. The onset was sudden.   The illness does not include chills or back pain. Significant associated medical issues include gastric bypass.   Suicidal  Presenting symptoms: suicidal thoughts    Associated symptoms: abdominal pain and chest pain        Review of Systems   Constitutional: Negative.  Negative for chills and fever.   HENT: Negative.  Negative for congestion.    Respiratory: Negative.  Negative for cough and shortness of breath.    Cardiovascular: Positive for chest pain.   Gastrointestinal:  Positive for abdominal pain, nausea and vomiting.        She is seeing a little blood with some of her vomitus.   Genitourinary: Negative for difficulty urinating.   Musculoskeletal: Negative for back pain.   Neurological: Negative.    Psychiatric/Behavioral: Positive for suicidal ideas.   All other systems reviewed and are negative.      Past Medical History:   Diagnosis Date   • Anxiety    • Arthritis    • Depression    • Diabetes mellitus (CMS/HCC)    • Gall stones    • History of stomach ulcers    • Stomach problems        Allergies   Allergen Reactions   • Gabapentin Other (See Comments)     Seizure   • Morphine And Related Itching and Swelling   • Aspirin GI Intolerance   • Ibuprofen GI Intolerance       Past Surgical History:   Procedure Laterality Date   • CHOLECYSTECTOMY     • ENDOSCOPY N/A 6/20/2018    Procedure: ESOPHAGOGASTRODUODENOSCOPY;  Surgeon: Mark I Brunner, MD;  Location: [facility] ENDOSCOPY;  Service: Gastroenterology   • GASTRIC BYPASS      x2   • HERNIA REPAIR     • MASTECTOMY Bilateral     Left With sentinel lymph node sampling and prophylactic right mastectomy   • PACEMAKER IMPLANTATION     • PORTACATH PLACEMENT         Family History   Problem Relation Age of Onset   • Breast cancer Maternal Aunt    • Pancreatic cancer Cousin    • Breast cancer Cousin    • Breast cancer Cousin        Social History     Socioeconomic History   • Marital status:      Spouse name: Not on file   • Number of children: Not on file   • Years of education: Not on file   • Highest education level: Not on file   Tobacco Use   • Smoking status: Current Every Day Smoker   • Smokeless tobacco: Never Used   Substance and Sexual Activity   • Alcohol use: No   • Drug use: Yes     Types: Marijuana   • Sexual activity: Defer           Objective   Physical Exam  Vitals signs and nursing note reviewed.   Constitutional:       Appearance: She is normal weight.      Comments: Alert female, quite distraught, retching in the  middle of my evaluation.   HENT:      Head: Atraumatic.      Mouth/Throat:      Comments: Airway patent  Eyes:      General: No scleral icterus.     Conjunctiva/sclera: Conjunctivae normal.   Neck:      Musculoskeletal: Neck supple.      Trachea: Phonation normal.   Cardiovascular:      Rate and Rhythm: Regular rhythm. Tachycardia present.      Heart sounds: Normal heart sounds.   Pulmonary:      Effort: Pulmonary effort is normal. No respiratory distress.      Breath sounds: Normal breath sounds.   Abdominal:      Palpations: Abdomen is soft.      Tenderness: There is abdominal tenderness (tender everywhere but mostly upper abdomen). There is no guarding.   Musculoskeletal:      Right lower leg: No edema.      Left lower leg: No edema.   Skin:     General: Skin is warm and dry.   Neurological:      Mental Status: She is alert and oriented to person, place, and time.   Psychiatric:      Comments: Depressed, leda about her depression and suicidal thoughts.         Procedures           ED Course  ED Course as of Oct 08 0011   Wed Oct 07, 2020   1645 She is feeling much better after the Haldol and is pleasantly surprised at how well it has worked. Her suicidal thoughts are less intense now, and she doesn't believe that she would try to kill herself. Nevertheless, we will seek [facility] evaluation, and she is happy to talk with them.    [LI]   1846 Awaiting [facility] assessment.    [LI]   [facility]  felt she was a candidate for admission there but no beds tonight. In the meantime, her pain and nausea started coming back though no vomiting. A second dose of Haldol was given with symptomatic success once again. I have talked with Dr. Lezama who will admit her.    [LI]      ED Course User Index  [LI] Lev Askew MD                                  Recent Results (from the past 24 hour(s))   Comprehensive Metabolic Panel    Collection Time: 10/07/20  3:41 PM    Specimen: Blood   Result Value Ref Range    Glucose 118  (H) 65 - 99 mg/dL    BUN 21 (H) 6 - 20 mg/dL    Creatinine 1.18 (H) 0.57 - 1.00 mg/dL    Sodium 138 136 - 145 mmol/L    Potassium 3.2 (L) 3.5 - 5.2 mmol/L    Chloride 96 (L) 98 - 107 mmol/L    CO2 23.0 22.0 - 29.0 mmol/L    Calcium 10.2 8.6 - 10.5 mg/dL    Total Protein 8.5 6.0 - 8.5 g/dL    Albumin 5.10 3.50 - 5.20 g/dL    ALT (SGPT) 18 1 - 33 U/L    AST (SGOT) 31 1 - 32 U/L    Alkaline Phosphatase 133 (H) 39 - 117 U/L    Total Bilirubin 0.7 0.0 - 1.2 mg/dL    eGFR  African Amer 58 (L) >60 mL/min/1.73    Globulin 3.4 gm/dL    A/G Ratio 1.5 g/dL    BUN/Creatinine Ratio 17.8 7.0 - 25.0    Anion Gap 19.0 (H) 5.0 - 15.0 mmol/L   Lipase    Collection Time: 10/07/20  3:41 PM    Specimen: Blood   Result Value Ref Range    Lipase 10 (L) 13 - 60 U/L   Troponin    Collection Time: 10/07/20  3:41 PM    Specimen: Blood   Result Value Ref Range    Troponin T <0.010 0.000 - 0.030 ng/mL   CBC Auto Differential    Collection Time: 10/07/20  3:41 PM    Specimen: Blood   Result Value Ref Range    WBC 7.71 3.40 - 10.80 10*3/mm3    RBC 5.71 (H) 3.77 - 5.28 10*6/mm3    Hemoglobin 15.6 12.0 - 15.9 g/dL    Hematocrit 46.4 34.0 - 46.6 %    MCV 81.3 79.0 - 97.0 fL    MCH 27.3 26.6 - 33.0 pg    MCHC 33.6 31.5 - 35.7 g/dL    RDW 13.2 12.3 - 15.4 %    RDW-SD 38.6 37.0 - 54.0 fl    MPV 10.2 6.0 - 12.0 fL    Platelets 216 140 - 450 10*3/mm3    Neutrophil % 64.4 42.7 - 76.0 %    Lymphocyte % 24.4 19.6 - 45.3 %    Monocyte % 10.1 5.0 - 12.0 %    Eosinophil % 0.3 0.3 - 6.2 %    Basophil % 0.5 0.0 - 1.5 %    Immature Grans % 0.3 0.0 - 0.5 %    Neutrophils, Absolute 4.97 1.70 - 7.00 10*3/mm3    Lymphocytes, Absolute 1.88 0.70 - 3.10 10*3/mm3    Monocytes, Absolute 0.78 0.10 - 0.90 10*3/mm3    Eosinophils, Absolute 0.02 0.00 - 0.40 10*3/mm3    Basophils, Absolute 0.04 0.00 - 0.20 10*3/mm3    Immature Grans, Absolute 0.02 0.00 - 0.05 10*3/mm3    nRBC 0.0 0.0 - 0.2 /100 WBC   Magnesium    Collection Time: 10/07/20  3:41 PM    Specimen: Blood   Result  Value Ref Range    Magnesium 2.2 1.6 - 2.6 mg/dL   Urine Drug Screen - Urine, Clean Catch    Collection Time: 10/07/20 10:19 PM    Specimen: Urine, Clean Catch   Result Value Ref Range    THC, Screen, Urine Positive (A) Negative    Phencyclidine (PCP), Urine Negative Negative    Cocaine Screen, Urine Positive (A) Negative    Methamphetamine, Ur Negative Negative    Opiate Screen Negative Negative    Amphetamine Screen, Urine Negative Negative    Benzodiazepine Screen, Urine Negative Negative    Tricyclic Antidepressants Screen Negative Negative    Methadone Screen, Urine Negative Negative    Barbiturates Screen, Urine Negative Negative    Oxycodone Screen, Urine Negative Negative    Propoxyphene Screen Negative Negative    Buprenorphine, Screen, Urine Negative Negative   Urinalysis With Microscopic If Indicated (No Culture) - Urine, Clean Catch    Collection Time: 10/07/20 10:19 PM    Specimen: Urine, Clean Catch   Result Value Ref Range    Color, UA Yellow Yellow, Straw    Appearance, UA Clear Clear    pH, UA <=5.0 5.0 - 8.0    Specific Gravity, UA 1.014 1.001 - 1.030    Glucose, UA Negative Negative    Ketones, UA 40 mg/dL (2+) (A) Negative    Bilirubin, UA Negative Negative    Blood, UA Moderate (2+) (A) Negative    Protein, UA Trace (A) Negative    Leuk Esterase, UA Small (1+) (A) Negative    Nitrite, UA Positive (A) Negative    Urobilinogen, UA 0.2 E.U./dL 0.2 - 1.0 E.U./dL   Urinalysis, Microscopic Only - Urine, Clean Catch    Collection Time: 10/07/20 10:19 PM    Specimen: Urine, Clean Catch   Result Value Ref Range    RBC, UA 0-2 None Seen, 0-2 /HPF    WBC, UA 6-12 (A) None Seen, 0-2 /HPF    Bacteria, UA 4+ (A) None Seen, Trace /HPF    Squamous Epithelial Cells, UA 0-2 None Seen, 0-2 /HPF    Hyaline Casts, UA 0-6 0 - 6 /LPF    Methodology Automated Microscopy    Ethanol    Collection Time: 10/07/20 10:44 PM    Specimen: Blood   Result Value Ref Range    Ethanol <10 0 - 10 mg/dL   Lactic Acid, Plasma     Collection Time: 10/07/20 10:44 PM    Specimen: Blood   Result Value Ref Range    Lactate 1.1 0.5 - 2.0 mmol/L     Note: In addition to lab results from this visit, the labs listed above may include labs taken at another facility or during a different encounter within the last 24 hours. Please correlate lab times with ED admission and discharge times for further clarification of the services performed during this visit.    XR Abdomen KUB   Final Result   Normal bowel gas pattern.      Signer Name: Brett Madrigal MD    Signed: 10/7/2020 10:15 PM    Workstation Name: [email or web address]     [facility]        Vitals:    10/07/20 2200 10/07/20 2230 10/07/20 2300 10/07/20 2330   BP: 145/95 (!) 152/106 137/86 118/84   BP Location:       Patient Position:       Pulse:       Resp:       Temp:       TempSrc:       SpO2:       Weight:       Height:         Medications   erythromycin ethylsuccinate (EES) 200 MG/5ML suspension 200 mg (200 mg Oral Given 10/7/20 2234)   lactated ringers infusion (has no administration in time range)   magnesium sulfate in D5W 1g/100mL (PREMIX) (has no administration in time range)   potassium chloride 10 mEq in 100 mL IVPB (10 mEq Intravenous New Bag 10/7/20 2348)   pantoprazole (PROTONIX) injection 40 mg (40 mg Intravenous Given 10/7/20 2234)   ondansetron (ZOFRAN) injection 4 mg (has no administration in time range)   sodium chloride 0.9 % bolus 1,000 mL (0 mL Intravenous Stopped 10/7/20 1632)   haloperidol lactate (HALDOL) injection 5 mg (5 mg Intravenous Given 10/7/20 1550)   haloperidol lactate (HALDOL) injection 5 mg (5 mg Intravenous Given 10/7/20 2040)     ECG/EMG Results (last 24 hours)     Procedure Component Value Units Date/Time    ECG 12 Lead [[id number]] Collected: 10/07/20 1532     Updated: 10/07/20 1641    Narrative:      Test Reason : chest pain, vomiting  Blood Pressure : **/** mmHG  Vent. Rate : 124 BPM     Atrial Rate : 124 BPM     P-R Int : 120 ms          QRS  Dur : 074 ms      QT Int : 330 ms       P-R-T Axes : 081 055 061 degrees     QTc Int : 474 ms    Sinus tachycardia  Possible Left atrial enlargement  Borderline ECG  When compared with ECG of 17-JAN-2019 10:35,  No significant change was found  Confirmed by LEV ASEKW MD ([id number]) on 10/7/2020 4:41:50 PM    Referred By:  LANE           Confirmed By:LEV ASKEW MD        ECG 12 Lead   Final Result   Test Reason : chest pain, vomiting   Blood Pressure : **/** mmHG   Vent. Rate : 124 BPM     Atrial Rate : 124 BPM      P-R Int : 120 ms          QRS Dur : 074 ms       QT Int : 330 ms       P-R-T Axes : 081 055 061 degrees      QTc Int : 474 ms      Sinus tachycardia   Possible Left atrial enlargement   Borderline ECG   When compared with ECG of 17-JAN-2019 10:35,   No significant change was found   Confirmed by LEV ASKEW MD ([id number]) on 10/7/2020 4:41:50 PM      Referred By:  EDMD           Confirmed By:LEV ASKEW MD      ECG 12 Lead    (Results Pending)                   MDM    Final diagnoses:   Generalized abdominal pain   Cyclical vomiting syndrome not associated with migraine   Suicidal ideations            Lev Askew MD  10/08/20 0012

## 2020-10-08 LAB
ANION GAP SERPL CALCULATED.3IONS-SCNC: 12 MMOL/L (ref 5–15)
BASOPHILS # BLD AUTO: 0.03 10*3/MM3 (ref 0–0.2)
BASOPHILS NFR BLD AUTO: 0.5 % (ref 0–1.5)
BUN SERPL-MCNC: 21 MG/DL (ref 6–20)
BUN/CREAT SERPL: 17.9 (ref 7–25)
CALCIUM SPEC-SCNC: 9.3 MG/DL (ref 8.6–10.5)
CHLORIDE SERPL-SCNC: 101 MMOL/L (ref 98–107)
CO2 SERPL-SCNC: 24 MMOL/L (ref 22–29)
CREAT SERPL-MCNC: 1.17 MG/DL (ref 0.57–1)
DEPRECATED RDW RBC AUTO: 40.5 FL (ref 37–54)
EOSINOPHIL # BLD AUTO: 0.02 10*3/MM3 (ref 0–0.4)
EOSINOPHIL NFR BLD AUTO: 0.3 % (ref 0.3–6.2)
ERYTHROCYTE [DISTWIDTH] IN BLOOD BY AUTOMATED COUNT: 13.4 % (ref 12.3–15.4)
GFR SERPL CREATININE-BSD FRML MDRD: 58 ML/MIN/1.73
GLUCOSE BLDC GLUCOMTR-MCNC: 85 MG/DL (ref 70–130)
GLUCOSE BLDC GLUCOMTR-MCNC: 88 MG/DL (ref 70–130)
GLUCOSE BLDC GLUCOMTR-MCNC: 88 MG/DL (ref 70–130)
GLUCOSE BLDC GLUCOMTR-MCNC: 93 MG/DL (ref 70–130)
GLUCOSE SERPL-MCNC: 89 MG/DL (ref 65–99)
HBA1C MFR BLD: 5.9 % (ref 4.8–5.6)
HCT VFR BLD AUTO: 39.3 % (ref 34–46.6)
HGB BLD-MCNC: 12.7 G/DL (ref 12–15.9)
IMM GRANULOCYTES # BLD AUTO: 0.02 10*3/MM3 (ref 0–0.05)
IMM GRANULOCYTES NFR BLD AUTO: 0.3 % (ref 0–0.5)
LYMPHOCYTES # BLD AUTO: 1.8 10*3/MM3 (ref 0.7–3.1)
LYMPHOCYTES NFR BLD AUTO: 28.7 % (ref 19.6–45.3)
MCH RBC QN AUTO: 26.9 PG (ref 26.6–33)
MCHC RBC AUTO-ENTMCNC: 32.3 G/DL (ref 31.5–35.7)
MCV RBC AUTO: 83.3 FL (ref 79–97)
MONOCYTES # BLD AUTO: 0.74 10*3/MM3 (ref 0.1–0.9)
MONOCYTES NFR BLD AUTO: 11.8 % (ref 5–12)
NEUTROPHILS NFR BLD AUTO: 3.66 10*3/MM3 (ref 1.7–7)
NEUTROPHILS NFR BLD AUTO: 58.4 % (ref 42.7–76)
NRBC BLD AUTO-RTO: 0 /100 WBC (ref 0–0.2)
PHOSPHATE SERPL-MCNC: 3 MG/DL (ref 2.5–4.5)
PLATELET # BLD AUTO: 170 10*3/MM3 (ref 140–450)
PMV BLD AUTO: 10.2 FL (ref 6–12)
POTASSIUM SERPL-SCNC: 3.2 MMOL/L (ref 3.5–5.2)
POTASSIUM SERPL-SCNC: 4 MMOL/L (ref 3.5–5.2)
RBC # BLD AUTO: 4.72 10*6/MM3 (ref 3.77–5.28)
SARS-COV-2 RNA RESP QL NAA+PROBE: NOT DETECTED
SODIUM SERPL-SCNC: 137 MMOL/L (ref 136–145)
WBC # BLD AUTO: 6.27 10*3/MM3 (ref 3.4–10.8)

## 2020-10-08 PROCEDURE — U0004 COV-19 TEST NON-CDC HGH THRU: HCPCS | Performed by: INTERNAL MEDICINE

## 2020-10-08 PROCEDURE — 93010 ELECTROCARDIOGRAM REPORT: CPT | Performed by: INTERNAL MEDICINE

## 2020-10-08 PROCEDURE — 84132 ASSAY OF SERUM POTASSIUM: CPT | Performed by: INTERNAL MEDICINE

## 2020-10-08 PROCEDURE — G0378 HOSPITAL OBSERVATION PER HR: HCPCS

## 2020-10-08 PROCEDURE — 99225 PR SBSQ OBSERVATION CARE/DAY 25 MINUTES: CPT | Performed by: NURSE PRACTITIONER

## 2020-10-08 PROCEDURE — 83036 HEMOGLOBIN GLYCOSYLATED A1C: CPT | Performed by: INTERNAL MEDICINE

## 2020-10-08 PROCEDURE — C9803 HOPD COVID-19 SPEC COLLECT: HCPCS | Performed by: INTERNAL MEDICINE

## 2020-10-08 PROCEDURE — 82962 GLUCOSE BLOOD TEST: CPT

## 2020-10-08 PROCEDURE — 99214 OFFICE O/P EST MOD 30 MIN: CPT | Performed by: NURSE PRACTITIONER

## 2020-10-08 PROCEDURE — 96375 TX/PRO/DX INJ NEW DRUG ADDON: CPT

## 2020-10-08 PROCEDURE — 25010000002 POTASSIUM CHLORIDE PER 2 MEQ OF POTASSIUM: Performed by: INTERNAL MEDICINE

## 2020-10-08 PROCEDURE — 25010000002 ONDANSETRON PER 1 MG: Performed by: INTERNAL MEDICINE

## 2020-10-08 PROCEDURE — 25010000002 LORAZEPAM PER 2 MG: Performed by: INTERNAL MEDICINE

## 2020-10-08 PROCEDURE — 85025 COMPLETE CBC W/AUTO DIFF WBC: CPT | Performed by: INTERNAL MEDICINE

## 2020-10-08 PROCEDURE — 96376 TX/PRO/DX INJ SAME DRUG ADON: CPT

## 2020-10-08 PROCEDURE — 93005 ELECTROCARDIOGRAM TRACING: CPT | Performed by: INTERNAL MEDICINE

## 2020-10-08 PROCEDURE — 80048 BASIC METABOLIC PNL TOTAL CA: CPT | Performed by: INTERNAL MEDICINE

## 2020-10-08 RX ORDER — MELATONIN
1000 DAILY
Status: DISCONTINUED | OUTPATIENT
Start: 2020-10-08 | End: 2020-10-09 | Stop reason: HOSPADM

## 2020-10-08 RX ORDER — MAGNESIUM SULFATE HEPTAHYDRATE 40 MG/ML
2 INJECTION, SOLUTION INTRAVENOUS AS NEEDED
Status: DISCONTINUED | OUTPATIENT
Start: 2020-10-08 | End: 2020-10-09 | Stop reason: HOSPADM

## 2020-10-08 RX ORDER — CHOLECALCIFEROL (VITAMIN D3) 125 MCG
5 CAPSULE ORAL NIGHTLY PRN
Status: DISCONTINUED | OUTPATIENT
Start: 2020-10-08 | End: 2020-10-09 | Stop reason: HOSPADM

## 2020-10-08 RX ORDER — NICOTINE 21 MG/24HR
1 PATCH, TRANSDERMAL 24 HOURS TRANSDERMAL
Status: DISCONTINUED | OUTPATIENT
Start: 2020-10-08 | End: 2020-10-09 | Stop reason: HOSPADM

## 2020-10-08 RX ORDER — NICOTINE POLACRILEX 4 MG
15 LOZENGE BUCCAL
Status: DISCONTINUED | OUTPATIENT
Start: 2020-10-08 | End: 2020-10-09 | Stop reason: HOSPADM

## 2020-10-08 RX ORDER — METOCLOPRAMIDE 10 MG/1
10 TABLET ORAL
Status: DISCONTINUED | OUTPATIENT
Start: 2020-10-08 | End: 2020-10-09 | Stop reason: HOSPADM

## 2020-10-08 RX ORDER — CITALOPRAM 20 MG/1
20 TABLET ORAL DAILY
Status: DISCONTINUED | OUTPATIENT
Start: 2020-10-08 | End: 2020-10-09 | Stop reason: HOSPADM

## 2020-10-08 RX ORDER — SODIUM CHLORIDE 0.9 % (FLUSH) 0.9 %
10 SYRINGE (ML) INJECTION EVERY 12 HOURS SCHEDULED
Status: DISCONTINUED | OUTPATIENT
Start: 2020-10-08 | End: 2020-10-09 | Stop reason: HOSPADM

## 2020-10-08 RX ORDER — POTASSIUM CHLORIDE 7.45 MG/ML
10 INJECTION INTRAVENOUS
Status: DISCONTINUED | OUTPATIENT
Start: 2020-10-08 | End: 2020-10-09 | Stop reason: HOSPADM

## 2020-10-08 RX ORDER — CETIRIZINE HYDROCHLORIDE 10 MG/1
10 TABLET ORAL DAILY
Status: DISCONTINUED | OUTPATIENT
Start: 2020-10-08 | End: 2020-10-09 | Stop reason: HOSPADM

## 2020-10-08 RX ORDER — SODIUM CHLORIDE 0.9 % (FLUSH) 0.9 %
10 SYRINGE (ML) INJECTION AS NEEDED
Status: DISCONTINUED | OUTPATIENT
Start: 2020-10-08 | End: 2020-10-09 | Stop reason: HOSPADM

## 2020-10-08 RX ORDER — LORAZEPAM 2 MG/ML
1 INJECTION INTRAMUSCULAR EVERY 4 HOURS PRN
Status: DISCONTINUED | OUTPATIENT
Start: 2020-10-08 | End: 2020-10-09 | Stop reason: HOSPADM

## 2020-10-08 RX ORDER — POTASSIUM CHLORIDE 750 MG/1
40 CAPSULE, EXTENDED RELEASE ORAL AS NEEDED
Status: DISCONTINUED | OUTPATIENT
Start: 2020-10-08 | End: 2020-10-09 | Stop reason: HOSPADM

## 2020-10-08 RX ORDER — SODIUM CHLORIDE 9 MG/ML
75 INJECTION, SOLUTION INTRAVENOUS CONTINUOUS
Status: ACTIVE | OUTPATIENT
Start: 2020-10-08 | End: 2020-10-09

## 2020-10-08 RX ORDER — DEXTROSE MONOHYDRATE 25 G/50ML
25 INJECTION, SOLUTION INTRAVENOUS
Status: DISCONTINUED | OUTPATIENT
Start: 2020-10-08 | End: 2020-10-09 | Stop reason: HOSPADM

## 2020-10-08 RX ORDER — DULOXETIN HYDROCHLORIDE 30 MG/1
30 CAPSULE, DELAYED RELEASE ORAL 2 TIMES DAILY
Status: DISCONTINUED | OUTPATIENT
Start: 2020-10-08 | End: 2020-10-09 | Stop reason: HOSPADM

## 2020-10-08 RX ORDER — POTASSIUM CHLORIDE 1.5 G/1.77G
40 POWDER, FOR SOLUTION ORAL AS NEEDED
Status: DISCONTINUED | OUTPATIENT
Start: 2020-10-08 | End: 2020-10-09 | Stop reason: HOSPADM

## 2020-10-08 RX ORDER — MAGNESIUM SULFATE HEPTAHYDRATE 40 MG/ML
4 INJECTION, SOLUTION INTRAVENOUS AS NEEDED
Status: DISCONTINUED | OUTPATIENT
Start: 2020-10-08 | End: 2020-10-09 | Stop reason: HOSPADM

## 2020-10-08 RX ADMIN — POTASSIUM CHLORIDE: 2 INJECTION, SOLUTION, CONCENTRATE INTRAVENOUS at 02:04

## 2020-10-08 RX ADMIN — SODIUM CHLORIDE, PRESERVATIVE FREE 10 ML: 5 INJECTION INTRAVENOUS at 21:10

## 2020-10-08 RX ADMIN — LORAZEPAM 1 MG: 2 INJECTION INTRAMUSCULAR; INTRAVENOUS at 13:16

## 2020-10-08 RX ADMIN — SODIUM CHLORIDE 75 ML/HR: 9 INJECTION, SOLUTION INTRAVENOUS at 14:18

## 2020-10-08 RX ADMIN — METOCLOPRAMIDE 10 MG: 10 TABLET ORAL at 18:00

## 2020-10-08 RX ADMIN — ONDANSETRON 4 MG: 2 INJECTION INTRAMUSCULAR; INTRAVENOUS at 12:58

## 2020-10-08 RX ADMIN — LORAZEPAM 1 MG: 2 INJECTION INTRAMUSCULAR; INTRAVENOUS at 02:38

## 2020-10-08 RX ADMIN — MELATONIN TAB 5 MG 5 MG: 5 TAB at 02:04

## 2020-10-08 RX ADMIN — ERYTHROMYCIN ETHYLSUCCINATE 200 MG: 200 GRANULE, FOR SUSPENSION ORAL at 10:51

## 2020-10-08 RX ADMIN — ERYTHROMYCIN ETHYLSUCCINATE 200 MG: 200 GRANULE, FOR SUSPENSION ORAL at 18:00

## 2020-10-08 RX ADMIN — METOCLOPRAMIDE 10 MG: 10 TABLET ORAL at 12:58

## 2020-10-08 RX ADMIN — DULOXETINE HYDROCHLORIDE 30 MG: 30 CAPSULE, DELAYED RELEASE ORAL at 08:54

## 2020-10-08 RX ADMIN — SODIUM CHLORIDE, PRESERVATIVE FREE 10 ML: 5 INJECTION INTRAVENOUS at 02:05

## 2020-10-08 RX ADMIN — SODIUM CHLORIDE, PRESERVATIVE FREE 10 ML: 5 INJECTION INTRAVENOUS at 08:55

## 2020-10-08 RX ADMIN — DULOXETINE HYDROCHLORIDE 30 MG: 30 CAPSULE, DELAYED RELEASE ORAL at 20:16

## 2020-10-08 RX ADMIN — PANTOPRAZOLE SODIUM 40 MG: 40 INJECTION, POWDER, FOR SOLUTION INTRAVENOUS at 20:16

## 2020-10-08 RX ADMIN — POTASSIUM CHLORIDE 40 MEQ: 10 CAPSULE, COATED, EXTENDED RELEASE ORAL at 08:54

## 2020-10-08 RX ADMIN — POTASSIUM CHLORIDE 40 MEQ: 10 CAPSULE, COATED, EXTENDED RELEASE ORAL at 12:58

## 2020-10-08 RX ADMIN — METOCLOPRAMIDE 10 MG: 10 TABLET ORAL at 20:16

## 2020-10-08 RX ADMIN — ERYTHROMYCIN ETHYLSUCCINATE 200 MG: 200 GRANULE, FOR SUSPENSION ORAL at 12:58

## 2020-10-08 RX ADMIN — CETIRIZINE HYDROCHLORIDE 10 MG: 10 TABLET, FILM COATED ORAL at 08:54

## 2020-10-08 RX ADMIN — Medication 1000 UNITS: at 08:54

## 2020-10-08 RX ADMIN — CITALOPRAM HYDROBROMIDE 20 MG: 20 TABLET ORAL at 08:54

## 2020-10-08 RX ADMIN — METOCLOPRAMIDE 10 MG: 10 TABLET ORAL at 08:54

## 2020-10-08 NOTE — H&P
[facility] Medicine Services  HISTORY AND PHYSICAL    Patient Name: Geneva Haro  : 1965  MRN: [id number]  Primary Care Physician: Sapna, No Known  Date of admission: 10/7/2020      Subjective   Subjective     Chief Complaint:  Nausea, vomiting, abdominal pain    HPI:  Geneva Haro is a 55 y.o. female with past medical history of recurrent intractable nausea and vomiting related to both cyclical vomiting syndrome as well as gastroparesis related to her diabetes for which she has gastric stimulator, anxiety and depression, chronic marijuana and tobacco use, GERD, fibromyalgia who presents to the ER with complaints of intractable nausea and vomiting and epigastric abdominal pain as well as suicidal ideation.    Patient states that 3 to 4 days ago she began having intractable nausea and vomiting.  Up to 5-6 episodes daily.  She has been unable to tolerate anything orally including liquids or solids.  Her symptoms have been unrelenting making her completely miserable.  She reports that she has epigastric abdominal pain related to straining so much from vomiting.  She typically follows with a gastroenterologist from [facility], however he recently relocated to another state and she has not established care with anyone else.  She does have gastric stimulator for her gastroparesis and thinks it likely needs a battery change.    Due to her worsening health status as above and the upcoming anniversary of her mother's death, patient has been very depressed.  She reports suicidal ideation and actually has a plan to take pills in order to kill herself.  Due to this, patient is admitted for observation with suicide precautions.  Case has been discussed with the [facility] who does not have a bed tonight, however will likely have a bed tomorrow if patient is medically cleared.    Review of Systems   Gen- No fevers, chills  CV- No chest pain, palpitations  Resp- No cough, dyspnea  GI-  reports N/V, abd pain. No diarrhea      All other systems reviewed and are negative.     Personal History     Past Medical History:   Diagnosis Date   • Anxiety    • Arthritis    • Depression    • Diabetes mellitus (CMS/HCC)    • Gall stones    • History of stomach ulcers    • Stomach problems        Past Surgical History:   Procedure Laterality Date   • CHOLECYSTECTOMY     • ENDOSCOPY N/A 6/20/2018    Procedure: ESOPHAGOGASTRODUODENOSCOPY;  Surgeon: Mark I Brunner, MD;  Location: [facility] END[facility];  Service: Gastroenterology   • GASTRIC BYPASS      x2   • HERNIA REPAIR     • MASTECTOMY Bilateral     Left With sentinel lymph node sampling and prophylactic right mastectomy   • PACEMAKER IMPLANTATION     • PORTACATH PLACEMENT         Family History: family history includes Breast cancer in her cousin, cousin, and maternal aunt; Pancreatic cancer in her cousin. Otherwise pertinent FHx was reviewed and unremarkable.     Social History:  reports that she has been smoking. She has never used smokeless tobacco. She reports current drug use. Drug: Marijuana. She reports that she does not drink alcohol.  Social History     Social History Narrative   • Not on file       Medications:  Available home medication information reviewed.  (Not in a hospital admission)      Allergies   Allergen Reactions   • Gabapentin Other (See Comments)     Seizure   • Morphine And Related Itching and Swelling   • Aspirin GI Intolerance   • Ibuprofen GI Intolerance       Objective   Objective     Vital Signs:   Temp:  [95.7 °F (35.4 °C)-98.4 °F (36.9 °C)] 98.4 °F (36.9 °C)  Heart Rate:  [128] 128  Resp:  [18] 18  BP: (118-153)/() 147/105        Physical Exam   Constitutional: Awake, alert, appears fatigued and acutely ill  Eyes: PERRLA, sclerae anicteric, no conjunctival injection  HENT: NCAT, mucous membranes moist  Neck: Supple, no thyromegaly, no lymphadenopathy, trachea midline  Respiratory: Clear to auscultation bilaterally, nonlabored  respirations   Cardiovascular: Tachycardic, regular, no murmurs, rubs, or gallops, palpable pedal pulses bilaterally  Gastrointestinal: Positive bowel sounds, soft, mild to moderate tenderness epigastric, no rebound or guarding, nondistended  Musculoskeletal: No bilateral ankle edema, no clubbing or cyanosis to extremities  Psychiatric: Appropriate affect, cooperative  Neurologic: Oriented x 3, strength symmetric in all extremities, Cranial Nerves grossly intact to confrontation, speech clear  Skin: No rashes      Results Reviewed:  I have personally reviewed current lab and radiology data.    Results from last 7 days   Lab Units 10/07/20  1541   WBC 10*3/mm3 7.71   HEMOGLOBIN g/dL 15.6   HEMATOCRIT % 46.4   PLATELETS 10*3/mm3 216     Results from last 7 days   Lab Units 10/07/20  1541   SODIUM mmol/L 138   POTASSIUM mmol/L 3.2*   CHLORIDE mmol/L 96*   CO2 mmol/L 23.0   BUN mg/dL 21*   CREATININE mg/dL 1.18*   GLUCOSE mg/dL 118*   CALCIUM mg/dL 10.2   ALT (SGPT) U/L 18   AST (SGOT) U/L 31   TROPONIN T ng/mL <0.010     Estimated Creatinine Clearance: 57.3 mL/min (A) (by C-G formula based on SCr of 1.18 mg/dL (H)).  Brief Urine Lab Results     None        Imaging Results (Last 24 Hours)     Procedure Component Value Units Date/Time    XR Abdomen KUB [[id number]] Resulted: 10/07/20 2118     Updated: 10/07/20 2122             Assessment/Plan   Assessment & Plan     Active Hospital Problems    Diagnosis POA   • **Intractable nausea and vomiting [R11.2] Yes   • Generalized abdominal pain [R10.84] Yes   • Anxiety and depression [F41.9, F32.9] Yes   • Tobacco abuse [Z72.0] Yes   • Marijuana abuse [F12.10] Yes   • GERD (gastroesophageal reflux disease) [K21.9] Yes   • Type 2 diabetes mellitus (CMS/HCC) [E11.9] Yes   • Intractable cyclical vomiting with nausea [R11.15] Yes     Added automatically from request for surgery [id number]     • Gastroparesis [K31.84] Yes   • Fibromyalgia [M79.7] Yes     And chronic pain       Patient is  a 55-year-old female with past medical history of recurrent intractable nausea and vomiting related to both cyclical vomiting syndrome as well as gastroparesis related to her diabetes for which she has gastric stimulator, anxiety and depression, chronic marijuana and tobacco use, GERD, fibromyalgia who presents to the ER with complaints of intractable nausea and vomiting and epigastric abdominal pain as well as suicidal ideation.      1.  Intractable nausea and vomiting  2.  Gastroparesis  3.  GERD  4.  Cyclical vomiting syndrome  5.  Suicidal ideation  6.  Anxiety/depression  7.  Chronic tobacco and marijuana use  8.  Type 2 diabetes  9.  Fibromyalgia    --Admit as observation  --Patient has bed at [facility] for tomorrow  --Given 10 mg of IV Haldol and ER, avoid further QT prolonging medications  --Administer 1 mg magnesium now, check mag level  --Correct potassium  --IV fluids  --Initiate erythromycin for gastric motility and hold Reglan until a.m. to avoid further QT prolongation  --GI consult as patient is concerned she may need battery change for her gastric stimulator  --One-to-one precautions, suicide precaution  --Accu-Cheks  --N.p.o. for now except ice chips  --IV PPI  --If patient has further nausea and vomiting may need to use alternative methods such as dexamethasone and benzodiazepines in order to avoid further QT prolongation tonight.  Repeat EKG in a.m.      DVT prophylaxis: SCD      CODE STATUS: Full  Code Status and Medical Interventions:   Ordered at: 10/07/20 2133     Level Of Support Discussed With:    Patient     Code Status:    CPR     Medical Interventions (Level of Support Prior to Arrest):    Full       Admission Status:  I believe this patient meets OBSERVATION status, however if further evaluation or treatment plans warrant, status may change.  Based upon current information, I predict patient's care encounter to be less than or equal to 2 midnights.      Cade Lezama,   10/07/20

## 2020-10-08 NOTE — PROGRESS NOTES
Discharge Planning Assessment  Psychiatric     Patient Name: Geneva Haro  MRN: [id number]  Today's Date: 10/8/2020    Admit Date: 10/7/2020    Discharge Needs Assessment     Row Name 10/08/20 0952       Living Environment    Current Living Arrangements  home/apartment/condo    Primary Care Provided by  self    Provides Primary Care For  no one    Family Caregiver if Needed  none    Quality of Family Relationships  helpful       Transition Planning    Patient/Family Anticipates Transition to  inpatient rehabilitation facility    Patient/Family Anticipated Services at Transition  none    Transportation Anticipated  family or friend will provide       Discharge Needs Assessment    Readmission Within the Last 30 Days  no previous admission in last 30 days    Current Outpatient/Agency/Support Group  psychiatric facility    Equipment Currently Used at Home  none    Concerns to be Addressed  discharge planning    Anticipated Changes Related to Illness  none    Equipment Needed After Discharge  none        Discharge Plan     Row Name 10/08/20 0948       Plan    Plan  Social work spoke with the patient and she is agreeable to going to the [facility]. [facility] was called at phone [phone] and they said that the assessment was completed and they will need to do a brief updated assessment on the day of discharge but they are full today and should have a bed on Friday. Case management will continue to follow. Social work will have [facility] if possible on Friday and if that is not possible than [facility] at 4:00 pm Friday 10/9/2020 to [facility].        Continued Care and Services - Admitted Since 10/7/2020    Coordination has not been started for this encounter.         Demographic Summary     Row Name 10/08/20 0950       General Information    Admission Type  inpatient    Arrived From  home    Referral Source  patient    Reason for Consult  discharge planning    Preferred Language  English       Contact Information     Permission Granted to Share Info With      Contact Information Obtained for          Functional Status     Row Name 10/08/20 0951       Functional Status    Usual Activity Tolerance  moderate    Current Activity Tolerance  moderate       Functional Status, IADL    Medications  independent    Meal Preparation  independent    Housekeeping  independent    Laundry  independent    Shopping  independent       Mental Status Summary    Recent Changes in Mental Status/Cognitive Functioning  no changes        Psychosocial    No documentation.       Abuse/Neglect    No documentation.       Legal    No documentation.       Substance Abuse    No documentation.       Patient Forms    No documentation.           ABRAM Bravo

## 2020-10-08 NOTE — PLAN OF CARE
Goal Outcome Evaluation:  Plan of Care Reviewed With: patient  Progress: no change       Patient was calm, cooperative, when arrived to the unit. Patient became tearful when talking about her mom. Fluids initiated. Melatonin administered, per order. Patient appeared to have a panic attack after taking Melatonin. Provider notified and Ativan ordered and administered. Patient stated she felt immediate relief. Patient initiated and maintained airborne isolation pending Covid results. [facility]

## 2020-10-08 NOTE — PROGRESS NOTES
[facility] Medicine Services  PROGRESS NOTE    Patient Name: Geneva Haro  : 1965  MRN: [id number]    Date of Admission: 10/7/2020  Primary Care Physician: Provider, No Known    Subjective   Subjective     CC:  Nausea/vomiting    HPI:  Patient is seen resting up in bed awake and alert.  No visitors at bedside.  Sitter sitting outside room monitoring through the window.  Patient states she feels a little better this morning.  Currently no nausea or vomiting and asking for clear liquid diet.  Still feels very anxious and depressed but no current thoughts of SI/HI.  Awaiting the [facility] reevaluation and patient is agreeable to go to the [facility] at discharge if/when bed available.  No new issues.      Review of Systems  Gen- No fevers, chills  CV- No chest pain, palpitations  Resp- No cough, dyspnea  GI- No N/V/D, abd pain  Psych- No SI/HI, + anx/depression    Objective   Objective     Vital Signs:   Temp:  [98 °F (36.7 °C)-98.4 °F (36.9 °C)] 98.1 °F (36.7 °C)  Heart Rate:  [] 106  Resp:  [16-18] 16  BP: ()/() 145/96        Physical Exam:  Constitutional: No acute distress, awake, alert. Resting up in bed.  No visitors at bedside.    HENT: NCAT, mucous membranes moist  Respiratory: Clear to auscultation bilaterally A/P, respiratory effort normal on RA  Cardiovascular: RRR, no murmurs, rubs, or gallops, palpable pedal pulses bilaterally  Gastrointestinal: Positive bowel sounds, soft, nontender, nondistended  Musculoskeletal: No bilateral ankle edema. ROSEN spont   Psychiatric: slightly depressed affect, calm and cooperative. No SI/HI  Neurologic: Oriented x 3, strength symmetric in all extremities, Cranial Nerves grossly intact to confrontation, speech clear and appropriate.  Follows commands   Skin: No rashes      Results Reviewed:  Results from last 7 days   Lab Units 10/08/20  0446 10/07/20  1541   WBC 10*3/mm3 6.27 7.71   HEMOGLOBIN g/dL 12.7 15.6   HEMATOCRIT %  39.3 46.4   PLATELETS 10*3/mm3 170 216     Results from last 7 days   Lab Units 10/08/20  0446 10/07/20  1541   SODIUM mmol/L 137 138   POTASSIUM mmol/L 3.2* 3.2*   CHLORIDE mmol/L 101 96*   CO2 mmol/L 24.0 23.0   BUN mg/dL 21* 21*   CREATININE mg/dL 1.17* 1.18*   GLUCOSE mg/dL 89 118*   CALCIUM mg/dL 9.3 10.2   ALT (SGPT) U/L  --  18   AST (SGOT) U/L  --  31   TROPONIN T ng/mL  --  <0.010     Estimated Creatinine Clearance: 57.8 mL/min (A) (by C-G formula based on SCr of 1.17 mg/dL (H)).    Microbiology Results Abnormal     Procedure Component Value - Date/Time    COVID PRE-OP / PRE-PROCEDURE SCREENING ORDER (NO ISOLATION) - Swab, Nasopharynx [[id number]] Collected: 10/08/20 0041    Lab Status: Final result Specimen: Swab from Nasopharynx Updated: 10/08/20 1145    Narrative:      The following orders were created for panel order COVID PRE-OP / PRE-PROCEDURE SCREENING ORDER (NO ISOLATION) - Swab, Nasopharynx.  Procedure                               Abnormality         Status                     ---------                               -----------         ------                     COVID-19,[facility], NP ...[[id number]]                      Final result                 Please view results for these tests on the individual orders.    COVID-19,[facility], NP SWAB IN LEXAR VIRAL TRANSPORT MEDIA 24-30 HR TAT - Swab, Nasopharynx [[id number]] Collected: 10/08/20 0041    Lab Status: Final result Specimen: Swab from Nasopharynx Updated: 10/08/20 1145     SARS-CoV-2 MARGO Not Detected          Imaging Results (Last 24 Hours)     Procedure Component Value Units Date/Time    XR Abdomen KUB [[id number]] Collected: 10/07/20 2215     Updated: 10/07/20 2218    Narrative:      CR Abdomen 1 Vw    INDICATION:   Gastroparesis. Generalized abdominal pain. Vomiting.    COMPARISON:   None available    FINDINGS:  AP view of the abdomen. Bowel gas pattern is normal. No obstruction is seen. Cholecystectomy clips and a gastric stimulator are  present. There are multiple phleboliths in the pelvis. No evidence of renal or ureteral calculus. There is degenerative  disease in the mid to lower lumbar spine.      Impression:      Normal bowel gas pattern.    Signer Name: Brett Madrigal MD   Signed: 10/7/2020 10:15 PM   Workstation [facility]: [facility]    [facility]               I have reviewed the medications:  Scheduled Meds:cetirizine, 10 mg, Oral, Daily  cholecalciferol, 1,000 Units, Oral, Daily  citalopram, 20 mg, Oral, Daily  DULoxetine, 30 mg, Oral, BID  erythromycin ethylsuccinate, 200 mg, Oral, TID With Meals  insulin lispro, 0-7 Units, Subcutaneous, TID AC  magnesium sulfate, 1 g, Intravenous, Once  metoclopramide, 10 mg, Oral, 4x Daily AC & at Bedtime  nicotine, 1 patch, Transdermal, Q24H  pantoprazole, 40 mg, Intravenous, Nightly  sodium chloride, 10 mL, Intravenous, Q12H      Continuous Infusions:custom IV infusion builder, , Last Rate: 75 mL/hr at 10/08/20 0645      PRN Meds:.dextrose  •  dextrose  •  glucagon (human recombinant)  •  LORazepam  •  magnesium sulfate **OR** magnesium sulfate **OR** magnesium sulfate  •  melatonin  •  ondansetron  •  potassium chloride **OR** potassium chloride **OR** potassium chloride  •  potassium phosphate infusion greater than 15 mMoles **OR** potassium phosphate infusion greater than 15 mMoles **OR** potassium phosphate **OR** sodium phosphate IVPB **OR** sodium phosphate IVPB **OR** sodium phosphate IVPB  •  sodium chloride    Assessment/Plan   Assessment & Plan     Active Hospital Problems    Diagnosis  POA   • **Intractable nausea and vomiting [R11.2]  Yes   • Generalized abdominal pain [R10.84]  Yes   • Anxiety and depression [F41.9, F32.9]  Yes   • Tobacco abuse [Z72.0]  Yes   • Marijuana abuse [F12.10]  Yes   • GERD (gastroesophageal reflux disease) [K21.9]  Yes   • Type 2 diabetes mellitus (CMS/HCC) [E11.9]  Yes   • Intractable cyclical vomiting with nausea [R11.15]  Yes   •  Gastroparesis [K31.84]  Yes   • Fibromyalgia [M79.7]  Yes      Resolved Hospital Problems   No resolved problems to display.        Brief Hospital Course to date:  Geneva Haro is a 55 y.o. female with past medical history of recurrent intractable nausea and vomiting related to both cyclical vomiting syndrome as well as gastroparesis related to her diabetes for which she has gastric stimulator, anxiety and depression, chronic marijuana and tobacco use, GERD, fibromyalgia who presents to the ER with complaints of intractable nausea and vomiting and epigastric abdominal pain as well as suicidal ideation.    Assessment/plan:    Intractable N/V  Cyclical vomitting syndrome  Hx of gastroparesis and gastric stimulator  Hx of chronic marijuana use  --follows with [facility], states she feels like her battery needs changed.  Will f/u with [facility]   --Asking for CLD today.  Will try.    --IVFs, reglan, antiemetics   --consider GI if no improvement  --am labs     Anxiety/depression  Suicidal ideation  --sitter, suicide precautions.  --seen by [facility] in ER.  [facility] to [facility]  --pt denies SI/HI today.  But does want to go to the [facility] if possible.  States no longer wants to diet but is still very depressed and anxious.     DM II (A1c 5.9)  --accuchecks achs  --glucoses wnl   --LDSSI tid ac    Prolonged QTc  --474 on admit  --monitor meds to avoid worsening    DVT Prophylaxis:  Sequentials       Disposition: I expect the patient to be discharged to the [facility] possible Friday.      CODE STATUS:   Code Status and Medical Interventions:   Ordered at: 10/07/20 6497     Level Of Support Discussed With:    Patient     Code Status:    CPR     Medical Interventions (Level of Support Prior to Arrest):    Full       Maria Teresa Dempsey, APRN  10/08/20

## 2020-10-08 NOTE — CONSULTS
"[facility]astroenterology Consult    Referring Provider: No ref. provider found    PCP: Provider, No Known    Reason for Consultation: intractable nausea, vomiting, abdominal pain     Chief complaint: abdominal pain     History of present illness:    Geneva Haro is a 55 y.o. female who is admitted with intractable nausea and vomiting.  Patient has a chronic history of gastrointestinal issues including gastroparesis, hx of cyclical vomiting syndrome and GERD; also notes that she has a gastric stimulator and follows at [facility]. Patient notes that her symptoms occur intermittently noting good days and bad days and are chronic in nature. Throughout exam, patient appears anxious and rocks back and forth; makes several offhand comments about wanting to \"end it all\" but does not endorse a plan or any homicidal ideations. At time of exam, notes nausea and vomiting with epigastric abdominal pain; denies any shortness of breath, chest pain, fever, chills or known sick contacts. Patient does not note any alcohol use but does endorse daily tobacco and marijuana use; denies any further drug abuse though her UDS states otherwise. Denies any alleviating factors and notes symptoms exacerbated with any oral intake. Has attempted home treatment with zofran with limited response. Notes epigastric pain that is non radiating and made worse with repeated emesis.     Allergies:  Gabapentin, Morphine and related, Aspirin, and Ibuprofen    Scheduled Meds:  cetirizine, 10 mg, Oral, Daily  cholecalciferol, 1,000 Units, Oral, Daily  citalopram, 20 mg, Oral, Daily  DULoxetine, 30 mg, Oral, BID  erythromycin ethylsuccinate, 200 mg, Oral, TID With Meals  insulin lispro, 0-7 Units, Subcutaneous, TID AC  magnesium sulfate, 1 g, Intravenous, Once  metoclopramide, 10 mg, Oral, 4x Daily AC & at Bedtime  nicotine, 1 patch, Transdermal, Q24H  pantoprazole, 40 mg, Intravenous, Nightly  sodium chloride, 10 mL, Intravenous, Q12H     "     Infusions:  custom IV infusion builder, , Last Rate: 75 mL/hr at 10/08/20 0645        PRN Meds:  dextrose  •  dextrose  •  glucagon (human recombinant)  •  LORazepam  •  magnesium sulfate **OR** magnesium sulfate **OR** magnesium sulfate  •  melatonin  •  ondansetron  •  potassium chloride **OR** potassium chloride **OR** potassium chloride  •  potassium phosphate infusion greater than 15 mMoles **OR** potassium phosphate infusion greater than 15 mMoles **OR** potassium phosphate **OR** sodium phosphate IVPB **OR** sodium phosphate IVPB **OR** sodium phosphate IVPB  •  sodium chloride    Home Meds:  Medications Prior to Admission   Medication Sig Dispense Refill Last Dose   • atorvastatin (LIPITOR) 20 MG tablet Take 20 mg by mouth Every Night.   10/7/2020 at Unknown time   • cetirizine (zyrTEC) 10 MG tablet Take 10 mg by mouth Daily.   10/7/2020 at Unknown time   • citalopram (CeleXA) 20 MG tablet Take 20 mg by mouth daily.   10/7/2020 at Unknown time   • DULoxetine (CYMBALTA) 30 MG capsule Take 30 mg by mouth 2 (Two) Times a Day.   10/7/2020 at Unknown time   • metoclopramide (REGLAN) 10 MG tablet Take 1 tablet by mouth 4 (Four) Times a Day Before Meals & at Bedtime As Needed (N/V) for up to 12 doses. 12 tablet 0 Unknown at Unknown time   • omeprazole (priLOSEC) 40 MG capsule Take 40 mg by mouth Daily.   Unknown at Unknown time   • ondansetron ODT (ZOFRAN-ODT) 4 MG disintegrating tablet Take 2 tablets by mouth Every 6 (Six) Hours As Needed for Nausea or Vomiting for up to 20 doses. 20 tablet 0 Unknown at Unknown time   • promethazine (PHENERGAN) 12.5 MG tablet Take 1 tablet by mouth Every 6 (Six) Hours As Needed for Nausea or Vomiting for up to 12 doses. 15 tablet 0 Unknown at Unknown time   • promethazine (PHENERGAN) 25 MG suppository Insert 1 suppository into the rectum Every 6 (Six) Hours As Needed for Nausea or Vomiting for up to 12 doses. 12 suppository 0 Unknown at Unknown time       ROS: Review of  Systems   Constitutional: Positive for activity change, appetite change and fatigue. Negative for chills, diaphoresis, fever and unexpected weight change.   HENT: Negative for sore throat, trouble swallowing and voice change.    Eyes: Negative.    Respiratory: Negative for apnea, cough, choking, chest tightness, shortness of breath, wheezing and stridor.    Cardiovascular: Negative for chest pain, palpitations and leg swelling.   Gastrointestinal: Positive for abdominal distention, abdominal pain, nausea and vomiting. Negative for anal bleeding, blood in stool, constipation, diarrhea and rectal pain.   Endocrine: Negative.    Genitourinary: Negative.    Musculoskeletal: Negative.    Skin: Negative for color change, pallor, rash and wound.   Allergic/Immunologic: Negative.    Neurological: Negative.    Hematological: Negative for adenopathy. Does not bruise/bleed easily.   Psychiatric/Behavioral: Positive for agitation and suicidal ideas. The patient is nervous/anxious.    All other systems reviewed and are negative.      PAST MED HX:  Past Medical History:   Diagnosis Date   • Anxiety    • Arthritis    • Depression    • Diabetes mellitus (CMS/HCC)    • Gall stones    • History of stomach ulcers    • Stomach problems        PAST SURG HX:  Past Surgical History:   Procedure Laterality Date   • CHOLECYSTECTOMY     • ENDOSCOPY N/A 6/20/2018    Procedure: ESOPHAGOGASTRODUODENOSCOPY;  Surgeon: Mark I Brunner, MD;  Location: [facility] ENDOSCOPY;  Service: Gastroenterology   • GASTRIC BYPASS      x2   • HERNIA REPAIR     • MASTECTOMY Bilateral     Left With sentinel lymph node sampling and prophylactic right mastectomy   • PACEMAKER IMPLANTATION     • PORTACATH PLACEMENT         FAM HX:  Family History   Problem Relation Age of Onset   • Breast cancer Maternal Aunt    • Pancreatic cancer Cousin    • Breast cancer Cousin    • Breast cancer Cousin        SOC HX:  Social History     Socioeconomic History   • Marital status:  "     Spouse name: Not on file   • Number of children: Not on file   • Years of education: Not on file   • Highest education level: Not on file   Tobacco Use   • Smoking status: Current Every Day Smoker   • Smokeless tobacco: Never Used   Substance and Sexual Activity   • Alcohol use: No   • Drug use: Yes     Types: Marijuana   • Sexual activity: Defer       PHYSICAL EXAM  /96 (BP Location: Right arm, Patient Position: Lying)   Pulse 93   Temp 98.1 °F (36.7 °C) (Oral)   Resp 16   Ht 170.2 cm (67\")   Wt 76 kg (167 lb 8 oz)   SpO2 100%   BMI 26.23 kg/m²   Wt Readings from Last 3 Encounters:   10/08/20 76 kg (167 lb 8 oz)   09/11/20 80.7 kg (178 lb)   08/12/20 76.7 kg (169 lb)   ,body mass index is 26.23 kg/m².  Physical Exam  Vitals signs and nursing note reviewed.   Constitutional:       Appearance: Normal appearance. She is normal weight. She is ill-appearing. She is not toxic-appearing.      Comments: Patient extremely anxious at time of exam rocking back and forth in bed.   HENT:      Head: Normocephalic and atraumatic.      Mouth/Throat:      Mouth: Mucous membranes are moist.      Pharynx: Oropharynx is clear. No oropharyngeal exudate.   Eyes:      General: No scleral icterus.     Extraocular Movements: Extraocular movements intact.      Conjunctiva/sclera: Conjunctivae normal.      Pupils: Pupils are equal, round, and reactive to light.   Neck:      Musculoskeletal: Normal range of motion and neck supple. No neck rigidity or muscular tenderness.   Cardiovascular:      Rate and Rhythm: Regular rhythm. Tachycardia present.      Pulses: Normal pulses.      Heart sounds: Normal heart sounds. No murmur. No friction rub. No gallop.    Pulmonary:      Effort: Pulmonary effort is normal. No respiratory distress.      Breath sounds: Normal breath sounds. No stridor. No wheezing, rhonchi or rales.   Abdominal:      General: Abdomen is flat. Bowel sounds are normal. There is no distension.      " "Palpations: Abdomen is soft. There is no mass.      Tenderness: There is no abdominal tenderness. There is no guarding or rebound.      Hernia: No hernia is present.      Comments: Numerous laparoscopic incisional scars noted to abdomen    Genitourinary:     Comments: defer  Musculoskeletal: Normal range of motion.         General: No swelling or tenderness.   Skin:     General: Skin is warm and dry.      Capillary Refill: Capillary refill takes less than 2 seconds.      Coloration: Skin is not jaundiced or pale.   Neurological:      General: No focal deficit present.      Mental Status: She is alert and oriented to person, place, and time.   Psychiatric:         Attention and Perception: She is inattentive.         Speech: Speech normal.         Behavior: Behavior is agitated.      Comments: Patient makes several offhand comments throughout exam indicating a desire to \" end it all\"; does not vocalize a plan.  Patient appears anxious at time of exam; constantly rocking back and forth in bed.      Results Review:   I reviewed the patient's new clinical results.  I reviewed the patient's new imaging results and agree with the interpretation.  I personally viewed and interpreted the patient's EKG/Telemetry data    Lab Results   Component Value Date    WBC 6.27 10/08/2020    HGB 12.7 10/08/2020    HGB 15.6 10/07/2020    HGB 12.3 10/22/2019    HCT 39.3 10/08/2020    MCV 83.3 10/08/2020     10/08/2020       No results found for: INR    Lab Results   Component Value Date    GLUCOSE 89 10/08/2020    BUN 21 (H) 10/08/2020    CREATININE 1.17 (H) 10/08/2020    EGFRIFAFRI 58 (L) 10/08/2020    BCR 17.9 10/08/2020     10/08/2020    K 3.2 (L) 10/08/2020    CO2 24.0 10/08/2020    CALCIUM 9.3 10/08/2020    PROTENTOTREF 6.2 06/19/2015    ALBUMIN 5.10 10/07/2020    ALKPHOS 133 (H) 10/07/2020    BILITOT 0.7 10/07/2020    ALT 18 10/07/2020    AST 31 10/07/2020   Results for KYM MONIQUE (MRN [id number]) as of " 10/8/2020 12:46   Ref. Range 10/7/2020 22:19   Amphetamine, Urine Qual Latest Ref Range: Negative  Negative   Barbiturates Screen, Urine Latest Ref Range: Negative  Negative   Benzodiazepine Screen, Urine Latest Ref Range: Negative  Negative   Buprenorphine, Screen, Urine Latest Ref Range: Negative  Negative   Cocaine Screen, Urine Latest Ref Range: Negative  Positive (A)   Methamphetamine, Ur Latest Ref Range: Negative  Negative   Methadone Screen , Urine Latest Ref Range: Negative  Negative   Opiate Screen Latest Ref Range: Negative  Negative   Oxycodone Screen, Urine Latest Ref Range: Negative  Negative   Phencyclidine (PCP), Urine Latest Ref Range: Negative  Negative   Propoxyphene Screen Latest Ref Range: Negative  Negative   THC Screen, Urine Latest Ref Range: Negative  Positive (A)   Tricyclic Antidepressants Screen Latest Ref Range: Negative  Negative     SARS-CoV-2 MARGO   Date Value Ref Range Status   10/08/2020 Not Detected Not Detected Final     Xr Abdomen Kub  Result Date: 10/7/2020  CR Abdomen 1 Vw INDICATION: Gastroparesis. Generalized abdominal pain. Vomiting. COMPARISON: None available FINDINGS: AP view of the abdomen. Bowel gas pattern is normal. No obstruction is seen. Cholecystectomy clips and a gastric stimulator are present. There are multiple phleboliths in the pelvis. No evidence of renal or ureteral calculus. There is degenerative disease in the mid to lower lumbar spine.     Normal bowel gas pattern. Signer Name: Brett Madrigal MD  Signed: 10/7/2020 10:15 PM  Workstation Name: [email or web address]  [facility]  ASSESSMENTS/PLANS  1. Intractable nausea and vomiting, likely related to cannaboid hyperemesis syndrome, active  2. Gastroparesis, s/p gastric stimulator placement, active  3. GERD  4. Hx of cyclic vomiting syndrome  5. Drug abuse, marijuana and cocaine, active  6. Anxiety/Depression/Suicidal Thoughts     Geneva Haro is a 55 y.o. female with a longstanding  history of gastrointestinal dysfunction including GERD, gastroparesis with gastric stimulator placement, cyclic vomiting syndrome and intractable nausea and vomiting.  Patient gastrointestinal issues are likely exacerbated by her drug use as her symptoms are concerning for cannabis hyperemesis syndrome.   >>> Recommend discontinuing offending agents including marijuana and cocaine.   >>> schedule Zofran; continue Reglan  >>> discussed use of Tori Root with patient; states she will try this  >>> Discussed with patient that she needs to follow up with new provider at [facility] in regard to her gastric stimulator as she believes that her battery may be dead which is likely an underlying cause of her symptoms.   >>> Patient with gastroparesis will require lifelong PPI therapy.     I discussed the patient's findings and my recommendations with patient and family    JAM Santiago  10/08/20  12:46 EDT

## 2020-10-08 NOTE — PLAN OF CARE
Goal Outcome Evaluation:  Plan of Care Reviewed With: patient  Progress: improving  Outcome Summary: Pt VSS and on room air. Pt alert and oriented up independently. Pt currently in suicide precautions and 1-1. Pt stated verbally that she has had thoughts of suicide but feeling more depressed than suicidal. Pt became very anxious and tearful when speaking about her mother, PRN ativan given. Pt complaining of nausea, scheduled and PRN medication given with relief. Continuous fluids reordered and changed to NS @ 75 through her chest port that was accsessed in the ED. Will continue to monitor.

## 2020-10-09 ENCOUNTER — READMISSION MANAGEMENT (OUTPATIENT)
Dept: CALL CENTER | Facility: HOSPITAL | Age: 55
End: 2020-10-09

## 2020-10-09 VITALS
SYSTOLIC BLOOD PRESSURE: 102 MMHG | RESPIRATION RATE: 16 BRPM | TEMPERATURE: 97.9 F | BODY MASS INDEX: 26.81 KG/M2 | HEART RATE: 85 BPM | HEIGHT: 67 IN | OXYGEN SATURATION: 97 % | DIASTOLIC BLOOD PRESSURE: 75 MMHG | WEIGHT: 170.8 LBS

## 2020-10-09 LAB
ANION GAP SERPL CALCULATED.3IONS-SCNC: 8 MMOL/L (ref 5–15)
BASOPHILS # BLD AUTO: 0.03 10*3/MM3 (ref 0–0.2)
BASOPHILS NFR BLD AUTO: 0.8 % (ref 0–1.5)
BUN SERPL-MCNC: 14 MG/DL (ref 6–20)
BUN/CREAT SERPL: 14.6 (ref 7–25)
CALCIUM SPEC-SCNC: 9 MG/DL (ref 8.6–10.5)
CHLORIDE SERPL-SCNC: 102 MMOL/L (ref 98–107)
CO2 SERPL-SCNC: 27 MMOL/L (ref 22–29)
CREAT SERPL-MCNC: 0.96 MG/DL (ref 0.57–1)
DEPRECATED RDW RBC AUTO: 41.4 FL (ref 37–54)
EOSINOPHIL # BLD AUTO: 0.04 10*3/MM3 (ref 0–0.4)
EOSINOPHIL NFR BLD AUTO: 1.1 % (ref 0.3–6.2)
ERYTHROCYTE [DISTWIDTH] IN BLOOD BY AUTOMATED COUNT: 13.5 % (ref 12.3–15.4)
GFR SERPL CREATININE-BSD FRML MDRD: 73 ML/MIN/1.73
GLUCOSE BLDC GLUCOMTR-MCNC: 109 MG/DL (ref 70–130)
GLUCOSE BLDC GLUCOMTR-MCNC: 93 MG/DL (ref 70–130)
GLUCOSE SERPL-MCNC: 146 MG/DL (ref 65–99)
HCT VFR BLD AUTO: 37.5 % (ref 34–46.6)
HGB BLD-MCNC: 12.1 G/DL (ref 12–15.9)
IMM GRANULOCYTES # BLD AUTO: 0.01 10*3/MM3 (ref 0–0.05)
IMM GRANULOCYTES NFR BLD AUTO: 0.3 % (ref 0–0.5)
LYMPHOCYTES # BLD AUTO: 1.28 10*3/MM3 (ref 0.7–3.1)
LYMPHOCYTES NFR BLD AUTO: 34 % (ref 19.6–45.3)
MCH RBC QN AUTO: 26.9 PG (ref 26.6–33)
MCHC RBC AUTO-ENTMCNC: 32.3 G/DL (ref 31.5–35.7)
MCV RBC AUTO: 83.5 FL (ref 79–97)
MONOCYTES # BLD AUTO: 0.5 10*3/MM3 (ref 0.1–0.9)
MONOCYTES NFR BLD AUTO: 13.3 % (ref 5–12)
NEUTROPHILS NFR BLD AUTO: 1.9 10*3/MM3 (ref 1.7–7)
NEUTROPHILS NFR BLD AUTO: 50.5 % (ref 42.7–76)
NRBC BLD AUTO-RTO: 0 /100 WBC (ref 0–0.2)
PLATELET # BLD AUTO: 148 10*3/MM3 (ref 140–450)
PMV BLD AUTO: 10.2 FL (ref 6–12)
POTASSIUM SERPL-SCNC: 3.8 MMOL/L (ref 3.5–5.2)
RBC # BLD AUTO: 4.49 10*6/MM3 (ref 3.77–5.28)
SODIUM SERPL-SCNC: 137 MMOL/L (ref 136–145)
WBC # BLD AUTO: 3.76 10*3/MM3 (ref 3.4–10.8)

## 2020-10-09 PROCEDURE — 82962 GLUCOSE BLOOD TEST: CPT

## 2020-10-09 PROCEDURE — 99212 OFFICE O/P EST SF 10 MIN: CPT | Performed by: INTERNAL MEDICINE

## 2020-10-09 PROCEDURE — 85025 COMPLETE CBC W/AUTO DIFF WBC: CPT | Performed by: INTERNAL MEDICINE

## 2020-10-09 PROCEDURE — G0378 HOSPITAL OBSERVATION PER HR: HCPCS

## 2020-10-09 PROCEDURE — 80048 BASIC METABOLIC PNL TOTAL CA: CPT | Performed by: INTERNAL MEDICINE

## 2020-10-09 PROCEDURE — 93005 ELECTROCARDIOGRAM TRACING: CPT | Performed by: INTERNAL MEDICINE

## 2020-10-09 PROCEDURE — 99217 PR OBSERVATION CARE DISCHARGE MANAGEMENT: CPT | Performed by: NURSE PRACTITIONER

## 2020-10-09 PROCEDURE — 25010000003 HEPARIN LOCK FLUSH PER 10 UNITS: Performed by: INTERNAL MEDICINE

## 2020-10-09 PROCEDURE — 93010 ELECTROCARDIOGRAM REPORT: CPT | Performed by: INTERNAL MEDICINE

## 2020-10-09 PROCEDURE — 25010000002 HEPARIN LOCK FLUSH PER 10 UNITS: Performed by: INTERNAL MEDICINE

## 2020-10-09 RX ORDER — MELATONIN
1000 DAILY
Qty: 30 TABLET | Refills: 0 | Status: SHIPPED | OUTPATIENT
Start: 2020-10-09 | End: 2021-02-02

## 2020-10-09 RX ORDER — HEPARIN SODIUM (PORCINE) LOCK FLUSH IV SOLN 100 UNIT/ML 100 UNIT/ML
5 SOLUTION INTRAVENOUS AS NEEDED
Status: DISCONTINUED | OUTPATIENT
Start: 2020-10-09 | End: 2020-10-09 | Stop reason: HOSPADM

## 2020-10-09 RX ORDER — ERYTHROMYCIN ETHYLSUCCINATE 200 MG/5ML
200 SUSPENSION ORAL
Qty: 50 ML | Refills: 0 | Status: SHIPPED | OUTPATIENT
Start: 2020-10-09 | End: 2020-10-13

## 2020-10-09 RX ORDER — ASCORBIC ACID 1000 MG
500 TABLET ORAL
Qty: 90 CAPSULE | Refills: 0 | Status: SHIPPED | OUTPATIENT
Start: 2020-10-09 | End: 2021-02-09

## 2020-10-09 RX ADMIN — SODIUM CHLORIDE, PRESERVATIVE FREE 10 ML: 5 INJECTION INTRAVENOUS at 11:56

## 2020-10-09 RX ADMIN — DULOXETINE HYDROCHLORIDE 30 MG: 30 CAPSULE, DELAYED RELEASE ORAL at 09:51

## 2020-10-09 RX ADMIN — METOCLOPRAMIDE 10 MG: 10 TABLET ORAL at 11:56

## 2020-10-09 RX ADMIN — CETIRIZINE HYDROCHLORIDE 10 MG: 10 TABLET, FILM COATED ORAL at 09:51

## 2020-10-09 RX ADMIN — Medication 1000 UNITS: at 09:51

## 2020-10-09 RX ADMIN — METOCLOPRAMIDE 10 MG: 10 TABLET ORAL at 05:22

## 2020-10-09 RX ADMIN — METOCLOPRAMIDE 10 MG: 10 TABLET ORAL at 09:53

## 2020-10-09 RX ADMIN — ERYTHROMYCIN ETHYLSUCCINATE 200 MG: 200 GRANULE, FOR SUSPENSION ORAL at 11:56

## 2020-10-09 RX ADMIN — HEPARIN 500 UNITS: 100 SYRINGE at 14:49

## 2020-10-09 RX ADMIN — ERYTHROMYCIN ETHYLSUCCINATE 200 MG: 200 GRANULE, FOR SUSPENSION ORAL at 09:51

## 2020-10-09 RX ADMIN — CITALOPRAM HYDROBROMIDE 20 MG: 20 TABLET ORAL at 09:51

## 2020-10-09 NOTE — PROGRESS NOTES
Continued Stay Note  [facility]     Patient Name: Geneva Haro  MRN: [id number]  Today's Date: 10/9/2020    Admit Date: 10/7/2020    Discharge Plan     Row Name 10/09/20 0922       Plan    Plan  Social work spoke with the patient and explained to the patient that the [facility] is still full and social work checked with Darcie Waddell in [place] and [facility] in [facility] and the patient is not agreeable to go to any other facility. Yonis will complete another telephone assessment today to determine if the patient will require inpatient because they may have beds on Saturday the [facility] is still full today.         Discharge Codes    No documentation.             ABRAM Bravo

## 2020-10-09 NOTE — OUTREACH NOTE
Prep Survey      Responses   [facility] patient discharged from?  [facility]   Is LACE score < 7 ?  Yes   Eligibility  [facility]   Date of Admission  10/07/20   Date of Discharge  10/09/20   Discharge Disposition  Home or Self Care   Discharge diagnosis  intractable n/v,  gastroparesis,  DM2,  marijuana abuse   Does the patient have one of the following disease processes/diagnoses(primary or secondary)?  Other   Does the patient have Home health ordered?  No   Is there a DME ordered?  No   General alerts for this patient  OP w/ [facility]   Prep survey completed?  Yes          Liberty Maurice RN

## 2020-10-09 NOTE — PROGRESS NOTES
Continued Stay Note  [facility]     Patient Name: Geneva Haro  MRN: [id number]  Today's Date: 10/9/2020    Admit Date: 10/7/2020    Discharge Plan     Row Name 10/09/20 1106       Plan    Plan  Social work spoke with Jennifer in the [facility] assessment office and the Psychiatrist at [facility] has said that the patient can do Outpatient treatment with [facility] and she is on the list to do in person Outpatient treatment with the [facility].    Row Name 10/09/20 0922       Plan    Plan  [facility] is still [facility] and social work will look at other inpatient psychiatric options if the patient is agreeable to going to another facility if the [facility] is still full today.        Discharge Codes    No documentation.             ABRAM Bravo

## 2020-10-09 NOTE — PROGRESS NOTES
"GI Daily Progress Note  Subjective:    Chief Complaint: Follow-up gastroparesis.    Nausea and vomiting have resolved.  She is tolerating clear liquids.  Denies abdominal pain.    Objective:    BP 95/62 (BP Location: Right arm, Patient Position: Lying)   Pulse 80   Temp 98.2 °F (36.8 °C) (Oral)   Resp 16   Ht 170.2 cm (67\")   Wt 77.5 kg (170 lb 12.8 oz)   SpO2 97%   BMI 26.75 kg/m²     Physical Exam  Vitals signs and nursing note reviewed.   Constitutional:       General: She is not in acute distress.     Appearance: Normal appearance. She is not ill-appearing or toxic-appearing.   HENT:      Head: Normocephalic.      Nose: Nose normal. No congestion.      Mouth/Throat:      Mouth: Mucous membranes are moist.   Eyes:      Conjunctiva/sclera: Conjunctivae normal.   Neck:      Musculoskeletal: Normal range of motion.   Cardiovascular:      Rate and Rhythm: Normal rate and regular rhythm.      Pulses: Normal pulses.   Pulmonary:      Effort: Pulmonary effort is normal.   Abdominal:      General: Bowel sounds are normal. There is no distension.      Palpations: Abdomen is soft.      Tenderness: There is no abdominal tenderness. There is no guarding.   Genitourinary:     Comments: Deferred  Musculoskeletal: Normal range of motion.      Right lower leg: No edema.      Left lower leg: No edema.   Skin:     General: Skin is warm and dry.      Capillary Refill: Capillary refill takes less than 2 seconds.      Coloration: Skin is not jaundiced or pale.      Findings: No erythema or rash.   Neurological:      General: No focal deficit present.      Mental Status: She is alert and oriented to person, place, and time.   Psychiatric:         Mood and Affect: Mood normal.         Behavior: Behavior normal.         Lab  Lab Results   Component Value Date    WBC 6.27 10/08/2020    HGB 12.7 10/08/2020    HGB 15.6 10/07/2020    HGB 12.3 10/22/2019    MCV 83.3 10/08/2020     10/08/2020       Lab Results   Component Value " Date    GLUCOSE 89 10/08/2020    BUN 21 (H) 10/08/2020    CREATININE 1.17 (H) 10/08/2020    EGFRIFAFRI 58 (L) 10/08/2020    BCR 17.9 10/08/2020     10/08/2020    K 4.0 10/08/2020    CO2 24.0 10/08/2020    CALCIUM 9.3 10/08/2020    PROTENTOTREF 6.2 06/19/2015    ALBUMIN 5.10 10/07/2020    ALKPHOS 133 (H) 10/07/2020    BILITOT 0.7 10/07/2020    ALT 18 10/07/2020    AST 31 10/07/2020       Assessment:    Gastroparesis.  Nausea and vomiting, resolved.    Plan:    >> The patient is in the process of making an appointment with her surgeon in [place] for battery replacement in her gastric pacemaker.  >> Advance diet to low residue.  >> Stop illicit drug use  >> Recommend gingerroot 500 mg 3 times daily AC.    Patient appears stable for discharge from GI standpoint.  We will sign off.  Please call with questions or concerns.      Mark I. Brunner, MD  10/09/20  10:01 EDT

## 2020-10-09 NOTE — PLAN OF CARE
Problem: Adult Inpatient Plan of Care  Goal: Plan of Care Review  Flowsheets (Taken 10/9/2020 0617)  Outcome Summary: Suicide precautions maintained with sitter at bedside. VS remained stable and patient was in pleasant mood throughout the night. No expressed thoughts of suicide however patient rested most of night. Patient remained on room air. No other changes noted, will continue with plan of care.   Goal Outcome Evaluation:  Plan of Care Reviewed With: patient  Progress: improving  Outcome Summary: Suicide precautions maintained with sitter at bedside. VS remained stable and patient was in pleasant mood throughout the night. No expressed thoughts of suicide however patient rested most of night. Patient remained on room air. No other changes noted, will continue with plan of care.

## 2020-10-09 NOTE — DISCHARGE SUMMARY
[facility] Medicine Services  DISCHARGE SUMMARY    Patient Name: Geneva Haro  : 1965  MRN: [id number]    Date of Admission: 10/7/2020  2:45 PM  Date of Discharge:  10/09/20    Primary Care Physician: Provider, No Known    Consults     Date and Time Order Name Status Description    10/8/2020 0100 Inpatient Gastroenterology Consult Completed           Hospital Course     Presenting Problem:   Generalized abdominal pain [R10.84]  Generalized abdominal pain [R10.84]    Active Hospital Problems    Diagnosis  POA   • **Intractable nausea and vomiting [R11.2]  Yes   • Generalized abdominal pain [R10.84]  Yes   • Anxiety and depression [F41.9, F32.9]  Yes   • Tobacco abuse [Z72.0]  Yes   • Marijuana abuse [F12.10]  Yes   • GERD (gastroesophageal reflux disease) [K21.9]  Yes   • Type 2 diabetes mellitus (CMS/[facility]) [E11.9]  Yes   • Intractable cyclical vomiting with nausea [R11.15]  Yes   • Gastroparesis [K31.84]  Yes   • Fibromyalgia [M79.7]  Yes      Resolved Hospital Problems   No resolved problems to display.          Hospital Course:  Geneva Haro is a 55 y.o. female with past medical history of recurrent intractable nausea and vomiting related to both cyclical vomiting syndrome as well as gastroparesis related to her diabetes for which she has gastric stimulator, anxiety and depression, chronic marijuana and tobacco use, GERD, fibromyalgia who presented to the ED on 10/7/2020 with complaints of intractable nausea and vomiting and epigastric abdominal pain as well as suicidal ideation.    Gastroenterology was consulted and felt that her gastrointestinal issues are likely exacerbated by her drug use as her symptoms are concerning for cannabis hyperemesis syndrome. She was recommended to discontinue use of marijuana and cocaine. She will trial Tori Root 500 mg with meals and will continue lifelong PPI therapy for history of gastroparesis. She will need to follow up  with new provider at [facility] in regard to her gastric stimulator as she believes that her battery may be dead which is likely an underlying cause of her symptoms per gastroenterology.     On admission, the patient expressed recent depression and suicidal ideation stemming from her recent health issues and the upcoming anniversary of her mother's death. The patient reports previously having a plan to take pills to harm herself. She currently denies suicidal ideation or having a plan. The patient was evaluated by the [facility]. Case management/Social work spoke with Jennifer in the [facility] assessment office and the Psychiatrist at the [facility] has recommended an intensive outpatient program. She has been enrolled to begin outpatient treatment at the [facility].  The patient is currently stable and is appropriate to be discharged home. She denies current suicidal ideation or plan. She will be transported home by family.     Cyclical vomitting syndrome  Hx of gastroparesis and gastric stimulator  Hx of chronic marijuana use  --follows with [facility], states she feels like her battery needs changed.  Will f/u with [facility] GI  --s/p IVFs, reglan, antiemetics   --GI followed  --Start Tori Root 500 mg TID  --Lifelong PPI      Anxiety/depression  Suicidal ideation  --seen by the [facility] in ER.  [facility] re-eval today  --pt denies SI/HI today.    --Will start IOP at [facility]      DM II (A1c 5.9)     Prolonged QTc  --474 on admit    Discharge Follow Up Recommendations for outpatient labs/diagnostics:   Follow up with PCP in 1 week.  Follow up [facility] as previously scheduled.  Intensive outpatient program at [facility] has been arranged.     Day of Discharge     HPI:   Patient seen and examined. No acute events overnight per nursing. She denies suicidal ideation or current plan. No new complaints at this time. We discussed the importance of taking all medications as prescribed and keeping all recommended follow up appointments.     Review  of Systems  Gen- No fevers, chills  CV- No chest pain, palpitations  Resp- No cough, dyspnea  GI- No N/V/D, abd pain    Vital Signs:   Temp:  [97.5 °F (36.4 °C)-98.6 °F (37 °C)] 97.9 °F (36.6 °C)  Heart Rate:  [] 91  Resp:  [16-20] 16  BP: ()/(62-99) 102/75     Physical Exam:  Constitutional: No acute distress, awake, alert  HENT: NCAT, mucous membranes moist  Respiratory: Clear to auscultation bilaterally, respiratory effort normal   Cardiovascular: RRR, no murmurs, palpable pedal pulses bilaterally, cap refill brisk   Gastrointestinal: Positive bowel sounds, soft, nontender, nondistended  Musculoskeletal: No BLE edema   Psychiatric: Appropriate affect, cooperative  Neurologic: Oriented x 3, moves all extremities, speech clear  Skin: warm, dry, no visible rash     Pertinent  and/or Most Recent Results     Results from last 7 days   Lab Units 10/09/20  1021 10/08/20  1847 10/08/20  0446 10/07/20  1541   WBC 10*3/mm3 3.76  --  6.27 7.71   HEMOGLOBIN g/dL 12.1  --  12.7 15.6   HEMATOCRIT % 37.5  --  39.3 46.4   PLATELETS 10*3/mm3 148  --  170 216   SODIUM mmol/L 137  --  137 138   POTASSIUM mmol/L 3.8 4.0 3.2* 3.2*   CHLORIDE mmol/L 102  --  101 96*   CO2 mmol/L 27.0  --  24.0 23.0   BUN mg/dL 14  --  21* 21*   CREATININE mg/dL 0.96  --  1.17* 1.18*   GLUCOSE mg/dL 146*  --  89 118*   CALCIUM mg/dL 9.0  --  9.3 10.2     Results from last 7 days   Lab Units 10/07/20  1541   BILIRUBIN mg/dL 0.7   ALK PHOS U/L 133*   ALT (SGPT) U/L 18   AST (SGOT) U/L 31           Invalid input(s): TG, LDLCALC, LDLREALC  Results from last 7 days   Lab Units 10/08/20  0446 10/07/20  2244 10/07/20  1541   HEMOGLOBIN A1C % 5.90*  --   --    TROPONIN T ng/mL  --   --  <0.010   LACTATE mmol/L  --  1.1  --        Brief Urine Lab Results  (Last result in the past 365 days)      Color   Clarity   Blood   Leuk Est   Nitrite   Protein   CREAT   Urine HCG        10/07/20 2210 Yellow Clear Moderate (2+) Small (1+) Positive Trace                Microbiology Results Abnormal     Procedure Component Value - Date/Time    COVID PRE-OP / PRE-PROCEDURE SCREENING ORDER (NO ISOLATION) - Swab, Nasopharynx [[id number]] Collected: 10/08/20 0041    Lab Status: Final result Specimen: Swab from Nasopharynx Updated: 10/08/20 1145    Narrative:      The following orders were created for panel order COVID PRE-OP / PRE-PROCEDURE SCREENING ORDER (NO ISOLATION) - Swab, Nasopharynx.  Procedure                               Abnormality         Status                     ---------                               -----------         ------                     COVID-19,[facility], NP ...[[id number]]                      Final result                 Please view results for these tests on the individual orders.    COVID-19,[facility], NP SWAB IN LEXAR VIRAL TRANSPORT MEDIA 24-30 HR TAT - Swab, Nasopharynx [[id number]] Collected: 10/08/20 0041    Lab Status: Final result Specimen: Swab from Nasopharynx Updated: 10/08/20 1145     SARS-CoV-2 MARGO Not Detected          Imaging Results (All)     Procedure Component Value Units Date/Time    XR Abdomen KUB [[id number]] Collected: 10/07/20 2215     Updated: 10/07/20 2218    Narrative:      CR Abdomen 1 Vw    INDICATION:   Gastroparesis. Generalized abdominal pain. Vomiting.    COMPARISON:   None available    FINDINGS:  AP view of the abdomen. Bowel gas pattern is normal. No obstruction is seen. Cholecystectomy clips and a gastric stimulator are present. There are multiple phleboliths in the pelvis. No evidence of renal or ureteral calculus. There is degenerative  disease in the mid to lower lumbar spine.      Impression:      Normal bowel gas pattern.    Signer Name: Brett Madrigal MD   Signed: 10/7/2020 10:15 PM   Workstation Name: [email or web address]    [facility]                         Plan for Follow-up of Pending Labs/Results: Follow up with PCP in 1 week.   Discharge Details        Discharge Medications      New  Medications      Instructions Start Date   erythromycin ethylsuccinate 200 MG/5ML suspension  Commonly known as: EES   200 mg, Oral, 3 Times Daily With Meals      Tori Root 500 MG capsule   500 mg, Oral, 3 Times Daily Before Meals         Continue These Medications      Instructions Start Date   cetirizine 10 MG tablet  Commonly known as: zyrTEC   10 mg, Oral, Daily      cholecalciferol 25 MCG (1000 UT) tablet  Commonly known as: VITAMIN D3   1,000 Units, Oral, Daily      citalopram 20 MG tablet  Commonly known as: CeleXA   20 mg, Oral, Daily      DULoxetine 30 MG capsule  Commonly known as: CYMBALTA   30 mg, Oral, 2 Times Daily      metoclopramide 10 MG tablet  Commonly known as: REGLAN   10 mg, Oral, 4 Times Daily Before Meals & Nightly PRN      omeprazole 40 MG capsule  Commonly known as: priLOSEC   40 mg, Oral, Daily      ondansetron ODT 4 MG disintegrating tablet  Commonly known as: ZOFRAN-ODT   8 mg, Oral, Every 6 Hours PRN      promethazine 12.5 MG tablet  Commonly known as: PHENERGAN   12.5 mg, Oral, Every 6 Hours PRN      promethazine 25 MG suppository  Commonly known as: PHENERGAN   25 mg, Rectal, Every 6 Hours PRN         Stop These Medications    atorvastatin 20 MG tablet  Commonly known as: LIPITOR            Allergies   Allergen Reactions   • Gabapentin Other (See Comments)     Seizure   • Morphine And Related Itching and Swelling   • Aspirin GI Intolerance   • Ibuprofen GI Intolerance         Discharge Disposition:  Home or Self Care    Diet:  Hospital:  Diet Order   Procedures   • Diet Regular; GI Soft       Activity:  Activity Instructions     Activity as Tolerated               CODE STATUS:    Code Status and Medical Interventions:   Ordered at: 10/07/20 4830     Level Of Support Discussed With:    Patient     Code Status:    CPR     Medical Interventions (Level of Support Prior to Arrest):    Full       Future Appointments   Date Time Provider Department Center   10/22/2020  1:00 PM [facility]  Michelle MONTERROSO, APRN [facility]   10/22/2020  1:30 PM [facility]  [facility]       Additional Instructions for the Follow-ups that You Need to Schedule     Discharge Follow-up with PCP   As directed       Currently Documented PCP:    Provider, No Known    PCP Phone Number:    [phone]     Follow Up Details: Follow up with PCP in 1 week.         Discharge Follow-up with Specialty: Gastroenterology; 1 Month   As directed      Specialty: Gastroenterology    Follow Up: 1 Month    Follow Up Details: [facility]                     JAM Reynolds  10/09/20      Time Spent on Discharge:  I spent 33  minutes on this discharge activity which included: face-to-face encounter with the patient, reviewing the data in the system, coordination of the care with the nursing staff as well as consultants, documentation, and entering orders.

## 2020-10-12 ENCOUNTER — TRANSITIONAL CARE MANAGEMENT TELEPHONE ENCOUNTER (OUTPATIENT)
Dept: CALL CENTER | Facility: HOSPITAL | Age: 55
End: 2020-10-12

## 2020-10-12 NOTE — OUTREACH NOTE
Call Center TCM Note      Responses   [facility] patient discharged from?  [place]   Does the patient have one of the following disease processes/diagnoses(primary or secondary)?  Other   TCM attempt successful?  Yes   Call start time  1618   Call end time  1626   Discharge diagnosis  intractable n/v,  gastroparesis,  DM2,  marijuana abuse   Is patient permission given to speak with other caregiver?  No   Meds reviewed with patient/caregiver?  Yes   Is the patient having any side effects they believe may be caused by any medication additions or changes?  No   Does the patient have all medications ordered at discharge?  Yes   Is the patient taking all medications as directed (includes completed medication regime)?  Yes   Comments regarding appointments  Patient reports no openings at this time at [facility] outpt program. She reports that she is waiting on them to recontact her.    Does the patient have a primary care provider?   Yes   Does the patient have an appointment with their PCP within 7 days of discharge?  Yes   Comments regarding PCP  New Patient with Cesar Gao MD, Friday Oct 16, 2020 9:30 AM   Has the patient kept scheduled appointments due by today?  N/A   Comments  Follow up [facility] as previously scheduled   Has home health visited the patient within 72 hours of discharge?  N/A   Psychosocial issues?  Yes   Psychosocial comments  Patient states that she is waiting on return call from [facility]. She states that she is doing well today.    Did the patient receive a copy of their discharge instructions?  Yes   Nursing interventions  Reviewed instructions with patient   What is the patient's perception of their health status since discharge?  Same   Is the patient/caregiver able to teach back signs and symptoms related to disease process for when to call PCP?  Yes   Is the patient/caregiver able to teach back the hierarchy of who to call/visit for symptoms/problems? PCP, Specialist, Home health  nurse, Urgent Care, ED, 911  Yes   TCM call completed?  Yes          Jolene Hidalgo RN    10/12/2020, 16:26 EDT

## 2020-10-12 NOTE — OUTREACH NOTE
Call Center TCM Note      Responses   [facility] [place] patient discharged from?  [place]   Does the patient have one of the following disease processes/diagnoses(primary or secondary)?  Other   TCM attempt successful?  No   Unsuccessful attempts  Attempt 1 [Attempted patient # only, no verbal release to attempt other contacts. ]          Jolene Hidalgo RN    10/12/2020, 08:57 EDT

## 2020-10-19 ENCOUNTER — OFFICE VISIT (OUTPATIENT)
Dept: FAMILY MEDICINE CLINIC | Facility: CLINIC | Age: 55
End: 2020-10-19

## 2020-10-19 VITALS
WEIGHT: 166 LBS | TEMPERATURE: 97.1 F | DIASTOLIC BLOOD PRESSURE: 80 MMHG | HEART RATE: 100 BPM | HEIGHT: 67 IN | BODY MASS INDEX: 26.06 KG/M2 | SYSTOLIC BLOOD PRESSURE: 120 MMHG | OXYGEN SATURATION: 98 %

## 2020-10-19 DIAGNOSIS — R11.2 INTRACTABLE NAUSEA AND VOMITING: ICD-10-CM

## 2020-10-19 DIAGNOSIS — K31.84 GASTROPARESIS: Primary | ICD-10-CM

## 2020-10-19 DIAGNOSIS — F12.10 MARIJUANA ABUSE: ICD-10-CM

## 2020-10-19 DIAGNOSIS — K21.9 GASTROESOPHAGEAL REFLUX DISEASE, UNSPECIFIED WHETHER ESOPHAGITIS PRESENT: ICD-10-CM

## 2020-10-19 DIAGNOSIS — R11.15 CYCLIC VOMITING SYNDROME: ICD-10-CM

## 2020-10-19 PROBLEM — F32.9 MAJOR DEPRESSIVE DISORDER: Status: ACTIVE | Noted: 2020-10-19

## 2020-10-19 PROCEDURE — 90732 PPSV23 VACC 2 YRS+ SUBQ/IM: CPT | Performed by: INTERNAL MEDICINE

## 2020-10-19 PROCEDURE — 99204 OFFICE O/P NEW MOD 45 MIN: CPT | Performed by: INTERNAL MEDICINE

## 2020-10-19 PROCEDURE — 90471 IMMUNIZATION ADMIN: CPT | Performed by: INTERNAL MEDICINE

## 2020-10-19 RX ORDER — ERYTHROMYCIN ETHYLSUCCINATE 200 MG/5ML
200 SUSPENSION ORAL
Qty: 500 ML | Refills: 3 | Status: SHIPPED | OUTPATIENT
Start: 2020-10-19 | End: 2021-02-02

## 2020-10-19 RX ORDER — PROMETHAZINE HYDROCHLORIDE 12.5 MG/1
12.5 TABLET ORAL EVERY 6 HOURS PRN
Qty: 30 TABLET | Refills: 0 | Status: SHIPPED | OUTPATIENT
Start: 2020-10-19 | End: 2022-03-29

## 2020-10-20 RX ORDER — PROMETHAZINE HYDROCHLORIDE 25 MG/1
25 SUPPOSITORY RECTAL EVERY 6 HOURS PRN
Qty: 12 SUPPOSITORY | Refills: 0 | Status: SHIPPED | OUTPATIENT
Start: 2020-10-20 | End: 2020-10-21 | Stop reason: SDUPTHER

## 2020-10-20 NOTE — TELEPHONE ENCOUNTER
Caller: Genvea Haro    Relationship: Self    Best call back number: [phone]     Medication needed:   Requested Prescriptions     Pending Prescriptions Disp Refills   • promethazine (PHENERGAN) 25 MG suppository 12 suppository 0     Sig: Insert 1 suppository into the rectum Every 6 (Six) Hours As Needed for Nausea or Vomiting for up to 12 doses.       When do you need the refill by: 10/20/2020    What details did the patient provide when requesting the medication: Patient states she was advised by her provider this medication would be called in 2 days ago. The patient states she is out of this medication.     Does the patient have less than a 3 day supply:  [x] Yes  [] No    What is the patient's preferred pharmacy: [facility] [address] [place] [address] [phone] [facility] [phone]

## 2020-10-21 RX ORDER — PROMETHAZINE HYDROCHLORIDE 25 MG/1
25 SUPPOSITORY RECTAL EVERY 6 HOURS PRN
Qty: 12 SUPPOSITORY | Refills: 0 | Status: SHIPPED | OUTPATIENT
Start: 2020-10-21 | End: 2022-03-29

## 2020-10-22 ENCOUNTER — OFFICE VISIT (OUTPATIENT)
Dept: ONCOLOGY | Facility: CLINIC | Age: 55
End: 2020-10-22

## 2020-10-22 ENCOUNTER — HOSPITAL ENCOUNTER (OUTPATIENT)
Dept: ONCOLOGY | Facility: HOSPITAL | Age: 55
Setting detail: INFUSION SERIES
Discharge: HOME | End: 2020-10-22

## 2020-10-22 VITALS
SYSTOLIC BLOOD PRESSURE: 98 MMHG | TEMPERATURE: 97.7 F | DIASTOLIC BLOOD PRESSURE: 71 MMHG | HEIGHT: 67 IN | BODY MASS INDEX: 26.84 KG/M2 | HEART RATE: 81 BPM | OXYGEN SATURATION: 99 % | WEIGHT: 171 LBS | RESPIRATION RATE: 18 BRPM

## 2020-10-22 DIAGNOSIS — C50.412 MALIGNANT NEOPLASM OF UPPER-OUTER QUADRANT OF LEFT BREAST IN FEMALE, ESTROGEN RECEPTOR POSITIVE (HCC): ICD-10-CM

## 2020-10-22 DIAGNOSIS — Z95.828 PORT-A-CATH IN PLACE: Primary | ICD-10-CM

## 2020-10-22 DIAGNOSIS — Z17.0 MALIGNANT NEOPLASM OF UPPER-OUTER QUADRANT OF LEFT BREAST IN FEMALE, ESTROGEN RECEPTOR POSITIVE (HCC): ICD-10-CM

## 2020-10-22 DIAGNOSIS — M54.6 ACUTE LEFT-SIDED THORACIC BACK PAIN: Primary | ICD-10-CM

## 2020-10-22 DIAGNOSIS — D50.8 OTHER IRON DEFICIENCY ANEMIA: ICD-10-CM

## 2020-10-22 DIAGNOSIS — M25.512 ACUTE PAIN OF LEFT SHOULDER: ICD-10-CM

## 2020-10-22 DIAGNOSIS — K31.84 GASTROPARESIS: ICD-10-CM

## 2020-10-22 PROCEDURE — 99213 OFFICE O/P EST LOW 20 MIN: CPT | Performed by: NURSE PRACTITIONER

## 2020-10-22 PROCEDURE — 25010000003 HEPARIN LOCK FLUSH PER 10 UNITS: Performed by: NURSE PRACTITIONER

## 2020-10-22 PROCEDURE — 96523 IRRIG DRUG DELIVERY DEVICE: CPT

## 2020-10-22 RX ORDER — ATORVASTATIN CALCIUM 20 MG/1
TABLET, FILM COATED ORAL
COMMUNITY
Start: 2020-10-21 | End: 2021-02-02

## 2020-10-22 RX ORDER — SODIUM CHLORIDE 0.9 % (FLUSH) 0.9 %
10 SYRINGE (ML) INJECTION AS NEEDED
Status: CANCELLED | OUTPATIENT
Start: 2020-10-22

## 2020-10-22 RX ORDER — HEPARIN SODIUM (PORCINE) LOCK FLUSH IV SOLN 100 UNIT/ML 100 UNIT/ML
500 SOLUTION INTRAVENOUS AS NEEDED
Status: DISCONTINUED | OUTPATIENT
Start: 2020-10-22 | End: 2020-10-23 | Stop reason: HOSPADM

## 2020-10-22 RX ORDER — HEPARIN SODIUM (PORCINE) LOCK FLUSH IV SOLN 100 UNIT/ML 100 UNIT/ML
500 SOLUTION INTRAVENOUS AS NEEDED
Status: CANCELLED | OUTPATIENT
Start: 2020-10-22

## 2020-10-22 RX ORDER — SODIUM CHLORIDE 0.9 % (FLUSH) 0.9 %
10 SYRINGE (ML) INJECTION AS NEEDED
Status: DISCONTINUED | OUTPATIENT
Start: 2020-10-22 | End: 2020-10-23 | Stop reason: HOSPADM

## 2020-10-22 RX ADMIN — Medication 10 ML: at 13:45

## 2020-10-22 RX ADMIN — HEPARIN 500 UNITS: 100 SYRINGE at 13:45

## 2020-10-22 NOTE — PROGRESS NOTES
"      PROBLEM LIST:  1. Stage I breast cancer, left upper outer quadrant:   a) Original diagnosis 06/20/2010.   b) Left mastectomy with sentinel lymph node sampling and prophylactic right  mastectomy.   c) [id number] stage I.   d) Estrogen receptor and progesterone receptor positive and HER-2 negative  with low risk Oncotype.   e) Completed adjuvant endocrine therapy 07/08/2015.   2. Fibromyalgia and chronic pain.   3. Recurring iron deficiency anemia.   4. Gastroparesis.       CHIEF COMPLAINT:    Subjective     HISTORY OF PRESENT ILLNESS:   Mrs. Haro is here for follow up evaluation of history of breast cancer and iron deficiency anemia.  She reports a 2-month history of increasing left back, shoulder and axillary pain.  She states the pain is getting worse over the past month.  The pain wakes her up in the middle of the night.  She is unable to raise her arm above her head or carry her purse on her shoulder or lift grocery bags due to the pain.  She denies any cough or shortness of air.  No severe headaches or diplopia.      Past Medical History, Past Surgical History, Social History, Family History have been reviewed and are without significant changes except as mentioned.    Review of Systems   A comprehensive 14 point review of systems was performed and was negative except as mentioned.    Medications:  The current medication list was reviewed in the EMR    ALLERGIES:    Allergies   Allergen Reactions   • Gabapentin Other (See Comments)     Seizure   • Morphine And Related Itching and Swelling   • Aspirin GI Intolerance   • Ibuprofen GI Intolerance       Objective      BP 98/71   Pulse 81   Temp 97.7 °F (36.5 °C)   Resp 18   Ht 170.2 cm (67\")   Wt 77.6 kg (171 lb)   SpO2 99%   BMI 26.78 kg/m²    Vitals:    10/22/20 1308   PainSc: 6  Comment: (L) arm where lymph nodes were removed                 General: well appearing, in no acute distress   HEENT: sclera anicteric, oropharynx clear  Lymphatics: no " cervical, supraclavicular, or axillary adenopathy  Cardiovascular: regular rate and rhythm, no murmurs  Lungs: clear to auscultation bilaterally  Abdomen: soft, nontender, nondistended.  No palpable masses or organomegaly  Extremeties: no lower extremity edema, cords or calf tenderness  Skin: no rashes, lesions, bruising, or petechiae  Breasts: bilateral mastectomy incisions well healed with no masses or skin changes noted      RECENT LABS:  Hematology WBC   Date Value Ref Range Status   10/09/2020 3.76 3.40 - 10.80 10*3/mm3 Final     Hemoglobin   Date Value Ref Range Status   10/09/2020 12.1 12.0 - 15.9 g/dL Final     Hematocrit   Date Value Ref Range Status   10/09/2020 37.5 34.0 - 46.6 % Final     MCV   Date Value Ref Range Status   10/09/2020 83.5 79.0 - 97.0 fL Final     RDW   Date Value Ref Range Status   10/09/2020 13.5 12.3 - 15.4 % Final     MPV   Date Value Ref Range Status   10/09/2020 10.2 6.0 - 12.0 fL Final     Platelets   Date Value Ref Range Status   10/09/2020 148 140 - 450 10*3/mm3 Final     Immature Grans %   Date Value Ref Range Status   10/09/2020 0.3 0.0 - 0.5 % Final     Neutrophils, Absolute   Date Value Ref Range Status   10/09/2020 1.90 1.70 - 7.00 10*3/mm3 Final     Lymphocytes, Absolute   Date Value Ref Range Status   10/09/2020 1.28 0.70 - 3.10 10*3/mm3 Final     Monocytes, Absolute   Date Value Ref Range Status   10/09/2020 0.50 0.10 - 0.90 10*3/mm3 Final     Eosinophils, Absolute   Date Value Ref Range Status   10/09/2020 0.04 0.00 - 0.40 10*3/mm3 Final     Basophils, Absolute   Date Value Ref Range Status   10/09/2020 0.03 0.00 - 0.20 10*3/mm3 Final     Immature Grans, Absolute   Date Value Ref Range Status   10/09/2020 0.01 0.00 - 0.05 10*3/mm3 Final     nRBC   Date Value Ref Range Status   10/09/2020 0.0 0.0 - 0.2 /100 WBC Final       Glucose   Date Value Ref Range Status   10/09/2020 146 (H) 65 - 99 mg/dL Final     Sodium   Date Value Ref Range Status   10/09/2020 137 136 - 145  mmol/L Final     Potassium   Date Value Ref Range Status   10/09/2020 3.8 3.5 - 5.2 mmol/L Final     CO2   Date Value Ref Range Status   10/09/2020 27.0 22.0 - 29.0 mmol/L Final     Chloride   Date Value Ref Range Status   10/09/2020 102 98 - 107 mmol/L Final     Anion Gap   Date Value Ref Range Status   10/09/2020 8.0 5.0 - 15.0 mmol/L Final     Creatinine   Date Value Ref Range Status   10/09/2020 0.96 0.57 - 1.00 mg/dL Final     BUN   Date Value Ref Range Status   10/09/2020 14 6 - 20 mg/dL Final     BUN/Creatinine Ratio   Date Value Ref Range Status   10/09/2020 14.6 7.0 - 25.0 Final     Calcium   Date Value Ref Range Status   10/09/2020 9.0 8.6 - 10.5 mg/dL Final     Alkaline Phosphatase   Date Value Ref Range Status   10/07/2020 133 (H) 39 - 117 U/L Final     Total Protein   Date Value Ref Range Status   10/07/2020 8.5 6.0 - 8.5 g/dL Final     ALT (SGPT)   Date Value Ref Range Status   10/07/2020 18 1 - 33 U/L Final     AST (SGOT)   Date Value Ref Range Status   10/07/2020 31 1 - 32 U/L Final     Total Bilirubin   Date Value Ref Range Status   10/07/2020 0.7 0.0 - 1.2 mg/dL Final     Albumin   Date Value Ref Range Status   10/07/2020 5.10 3.50 - 5.20 g/dL Final     Globulin   Date Value Ref Range Status   10/07/2020 3.4 gm/dL Final     A/G Ratio   Date Value Ref Range Status   06/19/2015 1.6 0.7 - 2.0 Final       LDH   Date Value Ref Range Status   06/19/2015 174 120 - 246 Units/L Final          Assessment/Plan   IMPRESSION:   1. Anemia. Resolved.  She has had recurring iron deficiency anemia. She has responded to parenteral iron in the past. Last hemoglobin normal.   2. History of breast cancer. She has done well after adjuvant endocrine therapy.   She has recent left back, shoulder and axillary pain that has been getting worse over the past month.  I will obtain a bone scan to evaluate for bone metastasis related to her breast cancer.  If her bone scan is normal we will have to evaluate the left shoulder.  I  would like to do an MRI of the shoulder but cannot because she has a gastric stimulator.  I will contact patient with results of the bone scan when available.        PLAN:   1.  Bone scan within the next week.  I will contact her with results of the bone scan when available.  If the bone scan is normal I will evaluate the left shoulder with possible CT scan since she cannot have an MRI due to gastric stimulator.  2. Follow up in 1 year or sooner if new problems or findings develop. I have encouraged her to follow up with her PCP for routine health maintenance.                 Michelle Drake, [facility]    10/22/2020          CC:

## 2020-10-23 ENCOUNTER — TELEPHONE (OUTPATIENT)
Dept: FAMILY MEDICINE CLINIC | Facility: CLINIC | Age: 55
End: 2020-10-23

## 2020-10-23 NOTE — TELEPHONE ENCOUNTER
Pharmacy send fax about  ERYTHROMYCIN 200,  medication is not covered do you want to pre authorization or change the medicine.     AZITHROMYCIN AND CLINDAMYCIN ARE COVERED ALTERNATIVES.

## 2020-10-28 ENCOUNTER — HOSPITAL ENCOUNTER (OUTPATIENT)
Dept: NUCLEAR MEDICINE | Facility: HOSPITAL | Age: 55
Discharge: HOME | End: 2020-10-28

## 2020-10-28 DIAGNOSIS — M25.512 ACUTE PAIN OF LEFT SHOULDER: ICD-10-CM

## 2020-10-28 DIAGNOSIS — Z17.0 MALIGNANT NEOPLASM OF UPPER-OUTER QUADRANT OF LEFT BREAST IN FEMALE, ESTROGEN RECEPTOR POSITIVE (HCC): ICD-10-CM

## 2020-10-28 DIAGNOSIS — M54.6 ACUTE LEFT-SIDED THORACIC BACK PAIN: ICD-10-CM

## 2020-10-28 DIAGNOSIS — C50.412 MALIGNANT NEOPLASM OF UPPER-OUTER QUADRANT OF LEFT BREAST IN FEMALE, ESTROGEN RECEPTOR POSITIVE (HCC): ICD-10-CM

## 2020-10-28 PROCEDURE — 0 TECHNETIUM MEDRONATE KIT: Performed by: NURSE PRACTITIONER

## 2020-10-28 PROCEDURE — 78306 BONE IMAGING WHOLE BODY: CPT

## 2020-10-28 PROCEDURE — A9503 TC99M MEDRONATE: HCPCS | Performed by: NURSE PRACTITIONER

## 2020-10-28 RX ORDER — TC 99M MEDRONATE 20 MG/10ML
27.3 INJECTION, POWDER, LYOPHILIZED, FOR SOLUTION INTRAVENOUS
Status: COMPLETED | OUTPATIENT
Start: 2020-10-28 | End: 2020-10-28

## 2020-10-28 RX ADMIN — Medication 27.3 MILLICURIE: at 11:05

## 2020-10-28 NOTE — TELEPHONE ENCOUNTER
This is for gastroparesis. Not infection. The others will not work. Could we PA please. Also she needs to see her gi mottility expert in [place], she thinks her gastric stimulator is dead...not a lot we can do about that here

## 2020-10-28 NOTE — TELEPHONE ENCOUNTER
Denied today  You asked for the drug listed above for your Gastroparesis. [facility] follows Medicare rules. The Medicare rule in the Prescription Drug Manual (Chapter 6, Section 10.6) says an off-label use of a drug is a use that is not included on the drugs label as approved by the U.S. Food and Drug Administration (FDA). FDA approved drugs used for a disease other than what is on the official label may be covered under Medicare if [employer] determines the use to be medically accepted. [employer] makes these decisions on a case-by-case basis. We take into consideration the two major drug compendia. These compendia (or drug guides) are called the DRUGDEX Information System and the [facility] Form[facility] Drug Information (AHFS-DI). Off-label use is medically accepted when there is evidence in one or more of the compendia that it works for the disease in question. [employer] has determined that the off-label use of this drug for your condition is not medically accepted per Medicare rules and isn&apos;t covered based on available information.

## 2020-10-28 NOTE — TELEPHONE ENCOUNTER
shell gonzalez (Key: [id number]) - [id number]  Erythromycin Ethylsuccinate 200MG/5ML suspension  Status: SENT TO PLAN  Created: October 28th, 2020  Sent: October 28th, 2020

## 2020-10-30 ENCOUNTER — TELEPHONE (OUTPATIENT)
Dept: ONCOLOGY | Facility: CLINIC | Age: 55
End: 2020-10-30

## 2020-10-30 DIAGNOSIS — M25.512 LEFT SHOULDER PAIN, UNSPECIFIED CHRONICITY: ICD-10-CM

## 2020-10-30 DIAGNOSIS — R94.8 ABNORMAL RADIONUCLIDE BONE SCAN: Primary | ICD-10-CM

## 2020-10-30 NOTE — TELEPHONE ENCOUNTER
Called patient to review bone scan results. IMPRESSION:  Focus of uptake of tracer seen in the right anterior second rib which may represent a fracture. There is uptake of tracer in the maxilla and mandible suggesting possible odontogenic origin. No uptake of tracer to suggest evidence of bony metastatic disease within the bony skeleton. Continued follow up recommended as indicated.    She thinks she broke a rib after excessive vomiting. We will plan on repeat bone scan in 6 months for follow up.     In regards to left shoulder pain the bone scan shows nothing that looks like cancer. I will refer her to orthopedics for further evaluation.

## 2020-11-03 ENCOUNTER — OFFICE VISIT (OUTPATIENT)
Dept: ORTHOPEDIC SURGERY | Facility: CLINIC | Age: 55
End: 2020-11-03

## 2020-11-03 VITALS — OXYGEN SATURATION: 99 % | BODY MASS INDEX: 26.85 KG/M2 | WEIGHT: 171.08 LBS | HEART RATE: 86 BPM | HEIGHT: 67 IN

## 2020-11-03 DIAGNOSIS — IMO0002 DISORDER OF ROTATOR CUFF SYNDROME OF LEFT SHOULDER AND ALLIED DISORDER: ICD-10-CM

## 2020-11-03 DIAGNOSIS — M25.512 LEFT SHOULDER PAIN, UNSPECIFIED CHRONICITY: Primary | ICD-10-CM

## 2020-11-03 PROCEDURE — 99213 OFFICE O/P EST LOW 20 MIN: CPT | Performed by: ORTHOPAEDIC SURGERY

## 2020-11-03 NOTE — PROGRESS NOTES
[facility]        Subjective     Pain of the Left Shoulder      HPI    Geneva Haro is a 55 y.o. female who presents with new problem of: left shoulder pain.  Onset: atraumatic and gradual in nature. The issue has been ongoing for 4 month(s). Pain is a 7/10 on the pain scale. Pain is described as stabbing. Associated symptoms include pain, swelling and stiffness. The pain is worse with sleeping, lying on affected side and any movement of the joint; heat improve the pain. Previous treatments have included: nothing.    I have reviewed the following portions of the patient's history:History of Present Illness and review of systems.      Patient is here today for a 4-month history of worsening left shoulder pain.  7 years ago, she had a lymph node dissection in her left axilla.  She says over the last 4 months, she has stabbing pain anteriorly and posteriorly.  No history of any recent trauma.  She has difficulty with stiffness in the morning.  She has had no treatment thus far.  She has a history of fibromyalgia and is wondering if that could be the issue.    Past Medical History:   Diagnosis Date   • Anxiety    • Arthritis    • Depression    • Diabetes mellitus (CMS/HCC)    • Gall stones    • History of stomach ulcers    • Stomach problems       Past Surgical History:   Procedure Laterality Date   • CHOLECYSTECTOMY     • ENDOSCOPY N/A 6/20/2018    Procedure: ESOPHAGOGASTRODUODENOSCOPY;  Surgeon: Mark I Brunner, MD;  Location: [facility] ENDOSCOPY;  Service: Gastroenterology   • GASTRIC BYPASS      x2   • HERNIA REPAIR     • MASTECTOMY Bilateral     Left With sentinel lymph node sampling and prophylactic right mastectomy   • PACEMAKER IMPLANTATION     • PORTACATH PLACEMENT        Family History   Problem Relation Age of Onset   • Breast cancer Maternal Aunt    • Pancreatic cancer Cousin    • Breast cancer Cousin    • Breast cancer Cousin      Social History     Socioeconomic History      • Marital status:      Spouse name: Not on file   • Number of children: Not on file   • Years of education: Not on file   • Highest education level: Not on file   Tobacco Use   • Smoking status: Current Every Day Smoker   • Smokeless tobacco: Never Used   Substance and Sexual Activity   • Alcohol use: No   • Drug use: Yes     Types: Marijuana   • Sexual activity: Defer      Current Outpatient Medications on File Prior to Visit   Medication Sig Dispense Refill   • atorvastatin (LIPITOR) 20 MG tablet      • cetirizine (zyrTEC) 10 MG tablet Take 10 mg by mouth Daily.     • cholecalciferol (VITAMIN D3) 25 MCG (1000 UT) tablet Take 1 tablet by mouth Daily. 30 tablet 0   • citalopram (CeleXA) 20 MG tablet Take 20 mg by mouth daily.     • DULoxetine (CYMBALTA) 30 MG capsule Take 30 mg by mouth 2 (Two) Times a Day.     • erythromycin ethylsuccinate (EES) 200 MG/5ML suspension Take 5 mL by mouth 4 (Four) Times a Day With Meals & at Bedtime. 500 mL 3   • Ginger, Zingiber officinalis, (Ginger Root) 500 MG capsule Take 500 mg by mouth 3 (Three) Times a Day Before Meals. 90 capsule 0   • metoclopramide (REGLAN) 10 MG tablet Take 1 tablet by mouth 4 (Four) Times a Day Before Meals & at Bedtime As Needed (N/V) for up to 12 doses. 12 tablet 0   • omeprazole (priLOSEC) 40 MG capsule Take 40 mg by mouth Daily.     • promethazine (PHENERGAN) 12.5 MG tablet Take 1 tablet by mouth Every 6 (Six) Hours As Needed for Nausea or Vomiting. 30 tablet 0   • promethazine (PHENERGAN) 25 MG suppository Insert 1 suppository into the rectum Every 6 (Six) Hours As Needed for Nausea or Vomiting for up to 12 doses. 12 suppository 0     Current Facility-Administered Medications on File Prior to Visit   Medication Dose Route Frequency Provider Last Rate Last Dose   • pneumococcal polysaccharide 23-valent (PNEUMOVAX-23) vaccine 0.5 mL  0.5 mL Intramuscular During Hospitalization Cesar Gao MD          Allergies   Allergen Reactions   •  "Gabapentin Other (See Comments)     Seizure   • Morphine And Related Itching and Swelling   • Aspirin GI Intolerance   • Ibuprofen GI Intolerance          Review of Systems   Constitutional: Negative.    HENT: Negative.    Eyes: Negative.    Respiratory: Negative.    Cardiovascular: Negative.    Gastrointestinal: Negative.    Endocrine: Negative.    Genitourinary: Negative.    Musculoskeletal: Positive for arthralgias.   Skin: Negative.    Allergic/Immunologic: Negative.    Neurological: Negative.    Hematological: Negative.    Psychiatric/Behavioral: Negative.         I reviewed the patient's chief complaint, history of present illness, review of systems, past medical history, surgical history, family history, social history, medications and allergy list.        Objective      Physical Exam  Pulse 86   Ht 170.2 cm (67.01\")   Wt 77.6 kg (171 lb 1.2 oz)   SpO2 99%   BMI 26.79 kg/m²     Body mass index is 26.79 kg/m².    General  Mental Status - alert  General Appearance - cooperative, well groomed, not in acute distress  Orientation - Oriented X3  Build & Nutrition - well developed and well nourished  Posture - normal posture  Gait - normal gait     Integumentary  Global Assessment  Examination of related systems reveals - no lymphadenopathy  Ears:  No abnormality  Nose:  No mucous drainage  General Characteristics  Overall examination of the patient's skin reveals - no rashes, no evidence of scars, no suspicious lesions and no bruises.  Color - normal coloration of skin.  Vascular: Brisk capillary refill in all extremities    Ortho Exam  Musculoskeletal   Upper Extremity   Left Shoulder     Inspection and Palpation:     Medial Border Scapular Tenderness-moderate    AC Joint Tenderness -moderate    Sensation is normal    Examination reveals no ecchymosis.        Strength and Tone:    Supraspinatus - 5/5    External Rotators-5/5    Infraspinatus - 5/5    Subscapularis - 4/5    Deltoid - 5/5     Range of Motion     "    Left Shoulder:    Internal Rotation: ROM - L4    External Rotation: AROM - 60 degrees    Elevation through flexion: AROM - 140 degrees        Impingement   Left shoulder    [place] impingement test positive    Neer impingement test positive     Functional Testing   Left shoulder    AC crossover adduction test negative    Speeds test negative    Uppercut test negative    O'[place]riens test negative    Drop arm sign negative    Apprehension relocation negative        Imaging/Studies  Imaging Results (Last 24 Hours)     Procedure Component Value Units Date/Time    XR Shoulder 2+ View Left [[id number]] Resulted: 11/03/20 1448     Updated: 11/03/20 1450    Narrative:      Left Shoulder X-Ray    Indication: Pain    Study:  AP, axillary lateral, and scapular Y views    Comparison: None    Findings:  No acute fractures are visualized  No bony lesions are visualized.  Normal soft tissue appearance  AC joint: Moderate to severe joint space narrowing  Glenohumeral joint: Minimal joint space narrowing  Acromion type: 2      Impression:    No acute bony abnormalities noted  Type II acromion  Moderate to severe AC joint space narrowing              Assessment    Assessment:  1. Left shoulder pain, unspecified chronicity    2. Disorder of rotator cuff syndrome of left shoulder and allied disorder        Plan:  1. Continue over-the-counter medication as needed for discomfort  2. Chronic left shoulder pain in the face of fibromyalgia--patient cannot have an MRI because of a stimulator.  I am hopeful that a course of physical therapy will do the trick.  We will send her out to [facility] in [place].  I will see her back in about 3 months we will see how she doing overall.  Her range of motion is excellent today.  I do not detect any cuff weakness either with exception of some slight subscap weakness.        Abdulkadir Estrada MD  11/03/20  14:59 EST    Dragon disclaimer:  Much of this encounter note is an electronic  transcription/translation of spoken language to printed text. The electronic translation of spoken language may permit erroneous, or at times, nonsensical words or phrases to be inadvertently transcribed; Although I have reviewed the note for such errors, some may still exist.

## 2020-11-09 RX ORDER — DULOXETIN HYDROCHLORIDE 30 MG/1
30 CAPSULE, DELAYED RELEASE ORAL 2 TIMES DAILY
Qty: 180 CAPSULE | Refills: 0 | Status: SHIPPED | OUTPATIENT
Start: 2020-11-09 | End: 2021-02-06 | Stop reason: SDUPTHER

## 2020-11-09 NOTE — TELEPHONE ENCOUNTER
Caller: Geneva Haro Janae    Relationship: Self    Best call back number: [phone]    Medication needed:   Requested Prescriptions     Pending Prescriptions Disp Refills   • DULoxetine (CYMBALTA) 30 MG capsule        Sig: Take 1 capsule by mouth 2 (Two) Times a Day.       When do you need the refill by: 11-    What details did the patient provide when requesting the medication:PATIENT OUT OF MEDS SINCE OVER THE WEEKEND; PREV DOCTOR HAD PRESCRIBED BUT NEEDS NEW PRESCRIPTION WITH DR PENN NAME TO BE CALLED IN, PLEASE AND THANK YOU    Does the patient have less than a 3 day supply:  [x] Yes  [] No    What is the patient's preferred pharmacy: [address] [place] [address] [phone] [facility] [phone]

## 2020-11-10 RX ORDER — DULOXETIN HYDROCHLORIDE 30 MG/1
30 CAPSULE, DELAYED RELEASE ORAL 2 TIMES DAILY
Qty: 180 CAPSULE | Refills: 0 | OUTPATIENT
Start: 2020-11-10

## 2020-11-10 NOTE — TELEPHONE ENCOUNTER
Caller: LACEY MONIQUE    Relationship: Spouse    Best call back number: [phone]  Medication needed:   Requested Prescriptions     Pending Prescriptions Disp Refills   • DULoxetine (CYMBALTA) 30 MG capsule 180 capsule 0     Sig: Take 1 capsule by mouth 2 (Two) Times a Day.       When do you need the refill by: ASAP     What details did the patient provide when requesting the medication: PATIENT IS OUT OF MEDICATION -    REQUESTED A CALL WHEN THIS HAS BEEN SENT TO PHARMACY.     Does the patient have less than a 3 day supply:  [x] Yes  [] No    What is the patient's preferred pharmacy:   [facility] [address] [address] [place] [address] [phone]

## 2020-11-16 RX ORDER — CITALOPRAM 20 MG/1
20 TABLET ORAL DAILY
Qty: 90 TABLET | Refills: 1 | Status: SHIPPED | OUTPATIENT
Start: 2020-11-16 | End: 2021-06-16

## 2020-11-16 RX ORDER — CETIRIZINE HYDROCHLORIDE 10 MG/1
10 TABLET ORAL DAILY
Qty: 90 TABLET | Refills: 1 | Status: SHIPPED | OUTPATIENT
Start: 2020-11-16 | End: 2021-02-09

## 2020-11-16 NOTE — TELEPHONE ENCOUNTER
Caller: Geneva Haro    Relationship: Self    Best call back number: [phone]    Medication needed:     citalopram (CeleXA) 20 MG tablet  cetirizine (zyrTEC) 10 MG tablet      When do you need the refill by: TODAY    What details did the patient provide when requesting the medication: PATIENT STATES THAT SHE COMPLETELY OUT OF BOTH MEDICATIONS LISTED ABOVE    Does the patient have less than a 3 day supply:  [x] Yes  [] No    What is the patient's preferred pharmacy:      [facility] [address] [place] [address] [phone] [facility] [phone]

## 2020-11-30 ENCOUNTER — TREATMENT (OUTPATIENT)
Dept: PHYSICAL THERAPY | Facility: CLINIC | Age: 55
End: 2020-11-30

## 2020-11-30 DIAGNOSIS — M25.512 BILATERAL SHOULDER PAIN, UNSPECIFIED CHRONICITY: Primary | ICD-10-CM

## 2020-11-30 DIAGNOSIS — M25.511 BILATERAL SHOULDER PAIN, UNSPECIFIED CHRONICITY: Primary | ICD-10-CM

## 2020-11-30 PROCEDURE — 97162 PT EVAL MOD COMPLEX 30 MIN: CPT | Performed by: PHYSICAL THERAPIST

## 2020-11-30 NOTE — PROGRESS NOTES
"    Physical Therapy Initial Evaluation and Plan of Care    Patient: Geneva Haro   : 1965  Diagnosis/ICD-10 Code:  No primary diagnosis found.  Referring practitioner: Abdulkadir Estrada,*  Date of Initial Visit: 2020  Today's Date: 2020  Patient seen for Visit count could not be calculated. Make sure you are using a visit which is associated with an episode. sessions           Subjective Questionnaire: QuickDASH: 61      Subjective Evaluation    History of Present Illness  Date of onset: 10/27/2020  Mechanism of injury: Pt states insidious onset of right shoulder pain began a few months ago. She denies previous injury, states she has h/o fibromyalgia, has noted constant pain recently, gets burning and tingling down right arm and into all digits. Pt states she \"makes herself do all home tasks but has pain with getting laundry out, needs help hanging clothing on line.\" She states her right shoulder pain began around the time she receieved her flu shot this year.     Subjective comment: Pt states right shoulder pain is primary issue on arrival, states it was her left shoulder but now right shoulder is more painful.   Patient Occupation: Pt isn't working currently. States she is indep with home tasks, notes pain will ADLs.  Quality of life: fair    Pain  Current pain ratin  At best pain ratin  At worst pain ratin  Relieving factors: change in position  Aggravating factors: movement, lifting, overhead activity, sleeping and outstretched reach    Social Support  Lives with: significant other    Hand dominance: right    Diagnostic Tests  X-ray: normal    Patient Goals  Patient goals for therapy: decreased pain             Objective        Special Questions  Patient is experiencing disturbed sleep.       Postural Observations  Seated posture: poor        Neurological Testing     Sensation   Cervical/Thoracic   Left   Intact: light touch    Right   Intact: light touch    Active " Range of Motion   Cervical/Thoracic Spine   Cervical    Flexion: 30 degrees   Extension: 37 degrees   Left lateral flexion: 29 degrees   Right lateral flexion: 13 degrees with pain  Left rotation: 79 degrees   Right rotation: 58 degrees   Left Shoulder   Flexion: 150 degrees   Abduction: 150 degrees   External rotation 0°: WFL  External rotation BTH: C7   Internal rotation BTB: T10     Right Shoulder   Flexion: 82 degrees with pain  Abduction: 94 degrees with pain  Internal rotation BTB: L5 (increased tingling)     Strength/Myotome Testing     Left Shoulder   Normal muscle strength    Right Shoulder     Planes of Motion   Flexion: 3-   Abduction: 3-   External rotation at 0°: 4-   Internal rotation at 0°: 4     Isolated Muscles   Biceps: 4     Additional Strength Details  R shoulder MMT limited by pain, generalized and diffuse right upper arm pain.     Tests     Right Shoulder   Positive full can, Hawkin's and Neer's.   Negative Speed's.           Assessment & Plan     Assessment  Impairments: abnormal or restricted ROM, activity intolerance, impaired physical strength, lacks appropriate home exercise program and pain with function  Assessment details: Pt is a 54 yo female referred to PT for unspecified left shoulder pain who presents with right shoulder pain during evaluation. She demonstrates limited right shoulder AROM, decreased strength, and pain with all mobility, vague anterior GH joint pain noted. Poor scapular mechanics with attempted overhead lift past 90 degrees. Recommend skilled PT to improve global ROM, RC strength, and scapular stabilization. Pt has h/o fibromyalgia as well.   Barriers to therapy: chronic pain  Prognosis: fair  Functional Limitations: carrying objects, lifting, pulling, pushing, uncomfortable because of pain, reaching behind back and reaching overhead  Goals  Plan Goals: Short Term Goals (2wks)  1. Patient to report right shoulder pain less than or equal to 4-5/10.  2. Patient to  demonstrate at least 140 degrees of AROM right shoulder flexion.  3. Patient will be independent and compliant with initial home exercise program.   4. Pt will report resting pain 0-1/10, 2-3/10 with PROM.      Long Term Goals (4wks)  1. Patient to demonstrate at least 160 degrees of right shoulder flexion AROM.  2. Patient to achieve right hand behind head to at least C7 without significant change in pain.  3. Pt will perform right hand behind back to at least L3 to improve ability to tuck in shirt.  4. Pt. will be independent and compliant with advanced home exercise program to facilitate self-management of symptoms.  5. Patient will improve Quick Dash score by 15 points to demonstrate improved function of daily tasks.          Plan  Therapy options: will be seen for skilled physical therapy services  Planned modality interventions: cryotherapy, dry needling, electrical stimulation/Russian stimulation and thermotherapy (hydrocollator packs)  Planned therapy interventions: functional ROM exercises, home exercise program, joint mobilization, manual therapy, neuromuscular re-education, postural training, soft tissue mobilization, spinal/joint mobilization, strengthening, stretching and therapeutic activities  Frequency: 1x week  Duration in visits: 4  Treatment plan discussed with: patient  Plan details: Assess compliance with initial HEP for improved mobility. Progress in clinic with scapular stabilization next visit along with AAROM, AROM as tolerated.         Manual Therapy:    0     mins  01250;  Therapeutic Exercise:    0     mins  77677;     Neuromuscular Cyndie:    0    mins  85102;    Therapeutic Activity:     0     mins  50240;     Gait Trainin     mins  50102;     Ultrasound:     0     mins  18131;    Electrical Stimulation:    0     mins  30305 ( );  Dry Needling     0     mins self-pay    Timed Treatment:   50   mins   Total Treatment:     50   mins    PT SIGNATURE: Corinne E. Perkins,  PT   DATE TREATMENT INITIATED: 11/30/2020    Initial Certification  Certification Period: 2/28/2021  I certify that the therapy services are furnished while this patient is under my care.  The services outlined above are required by this patient, and will be reviewed every 90 days.     PHYSICIAN: Abdulkadir Estrada MD      DATE:     Please sign and return via fax to [phone].. Thank you, [facility] Physical Therapy.

## 2020-12-03 ENCOUNTER — HOSPITAL ENCOUNTER (OUTPATIENT)
Dept: ONCOLOGY | Facility: HOSPITAL | Age: 55
Setting detail: INFUSION SERIES
Discharge: HOME | End: 2020-12-03

## 2020-12-03 VITALS
HEIGHT: 67 IN | DIASTOLIC BLOOD PRESSURE: 81 MMHG | TEMPERATURE: 96.5 F | WEIGHT: 166 LBS | BODY MASS INDEX: 26.06 KG/M2 | RESPIRATION RATE: 18 BRPM | HEART RATE: 109 BPM | SYSTOLIC BLOOD PRESSURE: 125 MMHG

## 2020-12-03 DIAGNOSIS — K31.84 GASTROPARESIS: ICD-10-CM

## 2020-12-03 DIAGNOSIS — Z95.828 PORT-A-CATH IN PLACE: Primary | ICD-10-CM

## 2020-12-03 DIAGNOSIS — D50.8 OTHER IRON DEFICIENCY ANEMIA: ICD-10-CM

## 2020-12-03 PROCEDURE — 96523 IRRIG DRUG DELIVERY DEVICE: CPT

## 2020-12-03 PROCEDURE — 25010000003 HEPARIN LOCK FLUSH PER 10 UNITS: Performed by: NURSE PRACTITIONER

## 2020-12-03 RX ORDER — SODIUM CHLORIDE 0.9 % (FLUSH) 0.9 %
10 SYRINGE (ML) INJECTION AS NEEDED
Status: DISCONTINUED | OUTPATIENT
Start: 2020-12-03 | End: 2020-12-04 | Stop reason: HOSPADM

## 2020-12-03 RX ORDER — HEPARIN SODIUM (PORCINE) LOCK FLUSH IV SOLN 100 UNIT/ML 100 UNIT/ML
500 SOLUTION INTRAVENOUS AS NEEDED
Status: DISCONTINUED | OUTPATIENT
Start: 2020-12-03 | End: 2020-12-04 | Stop reason: HOSPADM

## 2020-12-03 RX ORDER — SODIUM CHLORIDE 0.9 % (FLUSH) 0.9 %
10 SYRINGE (ML) INJECTION AS NEEDED
Status: CANCELLED | OUTPATIENT
Start: 2020-12-03

## 2020-12-03 RX ORDER — HEPARIN SODIUM (PORCINE) LOCK FLUSH IV SOLN 100 UNIT/ML 100 UNIT/ML
500 SOLUTION INTRAVENOUS AS NEEDED
Status: CANCELLED | OUTPATIENT
Start: 2020-12-03

## 2020-12-03 RX ADMIN — SODIUM CHLORIDE, PRESERVATIVE FREE 10 ML: 5 INJECTION INTRAVENOUS at 14:17

## 2020-12-03 RX ADMIN — HEPARIN 500 UNITS: 100 SYRINGE at 14:17

## 2020-12-09 ENCOUNTER — TREATMENT (OUTPATIENT)
Dept: PHYSICAL THERAPY | Facility: CLINIC | Age: 55
End: 2020-12-09

## 2020-12-09 DIAGNOSIS — M25.512 BILATERAL SHOULDER PAIN, UNSPECIFIED CHRONICITY: Primary | ICD-10-CM

## 2020-12-09 DIAGNOSIS — M25.511 BILATERAL SHOULDER PAIN, UNSPECIFIED CHRONICITY: Primary | ICD-10-CM

## 2020-12-09 PROCEDURE — 97530 THERAPEUTIC ACTIVITIES: CPT | Performed by: PHYSICAL THERAPIST

## 2020-12-09 PROCEDURE — 97110 THERAPEUTIC EXERCISES: CPT | Performed by: PHYSICAL THERAPIST

## 2020-12-09 NOTE — PATIENT INSTRUCTIONS
HEP: UT stretch, SCM stretch, mid rows motion without theraband, walk away stretch, scap squeeze.

## 2020-12-09 NOTE — PROGRESS NOTES
Physical Therapy Daily Progress Note    Date: 2020    Patient: Geneva Haro  Medical Record #: [id number]  Referring Provider: Abdulkadir Estrada*    Visit Diagnosis/ICD-10 Code: Bilateral shoulder pain, unspecified chronicity [M25.511, M25.512]  Patient seen for 2 sessions    Subjective   Patient states RUE pain has progressed to include her neck and UT. She reports she is very tender along arm and neck, even with light touch. States she is able to sleep on that side.     Pain Scale (0-10): 5/10      Objective    Hypersensitivity along right UT to light touch noted. Pt verbalized increased numbness and tingling in RUE after light touch to UT.   See Exercise, Manual and Modality logs for complete treatment.    Objective     Treatment/Procedures/Modalities:   Manual Therapy: 0 Minutes  Therapeutic Exercise: 19 Minutes   Neuromuscular Re-Education: 0 Minutes   Therapeutic Activity: 10 Minutes  Gait Trainin Minutes   Moist Heat:    Ice:    E-Stim:    Ultrasound:    Ionto:   Traction:      Timed Code Treatment: 29 Minutes  Total Treatment Time: 39 Minutes    Assessment / Plan:   Assessment/Plan   Pt unable to tolerate much exercise or stretching due to sharp increase in pain along generalized right shoulder and neck. She does have h/o bilateral mastectomy, fibromyalgia, and chronic pain. She tolerated limited exercise, educated on HEP for improved compliance. Pt gets relief with MHP. Unable to tolerate manual techniques today.     Progress per Plan of Care as tolerated. Assess compliance with HEP. Progress standing and seated exercise, tolerated better than supine or hooklying exercise due to chronic pain.       Corinne E. Perkins, PT  Physical Therapist

## 2020-12-11 NOTE — TELEPHONE ENCOUNTER
Scripts pending in this encounter.    Patient seen and examined at bedside independently of PA and agree with the H/P unless otherwise stated     1) Acute on Chronic Respiratory failure with hypercapnia   -Bipap/AVAP --- wean to high flow as tolerated (highly doubt for now though)  -appears distressed in ED; ICU denied; with overall poor prognosis with advanced metastatic malignancy     2) Metastatic breast cancer/Advanced  -overall prognosis extremely poor   -labs noted --- abnormal    3) Chronic Pulmonary Embolism  -on long term anticoagulant lovenox     DNR/DNI  prognosis extremely poor/grave   -pt would benefit from hospice eval ( refused hospice last admission and did not want to talk about comfort care until he himself feels the need for it)    # Progress Note Handoff  PENDING as follows  consults: Pulmonary consult   Test: daily labs and chest xray   Family discussion:  unavailable at bedside in ED; but discussed over the phone and aware that wife is unresponsive with very abnormal labs  Disposition: extremely poor prognosis; may pass inpatient     Attending Physician Dr. Adriana Michele # [id number]

## 2020-12-31 ENCOUNTER — TELEPHONE (OUTPATIENT)
Dept: PHYSICAL THERAPY | Facility: CLINIC | Age: 55
End: 2020-12-31

## 2021-01-06 ENCOUNTER — TREATMENT (OUTPATIENT)
Dept: PHYSICAL THERAPY | Facility: CLINIC | Age: 56
End: 2021-01-06

## 2021-01-06 DIAGNOSIS — M25.511 BILATERAL SHOULDER PAIN, UNSPECIFIED CHRONICITY: Primary | ICD-10-CM

## 2021-01-06 DIAGNOSIS — M25.512 BILATERAL SHOULDER PAIN, UNSPECIFIED CHRONICITY: Primary | ICD-10-CM

## 2021-01-06 PROCEDURE — 97110 THERAPEUTIC EXERCISES: CPT | Performed by: PHYSICAL THERAPIST

## 2021-01-06 PROCEDURE — 97530 THERAPEUTIC ACTIVITIES: CPT | Performed by: PHYSICAL THERAPIST

## 2021-01-06 NOTE — PROGRESS NOTES
Physical Therapy Re- Assessment/Certification and Discharge Note    Date: 2021    Patient: Geneva Haro  : 1965  Medical Record #: [id number]  Referring Provider: Abdulkadir Estrada*    Visit Diagnosis/ICD-10 Code: Bilateral shoulder pain, unspecified chronicity [M25.511, M25.512]  Patient seen for 3 sessions     Subjective   Patient states she hasn't been feeling well, reports GI issues prevented her from attending therapy a few times. Pt states her shoulder pain has improved along with improved motion; however states she is having constant numbness and tingling down RUE. She reports she would like to d/c from therapy today.     Pain Scale (0-10): 4/10  Functional Outcome Measure: QuickDASH: 43%  Clinical Progress: improved  Home Program Compliance: No  Treatment has included: therapeutic exercise and moist heat    Objective        Special Questions  Patient is experiencing disturbed sleep.       Postural Observations  Seated posture: poor        Palpation   Left   Tenderness of the upper trapezius.     Right Tenderness of the upper trapezius.     Additional Palpation Details  Hypersensitivity along bilateral UT, anterior GHJ.     Neurological Testing     Sensation   Cervical/Thoracic   Left   Intact: light touch    Right   Intact: light touch    Active Range of Motion   Cervical/Thoracic Spine   Cervical    Flexion: 30 degrees   Extension: 37 degrees   Left lateral flexion: 29 degrees   Right lateral flexion: 13 degrees with pain  Left rotation: 79 degrees   Right rotation: 58 degrees   Left Shoulder   Flexion: 150 degrees   Abduction: 150 degrees   External rotation 0°: WFL  External rotation BTH: C7   Internal rotation BTB: T10     Right Shoulder   Flexion: 158 degrees   Abduction: 144 degrees   External rotation 0°: 87 degrees   External rotation BTH: C7   Internal rotation BTB: T10     Strength/Myotome Testing     Left Shoulder   Normal muscle strength    Right Shoulder     Planes  of Motion   Flexion: 4-   Abduction: 4-   External rotation at 0°: 4-   Internal rotation at 0°: 4     Isolated Muscles   Biceps: 4     Additional Strength Details  R shoulder MMT limited by pain, generalized and diffuse right upper arm pain.         Treatment/Procedures/Modalities:   Manual Therapy: 0 Minutes  Therapeutic Exercise: 26 Minutes   Neuromuscular Re-Education: 0 Minutes   Therapeutic Activity: 13 Minutes  Gait Trainin Minutes   Moist Heat:    Ice:    E-Stim:    Ultrasound:    Ionto:   Traction:      Timed Code Treatment: 39 Minutes  Total Treatment Time: 39 Minutes    Goals:   Progress toward previous goals: Partially Met  Recommendations: Discharge    Assessment & Plan     Assessment  Impairments: activity intolerance, impaired physical strength and pain with function  Assessment details: Reassessment completed today in clinic for Mrs. Haro, referred to PT for left and right shoulder pain. She demonstrates significant improvement in R shoulder AROM and is progressing with improved generalized strength; however new reports of constant numbness and tingling throughout whole RUE, no particular nerve pattern noted. She verbalized desire to d/c from therapy at this time. Pt encouraged to f/u with ortho MD if numbness/tingling progresses.   Barriers to therapy: chronic pain  Prognosis: fair  Functional Limitations: carrying objects, lifting, pulling, pushing, uncomfortable because of pain, reaching behind back and reaching overhead  Goals  Plan Goals: Short Term Goals (2wks)  1. Patient to report right shoulder pain less than or equal to 4-5/10. MET  2. Patient to demonstrate at least 140 degrees of AROM right shoulder flexion. MET  3. Patient will be independent and compliant with initial home exercise program. MET  4. Pt will report resting pain 0-1/10, 2-3/10 with PROM. MET      Long Term Goals (4wks)  1. Patient to demonstrate at least 160 degrees of right shoulder flexion AROM. MET  2. Patient to  achieve right hand behind head to at least C7 without significant change in pain. MET  3. Pt will perform right hand behind back to at least L3 to improve ability to tuck in shirt.MET  4. Pt. will be independent and compliant with advanced home exercise program to facilitate self-management of symptoms. NOT MET  5. Patient will improve Quick Dash score by 15 points to demonstrate improved function of daily tasks. NOT MET          Plan  Treatment plan discussed with: patient  Plan details: Discharge from skilled PT to home with current HEP.         I certify that the therapy services are furnished while this patient is under my care.  The services outlined above are required by this patient, and will be reviewed every 90 days.    Corinne E. Perkins, PT  Physical Therapist    Please sign and return via fax to [phone].. Thank you, [facility] Physical Therapy.

## 2021-01-21 ENCOUNTER — APPOINTMENT (OUTPATIENT)
Dept: ONCOLOGY | Facility: HOSPITAL | Age: 56
End: 2021-01-21

## 2021-01-25 ENCOUNTER — APPOINTMENT (OUTPATIENT)
Dept: ONCOLOGY | Facility: HOSPITAL | Age: 56
End: 2021-01-25

## 2021-02-02 ENCOUNTER — OFFICE VISIT (OUTPATIENT)
Dept: FAMILY MEDICINE CLINIC | Facility: CLINIC | Age: 56
End: 2021-02-02

## 2021-02-02 ENCOUNTER — HOSPITAL ENCOUNTER (OUTPATIENT)
Dept: ONCOLOGY | Facility: HOSPITAL | Age: 56
Setting detail: INFUSION SERIES
Discharge: HOME | End: 2021-02-02

## 2021-02-02 VITALS
BODY MASS INDEX: 27.28 KG/M2 | HEART RATE: 98 BPM | HEIGHT: 67 IN | SYSTOLIC BLOOD PRESSURE: 120 MMHG | DIASTOLIC BLOOD PRESSURE: 62 MMHG | WEIGHT: 173.8 LBS | OXYGEN SATURATION: 97 %

## 2021-02-02 VITALS
RESPIRATION RATE: 18 BRPM | BODY MASS INDEX: 27.15 KG/M2 | HEIGHT: 67 IN | HEART RATE: 113 BPM | WEIGHT: 173 LBS | DIASTOLIC BLOOD PRESSURE: 80 MMHG | TEMPERATURE: 97.2 F | SYSTOLIC BLOOD PRESSURE: 113 MMHG

## 2021-02-02 DIAGNOSIS — R25.2 MUSCLE CRAMPS: ICD-10-CM

## 2021-02-02 DIAGNOSIS — K31.84 GASTROPARESIS: ICD-10-CM

## 2021-02-02 DIAGNOSIS — M79.671 RIGHT FOOT PAIN: ICD-10-CM

## 2021-02-02 DIAGNOSIS — K31.84 GASTROPARESIS: Primary | ICD-10-CM

## 2021-02-02 DIAGNOSIS — D50.8 OTHER IRON DEFICIENCY ANEMIA: ICD-10-CM

## 2021-02-02 DIAGNOSIS — Z95.828 PORT-A-CATH IN PLACE: Primary | ICD-10-CM

## 2021-02-02 PROCEDURE — 90686 IIV4 VACC NO PRSV 0.5 ML IM: CPT | Performed by: INTERNAL MEDICINE

## 2021-02-02 PROCEDURE — 25010000003 HEPARIN LOCK FLUSH PER 10 UNITS: Performed by: NURSE PRACTITIONER

## 2021-02-02 PROCEDURE — 99213 OFFICE O/P EST LOW 20 MIN: CPT | Performed by: INTERNAL MEDICINE

## 2021-02-02 PROCEDURE — 96523 IRRIG DRUG DELIVERY DEVICE: CPT

## 2021-02-02 PROCEDURE — 90471 IMMUNIZATION ADMIN: CPT | Performed by: INTERNAL MEDICINE

## 2021-02-02 RX ORDER — BACLOFEN 10 MG/1
10 TABLET ORAL 3 TIMES DAILY PRN
Qty: 90 TABLET | Refills: 1 | Status: SHIPPED | OUTPATIENT
Start: 2021-02-02 | End: 2021-05-10 | Stop reason: SDUPTHER

## 2021-02-02 RX ORDER — SODIUM CHLORIDE 0.9 % (FLUSH) 0.9 %
10 SYRINGE (ML) INJECTION AS NEEDED
Status: CANCELLED | OUTPATIENT
Start: 2021-02-02

## 2021-02-02 RX ORDER — MAGNESIUM OXIDE 400 MG/1
400 TABLET ORAL DAILY
Qty: 30 TABLET | Refills: 6 | Status: SHIPPED | OUTPATIENT
Start: 2021-02-02 | End: 2021-07-06

## 2021-02-02 RX ORDER — HEPARIN SODIUM (PORCINE) LOCK FLUSH IV SOLN 100 UNIT/ML 100 UNIT/ML
500 SOLUTION INTRAVENOUS AS NEEDED
Status: CANCELLED | OUTPATIENT
Start: 2021-02-02

## 2021-02-02 RX ORDER — SODIUM CHLORIDE 0.9 % (FLUSH) 0.9 %
10 SYRINGE (ML) INJECTION AS NEEDED
Status: DISCONTINUED | OUTPATIENT
Start: 2021-02-02 | End: 2021-02-03 | Stop reason: HOSPADM

## 2021-02-02 RX ORDER — HEPARIN SODIUM (PORCINE) LOCK FLUSH IV SOLN 100 UNIT/ML 100 UNIT/ML
500 SOLUTION INTRAVENOUS AS NEEDED
Status: DISCONTINUED | OUTPATIENT
Start: 2021-02-02 | End: 2021-02-03 | Stop reason: HOSPADM

## 2021-02-02 RX ORDER — TRAZODONE HYDROCHLORIDE 50 MG/1
50 TABLET ORAL NIGHTLY
Qty: 30 TABLET | Refills: 3 | Status: SHIPPED | OUTPATIENT
Start: 2021-02-02 | End: 2021-07-06

## 2021-02-02 RX ADMIN — HEPARIN 500 UNITS: 100 SYRINGE at 10:58

## 2021-02-02 RX ADMIN — Medication 10 ML: at 10:58

## 2021-02-02 NOTE — PROGRESS NOTES
"Geneva Haro  1965  [id number]  Patient Care Team:  Cesar Gao MD as PCP - General (Internal Medicine)  Provider, No Known as PCP - Family Medicine    Geneva Haro is a 55 y.o. female here today for follow up.     This patient is accompanied by their self who contributes to the history of their care.    Chief Complaint:    Chief Complaint   Patient presents with   • Med Refill   • Discharge     Did really well. pt states pain went away but have nonstop tingling from shouldert to finger tips. Wakes her up in the middle of the night    • Spasms     have gotten worse since last doctor visit    • Insomnia     was up all night Saturday and SUnday and finally fell asleep yesterday         History of Present Illness:  I have reviewed and/or updated the patient's past medical, past surgical, family, social history, problem list and allergies as appropriate.     Muscle cramps worse at hs. takes nexium for gastroparesis with phenergan/zofran. Does not take vitamins or supplements. Has not seen GI in [place]. Reports intermittent n/v.  Typically in her legs.  Worse at night.  Denies any numbness or tingling.    Reports insomnia most nights. Tylenol pm helps. Can go 2-3 days without sleeping. Continues to take cymbalta and celexa.  Has never tried trazodone.    Additionally she reports worsening pain of her right top of her foot.  She indicates she dropped an object on it several years ago.  She claims there is a \"cyst\" on it.  She has pain with driving as well as weightbearing.  She has seen Dr. Baumann in the past.  sHe has not tried ice.  Tylenol tends to help a little.  She underwent radiographs 2017:  Standing AP lateral right foot ordered for pain and dorsal mass shows narrowing and osteophytes at the second and third tarsometatarsal joints, thick soft tissue shadow above these.  Otherwise unremarkable.  No comparison.  There is no erythema.  Rest and elevation to tell.    Review of " "Systems:    Review of Systems   Constitutional: Negative.    HENT: Negative.    Respiratory: Negative.    Cardiovascular: Negative.    Gastrointestinal: Positive for nausea and vomiting. Negative for blood in stool.   Musculoskeletal: Positive for myalgias.        Right forefoot pain   Psychiatric/Behavioral: Positive for sleep disturbance. Negative for suicidal ideas.       Vitals:    02/02/21 1121   BP: 120/62   Pulse: 98   SpO2: 97%   Weight: 78.8 kg (173 lb 12.8 oz)   Height: 170.2 cm (67.01\")     Body mass index is 27.21 kg/m².      Current Outpatient Medications:   •  cetirizine (zyrTEC) 10 MG tablet, Take 1 tablet by mouth Daily., Disp: 90 tablet, Rfl: 1  •  citalopram (CeleXA) 20 MG tablet, Take 1 tablet by mouth Daily., Disp: 90 tablet, Rfl: 1  •  DULoxetine (CYMBALTA) 30 MG capsule, Take 1 capsule by mouth 2 (Two) Times a Day., Disp: 180 capsule, Rfl: 0  •  Ginger, Zingiber officinalis, (Ginger Root) 500 MG capsule, Take 500 mg by mouth 3 (Three) Times a Day Before Meals., Disp: 90 capsule, Rfl: 0  •  metoclopramide (REGLAN) 10 MG tablet, Take 1 tablet by mouth 4 (Four) Times a Day Before Meals & at Bedtime As Needed (N/V) for up to 12 doses., Disp: 12 tablet, Rfl: 0  •  omeprazole (priLOSEC) 40 MG capsule, Take 40 mg by mouth Daily., Disp: , Rfl:   •  promethazine (PHENERGAN) 12.5 MG tablet, Take 1 tablet by mouth Every 6 (Six) Hours As Needed for Nausea or Vomiting., Disp: 30 tablet, Rfl: 0  •  promethazine (PHENERGAN) 25 MG suppository, Insert 1 suppository into the rectum Every 6 (Six) Hours As Needed for Nausea or Vomiting for up to 12 doses., Disp: 12 suppository, Rfl: 0  •  baclofen (LIORESAL) 10 MG tablet, Take 1 tablet by mouth 3 (Three) Times a Day As Needed for Muscle Spasms., Disp: 90 tablet, Rfl: 1  •  magnesium oxide (MAG-OX) 400 MG tablet, Take 1 tablet by mouth Daily., Disp: 30 tablet, Rfl: 6  •  traZODone (DESYREL) 50 MG tablet, Take 1 tablet by mouth Every Night., Disp: 30 tablet, Rfl: " 3  No current facility-administered medications for this visit.     Facility-Administered Medications Ordered in Other Visits:   •  heparin injection 500 Units, 500 Units, Intravenous, PRN, Michelle Drake APRN, 500 Units at 02/02/21 1058  •  sodium chloride 0.9 % flush 10 mL, 10 mL, Intravenous, PRN, Michelle Drake APRN, 10 mL at 02/02/21 1058    Physical Exam:    Physical Exam  Vitals signs and nursing note reviewed.   Constitutional:       General: She is not in acute distress.     Appearance: Normal appearance. She is well-developed. She is not diaphoretic.   HENT:      Head: Normocephalic and atraumatic.      Right Ear: External ear normal.      Left Ear: External ear normal.      Mouth/Throat:      Pharynx: No oropharyngeal exudate.   Eyes:      General: No scleral icterus.        Right eye: No discharge.      Conjunctiva/sclera: Conjunctivae normal.   Neck:      Musculoskeletal: Normal range of motion and neck supple.      Thyroid: No thyromegaly.      Vascular: No JVD.      Trachea: No tracheal deviation.   Cardiovascular:      Rate and Rhythm: Normal rate and regular rhythm.      Pulses: Normal pulses.      Heart sounds: Normal heart sounds.      Comments: PMI nondisplaced  Pulmonary:      Effort: Pulmonary effort is normal.      Breath sounds: Normal breath sounds. No wheezing or rales.   Abdominal:      General: Bowel sounds are normal.      Palpations: Abdomen is soft.      Tenderness: There is no abdominal tenderness. There is no guarding or rebound.   Musculoskeletal:         General: Tenderness present.      Comments: Normal gait.  She does have a palpable tenderness swelling without erythema or crepitus in the right Lisfranc region.  She has pain with inversion eversion as well as plantar dorsiflexion.  Pulses are 2+.  Sensory appears normal   Lymphadenopathy:      Cervical: No cervical adenopathy.   Skin:     General: Skin is warm and dry.      Capillary Refill: Capillary refill takes less  than 2 seconds.      Coloration: Skin is not pale.      Findings: No rash.   Neurological:      Mental Status: She is alert and oriented to person, place, and time.      Motor: No abnormal muscle tone.      Coordination: Coordination normal.   Psychiatric:         Judgment: Judgment normal.         Procedures    Results Review:    None    Assessment/Plan:    Problem List Items Addressed This Visit        Gastrointestinal Abdominal     Gastroparesis - Primary (Chronic)    Current Assessment & Plan     She continues have intermittent nausea and vomiting.  She did tells me she did not receive a referral to get you available though she is called you they will.  They have indicated they need a referral.  As far as I can tell no notification from the patient the referral was not sent.  We will try this again.  She should continue her Reglan, omeprazole.  Avoidance of marijuana is recommended.  I have asked her to notify the office if she is not heard of her referral in the next 10 days.         Relevant Medications    metoclopramide (REGLAN) 10 MG tablet    omeprazole (priLOSEC) 40 MG capsule    sodium chloride 0.9 % flush 10 mL    heparin injection 500 Units    magnesium oxide (MAG-OX) 400 MG tablet    Other Relevant Orders    Ambulatory Referral to Gastroenterology (Completed)       Musculoskeletal and Injuries    Right foot pain    Relevant Orders    Ambulatory Referral to Orthopedic Surgery       Symptoms and Signs    Muscle cramps    Current Assessment & Plan     Strongly suspect this is related to her chronic use of proton pump inhibitors.  I placed her on magnesium oxide supplement.  I will also placed her on baclofen to be used as needed.  I suspect her symptoms will resolve quickly.               Plan of care reviewed with patient at the conclusion of today's visit. Education was provided regarding diagnosis and management.  Patient verbalizes understanding of and agreement with management plan.    Return in  about 6 months (around 8/2/2021).    Cesar Gao MD    Please note that portions of this note may have been completed with a voice recognition program. Efforts were made to edit the dictations, but occasionally words are mistranscribed.

## 2021-02-02 NOTE — ASSESSMENT & PLAN NOTE
She continues have intermittent nausea and vomiting.  She did tells me she did not receive a referral to get you available though she is called you they will.  They have indicated they need a referral.  As far as I can tell no notification from the patient the referral was not sent.  We will try this again.  She should continue her Reglan, omeprazole.  Avoidance of marijuana is recommended.  I have asked her to notify the office if she is not heard of her referral in the next 10 days.

## 2021-02-02 NOTE — ASSESSMENT & PLAN NOTE
Strongly suspect this is related to her chronic use of proton pump inhibitors.  I placed her on magnesium oxide supplement.  I will also placed her on baclofen to be used as needed.  I suspect her symptoms will resolve quickly.

## 2021-02-06 RX ORDER — DULOXETIN HYDROCHLORIDE 30 MG/1
CAPSULE, DELAYED RELEASE ORAL
Qty: 180 CAPSULE | Refills: 0 | Status: SHIPPED | OUTPATIENT
Start: 2021-02-06 | End: 2021-05-04 | Stop reason: SDUPTHER

## 2021-02-09 ENCOUNTER — OFFICE VISIT (OUTPATIENT)
Dept: ORTHOPEDIC SURGERY | Facility: CLINIC | Age: 56
End: 2021-02-09

## 2021-02-09 VITALS — HEIGHT: 67 IN | OXYGEN SATURATION: 99 % | BODY MASS INDEX: 27.27 KG/M2 | WEIGHT: 173.72 LBS | HEART RATE: 82 BPM

## 2021-02-09 DIAGNOSIS — R20.0 NUMBNESS AND TINGLING IN RIGHT HAND: ICD-10-CM

## 2021-02-09 DIAGNOSIS — IMO0002 DISORDER OF ROTATOR CUFF SYNDROME OF LEFT SHOULDER AND ALLIED DISORDER: ICD-10-CM

## 2021-02-09 DIAGNOSIS — M25.512 LEFT SHOULDER PAIN, UNSPECIFIED CHRONICITY: Primary | ICD-10-CM

## 2021-02-09 DIAGNOSIS — M54.2 CERVICALGIA: ICD-10-CM

## 2021-02-09 DIAGNOSIS — R20.2 NUMBNESS AND TINGLING IN RIGHT HAND: ICD-10-CM

## 2021-02-09 DIAGNOSIS — M25.511 RIGHT SHOULDER PAIN, UNSPECIFIED CHRONICITY: ICD-10-CM

## 2021-02-09 PROCEDURE — 99214 OFFICE O/P EST MOD 30 MIN: CPT | Performed by: ORTHOPAEDIC SURGERY

## 2021-02-09 RX ORDER — CETIRIZINE HYDROCHLORIDE 10 MG/1
10 TABLET ORAL DAILY
COMMUNITY
End: 2021-05-04 | Stop reason: SDUPTHER

## 2021-02-09 RX ORDER — ESOMEPRAZOLE MAGNESIUM 40 MG/1
40 CAPSULE, DELAYED RELEASE ORAL 2 TIMES DAILY
COMMUNITY
End: 2023-02-03 | Stop reason: SDUPTHER

## 2021-02-09 NOTE — PROGRESS NOTES
"    [facility] [place] [facility] Note        Subjective     CC: Follow-up (Disorder of rotator cuff syndrome of left shoulder and allied disorder )      JIM Haro is a 55 y.o. female.  Patient is here today for new problem today regarding her right upper extremity.  She did physical therapy on both left and right shoulder.  She says the pain went away in the left shoulder.  The right shoulder pain also went away but they were doing some deep tissue massage and the patient started having some neck pain and numbness and tingling in her hand.  She says raising her arm above her head seems to alleviate some of the pain.  Also supporting it in a sling type posture helps her.  She says her whole hand goes numb.  Her neck hurts.  Her shoulder blade hurts.  She cannot have an MRI secondary to a stimulator.    Overall, patient's symptoms are better in the left shoulder and worse on the right shoulder.    ROS:    Constiutional:Pt denies fever, chills, nausea, or vomiting.  MSK:as above        Objective      Past Medical History  Past Medical History:   Diagnosis Date   • Anxiety    • Arthritis    • Depression    • Diabetes mellitus (CMS/HCC)    • Gall stones    • History of stomach ulcers    • Stomach problems          Physical Exam  Pulse 82   Ht 170.2 cm (67.01\")   Wt 78.8 kg (173 lb 11.6 oz)   SpO2 99%   BMI 27.20 kg/m²     Body mass index is 27.2 kg/m².    Patient is well nourished and well developed.        Ortho Exam  Positive Phalen's  Negative carpal tunnel compression test  Tender at the medial border scapula  Full shoulder range of motion  Tender over the cervical spine midline    Imaging/Labs/EMG Reviewed:  Imaging Results (Last 24 Hours)     Procedure Component Value Units Date/Time    XR Shoulder 2+ View Right [[id number]] Resulted: 02/09/21 1447     Updated: 02/09/21 1448    Narrative:      Right Shoulder X-Ray    Indication: Pain    Study:  AP, axillary lateral, and scapular Y " views    Comparison: None    Findings:  No acute fractures are visualized  No bony lesions are visualized.  Normal soft tissue appearance  AC joint: Moderate hypertrophic joint space narrowing  Glenohumeral joint: Minimal joint space narrowing.  There is an early   osteophyte off the inferior glenoid.  Acromion type: 2      Impression:    No acute bony abnormalities noted  Type II acromion  Moderate AC joint arthritis  Small osteophyte inferior glenoid              Assessment    Assessment:  1. Left shoulder pain, unspecified chronicity    2. Disorder of rotator cuff syndrome of left shoulder and allied disorder    3. Right shoulder pain, unspecified chronicity    4. Numbness and tingling in right hand    5. Cervicalgia        Plan:  1. Recommend over the counter anti-inflammatories for pain and/or swelling  2. Left shoulder pain--better after PT  3. Right shoulder pain and cervicalgia with numbness and tingling in the right hand--at this point, patient cannot have an MRI but an MRI of her cervical spine would help rule in or rule out a lot of issues with the neck.  Recommend a CT scan as an alternative and we will get nerve studies as well.  Guarded optimism overall.      Abdulkadir Estrada MD  02/09/21  15:03 EST      Dragon disclaimer:  Much of this encounter note is an electronic transcription/translation of spoken language to printed text. The electronic translation of spoken language may permit erroneous, or at times, nonsensical words or phrases to be inadvertently transcribed; Although I have reviewed the note for such errors, some may still exist.

## 2021-02-18 ENCOUNTER — HOSPITAL ENCOUNTER (OUTPATIENT)
Dept: CT IMAGING | Facility: HOSPITAL | Age: 56
Discharge: HOME | End: 2021-02-18
Admitting: ORTHOPAEDIC SURGERY

## 2021-02-18 DIAGNOSIS — R20.2 NUMBNESS AND TINGLING IN RIGHT HAND: ICD-10-CM

## 2021-02-18 DIAGNOSIS — R20.0 NUMBNESS AND TINGLING IN RIGHT HAND: ICD-10-CM

## 2021-02-18 DIAGNOSIS — M54.2 CERVICALGIA: ICD-10-CM

## 2021-02-18 PROCEDURE — 72125 CT NECK SPINE W/O DYE: CPT

## 2021-02-25 ENCOUNTER — HOSPITAL ENCOUNTER (OUTPATIENT)
Dept: ONCOLOGY | Facility: HOSPITAL | Age: 56
Setting detail: INFUSION SERIES
Discharge: HOME | End: 2021-02-25

## 2021-02-25 VITALS
SYSTOLIC BLOOD PRESSURE: 111 MMHG | RESPIRATION RATE: 18 BRPM | HEART RATE: 99 BPM | BODY MASS INDEX: 26.53 KG/M2 | TEMPERATURE: 97.3 F | WEIGHT: 169 LBS | DIASTOLIC BLOOD PRESSURE: 67 MMHG | HEIGHT: 67 IN

## 2021-02-25 DIAGNOSIS — D50.8 OTHER IRON DEFICIENCY ANEMIA: ICD-10-CM

## 2021-02-25 DIAGNOSIS — K31.84 GASTROPARESIS: ICD-10-CM

## 2021-02-25 DIAGNOSIS — Z95.828 PORT-A-CATH IN PLACE: Primary | ICD-10-CM

## 2021-02-25 PROCEDURE — 25010000003 HEPARIN LOCK FLUSH PER 10 UNITS: Performed by: NURSE PRACTITIONER

## 2021-02-25 PROCEDURE — 96523 IRRIG DRUG DELIVERY DEVICE: CPT

## 2021-02-25 PROCEDURE — G0463 HOSPITAL OUTPT CLINIC VISIT: HCPCS

## 2021-02-25 RX ORDER — SODIUM CHLORIDE 0.9 % (FLUSH) 0.9 %
10 SYRINGE (ML) INJECTION AS NEEDED
Status: CANCELLED | OUTPATIENT
Start: 2021-02-25

## 2021-02-25 RX ORDER — HEPARIN SODIUM (PORCINE) LOCK FLUSH IV SOLN 100 UNIT/ML 100 UNIT/ML
500 SOLUTION INTRAVENOUS AS NEEDED
Status: DISCONTINUED | OUTPATIENT
Start: 2021-02-25 | End: 2021-02-26 | Stop reason: HOSPADM

## 2021-02-25 RX ORDER — HEPARIN SODIUM (PORCINE) LOCK FLUSH IV SOLN 100 UNIT/ML 100 UNIT/ML
500 SOLUTION INTRAVENOUS AS NEEDED
Status: CANCELLED | OUTPATIENT
Start: 2021-02-25

## 2021-02-25 RX ADMIN — HEPARIN 500 UNITS: 100 SYRINGE at 14:42

## 2021-03-02 ENCOUNTER — OFFICE VISIT (OUTPATIENT)
Dept: ORTHOPEDIC SURGERY | Facility: CLINIC | Age: 56
End: 2021-03-02

## 2021-03-02 VITALS — BODY MASS INDEX: 26.54 KG/M2 | HEIGHT: 67 IN | WEIGHT: 169.09 LBS | OXYGEN SATURATION: 100 % | HEART RATE: 90 BPM

## 2021-03-02 DIAGNOSIS — M48.02 CERVICAL SPINAL STENOSIS: ICD-10-CM

## 2021-03-02 DIAGNOSIS — M54.2 CERVICALGIA: Primary | ICD-10-CM

## 2021-03-02 PROCEDURE — 99213 OFFICE O/P EST LOW 20 MIN: CPT | Performed by: ORTHOPAEDIC SURGERY

## 2021-03-02 NOTE — PROGRESS NOTES
"    [facility]        Subjective     CC: Follow-up (C-spine ct follow up. Ct done on 2-18-21)      JIM Haro is a 56 y.o. female.  Patient returns with follow-up on her CT scan of her cervical spine.    Overall, patient's symptoms are about the same.  Continue numbness and tingling in the right upper extremity.    ROS:    Constiutional:Pt denies fever, chills, nausea, or vomiting.  MSK:as above        Objective      Past Medical History  Past Medical History:   Diagnosis Date   • Anxiety    • Arthritis    • Depression    • Diabetes mellitus (CMS/HCC)    • Gall stones    • History of stomach ulcers    • Stomach problems          Physical Exam  Pulse 90   Ht 170.2 cm (67.01\")   Wt 76.7 kg (169 lb 1.5 oz)   SpO2 100%   BMI 26.48 kg/m²     Body mass index is 26.48 kg/m².    Patient is well nourished and well developed.        Ortho Exam  Patient has tenderness at the medial border the scapula and AC joint.  Passive full range of motion.    Imaging/Labs/EMG Reviewed:  Imaging Results (Last 24 Hours)     ** No results found for the last 24 hours. **      CT Cervical Spine Without Contrast  Narrative: EXAMINATION: CT CERVICAL SPINE WO CONTRAST-      INDICATION: Cervical radiculopathy, no red flags; R20.0-Anesthesia of  skin; R20.2-Paresthesia of skin; M54.2-Cervicalgia     TECHNIQUE: Axial noncontrast CT of the cervical spine with multiplanar  reconstruction     The radiation dose reduction device was turned on for each scan per the  ALARA (As Low as Reasonably Achievable) protocol.     COMPARISON: NONE     FINDINGS: Vertebral body heights are maintained without evidence of  acute fracture. Alignment is grossly anatomic without evidence of  subluxation or listhesis. The lung apices are grossly clear and the  paraspinal soft tissues are unremarkable. Multilevel spondylosis changes  are evident with disc osteophyte complex noted at C4-5 and C5-6.     Impression: Multilevel " cervical spondylosis. Mild to moderate in  severity and likely most advanced at C4-5 and C5-6. No acute fracture or  subluxation otherwise.        This report was finalized on 2/19/2021 9:27 AM by Cesar Peterson.           Assessment    Assessment:  1. Cervicalgia    2. Cervical spinal stenosis        Plan:  1. Recommend over the counter anti-inflammatories for pain and/or swelling  2. Cervical stenosis with degenerative arthritis/cervical spondylosis at C4-5 and C5-6--plan will be for a course of physical therapy.  We will do this at [facility] in [place].  See her back in 6 to 8 weeks and if she is no better, consider referral to neurosurgery.      Abdulkadir Estrada MD  03/02/21  13:34 EST      Dragon disclaimer:  Much of this encounter note is an electronic transcription/translation of spoken language to printed text. The electronic translation of spoken language may permit erroneous, or at times, nonsensical words or phrases to be inadvertently transcribed; Although I have reviewed the note for such errors, some may still exist.

## 2021-03-12 ENCOUNTER — OFFICE VISIT (OUTPATIENT)
Dept: ORTHOPEDIC SURGERY | Facility: CLINIC | Age: 56
End: 2021-03-12

## 2021-03-12 VITALS
HEART RATE: 90 BPM | BODY MASS INDEX: 26.54 KG/M2 | SYSTOLIC BLOOD PRESSURE: 128 MMHG | WEIGHT: 169.09 LBS | HEIGHT: 67 IN | DIASTOLIC BLOOD PRESSURE: 86 MMHG

## 2021-03-12 DIAGNOSIS — M19.071 ARTHRITIS OF ANKLE OR FOOT, DEGENERATIVE, RIGHT: ICD-10-CM

## 2021-03-12 DIAGNOSIS — M79.671 FOOT PAIN, RIGHT: Primary | ICD-10-CM

## 2021-03-12 PROCEDURE — 99214 OFFICE O/P EST MOD 30 MIN: CPT | Performed by: PHYSICIAN ASSISTANT

## 2021-03-12 NOTE — PROGRESS NOTES
[facility]    Subjective     Patient: Geneva Haro  : 1965    Primary Care Provider: Cesar Gao MD    Requesting Provider: As above    Pain of the Right Foot (Seen Dr. Pennington 17 for Arthritis of Right foot)      History    Chief Complaint: Right foot pain  History of Present Illness: Is a very pleasant 56-year-old patient of Dr. Pennington's, new to me, here with right foot pain.  She had seen Dr. Rivera in the past in 2017 for TMT arthritis.  She reports her pain to be dorsal foot pain with burning and throbbing 6/10 with weightbearing and walking and some throbbing pain at rest.  She has had second TMT injection which helped her for approximately 1 month.  She has treated with topical anti-inflammatories, unable to afford custom orthotics, activity modification.  She is here to discuss further treatment options.    Current Outpatient Medications on File Prior to Visit   Medication Sig Dispense Refill   • baclofen (LIORESAL) 10 MG tablet Take 1 tablet by mouth 3 (Three) Times a Day As Needed for Muscle Spasms. 90 tablet 1   • cetirizine (zyrTEC) 10 MG tablet Take 10 mg by mouth Daily.     • citalopram (CeleXA) 20 MG tablet Take 1 tablet by mouth Daily. 90 tablet 1   • DULoxetine (CYMBALTA) 30 MG capsule TAKE ONE CAPSULE BY MOUTH TWICE A  capsule 0   • esomeprazole (nexIUM) 40 MG capsule Take 40 mg by mouth 2 (Two) Times a Day.     • magnesium oxide (MAG-OX) 400 MG tablet Take 1 tablet by mouth Daily. 30 tablet 6   • metoclopramide (REGLAN) 10 MG tablet Take 1 tablet by mouth 4 (Four) Times a Day Before Meals & at Bedtime As Needed (N/V) for up to 12 doses. 12 tablet 0   • promethazine (PHENERGAN) 12.5 MG tablet Take 1 tablet by mouth Every 6 (Six) Hours As Needed for Nausea or Vomiting. 30 tablet 0   • promethazine (PHENERGAN) 25 MG suppository Insert 1 suppository into the rectum Every 6 (Six) Hours As Needed for Nausea or Vomiting for up to 12  doses. 12 suppository 0   • traZODone (DESYREL) 50 MG tablet Take 1 tablet by mouth Every Night. 30 tablet 3   • [DISCONTINUED] DULoxetine (CYMBALTA) 30 MG capsule Take 1 capsule by mouth 2 (Two) Times a Day. 180 capsule 0     No current facility-administered medications on file prior to visit.      Allergies   Allergen Reactions   • Gabapentin Other (See Comments)     Seizure   • Morphine And Related Itching and Swelling   • Aspirin GI Intolerance   • Ibuprofen GI Intolerance      Past Medical History:   Diagnosis Date   • Anxiety    • Arthritis    • Depression    • Diabetes mellitus (CMS/HCC)    • Gall stones    • History of stomach ulcers    • Stomach problems      Past Surgical History:   Procedure Laterality Date   • CHOLECYSTECTOMY     • ENDOSCOPY N/A 6/20/2018    Procedure: ESOPHAGOGASTRODUODENOSCOPY;  Surgeon: Mark I Brunner, MD;  Location: [facility] ENDOSCOPY;  Service: Gastroenterology   • GASTRIC BYPASS      x2   • HERNIA REPAIR     • MASTECTOMY Bilateral     Left With sentinel lymph node sampling and prophylactic right mastectomy   • PACEMAKER IMPLANTATION     • PORTACATH PLACEMENT       Family History   Problem Relation Age of Onset   • Breast cancer Maternal Aunt    • Pancreatic cancer Cousin    • Breast cancer Cousin    • Breast cancer Cousin    • Heart attack Mother       Social History     Socioeconomic History   • Marital status:      Spouse name: Not on file   • Number of children: Not on file   • Years of education: Not on file   • Highest education level: Not on file   Tobacco Use   • Smoking status: Current Every Day Smoker     Packs/day: 0.50     Years: 20.00     Pack years: 10.00     Types: Cigarettes   • Smokeless tobacco: Never Used   Substance and Sexual Activity   • Alcohol use: No   • Drug use: Yes     Types: Marijuana   • Sexual activity: Defer        Review of Systems   Constitutional: Negative.    HENT: Negative.    Eyes: Negative.    Respiratory: Negative.    Cardiovascular:  "Negative.    Gastrointestinal: Negative.    Endocrine: Negative.    Genitourinary: Negative.    Musculoskeletal: Positive for arthralgias and joint swelling.   Skin: Negative.    Allergic/Immunologic: Negative.    Neurological: Negative.    Hematological: Negative.    Psychiatric/Behavioral: Negative.        The following portions of the patient's history were reviewed and updated as appropriate: allergies, current medications, past family history, past medical history, past social history, past surgical history and problem list.      Objective      Physical Exam  /86   Pulse 90   Ht 170.2 cm (67.01\")   Wt 76.7 kg (169 lb 1.5 oz)   BMI 26.48 kg/m²     Body mass index is 26.48 kg/m².    Patient is well developed, well nourished and in no acute distress.  Alert and oriented x 3.    Ortho Exam  Right foot exam: Tender to palpation over the midfoot TMT joints and first MTP joint.  Tender over the dorsal boss.  No swelling.  Neurovascular intact.  Pulses 2+  Medical Decision Making    Data Review:   ordered and reviewed x-rays today    Assessment:  1. Foot pain, right    2. Arthritis of ankle or foot, degenerative, right        Plan:  Right foot arthritis.  I reviewed today's x-rays clinical findings past and current treatment with the patient.  X-rays today show demonstrate extensive midfoot arthritis with extensive dorsal bossing.  There is also extensive first MTP joint arthritis.  No acute bony injury or fracture.  Otherwise normal bony alignment.  We discussed further treatment options including repeat injection which she reports did not help long enough to bother doing another one.  We discussed custom orthotics, topical anti-inflammatories, lacing her shoes so there is no pressure on the dorsum of her foot and oral anti-inflammatories.  Patient is unable to take NSAIDs secondary to GI upset.  She reports she has tried all the other conservative treatments.  As Dr. Pennington told her before, last resort is " surgery.  This is a big surgery with a long recovery.  Plan today is that she return to see Dr. Pennington to discuss further treatment options.    History, diagnosis and treatment plan discussed with Dr. Perez.          Cleo Aden PA-C  03/16/21  13:40 EDT

## 2021-03-17 ENCOUNTER — APPOINTMENT (OUTPATIENT)
Dept: NUCLEAR MEDICINE | Facility: HOSPITAL | Age: 56
End: 2021-03-17

## 2021-03-18 ENCOUNTER — PROCEDURE VISIT (OUTPATIENT)
Dept: NEUROLOGY | Facility: CLINIC | Age: 56
End: 2021-03-18

## 2021-03-18 DIAGNOSIS — M54.2 CERVICALGIA: ICD-10-CM

## 2021-03-18 DIAGNOSIS — R20.0 NUMBNESS AND TINGLING IN RIGHT HAND: ICD-10-CM

## 2021-03-18 DIAGNOSIS — R20.2 NUMBNESS AND TINGLING IN RIGHT HAND: ICD-10-CM

## 2021-03-18 PROCEDURE — 95886 MUSC TEST DONE W/N TEST COMP: CPT | Performed by: PSYCHIATRY & NEUROLOGY

## 2021-03-18 PROCEDURE — 95909 NRV CNDJ TST 5-6 STUDIES: CPT | Performed by: PSYCHIATRY & NEUROLOGY

## 2021-03-18 NOTE — PROGRESS NOTES
[facility]   Electrodiagnostic Laboratory    Nerve Conduction & EMG Report        Patient:  Geneva Haro   Patient ID: [id number]   YOB: 1965  Sex:  female      Exam Physician:  Shady Cobian MD  Refer Physician:  Abdulkadir Estrada,*    Electromyogram and Nerve Conduction Velocity Procedure Note    Hx: 56 y.o. right handed female with complaint of numbness involving the right arm and pain involving the right arm. Symptoms have been present for several months and were provoked by activity. Significant past medical history includes nothing suggestive of neuropathy.  Family history no family history of nerve or muscle disease.    Exam: Motor power is normal. There is no atrophy. There are no fasciculations.  Deep tendon reflexes are present and symmetrical. Sensory exam is normal.      Edx studies of the R UE were performed to evaluate for peripheral nerve entrapment.     NCS Examination   For sensory nerve conduction studies, the amplitude is measured peak-to-peak, the latency reported is the distal peak latency, and the conduction velocity, if measured, is determined from onset latencies and is over the forearm.   For motor nerve conduction studies, the amplitude is measured baseline-to-peak, the latency reported is the distal onset latency, the conduction velocity is calculated over the forearm, and the F wave latency is the minimum latency.   Unless otherwise noted, the hand temperature was monitored continuously and remained between 32°C and 36°C during the performance of the NCSs.        Sensory NCS      Nerve / Sites Rec. Site Onset Lat Peak Lat NP Amp PP Amp Segments Distance Velocity     ms ms µV µV  cm m/s   R Median - Digit II (Antidromic)      Wrist Dig II 2.60 3.07 5.5 19.1 Wrist - Dig II 13 50   R Ulnar - Digit V (Antidromic)      Wrist Dig V 2.19 2.76 6.5 21.6 Wrist - Dig V 11 50   R Radial - Anatomical snuff box (Forearm)      Forearm Wrist 1.98 2.76  18.5 32.0 Forearm - Wrist 10 51             Motor NCS      Nerve / Sites Muscle Latency Amplitude Amp % Segments Distance Lat Diff Velocity     ms mV %  cm ms m/s   R Median - APB      Wrist APB 3.18 4.9 100 Wrist - APB 8        Elbow APB 7.45 4.7 95.9 Elbow - Wrist 24 4.27 56         Motor NCS      Nerve / Sites Muscle Latency Amplitude Amp % Duration Segments Distance Lat Diff Velocity     ms mV % ms  cm ms m/s   R Ulnar - ADM      Wrist ADM 3.18 5.4 100 5.26 Wrist - ADM 7        B.Elbow ADM 7.14 5.3 98 5.26 B.Elbow - Wrist 22 3.96 56      A.Elbow ADM 9.79 5.4 101 5.47 A.Elbow - B.Elbow 11 2.66 41         F  Wave      Nerve F Lat M Lat F-M Lat    ms ms ms   R Median - APB  28.4    R Ulnar - ADM 30.9 2.4 28.4                 EMG Examination   The study was performed with a concentric needle electrode. Fibrillation and fasciculation activity is graded from none (0) to continuous (4+). The configuration and recruitment pattern of motor unit action potentials under voluntary control, if not normal, are described below      EMG Summary Table     Spontaneous MUAP Recruitment   Muscle Nerve Roots IA Fib PSW Fasc H.F. Amp Dur. PPP Pattern   R. Triceps brachii Radial C6-C8 N None None None None N N N N   R. First dorsal interosseous Ulnar C8-T1 N None None None None N N N N   R. Extensor digitorum communis Radial C7-C8 N None None None None N N N N   R. Deltoid Axillary C5-C6 N None None None None N N N N   R. Cervical paraspinals Spinal C4-C8 N None None None None N N N N   R. Brachioradialis Radial C5-C6 N None None None None N N N N   R. Biceps brachii Musculocutaneous C5-C6 N None None None None N N N N   R. Abductor pollicis brevis Median C8-T1 N None None None None N N N N   L. Deltoid Axillary C5-C6 N None None None None N N N N       Summary    The motor conduction test was performed on 2 nerve(s). The results were normal in 1 nerve(s): R Median - APB. Results outside the specified normal range were found in 1  nerve(s), as follows:  • In the R Ulnar - ADM study  o the take off velocity result was reduced for A.Elbow - B.Elbow segment    The sensory conduction test was performed on 3 nerve(s). The results were normal in 1 nerve(s): R Radial - Anatomical snuff box (Forearm). Results outside the specified normal range were found in 2 nerve(s), as follows:  • In the R Median - Digit II (Antidromic) study  o the peak amplitude result was reduced for Wrist stimulation  • In the R Ulnar - Digit V (Antidromic) study  o the peak amplitude result was reduced for Wrist stimulation    The F wave study was normal in all 2 of the tested nerves: R Median - APB, R Ulnar - ADM.    The needle EMG study was normal in all 9 tested muscles: R. Triceps brachii, R. First dorsal interosseous, R. Extensor digitorum communis, R. Deltoid, R. Cervical paraspinals, R. Brachioradialis, R. Biceps brachii, R. Abductor pollicis brevis, L. Deltoid.           Conclusion: This study showed neurophysiologic evidence of right ulnar neuropathy at the elbow, ie cubital tunnel syndrome.           Instrument used:  Teca Synergy        Performed by:          Shady Cobian MD

## 2021-03-22 ENCOUNTER — TREATMENT (OUTPATIENT)
Dept: PHYSICAL THERAPY | Facility: CLINIC | Age: 56
End: 2021-03-22

## 2021-03-22 DIAGNOSIS — M54.12 CERVICAL RADICULOPATHY: Primary | ICD-10-CM

## 2021-03-22 PROCEDURE — 97140 MANUAL THERAPY 1/> REGIONS: CPT | Performed by: PHYSICAL THERAPIST

## 2021-03-22 PROCEDURE — 97162 PT EVAL MOD COMPLEX 30 MIN: CPT | Performed by: PHYSICAL THERAPIST

## 2021-03-22 PROCEDURE — 97110 THERAPEUTIC EXERCISES: CPT | Performed by: PHYSICAL THERAPIST

## 2021-03-22 NOTE — PROGRESS NOTES
Physical Therapy Initial Evaluation and Plan of Care      Patient: Geneva Haro   : 1965  Diagnosis/ICD-10 Code:  No primary diagnosis found.  Referring practitioner: Abdulkadir Estrada,*    Subjective Evaluation    History of Present Illness  Date of onset: 2021    Subjective comment: Woke up with a stiff neck.  A week afterward starting developing right arm and pain, numbness and tinging.  Tingling is in entire hand.  Started developing tingling and numbness in left hand in digits 2-4.    Patient Occupation: not working Quality of life: fair    Pain  Quality: burning, radiating, tight, sharp and pressure  Alleviating factors: raising arms overhead while lying down (eases N/T); raising arms overhead and moving fingers (eases N/T in arm and hands)  Exacerbated by: driving; washing hands; sleeping     Social Support  Lives in: multiple-level home    Hand dominance: right    Patient Goals  Patient goals for therapy: decreased pain             Objective    *Gr II C/S traction aggravates upper C/S and R UE pain         Active Range of Motion   Cervical/Thoracic Spine   Cervical    Left rotation: 35 (Increases B/L N/T (R>L)) degrees   Right rotation: 40 (Increases B/L N/T (R>L)) degrees           Assessment & Plan     Assessment  Impairments: abnormal or restricted ROM, lacks appropriate home exercise program and pain with function  Assessment details: Ms. Haro is a 56 year old RHD female that presents with C/S radiculopathic symptoms, affecting R UE more than L UE.  PMH was covered during interview.  Reproduction of symptoms with pressure applied to mid and lower C/S region.  Was able to ease symptoms in the C/S and UEs with heat on C/S and AROM C/S rotations.  Will focus care on reducing C/S sensitivity, C/S mobility and C/S strength to ease radiculopathic symptoms.  Prognosis: fair  Prognosis details: Based on severity of symptoms and co-morbidities  Functional Limitations: carrying  objects, lifting, sleeping, pulling, pushing and uncomfortable because of pain  Goals  Plan Goals: STGs:  1.)  QuickDASH improved x 1 MCID in 6 weeks.  2.)  Have no L UE numbness, tingling or pain in 6 weeks.  LTGs:  1.)  Have no R UE pain, numbness or tingling in 8 weeks.  2.)  Have at least 50% reduction in C/S pain in 8 weeks.    Plan  Therapy options: will be seen for skilled physical therapy services  Planned modality interventions: thermotherapy (hydrocollator packs)  Planned therapy interventions: therapeutic activities, stretching, strengthening, manual therapy, home exercise program and functional ROM exercises  Frequency: 2x week  Duration in visits: 16  Duration in weeks: 8  Treatment plan discussed with: patient        Manual Therapy:    8     mins  89841;  Therapeutic Exercise:    15     mins  74925;     Neuromuscular Cyndie:        mins  74847;    Therapeutic Activity:          mins  77477;     Gait Training:           mins  14498;     Ultrasound:          mins  99020;    Electrical Stimulation:         mins  12419 ( );  Dry Needling          mins self-pay    Timed Treatment:   23   mins   Total Treatment:     45   mins    PT SIGNATURE: Alexandre Quezada PT   DATE TREATMENT INITIATED: 3/22/2021    Initial Certification  Certification Period: 6/20/2021  I certify that the therapy services are furnished while this patient is under my care.  The services outlined above are required by this patient, and will be reviewed every 90 days.     PHYSICIAN: Abdulkadir Estrada MD      DATE:     Please sign and return via fax to [phone].. Thank you, [facility].

## 2021-03-31 ENCOUNTER — OFFICE VISIT (OUTPATIENT)
Dept: ORTHOPEDIC SURGERY | Facility: CLINIC | Age: 56
End: 2021-03-31

## 2021-03-31 VITALS
DIASTOLIC BLOOD PRESSURE: 90 MMHG | HEART RATE: 106 BPM | SYSTOLIC BLOOD PRESSURE: 120 MMHG | WEIGHT: 169.09 LBS | BODY MASS INDEX: 26.54 KG/M2 | HEIGHT: 67 IN

## 2021-03-31 DIAGNOSIS — M19.071 ARTHRITIS OF ANKLE OR FOOT, DEGENERATIVE, RIGHT: Primary | ICD-10-CM

## 2021-03-31 PROCEDURE — 99212 OFFICE O/P EST SF 10 MIN: CPT | Performed by: ORTHOPAEDIC SURGERY

## 2021-03-31 NOTE — PROGRESS NOTES
NEW PATIENT    Patient: Geneva Haro  : 1965    Primary Care Provider: Cesar Gao MD    Requesting Provider: As above    Pain of the Right Foot      History    Chief Complaint: Right foot pain    History of Present Illness: This is a patient had seen in the past with right midfoot arthritis.  She has not been able to obtain orthotics.  She reports she has fibromyalgia and severe pain diffusely.  She would like to walk for exercise but many joints prevent her.  She reports the pain is 8 out of 10 in many areas.  She feels better with rest.  She has had injection in the foot in the past but she does not think they worked very well.    Current Outpatient Medications on File Prior to Visit   Medication Sig Dispense Refill   • baclofen (LIORESAL) 10 MG tablet Take 1 tablet by mouth 3 (Three) Times a Day As Needed for Muscle Spasms. 90 tablet 1   • cetirizine (zyrTEC) 10 MG tablet Take 10 mg by mouth Daily.     • citalopram (CeleXA) 20 MG tablet Take 1 tablet by mouth Daily. 90 tablet 1   • DULoxetine (CYMBALTA) 30 MG capsule TAKE ONE CAPSULE BY MOUTH TWICE A  capsule 0   • esomeprazole (nexIUM) 40 MG capsule Take 40 mg by mouth 2 (Two) Times a Day.     • magnesium oxide (MAG-OX) 400 MG tablet Take 1 tablet by mouth Daily. 30 tablet 6   • metoclopramide (REGLAN) 10 MG tablet Take 1 tablet by mouth 4 (Four) Times a Day Before Meals & at Bedtime As Needed (N/V) for up to 12 doses. 12 tablet 0   • promethazine (PHENERGAN) 12.5 MG tablet Take 1 tablet by mouth Every 6 (Six) Hours As Needed for Nausea or Vomiting. 30 tablet 0   • promethazine (PHENERGAN) 25 MG suppository Insert 1 suppository into the rectum Every 6 (Six) Hours As Needed for Nausea or Vomiting for up to 12 doses. 12 suppository 0   • traZODone (DESYREL) 50 MG tablet Take 1 tablet by mouth Every Night. 30 tablet 3   • [DISCONTINUED] DULoxetine (CYMBALTA) 30 MG capsule Take 1 capsule by mouth 2 (Two) Times a Day. 180 capsule 0      No current facility-administered medications on file prior to visit.      Allergies   Allergen Reactions   • Gabapentin Other (See Comments)     Seizure   • Morphine And Related Itching and Swelling   • Aspirin GI Intolerance   • Ibuprofen GI Intolerance      Past Medical History:   Diagnosis Date   • Anxiety    • Arthritis    • Depression    • Diabetes mellitus (CMS/HCC)    • Gall stones    • History of stomach ulcers    • Stomach problems      Past Surgical History:   Procedure Laterality Date   • CHOLECYSTECTOMY     • ENDOSCOPY N/A 6/20/2018    Procedure: ESOPHAGOGASTRODUODENOSCOPY;  Surgeon: Mark I Brunner, MD;  Location: [facility] ENDOSCOPY;  Service: Gastroenterology   • GASTRIC BYPASS      x2   • HERNIA REPAIR     • MASTECTOMY Bilateral     Left With sentinel lymph node sampling and prophylactic right mastectomy   • PACEMAKER IMPLANTATION     • PORTACATH PLACEMENT       Family History   Problem Relation Age of Onset   • Breast cancer Maternal Aunt    • Pancreatic cancer Cousin    • Breast cancer Cousin    • Breast cancer Cousin    • Heart attack Mother       Social History     Socioeconomic History   • Marital status:      Spouse name: Not on file   • Number of children: Not on file   • Years of education: Not on file   • Highest education level: Not on file   Tobacco Use   • Smoking status: Current Every Day Smoker     Packs/day: 0.50     Years: 20.00     Pack years: 10.00     Types: Cigarettes   • Smokeless tobacco: Never Used   Substance and Sexual Activity   • Alcohol use: No   • Drug use: Yes     Types: Marijuana   • Sexual activity: Defer        Review of Systems   Constitutional: Negative.    HENT: Negative.    Eyes: Negative.    Respiratory: Negative.    Cardiovascular: Negative.    Gastrointestinal: Negative.    Endocrine: Negative.    Genitourinary: Negative.    Musculoskeletal: Positive for arthralgias.   Skin: Negative.    Allergic/Immunologic: Negative.    Neurological: Negative.   "  Hematological: Negative.    Psychiatric/Behavioral: Negative.        The following portions of the patient's history were reviewed and updated as appropriate: allergies, current medications, past family history, past medical history, past social history, past surgical history and problem list.    Physical Exam:   /90   Pulse 106   Ht 170.2 cm (67.01\")   Wt 76.7 kg (169 lb 1.5 oz)   BMI 26.48 kg/m²   GENERAL: Body habitus: overweight        Gait: antalgic with the first few steps     Mental Status:  awake and alert; oriented to person, place, and time    Voice:  clear  SKIN:  Lower extremity: Normal    Hair Growth(lower extremity):  Right:normal; Left:  normal      MSK:  Tender across the right TMT joints and diffusely throughout the foot and ankle               Medical Decision Making    Data Review:   reviewed radiology images and reviewed radiology results    Assessment and Plan/ Diagnosis/Treatment options:   1. Arthritis of ankle or foot, degenerative, right  She has midfoot arthritis.  She did not have much benefit from injections in the past.  We talked about topical Voltaren gel.  She wants to walk for exercise and I think she would be better doing recumbent biking or swimming.  I do not have any other answers for her, unfortunately I cannot give her cartilage back in the joints.  We talked about sending her to  for another opinion.  I do not think surgery would be useful for this.  It would not get her back to the level of function that she wishes.            Radiology Ordered []  Radiology Reports Reviewed []      Radiology Images Reviewed []   Labs Reviewed []    Labs Ordered []   PCP Records Reviewed []    Provider Records Reviewed []    ER Records Reviewed []    Hospital Records Reviewed []    History Obtained From Family []    Phone conversation with Provider []    Records Requested []        Jimena Pennington MD    "

## 2021-04-07 ENCOUNTER — HOSPITAL ENCOUNTER (OUTPATIENT)
Dept: ONCOLOGY | Facility: HOSPITAL | Age: 56
Setting detail: INFUSION SERIES
Discharge: HOME | End: 2021-04-07

## 2021-04-07 VITALS
RESPIRATION RATE: 18 BRPM | SYSTOLIC BLOOD PRESSURE: 109 MMHG | WEIGHT: 171 LBS | BODY MASS INDEX: 26.84 KG/M2 | TEMPERATURE: 97.8 F | DIASTOLIC BLOOD PRESSURE: 81 MMHG | HEART RATE: 106 BPM | HEIGHT: 67 IN

## 2021-04-07 DIAGNOSIS — Z95.828 PORT-A-CATH IN PLACE: Primary | ICD-10-CM

## 2021-04-07 DIAGNOSIS — K31.84 GASTROPARESIS: ICD-10-CM

## 2021-04-07 DIAGNOSIS — D50.8 OTHER IRON DEFICIENCY ANEMIA: ICD-10-CM

## 2021-04-07 PROCEDURE — 25010000003 HEPARIN LOCK FLUSH PER 10 UNITS: Performed by: NURSE PRACTITIONER

## 2021-04-07 PROCEDURE — 96523 IRRIG DRUG DELIVERY DEVICE: CPT

## 2021-04-07 RX ORDER — SODIUM CHLORIDE 0.9 % (FLUSH) 0.9 %
10 SYRINGE (ML) INJECTION AS NEEDED
Status: DISCONTINUED | OUTPATIENT
Start: 2021-04-07 | End: 2021-04-08 | Stop reason: HOSPADM

## 2021-04-07 RX ORDER — HEPARIN SODIUM (PORCINE) LOCK FLUSH IV SOLN 100 UNIT/ML 100 UNIT/ML
500 SOLUTION INTRAVENOUS AS NEEDED
Status: CANCELLED | OUTPATIENT
Start: 2021-04-07

## 2021-04-07 RX ORDER — HEPARIN SODIUM (PORCINE) LOCK FLUSH IV SOLN 100 UNIT/ML 100 UNIT/ML
500 SOLUTION INTRAVENOUS AS NEEDED
Status: DISCONTINUED | OUTPATIENT
Start: 2021-04-07 | End: 2021-04-08 | Stop reason: HOSPADM

## 2021-04-07 RX ORDER — SODIUM CHLORIDE 0.9 % (FLUSH) 0.9 %
10 SYRINGE (ML) INJECTION AS NEEDED
Status: CANCELLED | OUTPATIENT
Start: 2021-04-07

## 2021-04-07 RX ADMIN — HEPARIN 500 UNITS: 100 SYRINGE at 14:49

## 2021-04-07 RX ADMIN — Medication 10 ML: at 14:49

## 2021-04-12 ENCOUNTER — OFFICE VISIT (OUTPATIENT)
Dept: ORTHOPEDIC SURGERY | Facility: CLINIC | Age: 56
End: 2021-04-12

## 2021-04-12 VITALS
WEIGHT: 171.08 LBS | SYSTOLIC BLOOD PRESSURE: 121 MMHG | HEART RATE: 84 BPM | BODY MASS INDEX: 26.85 KG/M2 | HEIGHT: 67 IN | DIASTOLIC BLOOD PRESSURE: 80 MMHG

## 2021-04-12 DIAGNOSIS — M19.071 ARTHRITIS OF ANKLE OR FOOT, DEGENERATIVE, RIGHT: Primary | ICD-10-CM

## 2021-04-12 PROCEDURE — 20605 DRAIN/INJ JOINT/BURSA W/O US: CPT | Performed by: ORTHOPAEDIC SURGERY

## 2021-04-12 RX ORDER — LIDOCAINE HYDROCHLORIDE 10 MG/ML
2 INJECTION, SOLUTION EPIDURAL; INFILTRATION; INTRACAUDAL; PERINEURAL
Status: COMPLETED | OUTPATIENT
Start: 2021-04-12 | End: 2021-04-12

## 2021-04-12 RX ORDER — METHYLPREDNISOLONE ACETATE 40 MG/ML
80 INJECTION, SUSPENSION INTRA-ARTICULAR; INTRALESIONAL; INTRAMUSCULAR; SOFT TISSUE
Status: COMPLETED | OUTPATIENT
Start: 2021-04-12 | End: 2021-04-12

## 2021-04-12 RX ADMIN — LIDOCAINE HYDROCHLORIDE 2 ML: 10 INJECTION, SOLUTION EPIDURAL; INFILTRATION; INTRACAUDAL; PERINEURAL at 15:17

## 2021-04-12 RX ADMIN — METHYLPREDNISOLONE ACETATE 80 MG: 40 INJECTION, SUSPENSION INTRA-ARTICULAR; INTRALESIONAL; INTRAMUSCULAR; SOFT TISSUE at 15:17

## 2021-04-12 NOTE — PROGRESS NOTES
Procedure   Medium Joint Arthrocentesis  Date/Time: 4/12/2021 3:17 PM  Consent given by: patient  Site marked: site marked  Timeout: Immediately prior to procedure a time out was called to verify the correct patient, procedure, equipment, support staff and site/side marked as required   Supporting Documentation  Indications: pain   Procedure Details  Location: (R) TMT.  Preparation: Patient was prepped and draped in the usual sterile fashion  Needle size: 23 G  Approach: dorsal  Medications administered: 2 mL lidocaine PF 1% 1 %; 80 mg methylPREDNISolone acetate 40 MG/ML  Patient tolerance: patient tolerated the procedure well with no immediate complications

## 2021-04-12 NOTE — PROGRESS NOTES
ESTABLISHED PATIENT    Patient: Geneva Haro  : 1965    Primary Care Provider: Cesar Gao MD    Requesting Provider: As above    Follow-up (2 week recheck -  Arthritis of ankle or foot, degenerative, right)      History    Chief Complaint: Right foot pain    History of Present Illness: She is here for right midfoot pain and pain over a bunion.  Her midfoot arthritis is mild to moderate, the bunion is mild, I do not think she is a surgical candidate.  I think there would be a high risk of making her worse given her very globalized pain.  We talked about pain clinic.  She is not certain she wants to do that.  I would also be happy to send her to  for another opinion.    Current Outpatient Medications on File Prior to Visit   Medication Sig Dispense Refill   • baclofen (LIORESAL) 10 MG tablet Take 1 tablet by mouth 3 (Three) Times a Day As Needed for Muscle Spasms. 90 tablet 1   • cetirizine (zyrTEC) 10 MG tablet Take 10 mg by mouth Daily.     • citalopram (CeleXA) 20 MG tablet Take 1 tablet by mouth Daily. 90 tablet 1   • DULoxetine (CYMBALTA) 30 MG capsule TAKE ONE CAPSULE BY MOUTH TWICE A  capsule 0   • esomeprazole (nexIUM) 40 MG capsule Take 40 mg by mouth 2 (Two) Times a Day.     • magnesium oxide (MAG-OX) 400 MG tablet Take 1 tablet by mouth Daily. 30 tablet 6   • metoclopramide (REGLAN) 10 MG tablet Take 1 tablet by mouth 4 (Four) Times a Day Before Meals & at Bedtime As Needed (N/V) for up to 12 doses. 12 tablet 0   • promethazine (PHENERGAN) 12.5 MG tablet Take 1 tablet by mouth Every 6 (Six) Hours As Needed for Nausea or Vomiting. 30 tablet 0   • promethazine (PHENERGAN) 25 MG suppository Insert 1 suppository into the rectum Every 6 (Six) Hours As Needed for Nausea or Vomiting for up to 12 doses. 12 suppository 0   • traZODone (DESYREL) 50 MG tablet Take 1 tablet by mouth Every Night. 30 tablet 3   • [DISCONTINUED] DULoxetine (CYMBALTA) 30 MG capsule Take 1 capsule by mouth 2  (Two) Times a Day. 180 capsule 0     No current facility-administered medications on file prior to visit.      Allergies   Allergen Reactions   • Gabapentin Other (See Comments)     Seizure   • Morphine And Related Itching and Swelling   • Aspirin GI Intolerance   • Ibuprofen GI Intolerance      Past Medical History:   Diagnosis Date   • Anxiety    • Arthritis    • Depression    • Diabetes mellitus (CMS/HCC)    • Gall stones    • History of stomach ulcers    • Stomach problems      Past Surgical History:   Procedure Laterality Date   • CHOLECYSTECTOMY     • ENDOSCOPY N/A 6/20/2018    Procedure: ESOPHAGOGASTRODUODENOSCOPY;  Surgeon: Mark I Brunner, MD;  Location: [facility] ENDOSCOPY;  Service: Gastroenterology   • GASTRIC BYPASS      x2   • HERNIA REPAIR     • MASTECTOMY Bilateral     Left With sentinel lymph node sampling and prophylactic right mastectomy   • PACEMAKER IMPLANTATION     • PORTACATH PLACEMENT       Family History   Problem Relation Age of Onset   • Breast cancer Maternal Aunt    • Pancreatic cancer Cousin    • Breast cancer Cousin    • Breast cancer Cousin    • Heart attack Mother       Social History     Socioeconomic History   • Marital status:      Spouse name: Not on file   • Number of children: Not on file   • Years of education: Not on file   • Highest education level: Not on file   Tobacco Use   • Smoking status: Current Every Day Smoker     Packs/day: 0.50     Years: 20.00     Pack years: 10.00     Types: Cigarettes   • Smokeless tobacco: Never Used   Substance and Sexual Activity   • Alcohol use: No   • Drug use: Yes     Types: Marijuana   • Sexual activity: Defer        Review of Systems   Constitutional: Negative.    HENT: Negative.    Eyes: Negative.    Respiratory: Negative.    Cardiovascular: Positive for leg swelling.   Gastrointestinal: Negative.    Endocrine: Negative.    Genitourinary: Negative.    Musculoskeletal: Positive for arthralgias and joint swelling.   Skin: Negative.   "  Allergic/Immunologic: Negative.    Neurological: Negative.    Hematological: Negative.    Psychiatric/Behavioral: Negative.        The following portions of the patient's history were reviewed and updated as appropriate: allergies, current medications, past family history, past medical history, past social history, past surgical history and problem list.    Physical Exam:   /80   Pulse 84   Ht 170.2 cm (67.01\")   Wt 77.6 kg (171 lb 1.2 oz)   BMI 26.79 kg/m²   Very tender to light touch throughout with the right foot, somewhat concentrated over the second and third tarsometatarsal joints  Medical Decision Making    Data Review:   none    Assessment/Plan/Diagnosis/Treatment Options:   1. Arthritis of ankle or foot, degenerative, right  As above I do not have surgery that I think would be at all effective.  I think it would have a significant risk of making her worse.  Her radiographs show only mild to moderate arthritis and bunion.  Injecting it is reasonable she would like to try this.  I will see her as needed.    After discussing the risks (including infection, skin atrophy, etc), time out was called,  the right tarsalmetatarsal joints were prepped and using sterile technique injected with 2cc of 1% lidocaine and 2cc (80mg) DepoMedrol.  A band aid was applied.  No complications.                 Jimena Pennington MD                      "

## 2021-04-30 ENCOUNTER — TELEPHONE (OUTPATIENT)
Dept: FAMILY MEDICINE CLINIC | Facility: CLINIC | Age: 56
End: 2021-04-30

## 2021-04-30 RX ORDER — ONDANSETRON HYDROCHLORIDE 8 MG/1
8 TABLET, FILM COATED ORAL EVERY 8 HOURS PRN
Qty: 30 TABLET | Refills: 6 | Status: SHIPPED | OUTPATIENT
Start: 2021-04-30 | End: 2022-03-29

## 2021-05-04 ENCOUNTER — OFFICE VISIT (OUTPATIENT)
Dept: ORTHOPEDIC SURGERY | Facility: CLINIC | Age: 56
End: 2021-05-04

## 2021-05-04 VITALS
WEIGHT: 171.08 LBS | HEART RATE: 95 BPM | DIASTOLIC BLOOD PRESSURE: 77 MMHG | HEIGHT: 67 IN | SYSTOLIC BLOOD PRESSURE: 122 MMHG | BODY MASS INDEX: 26.85 KG/M2

## 2021-05-04 DIAGNOSIS — M25.511 RIGHT SHOULDER PAIN, UNSPECIFIED CHRONICITY: ICD-10-CM

## 2021-05-04 DIAGNOSIS — R20.2 NUMBNESS AND TINGLING IN RIGHT HAND: ICD-10-CM

## 2021-05-04 DIAGNOSIS — M48.02 CERVICAL SPINAL STENOSIS: Primary | ICD-10-CM

## 2021-05-04 DIAGNOSIS — R20.0 NUMBNESS AND TINGLING IN RIGHT HAND: ICD-10-CM

## 2021-05-04 PROCEDURE — 99213 OFFICE O/P EST LOW 20 MIN: CPT | Performed by: ORTHOPAEDIC SURGERY

## 2021-05-04 RX ORDER — CETIRIZINE HYDROCHLORIDE 10 MG/1
TABLET ORAL
Qty: 90 TABLET | Refills: 0 | Status: SHIPPED | OUTPATIENT
Start: 2021-05-04 | End: 2021-08-06 | Stop reason: SDUPTHER

## 2021-05-04 RX ORDER — DULOXETIN HYDROCHLORIDE 30 MG/1
CAPSULE, DELAYED RELEASE ORAL
Qty: 180 CAPSULE | Refills: 0 | Status: SHIPPED | OUTPATIENT
Start: 2021-05-04 | End: 2021-08-06 | Stop reason: SDUPTHER

## 2021-05-04 NOTE — TELEPHONE ENCOUNTER
LOV: 2/2/21  NOV: 8/2/21  LAST FILL: 2/6/21-DULOXETINE, WE'VE NEVER FILLED THE CETIRIZINE PRESCRIPTION FOR PATIENT.

## 2021-05-04 NOTE — PROGRESS NOTES
"    [facility]        Subjective     CC: Follow-up (2 month f/u--Cervical spinal stenosis )      HPI    Geneva Haro is a 56 y.o. female.  Patient returns today for follow-up.  At her last visit, she was sent for physical therapy which she did in [facility] in [place].  She says if anything, this made both of her arms hurt and go numb and tingly.    Overall, patient's symptoms are worsening.    ROS:    Constiutional:Pt denies fever, chills, nausea, or vomiting.  MSK:as above        Objective      Past Medical History  Past Medical History:   Diagnosis Date   • Anxiety    • Arthritis    • Depression    • Diabetes mellitus (CMS/HCC)    • Gall stones    • History of stomach ulcers    • Stomach problems          Physical Exam  /77   Pulse 95   Ht 170.2 cm (67.01\")   Wt 77.6 kg (171 lb 1.2 oz)   BMI 26.79 kg/m²     Body mass index is 26.79 kg/m².    Patient is well nourished and well developed.        Ortho Exam  Musculoskeletal   Upper Extremity   Right Shoulder       Strength and Tone:    Supraspinatus -5-5    External Rotators-5/5    Infraspinatus - 5/5    Subscapularis - 5/5    Deltoid - 5/5     Range of Motion      Right Shoulder:    Internal Rotation: ROM - L4    External Rotation: AROM - 70 degrees    Elevation through flexion: AROM - 140 degrees     AC joint:  non tender to palpation    AC joint:  negative crossover      Imaging/Labs/EMG Reviewed:  Imaging Results (Last 24 Hours)     ** No results found for the last 24 hours. **            Assessment    Assessment:  1. Cervical spinal stenosis    2. Right shoulder pain, unspecified chronicity    3. Numbness and tingling in right hand        Plan:  1. Recommend over the counter anti-inflammatories for pain and/or swelling  2. Chronic right shoulder pain in the face of cervical stenosis and cervical spondylosis at C4-5 and C5-6--plan will be for a referral to neurosurgery.  Patient is a smoker we told her that she will " likely be asked to stop smoking before any type of surgery can be entertained.  I will see her back as needed going forward.      Abdulkadir Estrada MD  05/04/21  13:05 JULIANNAT      Dragon disclaimer:  Much of this encounter note is an electronic transcription/translation of spoken language to printed text. The electronic translation of spoken language may permit erroneous, or at times, nonsensical words or phrases to be inadvertently transcribed; Although I have reviewed the note for such errors, some may still exist.

## 2021-05-05 ENCOUNTER — HOSPITAL ENCOUNTER (OUTPATIENT)
Dept: ONCOLOGY | Facility: HOSPITAL | Age: 56
Setting detail: INFUSION SERIES
Discharge: HOME | End: 2021-05-05

## 2021-05-05 VITALS
HEIGHT: 67 IN | RESPIRATION RATE: 16 BRPM | DIASTOLIC BLOOD PRESSURE: 89 MMHG | WEIGHT: 171 LBS | SYSTOLIC BLOOD PRESSURE: 124 MMHG | BODY MASS INDEX: 26.84 KG/M2 | TEMPERATURE: 97.7 F | HEART RATE: 94 BPM

## 2021-05-05 DIAGNOSIS — D50.8 OTHER IRON DEFICIENCY ANEMIA: ICD-10-CM

## 2021-05-05 DIAGNOSIS — K31.84 GASTROPARESIS: ICD-10-CM

## 2021-05-05 DIAGNOSIS — Z95.828 PORT-A-CATH IN PLACE: Primary | ICD-10-CM

## 2021-05-05 PROCEDURE — 25010000002 HEPARIN LOCK FLUSH PER 10 UNITS: Performed by: NURSE PRACTITIONER

## 2021-05-05 PROCEDURE — 96523 IRRIG DRUG DELIVERY DEVICE: CPT

## 2021-05-05 RX ORDER — HEPARIN SODIUM (PORCINE) LOCK FLUSH IV SOLN 100 UNIT/ML 100 UNIT/ML
500 SOLUTION INTRAVENOUS AS NEEDED
Status: DISCONTINUED | OUTPATIENT
Start: 2021-05-05 | End: 2021-05-06 | Stop reason: HOSPADM

## 2021-05-05 RX ORDER — HEPARIN SODIUM (PORCINE) LOCK FLUSH IV SOLN 100 UNIT/ML 100 UNIT/ML
500 SOLUTION INTRAVENOUS AS NEEDED
Status: CANCELLED | OUTPATIENT
Start: 2021-05-05

## 2021-05-05 RX ORDER — SODIUM CHLORIDE 0.9 % (FLUSH) 0.9 %
10 SYRINGE (ML) INJECTION AS NEEDED
Status: CANCELLED | OUTPATIENT
Start: 2021-05-05

## 2021-05-05 RX ORDER — SODIUM CHLORIDE 0.9 % (FLUSH) 0.9 %
10 SYRINGE (ML) INJECTION AS NEEDED
Status: DISCONTINUED | OUTPATIENT
Start: 2021-05-05 | End: 2021-05-06 | Stop reason: HOSPADM

## 2021-05-05 RX ADMIN — Medication 10 ML: at 14:38

## 2021-05-05 RX ADMIN — HEPARIN 500 UNITS: 100 SYRINGE at 14:38

## 2021-05-10 RX ORDER — BACLOFEN 10 MG/1
TABLET ORAL
Qty: 90 TABLET | Refills: 0 | Status: SHIPPED | OUTPATIENT
Start: 2021-05-10 | End: 2021-06-16

## 2021-06-02 ENCOUNTER — HOSPITAL ENCOUNTER (OUTPATIENT)
Dept: ONCOLOGY | Facility: HOSPITAL | Age: 56
Setting detail: INFUSION SERIES
Discharge: HOME | End: 2021-06-02

## 2021-06-02 VITALS
DIASTOLIC BLOOD PRESSURE: 83 MMHG | BODY MASS INDEX: 27 KG/M2 | TEMPERATURE: 97.2 F | RESPIRATION RATE: 16 BRPM | HEIGHT: 67 IN | SYSTOLIC BLOOD PRESSURE: 120 MMHG | HEART RATE: 89 BPM | WEIGHT: 172 LBS

## 2021-06-02 DIAGNOSIS — D50.8 OTHER IRON DEFICIENCY ANEMIA: ICD-10-CM

## 2021-06-02 DIAGNOSIS — Z95.828 PORT-A-CATH IN PLACE: Primary | ICD-10-CM

## 2021-06-02 DIAGNOSIS — K31.84 GASTROPARESIS: ICD-10-CM

## 2021-06-02 PROCEDURE — 96523 IRRIG DRUG DELIVERY DEVICE: CPT

## 2021-06-02 PROCEDURE — 25010000002 HEPARIN LOCK FLUSH PER 10 UNITS: Performed by: NURSE PRACTITIONER

## 2021-06-02 RX ORDER — HEPARIN SODIUM (PORCINE) LOCK FLUSH IV SOLN 100 UNIT/ML 100 UNIT/ML
500 SOLUTION INTRAVENOUS AS NEEDED
Status: CANCELLED | OUTPATIENT
Start: 2021-06-02

## 2021-06-02 RX ORDER — SODIUM CHLORIDE 0.9 % (FLUSH) 0.9 %
10 SYRINGE (ML) INJECTION AS NEEDED
Status: CANCELLED | OUTPATIENT
Start: 2021-06-02

## 2021-06-02 RX ORDER — HEPARIN SODIUM (PORCINE) LOCK FLUSH IV SOLN 100 UNIT/ML 100 UNIT/ML
500 SOLUTION INTRAVENOUS AS NEEDED
Status: DISCONTINUED | OUTPATIENT
Start: 2021-06-02 | End: 2021-06-03 | Stop reason: HOSPADM

## 2021-06-02 RX ADMIN — HEPARIN 500 UNITS: 100 SYRINGE at 15:25

## 2021-06-16 RX ORDER — BACLOFEN 10 MG/1
TABLET ORAL
Qty: 90 TABLET | Refills: 0 | Status: SHIPPED | OUTPATIENT
Start: 2021-06-16 | End: 2021-07-06

## 2021-06-16 RX ORDER — CITALOPRAM 20 MG/1
TABLET ORAL
Qty: 90 TABLET | Refills: 0 | Status: SHIPPED | OUTPATIENT
Start: 2021-06-16 | End: 2021-11-08

## 2021-07-06 ENCOUNTER — OFFICE VISIT (OUTPATIENT)
Dept: NEUROSURGERY | Facility: CLINIC | Age: 56
End: 2021-07-06

## 2021-07-06 VITALS
DIASTOLIC BLOOD PRESSURE: 80 MMHG | TEMPERATURE: 96.9 F | WEIGHT: 165.8 LBS | HEIGHT: 67 IN | BODY MASS INDEX: 26.02 KG/M2 | SYSTOLIC BLOOD PRESSURE: 120 MMHG

## 2021-07-06 DIAGNOSIS — M50.00 HNP (HERNIATED NUCLEUS PULPOSUS) WITH MYELOPATHY, CERVICAL: ICD-10-CM

## 2021-07-06 DIAGNOSIS — M79.7 FIBROMYALGIA: Chronic | ICD-10-CM

## 2021-07-06 DIAGNOSIS — M19.90 ARTHRITIS: ICD-10-CM

## 2021-07-06 DIAGNOSIS — Z72.0 TOBACCO ABUSE: ICD-10-CM

## 2021-07-06 DIAGNOSIS — M47.812 CERVICAL SPONDYLOSIS WITHOUT MYELOPATHY: Primary | ICD-10-CM

## 2021-07-06 DIAGNOSIS — R25.2 MUSCLE CRAMPS: ICD-10-CM

## 2021-07-06 PROCEDURE — 99204 OFFICE O/P NEW MOD 45 MIN: CPT | Performed by: PHYSICIAN ASSISTANT

## 2021-07-06 RX ORDER — TRAZODONE HYDROCHLORIDE 50 MG/1
TABLET ORAL
Qty: 30 TABLET | Refills: 2 | Status: SHIPPED | OUTPATIENT
Start: 2021-07-06 | End: 2021-08-06 | Stop reason: SDUPTHER

## 2021-07-06 RX ORDER — TIZANIDINE 4 MG/1
4 TABLET ORAL NIGHTLY PRN
Qty: 30 TABLET | Refills: 1 | Status: SHIPPED | OUTPATIENT
Start: 2021-07-06 | End: 2021-11-01

## 2021-07-06 RX ORDER — MELOXICAM 7.5 MG/1
7.5 TABLET ORAL DAILY
Qty: 20 TABLET | Refills: 0 | Status: SHIPPED | OUTPATIENT
Start: 2021-07-06 | End: 2022-03-30 | Stop reason: SDUPTHER

## 2021-07-06 NOTE — PROGRESS NOTES
Patient: Geneva Haro  : 1965  Chart #: [id number]    Date of Service: 2021    Chief Complaint   Patient presents with   • Neck Pain     new pt   • Arm Pain     HPI  This is a very pleasant 55-year-old female who presents with 6 months of tingling and numbness as well as pain in the neck and arms.  She has subjective weakness due to pain.  The pain in the left arm goes all the way into her fingers the pain in the right arm goes to the elbow.  The patient went to physical therapy in May 2021 for 3 visits but due to increased pain and discomfort she had to stop.  She is currently on Cymbalta 30 mg twice a day.    Chronic illnesses:  Arthritis neck and low back pain  Tobacco abuse  Past Medical History:   Diagnosis Date   • Anxiety    • Arthritis    • Cancer (CMS/HCC)    • Depression    • Diabetes mellitus (CMS/HCC)    • Gall stones    • Hearing loss    • Hernia, hiatal    • History of stomach ulcers    • History of transfusion    • Stomach problems    • Ulcer of bile duct        Allergies   Allergen Reactions   • Gabapentin Other (See Comments)     Seizure   • Morphine And Related Itching and Swelling   • Aspirin GI Intolerance   • Ibuprofen GI Intolerance         Current Outpatient Medications:   •  cetirizine (zyrTEC) 10 MG tablet, TAKE ONE TABLET BY MOUTH DAILY, Disp: 90 tablet, Rfl: 0  •  citalopram (CeleXA) 20 MG tablet, TAKE ONE TABLET BY MOUTH DAILY, Disp: 90 tablet, Rfl: 0  •  DULoxetine (CYMBALTA) 30 MG capsule, TAKE ONE CAPSULE BY MOUTH TWICE A DAY, Disp: 180 capsule, Rfl: 0  •  esomeprazole (nexIUM) 40 MG capsule, Take 40 mg by mouth 2 (Two) Times a Day., Disp: , Rfl:   •  ondansetron (Zofran) 8 MG tablet, Take 1 tablet by mouth Every 8 (Eight) Hours As Needed for Nausea or Vomiting., Disp: 30 tablet, Rfl: 6  •  promethazine (PHENERGAN) 12.5 MG tablet, Take 1 tablet by mouth Every 6 (Six) Hours As Needed for Nausea or Vomiting., Disp: 30 tablet, Rfl: 0  •  promethazine (PHENERGAN) 25 MG  suppository, Insert 1 suppository into the rectum Every 6 (Six) Hours As Needed for Nausea or Vomiting for up to 12 doses., Disp: 12 suppository, Rfl: 0  •  traZODone (DESYREL) 50 MG tablet, TAKE ONE TABLET BY MOUTH ONCE NIGHTLY, Disp: 30 tablet, Rfl: 2  •  baclofen (LIORESAL) 10 MG tablet, TAKE ONE TABLET BY MOUTH THREE TIMES A DAY AS NEEDED FOR  MUSCLE SPASMS, Disp: 90 tablet, Rfl: 0  •  magnesium oxide (MAG-OX) 400 MG tablet, Take 1 tablet by mouth Daily., Disp: 30 tablet, Rfl: 6  •  metoclopramide (REGLAN) 10 MG tablet, Take 1 tablet by mouth 4 (Four) Times a Day Before Meals & at Bedtime As Needed (N/V) for up to 12 doses., Disp: 12 tablet, Rfl: 0    Social History     Socioeconomic History   • Marital status:      Spouse name: Not on file   • Number of children: Not on file   • Years of education: Not on file   • Highest education level: Not on file   Tobacco Use   • Smoking status: Current Every Day Smoker     Packs/day: 0.50     Years: 20.00     Pack years: 10.00     Types: Cigarettes   • Smokeless tobacco: Never Used   Vaping Use   • Vaping Use: Never assessed   Substance and Sexual Activity   • Alcohol use: No   • Drug use: Yes     Types: Marijuana   • Sexual activity: Defer       Family History   Problem Relation Age of Onset   • Breast cancer Maternal Aunt    • Pancreatic cancer Cousin    • Breast cancer Cousin    • Breast cancer Cousin    • Heart attack Mother        Review of Systems   Constitutional: Negative for activity change, appetite change, chills, diaphoresis, fatigue, fever and unexpected weight change.   HENT: Negative for congestion, dental problem, drooling, ear discharge, ear pain, facial swelling, hearing loss, mouth sores, nosebleeds, postnasal drip, rhinorrhea, sinus pressure, sinus pain, sneezing, sore throat, tinnitus, trouble swallowing and voice change.    Eyes: Negative for photophobia, pain, discharge, redness, itching and visual disturbance.   Respiratory: Negative for  apnea, cough, choking, chest tightness, shortness of breath, wheezing and stridor.    Cardiovascular: Negative for chest pain, palpitations and leg swelling.   Gastrointestinal: Negative for abdominal distention, abdominal pain, anal bleeding, blood in stool, constipation, diarrhea, nausea, rectal pain and vomiting.   Endocrine: Negative for cold intolerance, heat intolerance, polydipsia, polyphagia and polyuria.   Genitourinary: Negative for decreased urine volume, difficulty urinating, dyspareunia, dysuria, enuresis, flank pain, frequency, genital sores, hematuria, menstrual problem, pelvic pain, urgency, vaginal bleeding, vaginal discharge and vaginal pain.   Musculoskeletal: Positive for neck pain (goes down to neck to arms). Negative for arthralgias, back pain, gait problem, joint swelling, myalgias and neck stiffness.   Skin: Negative for color change, pallor, rash and wound.   Allergic/Immunologic: Negative for environmental allergies, food allergies and immunocompromised state.   Neurological: Negative for dizziness, tremors, seizures, syncope, facial asymmetry, speech difficulty, weakness, light-headedness, numbness and headaches.   Hematological: Negative for adenopathy. Does not bruise/bleed easily.   Psychiatric/Behavioral: Negative for agitation, behavioral problems, confusion, decreased concentration, dysphoric mood, hallucinations, self-injury, sleep disturbance and suicidal ideas. The patient is not nervous/anxious and is not hyperactive.        Patient's Body mass index is 25.97 kg/m². indicating that she is overweight (BMI 25-29.9). Obesity-related health conditions include the following: osteoarthritis. Obesity is unchanged. BMI is is above average; BMI management plan is completed.      Social History    Tobacco Use      Smoking status: Current Every Day Smoker        Packs/day: 0.50        Years: 20.00        Pack years: 10        Types: Cigarettes      Smokeless tobacco: Never Used    "    Physical examination:  Blood pressure 120/80, temperature 96.9 °F (36.1 °C), height 170.2 cm (67\"), weight 75.2 kg (165 lb 12.8 oz).  HEENT- normocephalic, atraumatic, sclera clear  Lungs-normal expansion, no wheezing  Heart-regular rate and rhythm  Extremities-positive pulses, no edema    Neurologic Exam    WDWN AAF  A/A/C, speech clear, attention normal, conversant, answers questions appropriately, good historian.  Cranial nerves II through XII are intact  Motor examination does not reveal weakness in the , upper or lower extremities.     Sensation is intact.  Gait is normal, balance is normal.   No tremors are noted.  Reflexes are intact except for an absent left Achilles.   Valerio is negative. Clonus is negative.   Palpation of the cervical paraspinal muscles is tender, she does have some decreased range of motion of the cervical spine with lateral rotation and flexion extension.    Radiographic Imaging:  I have personally reviewed imaging and outside results. I have independently interpreted the imaging and discussed with the patient the findings and appropriate management.  MRI of the cervical spine shows bone spurring at C4-5.  She has degenerative changes at C5-6 and C6-7.      Medical Decision Making  Assessment and Plan:  1.  Cervical spondylosis-I have ordered AP lateral, flexion-extension x-rays of the cervical spine.  2.  Cervical radiculopathy, ulnar neuropathy, carpal tunnel syndrome.  I have ordered EMG and nerve conduction study.  The patient will see me in follow-up after this is completed.  3.  I have started the patient on Mobic and Zanaflex.  4.  Due to the patient's gastric stimulator she cannot undergo MRI scanning therefore I have recommended a cervical myelogram.  She will get her diagnostic studies completed and see me in the office on the same day.    Beverly Villa PA-C      Patient Care Team:  Cesar Gao MD as PCP - General (Internal Medicine)  Provider, No Known as " PCP - Family Medicine  Darion Figueroa MD as Consulting Physician (Gastroenterology)

## 2021-07-06 NOTE — PATIENT INSTRUCTIONS
Tobacco Use Disorder  Tobacco use disorder (TUD) occurs when a person craves, seeks, and uses tobacco, regardless of the consequences. This disorder can cause problems with mental and physical health. It can affect your ability to have healthy relationships, and it can keep you from meeting your responsibilities at work, home, or school.  Tobacco may be:  · Smoked as a cigarette or cigar.  · Inhaled using e-cigarettes.  · Smoked in a pipe or hookah.  · Chewed as smokeless tobacco.  · Inhaled into the nostrils as snuff.  Tobacco products contain a dangerous chemical called nicotine, which is very addictive. Nicotine triggers hormones that make the body feel stimulated and works on areas of the brain that make you feel good. These effects can make it hard for people to quit nicotine.  Tobacco contains many other unsafe chemicals that can damage almost every organ in the body. Smoking tobacco also puts others in danger due to fire risk and possible health problems caused by breathing in secondhand smoke.  What are the signs or symptoms?  Symptoms of TUD may include:  · Being unable to slow down or stop your tobacco use.  · Spending an abnormal amount of time getting or using tobacco.  · Craving tobacco. Cravings may last for up to 6 months after quitting.  · Tobacco use that:  ? Interferes with your work, school, or home life.  ? Interferes with your personal and social relationships.  ? Makes you give up activities that you once enjoyed or found important.  · Using tobacco even though you know that it is:  ? Dangerous or bad for your health or someone else's health.  ? Causing problems in your life.  · Needing more and more of the substance to get the same effect (developing tolerance).  · Experiencing unpleasant symptoms if you do not use the substance (withdrawal). Withdrawal symptoms may include:  ? Depressed, anxious, or irritable mood.  ? Difficulty concentrating.  ? Increased appetite.  ? Restlessness or trouble  sleeping.  · Using the substance to avoid withdrawal.  How is this diagnosed?  This condition may be diagnosed based on:  · Your current and past tobacco use. Your health care provider may ask questions about how your tobacco use affects your life.  · A physical exam.  You may be diagnosed with TUD if you have at least two symptoms within a 12-month period.  How is this treated?  This condition is treated by stopping tobacco use. Many people are unable to quit on their own and need help. Treatment may include:  · Nicotine replacement therapy (NRT). NRT provides nicotine without the other harmful chemicals in tobacco. NRT gradually lowers the dosage of nicotine in the body and reduces withdrawal symptoms. NRT is available as:  ? Over-the-counter gums, lozenges, and skin patches.  ? Prescription mouth inhalers and nasal sprays.  · Medicine that acts on the brain to reduce cravings and withdrawal symptoms.  · A type of talk therapy that examines your triggers for tobacco use, how to avoid them, and how to cope with cravings (behavioral therapy).  · Hypnosis. This may help with withdrawal symptoms.  · Joining a support group for others coping with TUD.  The best treatment for TUD is usually a combination of medicine, talk therapy, and support groups. Recovery can be a long process. Many people start using tobacco again after stopping (relapse). If you relapse, it does not mean that treatment will not work.  Follow these instructions at home:    Lifestyle  · Do not use any products that contain nicotine or tobacco, such as cigarettes and e-cigarettes.  · Avoid things that trigger tobacco use as much as you can. Triggers include people and situations that usually cause you to use tobacco.  · Avoid drinks that contain caffeine, including coffee. These may worsen some withdrawal symptoms.  · Find ways to manage stress. Wanting to smoke may cause stress, and stress can make you want to smoke. Relaxation techniques such as  deep breathing, meditation, and yoga may help.  · Attend support groups as needed. These groups are an important part of long-term recovery for many people.  General instructions  · Take over-the-counter and prescription medicines only as told by your health care provider.  · Check with your health care provider before taking any new prescription or over-the-counter medicines.  · Decide on a friend, family member, or smoking quit-line (such as 1-800-QUIT-NOW in the [place] that you can call or text when you feel the urge to smoke or when you need help coping with cravings.  · Keep all follow-up visits as told by your health care provider and therapist. This is important.  Contact a health care provider if:  · You are not able to take your medicines as prescribed.  · Your symptoms get worse, even with treatment.  Summary  · Tobacco use disorder (TUD) occurs when a person craves, seeks, and uses tobacco regardless of the consequences.  · This condition may be diagnosed based on your current and past tobacco use and a physical exam.  · Many people are unable to quit on their own and need help. Recovery can be a long process.  · The most effective treatment for TUD is usually a combination of medicine, talk therapy, and support groups.  This information is not intended to replace advice given to you by your health care provider. Make sure you discuss any questions you have with your health care provider.  Document Revised: 12/05/2018 Document Reviewed: 12/05/2018  [employer] Patient Education © 2021 [employer]

## 2021-07-07 ENCOUNTER — HOSPITAL ENCOUNTER (OUTPATIENT)
Dept: ONCOLOGY | Facility: HOSPITAL | Age: 56
Setting detail: INFUSION SERIES
Discharge: HOME | End: 2021-07-07

## 2021-07-07 VITALS
WEIGHT: 167 LBS | HEART RATE: 94 BPM | HEIGHT: 67 IN | BODY MASS INDEX: 26.21 KG/M2 | SYSTOLIC BLOOD PRESSURE: 135 MMHG | TEMPERATURE: 97.8 F | RESPIRATION RATE: 20 BRPM | DIASTOLIC BLOOD PRESSURE: 83 MMHG

## 2021-07-07 DIAGNOSIS — D50.8 OTHER IRON DEFICIENCY ANEMIA: ICD-10-CM

## 2021-07-07 DIAGNOSIS — Z95.828 PORT-A-CATH IN PLACE: Primary | ICD-10-CM

## 2021-07-07 DIAGNOSIS — K31.84 GASTROPARESIS: ICD-10-CM

## 2021-07-07 PROCEDURE — 96523 IRRIG DRUG DELIVERY DEVICE: CPT

## 2021-07-07 PROCEDURE — 25010000002 HEPARIN LOCK FLUSH PER 10 UNITS: Performed by: NURSE PRACTITIONER

## 2021-07-07 RX ORDER — SODIUM CHLORIDE 0.9 % (FLUSH) 0.9 %
10 SYRINGE (ML) INJECTION AS NEEDED
Status: CANCELLED | OUTPATIENT
Start: 2021-07-07

## 2021-07-07 RX ORDER — HEPARIN SODIUM (PORCINE) LOCK FLUSH IV SOLN 100 UNIT/ML 100 UNIT/ML
500 SOLUTION INTRAVENOUS AS NEEDED
Status: CANCELLED | OUTPATIENT
Start: 2021-07-07

## 2021-07-07 RX ORDER — HEPARIN SODIUM (PORCINE) LOCK FLUSH IV SOLN 100 UNIT/ML 100 UNIT/ML
500 SOLUTION INTRAVENOUS AS NEEDED
Status: DISCONTINUED | OUTPATIENT
Start: 2021-07-07 | End: 2021-07-08 | Stop reason: HOSPADM

## 2021-07-07 RX ORDER — SODIUM CHLORIDE 0.9 % (FLUSH) 0.9 %
10 SYRINGE (ML) INJECTION AS NEEDED
Status: DISCONTINUED | OUTPATIENT
Start: 2021-07-07 | End: 2021-07-08 | Stop reason: HOSPADM

## 2021-07-07 RX ADMIN — Medication 10 ML: at 15:00

## 2021-07-07 RX ADMIN — HEPARIN 500 UNITS: 100 SYRINGE at 15:00

## 2021-07-11 PROBLEM — M50.00 HNP (HERNIATED NUCLEUS PULPOSUS) WITH MYELOPATHY, CERVICAL: Status: ACTIVE | Noted: 2021-07-11

## 2021-07-12 PROBLEM — M47.812 CERVICAL SPONDYLOSIS WITHOUT MYELOPATHY: Status: ACTIVE | Noted: 2021-07-12

## 2021-07-13 ENCOUNTER — TELEPHONE (OUTPATIENT)
Dept: NEUROSURGERY | Facility: CLINIC | Age: 56
End: 2021-07-13

## 2021-07-13 NOTE — TELEPHONE ENCOUNTER
THIS PT IS HAVING A MYELOGRAM ON 7/22/21 AND MIRTHA WANTS TO HAVE HIM FU WITH [facility] SAME DAY. CAN I PUT HIM ON AT NOON ON 7/22/21?

## 2021-07-22 ENCOUNTER — OFFICE VISIT (OUTPATIENT)
Dept: NEUROSURGERY | Facility: CLINIC | Age: 56
End: 2021-07-22

## 2021-07-22 ENCOUNTER — APPOINTMENT (OUTPATIENT)
Dept: CT IMAGING | Facility: HOSPITAL | Age: 56
End: 2021-07-22

## 2021-07-22 ENCOUNTER — HOSPITAL ENCOUNTER (OUTPATIENT)
Dept: NEUROLOGY | Facility: HOSPITAL | Age: 56
Discharge: HOME | End: 2021-07-22

## 2021-07-22 ENCOUNTER — HOSPITAL ENCOUNTER (OUTPATIENT)
Facility: HOSPITAL | Age: 56
Setting detail: HOSPITAL OUTPATIENT SURGERY
Discharge: HOME | End: 2021-07-22
Attending: RADIOLOGY | Admitting: RADIOLOGY

## 2021-07-22 VITALS
WEIGHT: 167 LBS | DIASTOLIC BLOOD PRESSURE: 70 MMHG | TEMPERATURE: 97.9 F | HEIGHT: 67 IN | BODY MASS INDEX: 26.21 KG/M2 | SYSTOLIC BLOOD PRESSURE: 110 MMHG

## 2021-07-22 VITALS
HEIGHT: 67 IN | WEIGHT: 167 LBS | HEART RATE: 69 BPM | OXYGEN SATURATION: 82 % | SYSTOLIC BLOOD PRESSURE: 124 MMHG | BODY MASS INDEX: 26.21 KG/M2 | DIASTOLIC BLOOD PRESSURE: 86 MMHG | TEMPERATURE: 97.8 F

## 2021-07-22 DIAGNOSIS — M47.812 CERVICAL SPONDYLOSIS WITHOUT MYELOPATHY: Primary | ICD-10-CM

## 2021-07-22 DIAGNOSIS — Z72.0 TOBACCO ABUSE: ICD-10-CM

## 2021-07-22 DIAGNOSIS — M79.7 FIBROMYALGIA: ICD-10-CM

## 2021-07-22 DIAGNOSIS — R25.2 MUSCLE CRAMPS: ICD-10-CM

## 2021-07-22 DIAGNOSIS — M19.90 ARTHRITIS: ICD-10-CM

## 2021-07-22 DIAGNOSIS — M47.812 CERVICAL SPONDYLOSIS WITHOUT MYELOPATHY: ICD-10-CM

## 2021-07-22 DIAGNOSIS — M79.7 FIBROMYALGIA: Chronic | ICD-10-CM

## 2021-07-22 DIAGNOSIS — M50.00 HNP (HERNIATED NUCLEUS PULPOSUS) WITH MYELOPATHY, CERVICAL: ICD-10-CM

## 2021-07-22 PROCEDURE — 61055 INJECTION INTO BRAIN CANAL: CPT | Performed by: RADIOLOGY

## 2021-07-22 PROCEDURE — 72240 MYELOGRAPHY NECK SPINE: CPT | Performed by: RADIOLOGY

## 2021-07-22 PROCEDURE — 95910 NRV CNDJ TEST 7-8 STUDIES: CPT

## 2021-07-22 PROCEDURE — 25010000002 IOPAMIDOL 61 % SOLUTION: Performed by: RADIOLOGY

## 2021-07-22 PROCEDURE — 99214 OFFICE O/P EST MOD 30 MIN: CPT | Performed by: PHYSICIAN ASSISTANT

## 2021-07-22 PROCEDURE — 72126 CT NECK SPINE W/DYE: CPT

## 2021-07-22 PROCEDURE — 95886 MUSC TEST DONE W/N TEST COMP: CPT

## 2021-07-22 NOTE — PROGRESS NOTES
Geneva Haro   1965   [id number]       07/22/2021     Chief Complaint   Patient presents with   • Follow-up     myelogram   • Back Pain        HPI   ***    Description of pain-***  Timing of pain-***  Location of pain-***,   radiation of pain-***  Duration of Pain-***  Treatment-***    Chronic Illnesses:  ***  Past Medical History:  No date: Anxiety  No date: Arthritis  No date: Cancer (CMS/HCC)  No date: Depression  No date: Diabetes mellitus (CMS/HCC)  No date: Gall stones  No date: Hearing loss  No date: Hernia, hiatal  No date: History of stomach ulcers  No date: History of transfusion  No date: Stomach problems  No date: Ulcer of bile duct     Past Surgical History:   Procedure Laterality Date   • CHOLECYSTECTOMY     • ENDOSCOPY N/A 6/20/2018    Procedure: ESOPHAGOGASTRODUODENOSCOPY;  Surgeon: Mark I Brunner, MD;  Location: [facility] ENDOSCOPY;  Service: Gastroenterology   • GASTRIC BYPASS      x2   • HERNIA REPAIR     • HERNIA REPAIR     • MASTECTOMY Bilateral     Left With sentinel lymph node sampling and prophylactic right mastectomy   • PACEMAKER IMPLANTATION     • PORTACATH PLACEMENT          Allergies   Allergen Reactions   • Gabapentin Other (See Comments)     Seizure   • Morphine And Related Itching and Swelling   • Aspirin GI Intolerance   • Ibuprofen GI Intolerance          Current Outpatient Medications:   •  cetirizine (zyrTEC) 10 MG tablet, TAKE ONE TABLET BY MOUTH DAILY, Disp: 90 tablet, Rfl: 0  •  citalopram (CeleXA) 20 MG tablet, TAKE ONE TABLET BY MOUTH DAILY, Disp: 90 tablet, Rfl: 0  •  DULoxetine (CYMBALTA) 30 MG capsule, TAKE ONE CAPSULE BY MOUTH TWICE A DAY, Disp: 180 capsule, Rfl: 0  •  esomeprazole (nexIUM) 40 MG capsule, Take 40 mg by mouth 2 (Two) Times a Day., Disp: , Rfl:   •  meloxicam (MOBIC) 7.5 MG tablet, Take 1 tablet by mouth Daily., Disp: 20 tablet, Rfl: 0  •  ondansetron (Zofran) 8 MG tablet, Take 1 tablet by mouth Every 8 (Eight) Hours As Needed for Nausea or Vomiting.,  "Disp: 30 tablet, Rfl: 6  •  promethazine (PHENERGAN) 12.5 MG tablet, Take 1 tablet by mouth Every 6 (Six) Hours As Needed for Nausea or Vomiting., Disp: 30 tablet, Rfl: 0  •  tiZANidine (Zanaflex) 4 MG tablet, Take 1 tablet by mouth At Night As Needed for Muscle Spasms., Disp: 30 tablet, Rfl: 1  •  traZODone (DESYREL) 50 MG tablet, TAKE ONE TABLET BY MOUTH ONCE NIGHTLY, Disp: 30 tablet, Rfl: 2  •  promethazine (PHENERGAN) 25 MG suppository, Insert 1 suppository into the rectum Every 6 (Six) Hours As Needed for Nausea or Vomiting for up to 12 doses., Disp: 12 suppository, Rfl: 0  No current facility-administered medications for this visit.     Social History     Socioeconomic History   • Marital status:      Spouse name: Not on file   • Number of children: Not on file   • Years of education: Not on file   • Highest education level: Not on file   Tobacco Use   • Smoking status: Current Every Day Smoker     Packs/day: 0.50     Years: 20.00     Pack years: 10.00     Types: Cigarettes   • Smokeless tobacco: Never Used   Vaping Use   • Vaping Use: Never assessed   Substance and Sexual Activity   • Alcohol use: No   • Drug use: Yes     Types: Marijuana   • Sexual activity: Defer        family history includes Breast cancer in her cousin, cousin, and maternal aunt; Heart attack in her mother; Pancreatic cancer in her cousin.     Social History    Tobacco Use      Smoking status: Current Every Day Smoker        Packs/day: 0.50        Years: 20.00        Pack years: 10        Types: Cigarettes      Smokeless tobacco: Never Used       Body mass index is 26.16 kg/m².   Patient's Body mass index is 26.16 kg/m². indicating that she is {weight categories:40334}.       /70 (BP Location: Right arm, Patient Position: Sitting, Cuff Size: Adult)   Temp 97.9 °F (36.6 °C)   Ht 170.2 cm (67\")   Wt 75.8 kg (167 lb)   BMI 26.16 kg/m²    Physical Examination:  HEENT-wnl  Lungs-No wheezing or SOB      Neurologic Exam "   ***    Radiological Data Review:  I have independently interpreted the imaging and discussed the findings with patient and discussed appropriate management.  ***      Other diagnostic test review:    Assessment and Plan:        Beverly Villa, PAC      PCP:  Cesar Gao MD

## 2021-07-30 NOTE — PROGRESS NOTES
Patient: Geneva Haro  : 1965  Chart #: [id number]    Date of Service: 2021    Chief Complaint   Patient presents with   • Follow-up     myelogram   • Back Pain     Back Pain  Pertinent negatives include no abdominal pain, chest pain, dysuria, fever, headaches, numbness, pelvic pain or weakness.     This is a very pleasant 55-year-old female who presents with 6 months of tingling and numbness as well as pain in the neck and arms.  She has subjective weakness due to pain.  The pain in the left arm goes all the way into her fingers the pain in the right arm goes to the elbow.  The patient went to physical therapy in May 2021 for 3 visits but due to increased pain and discomfort she had to stop.  She is currently on Cymbalta 30 mg twice a day.    Chronic illnesses:  Arthritis neck and low back pain  Tobacco abuse  Past Medical History:   Diagnosis Date   • Anxiety    • Arthritis    • Cancer (CMS/HCC)    • Depression    • Diabetes mellitus (CMS/HCC)    • Gall stones    • Hearing loss    • Hernia, hiatal    • History of stomach ulcers    • History of transfusion    • Stomach problems    • Ulcer of bile duct        Allergies   Allergen Reactions   • Gabapentin Other (See Comments)     Seizure   • Morphine And Related Itching and Swelling   • Aspirin GI Intolerance   • Ibuprofen GI Intolerance         Current Outpatient Medications:   •  cetirizine (zyrTEC) 10 MG tablet, TAKE ONE TABLET BY MOUTH DAILY, Disp: 90 tablet, Rfl: 0  •  citalopram (CeleXA) 20 MG tablet, TAKE ONE TABLET BY MOUTH DAILY, Disp: 90 tablet, Rfl: 0  •  DULoxetine (CYMBALTA) 30 MG capsule, TAKE ONE CAPSULE BY MOUTH TWICE A DAY, Disp: 180 capsule, Rfl: 0  •  esomeprazole (nexIUM) 40 MG capsule, Take 40 mg by mouth 2 (Two) Times a Day., Disp: , Rfl:   •  meloxicam (MOBIC) 7.5 MG tablet, Take 1 tablet by mouth Daily., Disp: 20 tablet, Rfl: 0  •  ondansetron (Zofran) 8 MG tablet, Take 1 tablet by mouth Every 8 (Eight) Hours As Needed for  Nausea or Vomiting., Disp: 30 tablet, Rfl: 6  •  promethazine (PHENERGAN) 12.5 MG tablet, Take 1 tablet by mouth Every 6 (Six) Hours As Needed for Nausea or Vomiting., Disp: 30 tablet, Rfl: 0  •  tiZANidine (Zanaflex) 4 MG tablet, Take 1 tablet by mouth At Night As Needed for Muscle Spasms., Disp: 30 tablet, Rfl: 1  •  traZODone (DESYREL) 50 MG tablet, TAKE ONE TABLET BY MOUTH ONCE NIGHTLY, Disp: 30 tablet, Rfl: 2  •  promethazine (PHENERGAN) 25 MG suppository, Insert 1 suppository into the rectum Every 6 (Six) Hours As Needed for Nausea or Vomiting for up to 12 doses., Disp: 12 suppository, Rfl: 0    Social History     Socioeconomic History   • Marital status:      Spouse name: Not on file   • Number of children: Not on file   • Years of education: Not on file   • Highest education level: Not on file   Tobacco Use   • Smoking status: Current Every Day Smoker     Packs/day: 0.50     Years: 20.00     Pack years: 10.00     Types: Cigarettes   • Smokeless tobacco: Never Used   Vaping Use   • Vaping Use: Never assessed   Substance and Sexual Activity   • Alcohol use: No   • Drug use: Yes     Types: Marijuana   • Sexual activity: Defer       Family History   Problem Relation Age of Onset   • Breast cancer Maternal Aunt    • Pancreatic cancer Cousin    • Breast cancer Cousin    • Breast cancer Cousin    • Heart attack Mother        Review of Systems   Constitutional: Negative for activity change, appetite change, chills, diaphoresis, fatigue, fever and unexpected weight change.   HENT: Negative for congestion, dental problem, drooling, ear discharge, ear pain, facial swelling, hearing loss, mouth sores, nosebleeds, postnasal drip, rhinorrhea, sinus pressure, sinus pain, sneezing, sore throat, tinnitus, trouble swallowing and voice change.    Eyes: Negative for photophobia, pain, discharge, redness, itching and visual disturbance.   Respiratory: Negative for apnea, cough, choking, chest tightness, shortness of  breath, wheezing and stridor.    Cardiovascular: Negative for chest pain, palpitations and leg swelling.   Gastrointestinal: Negative for abdominal distention, abdominal pain, anal bleeding, blood in stool, constipation, diarrhea, nausea, rectal pain and vomiting.   Endocrine: Negative for cold intolerance, heat intolerance, polydipsia, polyphagia and polyuria.   Genitourinary: Negative for decreased urine volume, difficulty urinating, dyspareunia, dysuria, enuresis, flank pain, frequency, genital sores, hematuria, menstrual problem, pelvic pain, urgency, vaginal bleeding, vaginal discharge and vaginal pain.   Musculoskeletal: Positive for back pain and neck pain (goes down to neck to arms). Negative for arthralgias, gait problem, joint swelling, myalgias and neck stiffness.   Skin: Negative for color change, pallor, rash and wound.   Allergic/Immunologic: Negative for environmental allergies, food allergies and immunocompromised state.   Neurological: Negative for dizziness, tremors, seizures, syncope, facial asymmetry, speech difficulty, weakness, light-headedness, numbness and headaches.   Hematological: Negative for adenopathy. Does not bruise/bleed easily.   Psychiatric/Behavioral: Negative for agitation, behavioral problems, confusion, decreased concentration, dysphoric mood, hallucinations, self-injury, sleep disturbance and suicidal ideas. The patient is not nervous/anxious and is not hyperactive.        Patient's Body mass index is 26.16 kg/m². indicating that she is overweight (BMI 25-29.9). Obesity-related health conditions include the following: osteoarthritis. Obesity is unchanged. BMI is is above average; BMI management plan is completed.      Social History    Tobacco Use      Smoking status: Current Every Day Smoker        Packs/day: 0.50        Years: 20.00        Pack years: 10        Types: Cigarettes      Smokeless tobacco: Never Used       Physical examination:  Blood pressure 110/70,  "temperature 97.9 °F (36.6 °C), height 170.2 cm (67\"), weight 75.8 kg (167 lb).  HEENT- normocephalic, atraumatic, sclera clear  Lungs-normal expansion, no wheezing  Heart-regular rate and rhythm  Extremities-positive pulses, no edema    Neurologic Exam    WDWN AAF  A/A/C, speech clear, attention normal, conversant, answers questions appropriately, good historian.  Cranial nerves II through XII are intact  Motor examination does not reveal weakness in the , upper or lower extremities.     Sensation is intact.  Gait is normal, balance is normal.   No tremors are noted.  Reflexes are intact except for an absent left Achilles.   Valerio is negative. Clonus is negative.   Palpation of the cervical paraspinal muscles is tender, she does have some decreased range of motion of the cervical spine with lateral rotation and flexion extension.    Radiographic Imaging:  I have personally reviewed imaging and outside results. I have independently interpreted the imaging and discussed with the patient the findings and appropriate management.  MRI of the cervical spine shows bone spurring at C4-5.  She has degenerative changes at C5-6 and C6-7.  Cervical myelogram today reveals mild cervical spondylosis with mild foraminal narrowing without nerve root compression.    Medical Decision Making  Assessment and Plan:  1.  Cervical spondylosis- myelogram did not reveal nerve root compression.  2.  Bilateral ulnar neuropathy  3.  I have started the patient on Mobic and Zanaflex.  4.  Tob abuse    No role for neurosurgical intervention at this time. I am making a referral to PT and to pain management for their evaluation.  It was a pleasure providing neurosurgical evaluation.    Beverly Villa PA-C      Patient Care Team:  Cesar Gao MD as PCP - General (Internal Medicine)  Provider, No Known as PCP - Family Medicine  Darion Figueroa MD as Consulting Physician (Gastroenterology)        "

## 2021-08-04 ENCOUNTER — TELEPHONE (OUTPATIENT)
Dept: FAMILY MEDICINE CLINIC | Facility: CLINIC | Age: 56
End: 2021-08-04

## 2021-08-04 NOTE — TELEPHONE ENCOUNTER
Caller: LACEY Haro    Relationship: Spouse    Best call back number: [phone]    What medication are you requesting: ZOFRAN DISSOLVABLE     What are your current symptoms: VOMITING     How long have you been experiencing symptoms: TODAY    Have you had these symptoms before:    [x] Yes  [] No    Have you been treated for these symptoms before:   [x] Yes  [] No    If a prescription is needed, what is your preferred pharmacy and phone number:    [facility] [address] [place] [address] [phone] [facility] [phone]       Additional notes: HAD TO RESCHEDULE APPOINTMENT, DIDN'T FEEL WELL ENOUGH TO COME IN

## 2021-08-06 ENCOUNTER — OFFICE VISIT (OUTPATIENT)
Dept: FAMILY MEDICINE CLINIC | Facility: CLINIC | Age: 56
End: 2021-08-06

## 2021-08-06 VITALS
BODY MASS INDEX: 24.83 KG/M2 | SYSTOLIC BLOOD PRESSURE: 112 MMHG | OXYGEN SATURATION: 97 % | WEIGHT: 158.2 LBS | DIASTOLIC BLOOD PRESSURE: 76 MMHG | HEIGHT: 67 IN | HEART RATE: 105 BPM

## 2021-08-06 DIAGNOSIS — H53.8 BLURRED VISION: ICD-10-CM

## 2021-08-06 DIAGNOSIS — K21.9 GASTROESOPHAGEAL REFLUX DISEASE, UNSPECIFIED WHETHER ESOPHAGITIS PRESENT: ICD-10-CM

## 2021-08-06 DIAGNOSIS — K31.84 GASTROPARESIS: Chronic | ICD-10-CM

## 2021-08-06 PROBLEM — E11.9 TYPE 2 DIABETES MELLITUS: Status: RESOLVED | Noted: 2018-06-18 | Resolved: 2021-08-06

## 2021-08-06 PROBLEM — Z72.0 TOBACCO ABUSE: Status: ACTIVE | Noted: 2021-08-06

## 2021-08-06 PROCEDURE — 99214 OFFICE O/P EST MOD 30 MIN: CPT | Performed by: INTERNAL MEDICINE

## 2021-08-06 RX ORDER — CETIRIZINE HYDROCHLORIDE 10 MG/1
10 TABLET ORAL DAILY
Qty: 90 TABLET | Refills: 3 | Status: SHIPPED | OUTPATIENT
Start: 2021-08-06 | End: 2022-10-08

## 2021-08-06 RX ORDER — VARENICLINE TARTRATE 1 MG/1
1 TABLET, FILM COATED ORAL 2 TIMES DAILY
Qty: 56 TABLET | Refills: 1 | Status: SHIPPED | OUTPATIENT
Start: 2021-09-03 | End: 2021-10-29

## 2021-08-06 RX ORDER — TRAZODONE HYDROCHLORIDE 50 MG/1
50 TABLET ORAL NIGHTLY
Qty: 30 TABLET | Refills: 2 | Status: SHIPPED | OUTPATIENT
Start: 2021-08-06 | End: 2022-02-01

## 2021-08-06 RX ORDER — DULOXETIN HYDROCHLORIDE 30 MG/1
30 CAPSULE, DELAYED RELEASE ORAL 2 TIMES DAILY
Qty: 180 CAPSULE | Refills: 3 | Status: SHIPPED | OUTPATIENT
Start: 2021-08-06 | End: 2022-03-30 | Stop reason: SDUPTHER

## 2021-08-06 NOTE — PROGRESS NOTES
Geneva Haro  1965  [id number]  Patient Care Team:  Cesar Gao MD as PCP - General (Internal Medicine)  Provider, No Known as PCP - Family Medicine  Darion Figueroa MD as Consulting Physician (Gastroenterology)    Geneva Haro is a 56 y.o. female here today for follow up.     This patient is accompanied by their self who contributes to the history of their care.    Chief Complaint:    Chief Complaint   Patient presents with   • Gastroparesis     Follow up. Doing better.          History of Present Illness:  I have reviewed and/or updated the patient's past medical, past surgical, family, social history, problem list and allergies as appropriate.     She is still yet to see the [facility] gastro motility clinic.  She indicates that she has persistent bloating nausea and vomiting.  She has gastroparesis and feels that her stimulator is malfunctioning.  She is anticipating getting injections in the C-spine for cervical radiculopathy.  She is currently quitting smoking has been off tobacco for 5 days.  She is getting cravings.  She has never tried Chantix.  She continues on citalopram for anxiety and depression as well as Cymbalta for neuropathy.  Denies any polyuria polydipsia visual changes or cold tolerance.  With the exception of blurred vision.  Just not been able to establish with an ophthalmologist here.  No eye pain or redness or dryness.    Review of Systems   Constitutional: Negative for chills, fatigue, fever, unexpected weight gain and unexpected weight loss.   HENT: Negative for ear pain, postnasal drip, sinus pressure and sore throat.    Eyes: Positive for blurred vision. Negative for double vision and visual disturbance.   Respiratory: Negative for cough, shortness of breath and wheezing.    Cardiovascular: Negative for chest pain, palpitations and leg swelling.   Gastrointestinal: Positive for nausea and vomiting. Negative for abdominal pain, blood in stool and diarrhea.   Endocrine:  "Negative for cold intolerance, heat intolerance, polydipsia, polyphagia and polyuria.   Genitourinary: Negative for dysuria, flank pain and hematuria.   Musculoskeletal: Positive for neck pain. Negative for arthralgias and joint swelling.   Skin: Negative for dry skin and rash.   Neurological: Negative for weakness, numbness and headache.   Psychiatric/Behavioral: Negative for self-injury, suicidal ideas and depressed mood.       Vitals:    08/06/21 1600   BP: 112/76   Pulse: 105   SpO2: 97%   Weight: 71.8 kg (158 lb 3.2 oz)   Height: 170.2 cm (67.01\")     Body mass index is 24.77 kg/m².    Physical Exam  Vitals and nursing note reviewed.   Constitutional:       General: She is not in acute distress.     Appearance: She is well-developed. She is not diaphoretic.   HENT:      Head: Normocephalic and atraumatic.      Right Ear: External ear normal.      Left Ear: External ear normal.      Mouth/Throat:      Pharynx: No oropharyngeal exudate.   Eyes:      General: No scleral icterus.        Right eye: No discharge.      Conjunctiva/sclera: Conjunctivae normal.   Neck:      Thyroid: No thyromegaly.      Vascular: No JVD.      Trachea: No tracheal deviation.   Cardiovascular:      Rate and Rhythm: Normal rate and regular rhythm.      Heart sounds: Normal heart sounds.      Comments: PMI nondisplaced  Pulmonary:      Effort: Pulmonary effort is normal.      Breath sounds: Normal breath sounds. No wheezing or rales.   Abdominal:      General: Bowel sounds are normal.      Palpations: Abdomen is soft.      Tenderness: There is no abdominal tenderness. There is no guarding or rebound.   Musculoskeletal:      Cervical back: Normal range of motion and neck supple.      Comments: Normal gait   Lymphadenopathy:      Cervical: No cervical adenopathy.   Skin:     General: Skin is warm and dry.      Capillary Refill: Capillary refill takes less than 2 seconds.      Coloration: Skin is not pale.      Findings: No rash. "   Neurological:      Mental Status: She is alert and oriented to person, place, and time.      Motor: No abnormal muscle tone.      Coordination: Coordination normal.   Psychiatric:         Judgment: Judgment normal.         Procedures    Results Review:    None    Assessment/Plan:  This is a of 56-year-old female with idiopathic gastroparesis.  She does have prediabetes.  She also has peripheral neuropathy controlled with Cymbalta.  She has been having a difficult time making contact with the motility center at [facility].  Have made a referral to try to facilitate this.  Continue Nexium.  Regarding her blurred vision ophthalmology referral was made.  I have prescribed Chantix in an effort to help her with her efforts in tobacco cessation.  She was congratulated for this.  Problem List Items Addressed This Visit        Gastrointestinal Abdominal     Gastroparesis (Chronic)    Relevant Medications    esomeprazole (nexIUM) 40 MG capsule    Other Relevant Orders    Ambulatory Referral to Gastroenterology (Completed)    GERD (gastroesophageal reflux disease)    Relevant Medications    esomeprazole (nexIUM) 40 MG capsule    Other Relevant Orders    Ambulatory Referral to Gastroenterology (Completed)      Other Visit Diagnoses     Blurred vision        Relevant Orders    Ambulatory Referral to Ophthalmology    Ambulatory Referral to Gastroenterology (Completed)          Plan of care reviewed with patient at the conclusion of today's visit. Education was provided regarding diagnosis and management.  Patient verbalizes understanding of and agreement with management plan.    Return in about 6 months (around 2/6/2022).    Cesar Gao MD    Please note that portions of this note may have been completed with a voice recognition program. Efforts were made to edit the dictations, but occasionally words are mistranscribed.

## 2021-10-22 ENCOUNTER — HOSPITAL ENCOUNTER (OUTPATIENT)
Dept: ONCOLOGY | Facility: HOSPITAL | Age: 56
Setting detail: INFUSION SERIES
Discharge: HOME | End: 2021-10-22

## 2021-10-22 ENCOUNTER — OFFICE VISIT (OUTPATIENT)
Dept: ONCOLOGY | Facility: CLINIC | Age: 56
End: 2021-10-22

## 2021-10-22 VITALS
DIASTOLIC BLOOD PRESSURE: 79 MMHG | WEIGHT: 160 LBS | HEIGHT: 67 IN | TEMPERATURE: 97.4 F | OXYGEN SATURATION: 97 % | HEART RATE: 84 BPM | SYSTOLIC BLOOD PRESSURE: 111 MMHG | RESPIRATION RATE: 16 BRPM | BODY MASS INDEX: 25.11 KG/M2

## 2021-10-22 DIAGNOSIS — D50.8 OTHER IRON DEFICIENCY ANEMIA: Primary | ICD-10-CM

## 2021-10-22 DIAGNOSIS — Z45.2 ENCOUNTER FOR CENTRAL LINE CARE: Primary | ICD-10-CM

## 2021-10-22 DIAGNOSIS — Z85.3 HISTORY OF LEFT BREAST CANCER: ICD-10-CM

## 2021-10-22 DIAGNOSIS — R94.8 ABNORMAL RADIONUCLIDE BONE SCAN: ICD-10-CM

## 2021-10-22 LAB
ERYTHROCYTE [DISTWIDTH] IN BLOOD BY AUTOMATED COUNT: 14.9 % (ref 12.3–15.4)
FERRITIN SERPL-MCNC: 59.2 NG/ML (ref 13–150)
HCT VFR BLD AUTO: 36.9 % (ref 34–46.6)
HGB BLD-MCNC: 12 G/DL (ref 12–15.9)
IRON 24H UR-MRATE: 58 MCG/DL (ref 37–145)
IRON SATN MFR SERPL: 14 % (ref 20–50)
LYMPHOCYTES # BLD AUTO: 1.5 10*3/MM3 (ref 0.7–3.1)
LYMPHOCYTES NFR BLD AUTO: 40.3 % (ref 19.6–45.3)
MCH RBC QN AUTO: 27.9 PG (ref 26.6–33)
MCHC RBC AUTO-ENTMCNC: 32.5 G/DL (ref 31.5–35.7)
MCV RBC AUTO: 85.9 FL (ref 79–97)
MONOCYTES # BLD AUTO: 0.1 10*3/MM3 (ref 0.1–0.9)
MONOCYTES NFR BLD AUTO: 2.9 % (ref 5–12)
NEUTROPHILS NFR BLD AUTO: 2.2 10*3/MM3 (ref 1.7–7)
NEUTROPHILS NFR BLD AUTO: 56.8 % (ref 42.7–76)
PLATELET # BLD AUTO: 203 10*3/MM3 (ref 140–450)
PMV BLD AUTO: 7.2 FL (ref 6–12)
RBC # BLD AUTO: 4.29 10*6/MM3 (ref 3.77–5.28)
TIBC SERPL-MCNC: 420 MCG/DL (ref 298–536)
TRANSFERRIN SERPL-MCNC: 282 MG/DL (ref 200–360)
WBC # BLD AUTO: 3.8 10*3/MM3 (ref 3.4–10.8)

## 2021-10-22 PROCEDURE — 25010000002 HEPARIN LOCK FLUSH PER 10 UNITS: Performed by: INTERNAL MEDICINE

## 2021-10-22 PROCEDURE — 85025 COMPLETE CBC W/AUTO DIFF WBC: CPT | Performed by: NURSE PRACTITIONER

## 2021-10-22 PROCEDURE — 84466 ASSAY OF TRANSFERRIN: CPT | Performed by: NURSE PRACTITIONER

## 2021-10-22 PROCEDURE — 82728 ASSAY OF FERRITIN: CPT | Performed by: NURSE PRACTITIONER

## 2021-10-22 PROCEDURE — 83540 ASSAY OF IRON: CPT | Performed by: NURSE PRACTITIONER

## 2021-10-22 PROCEDURE — 99214 OFFICE O/P EST MOD 30 MIN: CPT | Performed by: NURSE PRACTITIONER

## 2021-10-22 PROCEDURE — 36591 DRAW BLOOD OFF VENOUS DEVICE: CPT

## 2021-10-22 RX ORDER — SODIUM CHLORIDE 0.9 % (FLUSH) 0.9 %
20 SYRINGE (ML) INJECTION AS NEEDED
Status: CANCELLED | OUTPATIENT
Start: 2021-10-22

## 2021-10-22 RX ORDER — HEPARIN SODIUM (PORCINE) LOCK FLUSH IV SOLN 100 UNIT/ML 100 UNIT/ML
500 SOLUTION INTRAVENOUS AS NEEDED
Status: CANCELLED | OUTPATIENT
Start: 2021-10-22

## 2021-10-22 RX ORDER — SODIUM CHLORIDE 0.9 % (FLUSH) 0.9 %
10 SYRINGE (ML) INJECTION AS NEEDED
Status: CANCELLED | OUTPATIENT
Start: 2021-10-22

## 2021-10-22 RX ORDER — HEPARIN SODIUM (PORCINE) LOCK FLUSH IV SOLN 100 UNIT/ML 100 UNIT/ML
500 SOLUTION INTRAVENOUS AS NEEDED
Status: DISCONTINUED | OUTPATIENT
Start: 2021-10-22 | End: 2021-10-23 | Stop reason: HOSPADM

## 2021-10-22 RX ORDER — SODIUM CHLORIDE 0.9 % (FLUSH) 0.9 %
20 SYRINGE (ML) INJECTION AS NEEDED
Status: DISCONTINUED | OUTPATIENT
Start: 2021-10-22 | End: 2021-10-23 | Stop reason: HOSPADM

## 2021-10-22 RX ADMIN — HEPARIN 500 UNITS: 100 SYRINGE at 14:07

## 2021-10-22 RX ADMIN — SODIUM CHLORIDE, PRESERVATIVE FREE 20 ML: 5 INJECTION INTRAVENOUS at 14:07

## 2021-10-22 NOTE — PROGRESS NOTES
PROBLEM LIST:  1. Stage I breast cancer, left upper outer quadrant:   a) Original diagnosis 06/20/2010.   b) Left mastectomy with sentinel lymph node sampling and prophylactic right  mastectomy.   c) [id number] stage I.   d) Estrogen receptor and progesterone receptor positive and HER-2 negative  with low risk Oncotype.   e) Completed adjuvant endocrine therapy 07/08/2015.   2. Fibromyalgia and chronic pain.   3. Recurring iron deficiency anemia.   4. Gastroparesis.       CHIEF COMPLAINT:    Subjective     HISTORY OF PRESENT ILLNESS:   Mrs. Haro is here for follow up evaluation of history of breast cancer and iron deficiency anemia.  She has persistent neck and back pain that radiates down both arms and causes numbness and tingling in her fingers.  She has been evaluated by neurosurgery and there is currently no recommendation for surgical options at this time.  They have recommended that she start physical therapy which she has not done yet.  She did see a pain specialist that wanted to do injections for the neck pain but she does not want any injections so she is not going back to see him either.  She was supposed to have a repeat bone scan in April to follow-up on abnormalities from in October 2020 bone scan.  She was sick and canceled the appointment and did not make a follow-up appointment.  She said her fibromyalgia is getting bad this is managed by her primary care provider.        Past Medical History, Past Surgical History, Social History, Family History have been reviewed and are without significant changes except as mentioned.    Review of Systems   A comprehensive 14 point review of systems was performed and was negative except as mentioned.    Medications:  The current medication list was reviewed in the EMR    ALLERGIES:    Allergies   Allergen Reactions   • Gabapentin Other (See Comments)     Seizure   • Morphine And Related Itching and Swelling   • Aspirin GI Intolerance   • Ibuprofen GI  "Intolerance       Objective      /79   Pulse 84   Temp 97.4 °F (36.3 °C)   Resp 16   Ht 170.2 cm (67\")   Wt 72.6 kg (160 lb)   SpO2 97%   BMI 25.06 kg/m²    Vitals:    10/22/21 1304   PainSc: 0-No pain                 General: well appearing, in no acute distress   HEENT: sclera anicteric, oropharynx clear  Lymphatics: no cervical, supraclavicular, or axillary adenopathy  Cardiovascular: regular rate and rhythm, no murmurs  Lungs: clear to auscultation bilaterally  Abdomen: soft, nontender, nondistended.  No palpable masses or organomegaly  Extremeties: no lower extremity edema, cords or calf tenderness  Skin: no rashes, lesions, bruising, or petechiae  Breasts: bilateral mastectomy incisions well healed with no masses or skin changes noted very sensitive to palpation on bilateral chest walls    RECENT LABS:  Hematology WBC   Date Value Ref Range Status   10/09/2020 3.76 3.40 - 10.80 10*3/mm3 Final     Hemoglobin   Date Value Ref Range Status   10/09/2020 12.1 12.0 - 15.9 g/dL Final     Hematocrit   Date Value Ref Range Status   10/09/2020 37.5 34.0 - 46.6 % Final     MCV   Date Value Ref Range Status   10/09/2020 83.5 79.0 - 97.0 fL Final     RDW   Date Value Ref Range Status   10/09/2020 13.5 12.3 - 15.4 % Final     MPV   Date Value Ref Range Status   10/09/2020 10.2 6.0 - 12.0 fL Final     Platelets   Date Value Ref Range Status   10/09/2020 148 140 - 450 10*3/mm3 Final     Immature Grans %   Date Value Ref Range Status   10/09/2020 0.3 0.0 - 0.5 % Final     Neutrophils, Absolute   Date Value Ref Range Status   10/09/2020 1.90 1.70 - 7.00 10*3/mm3 Final     Lymphocytes, Absolute   Date Value Ref Range Status   10/09/2020 1.28 0.70 - 3.10 10*3/mm3 Final     Monocytes, Absolute   Date Value Ref Range Status   10/09/2020 0.50 0.10 - 0.90 10*3/mm3 Final     Eosinophils, Absolute   Date Value Ref Range Status   10/09/2020 0.04 0.00 - 0.40 10*3/mm3 Final     Basophils, Absolute   Date Value Ref Range " Status   10/09/2020 0.03 0.00 - 0.20 10*3/mm3 Final     Immature Grans, Absolute   Date Value Ref Range Status   10/09/2020 0.01 0.00 - 0.05 10*3/mm3 Final     nRBC   Date Value Ref Range Status   10/09/2020 0.0 0.0 - 0.2 /100 WBC Final       Glucose   Date Value Ref Range Status   10/09/2020 146 (H) 65 - 99 mg/dL Final     Sodium   Date Value Ref Range Status   10/09/2020 137 136 - 145 mmol/L Final     Potassium   Date Value Ref Range Status   10/09/2020 3.8 3.5 - 5.2 mmol/L Final     CO2   Date Value Ref Range Status   10/09/2020 27.0 22.0 - 29.0 mmol/L Final     Chloride   Date Value Ref Range Status   10/09/2020 102 98 - 107 mmol/L Final     Anion Gap   Date Value Ref Range Status   10/09/2020 8.0 5.0 - 15.0 mmol/L Final     Creatinine   Date Value Ref Range Status   10/09/2020 0.96 0.57 - 1.00 mg/dL Final     BUN   Date Value Ref Range Status   10/09/2020 14 6 - 20 mg/dL Final     BUN/Creatinine Ratio   Date Value Ref Range Status   10/09/2020 14.6 7.0 - 25.0 Final     Calcium   Date Value Ref Range Status   10/09/2020 9.0 8.6 - 10.5 mg/dL Final     Alkaline Phosphatase   Date Value Ref Range Status   10/07/2020 133 (H) 39 - 117 U/L Final     Total Protein   Date Value Ref Range Status   10/07/2020 8.5 6.0 - 8.5 g/dL Final     ALT (SGPT)   Date Value Ref Range Status   10/07/2020 18 1 - 33 U/L Final     AST (SGOT)   Date Value Ref Range Status   10/07/2020 31 1 - 32 U/L Final     Total Bilirubin   Date Value Ref Range Status   10/07/2020 0.7 0.0 - 1.2 mg/dL Final     Albumin   Date Value Ref Range Status   10/07/2020 5.10 3.50 - 5.20 g/dL Final     Globulin   Date Value Ref Range Status   10/07/2020 3.4 gm/dL Final     A/G Ratio   Date Value Ref Range Status   06/19/2015 1.6 0.7 - 2.0 Final       LDH   Date Value Ref Range Status   06/19/2015 174 120 - 246 Units/L Final          Assessment/Plan   IMPRESSION:   1. Anemia.  She has had recurring iron deficiency anemia. She has responded to parenteral iron in the  past.  We will obtain labs today.  2. History of breast cancer. She has done well after adjuvant endocrine therapy.   Bone scan from 10/28/2020 showed Focus of uptake of tracer seen in the right anterior second rib which may represent a fracture. There is uptake of tracer in the maxilla and mandible suggesting possible odontogenic origin. No uptake of tracer to suggest evidence of bony metastatic disease within the bony skeleton. Continued follow up recommended as indicated.  When I spoke to the patient last October to review the results with her she thinks she broke her rib after excessive vomiting.  I ordered a repeat bone scan for April 2021.  She canceled the appointment due to illness.  I will repeat the bone scan in the next few weeks to follow-up on the previous abnormality.  3.  Neck and back pain: She has been evaluated by orthopedics, neurosurgery, and pain management.  Current plan is physical therapy for which she has been encouraged to follow-up with.  4.  Fibromyalgia.  Recommend follow-up with primary care provider.        PLAN:   1.  Repeat follow-up bone scan within the next few weeks.  I will contact her with results when available.  If she needs further evaluation she cannot have an MRI due to her gastric stimulator.  2.  Labs today including CBC, iron profile and ferritin levels.  3.  Follow-up in 1 year.            I spent 31 minutes caring for Geneva on this date of service. This time includes time spent by me in the following activities: preparing for the visit, reviewing tests, obtaining and/or reviewing a separately obtained history, performing a medically appropriate examination and/or evaluation, counseling and educating the patient/family/caregiver, ordering medications, tests, or procedures and documenting information in the medical record      JAM Diaz  [facility]    10/22/2021          CC:

## 2021-10-30 DIAGNOSIS — M47.812 CERVICAL SPONDYLOSIS WITHOUT MYELOPATHY: ICD-10-CM

## 2021-10-30 DIAGNOSIS — M19.90 ARTHRITIS: Primary | ICD-10-CM

## 2021-10-30 DIAGNOSIS — M79.7 FIBROMYALGIA: ICD-10-CM

## 2021-10-30 DIAGNOSIS — M50.00 HNP (HERNIATED NUCLEUS PULPOSUS) WITH MYELOPATHY, CERVICAL: ICD-10-CM

## 2021-10-30 DIAGNOSIS — R25.2 MUSCLE CRAMPS: ICD-10-CM

## 2021-11-01 RX ORDER — TIZANIDINE 4 MG/1
TABLET ORAL
Qty: 30 TABLET | Refills: 1 | Status: SHIPPED | OUTPATIENT
Start: 2021-11-01 | End: 2022-02-07

## 2021-11-01 NOTE — TELEPHONE ENCOUNTER
Provider:  Dianna ANTOINE  Caller:   Time of call:     Phone #:    Surgery:  no  Surgery Date:    Last visit:   07/22/21  Next visit:     DORIE:         Reason for call:     Patient requests refill on Tizanidine.

## 2021-11-08 RX ORDER — CITALOPRAM 20 MG/1
TABLET ORAL
Qty: 90 TABLET | Refills: 0 | Status: SHIPPED | OUTPATIENT
Start: 2021-11-08 | End: 2022-04-28

## 2021-11-08 NOTE — TELEPHONE ENCOUNTER
Rx Refill Note  Requested Prescriptions     Pending Prescriptions Disp Refills   • citalopram (CeleXA) 20 MG tablet [Pharmacy Med Name: CITALOPRAM HBR 20 MG TABLET] 90 tablet 0     Sig: TAKE ONE TABLET BY MOUTH DAILY      Last office visit with prescribing clinician: 8/6/2021      Next office visit with prescribing clinician: Visit date not found            Kenia Goldman MA  11/08/21, 08:54 EST

## 2021-11-16 ENCOUNTER — HOSPITAL ENCOUNTER (OUTPATIENT)
Dept: NUCLEAR MEDICINE | Facility: HOSPITAL | Age: 56
Discharge: HOME | End: 2021-11-16

## 2021-11-16 DIAGNOSIS — Z85.3 HISTORY OF LEFT BREAST CANCER: ICD-10-CM

## 2021-11-16 DIAGNOSIS — R94.8 ABNORMAL RADIONUCLIDE BONE SCAN: ICD-10-CM

## 2021-11-16 PROCEDURE — 78306 BONE IMAGING WHOLE BODY: CPT

## 2021-11-16 PROCEDURE — 0 TECHNETIUM MEDRONATE KIT: Performed by: NURSE PRACTITIONER

## 2021-11-16 PROCEDURE — A9503 TC99M MEDRONATE: HCPCS | Performed by: NURSE PRACTITIONER

## 2021-11-16 RX ORDER — TC 99M MEDRONATE 20 MG/10ML
27.3 INJECTION, POWDER, LYOPHILIZED, FOR SOLUTION INTRAVENOUS
Status: COMPLETED | OUTPATIENT
Start: 2021-11-16 | End: 2021-11-16

## 2021-11-16 RX ADMIN — Medication 27.3 MILLICURIE: at 10:40

## 2021-12-02 ENCOUNTER — APPOINTMENT (OUTPATIENT)
Dept: ONCOLOGY | Facility: HOSPITAL | Age: 56
End: 2021-12-02

## 2022-02-01 RX ORDER — TRAZODONE HYDROCHLORIDE 50 MG/1
TABLET ORAL
Qty: 30 TABLET | Refills: 0 | Status: SHIPPED | OUTPATIENT
Start: 2022-02-01 | End: 2022-03-30 | Stop reason: SDUPTHER

## 2022-02-01 NOTE — TELEPHONE ENCOUNTER
Rx Refill Note  Requested Prescriptions     Pending Prescriptions Disp Refills   • traZODone (DESYREL) 50 MG tablet [Pharmacy Med Name: traZODone 50 MG TABLET] 30 tablet 2     Sig: TAKE ONE TABLET BY MOUTH ONCE NIGHTLY      Last office visit with prescribing clinician: 8/6/2021      Next office visit with prescribing clinician: Visit date not found            Augie Osorio MA  02/01/22, 14:39 EST

## 2022-02-05 DIAGNOSIS — M47.812 CERVICAL SPONDYLOSIS WITHOUT MYELOPATHY: ICD-10-CM

## 2022-02-05 DIAGNOSIS — M79.7 FIBROMYALGIA: ICD-10-CM

## 2022-02-05 DIAGNOSIS — R25.2 MUSCLE CRAMPS: ICD-10-CM

## 2022-02-05 DIAGNOSIS — M19.90 ARTHRITIS: ICD-10-CM

## 2022-02-05 DIAGNOSIS — M50.00 HNP (HERNIATED NUCLEUS PULPOSUS) WITH MYELOPATHY, CERVICAL: ICD-10-CM

## 2022-02-07 RX ORDER — TIZANIDINE 4 MG/1
TABLET ORAL
Qty: 30 TABLET | Refills: 1 | OUTPATIENT
Start: 2022-02-07 | End: 2022-03-29

## 2022-02-07 NOTE — TELEPHONE ENCOUNTER
Provider:  Allen  Caller:  Automated refill request  Surgery:  NA  Surgery Date:    Last visit:  Office Visit with Beverly Villa PA-C (07/22/2021)    Next visit: NA    Reason for call:         Requested Prescriptions     Pending Prescriptions Disp Refills   • tiZANidine (ZANAFLEX) 4 MG tablet [Pharmacy Med Name: tiZANidine HCL 4MG TABLET] 30 tablet 1     Sig: TAKE ONE TABLET BY MOUTH EVERY NIGHT  AS NEEDED FOR MUSCLE SPASMS

## 2022-02-21 ENCOUNTER — APPOINTMENT (OUTPATIENT)
Dept: GENERAL RADIOLOGY | Facility: HOSPITAL | Age: 57
End: 2022-02-21

## 2022-02-21 ENCOUNTER — HOSPITAL ENCOUNTER (EMERGENCY)
Facility: HOSPITAL | Age: 57
Discharge: HOME | End: 2022-02-21
Attending: EMERGENCY MEDICINE | Admitting: EMERGENCY MEDICINE

## 2022-02-21 VITALS
HEART RATE: 78 BPM | RESPIRATION RATE: 16 BRPM | TEMPERATURE: 99 F | OXYGEN SATURATION: 100 % | BODY MASS INDEX: 23.54 KG/M2 | SYSTOLIC BLOOD PRESSURE: 116 MMHG | WEIGHT: 150 LBS | HEIGHT: 67 IN | DIASTOLIC BLOOD PRESSURE: 80 MMHG

## 2022-02-21 DIAGNOSIS — M19.012 OSTEOARTHRITIS OF LEFT SHOULDER, UNSPECIFIED OSTEOARTHRITIS TYPE: ICD-10-CM
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
## 2022-02-21 DIAGNOSIS — M25.512 ACUTE PAIN OF LEFT SHOULDER: Primary | ICD-10-CM

## 2022-02-21 PROCEDURE — 99283 EMERGENCY DEPT VISIT LOW MDM: CPT

## 2022-02-21 PROCEDURE — 73030 X-RAY EXAM OF SHOULDER: CPT

## 2022-02-21 PROCEDURE — 25010000002 KETOROLAC TROMETHAMINE PER 15 MG: Performed by: NURSE PRACTITIONER

## 2022-02-21 PROCEDURE — 96372 THER/PROPH/DIAG INJ SC/IM: CPT

## 2022-02-21 RX ORDER — CYCLOBENZAPRINE HCL 10 MG
10 TABLET ORAL 3 TIMES DAILY PRN
Qty: 15 TABLET | Refills: 0 | Status: SHIPPED | OUTPATIENT
Start: 2022-02-21 | End: 2022-03-30 | Stop reason: SDUPTHER

## 2022-02-21 RX ORDER — HYDROCODONE BITARTRATE AND ACETAMINOPHEN 5; 325 MG/1; MG/1
1 TABLET ORAL ONCE
Status: COMPLETED | OUTPATIENT
Start: 2022-02-21 | End: 2022-02-21

## 2022-02-21 RX ORDER — IBUPROFEN 800 MG/1
800 TABLET ORAL
Qty: 15 TABLET | Refills: 0 | Status: SHIPPED | OUTPATIENT
Start: 2022-02-21 | End: 2022-03-30

## 2022-02-21 RX ORDER — KETOROLAC TROMETHAMINE 30 MG/ML
30 INJECTION, SOLUTION INTRAMUSCULAR; INTRAVENOUS ONCE
Status: COMPLETED | OUTPATIENT
Start: 2022-02-21 | End: 2022-02-21

## 2022-02-21 RX ADMIN — KETOROLAC TROMETHAMINE 30 MG: 30 INJECTION, SOLUTION INTRAMUSCULAR at 15:20

## 2022-02-21 RX ADMIN — HYDROCODONE BITARTRATE AND ACETAMINOPHEN 1 TABLET: 5; 325 TABLET ORAL at 15:21
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
## 2022-02-21 NOTE — DISCHARGE INSTRUCTIONS
Take meds as ordered.  If pain continues you will need to follow-up with an orthopedic physician.  You may need an MRI of your left shoulder.

## 2022-02-23 ENCOUNTER — APPOINTMENT (OUTPATIENT)
Dept: ONCOLOGY | Facility: HOSPITAL | Age: 57
End: 2022-02-23

## 2022-02-23 ENCOUNTER — HOSPITAL ENCOUNTER (OUTPATIENT)
Dept: ONCOLOGY | Facility: HOSPITAL | Age: 57
Setting detail: INFUSION SERIES
Discharge: HOME | End: 2022-02-23

## 2022-02-23 VITALS
BODY MASS INDEX: 24.3 KG/M2 | WEIGHT: 154.8 LBS | HEIGHT: 67 IN | RESPIRATION RATE: 18 BRPM | SYSTOLIC BLOOD PRESSURE: 131 MMHG | TEMPERATURE: 97.2 F | HEART RATE: 76 BPM | DIASTOLIC BLOOD PRESSURE: 83 MMHG

## 2022-02-23 DIAGNOSIS — Z45.2 ENCOUNTER FOR CENTRAL LINE CARE: Primary | ICD-10-CM

## 2022-02-23 PROCEDURE — 25010000002 HEPARIN LOCK FLUSH PER 10 UNITS: Performed by: INTERNAL MEDICINE

## 2022-02-23 PROCEDURE — 96523 IRRIG DRUG DELIVERY DEVICE: CPT

## 2022-02-23 PROCEDURE — G0463 HOSPITAL OUTPT CLINIC VISIT: HCPCS

## 2022-02-23 RX ORDER — HEPARIN SODIUM (PORCINE) LOCK FLUSH IV SOLN 100 UNIT/ML 100 UNIT/ML
500 SOLUTION INTRAVENOUS AS NEEDED
Status: CANCELLED | OUTPATIENT
Start: 2022-02-23

## 2022-02-23 RX ORDER — HEPARIN SODIUM (PORCINE) LOCK FLUSH IV SOLN 100 UNIT/ML 100 UNIT/ML
500 SOLUTION INTRAVENOUS AS NEEDED
Status: DISCONTINUED | OUTPATIENT
Start: 2022-02-23 | End: 2022-02-24 | Stop reason: HOSPADM

## 2022-02-23 RX ORDER — SODIUM CHLORIDE 0.9 % (FLUSH) 0.9 %
10 SYRINGE (ML) INJECTION AS NEEDED
Status: CANCELLED | OUTPATIENT
Start: 2022-02-23

## 2022-02-23 RX ORDER — SODIUM CHLORIDE 0.9 % (FLUSH) 0.9 %
20 SYRINGE (ML) INJECTION AS NEEDED
Status: CANCELLED | OUTPATIENT
Start: 2022-02-23

## 2022-02-23 RX ADMIN — HEPARIN SODIUM (PORCINE) LOCK FLUSH IV SOLN 100 UNIT/ML 500 UNITS: 100 SOLUTION at 16:20

## 2022-03-29 ENCOUNTER — HOSPITAL ENCOUNTER (EMERGENCY)
Facility: HOSPITAL | Age: 57
Discharge: HOME | End: 2022-03-29
Attending: EMERGENCY MEDICINE | Admitting: EMERGENCY MEDICINE

## 2022-03-29 ENCOUNTER — APPOINTMENT (OUTPATIENT)
Dept: CT IMAGING | Facility: HOSPITAL | Age: 57
End: 2022-03-29

## 2022-03-29 VITALS
HEART RATE: 88 BPM | BODY MASS INDEX: 23.7 KG/M2 | WEIGHT: 151 LBS | DIASTOLIC BLOOD PRESSURE: 76 MMHG | RESPIRATION RATE: 20 BRPM | OXYGEN SATURATION: 96 % | HEIGHT: 67 IN | TEMPERATURE: 97.3 F | SYSTOLIC BLOOD PRESSURE: 101 MMHG

## 2022-03-29 DIAGNOSIS — R10.11 ABDOMINAL PAIN, ACUTE, RIGHT UPPER QUADRANT: ICD-10-CM

## 2022-03-29 DIAGNOSIS — N17.9 ACUTE KIDNEY INJURY: ICD-10-CM

## 2022-03-29 DIAGNOSIS — K52.9 ACUTE COLITIS: Primary | ICD-10-CM

## 2022-03-29 LAB
ALBUMIN SERPL-MCNC: 4.8 G/DL (ref 3.5–5.2)
ALBUMIN/GLOB SERPL: 1.4 G/DL
ALP SERPL-CCNC: 155 U/L (ref 39–117)
ALT SERPL W P-5'-P-CCNC: 13 U/L (ref 1–33)
ANION GAP SERPL CALCULATED.3IONS-SCNC: 17 MMOL/L (ref 5–15)
AST SERPL-CCNC: 16 U/L (ref 1–32)
BACTERIA UR QL AUTO: ABNORMAL /HPF
BASOPHILS # BLD AUTO: 0.02 10*3/MM3 (ref 0–0.2)
BASOPHILS NFR BLD AUTO: 0.2 % (ref 0–1.5)
BILIRUB SERPL-MCNC: 0.6 MG/DL (ref 0–1.2)
BILIRUB UR QL STRIP: NEGATIVE
BUN SERPL-MCNC: 18 MG/DL (ref 6–20)
BUN/CREAT SERPL: 14.8 (ref 7–25)
CALCIUM SPEC-SCNC: 10.1 MG/DL (ref 8.6–10.5)
CHLORIDE SERPL-SCNC: 99 MMOL/L (ref 98–107)
CLARITY UR: ABNORMAL
CO2 SERPL-SCNC: 23 MMOL/L (ref 22–29)
COLOR UR: YELLOW
CREAT SERPL-MCNC: 1.22 MG/DL (ref 0.57–1)
D-LACTATE SERPL-SCNC: 1.7 MMOL/L (ref 0.5–2)
DEPRECATED RDW RBC AUTO: 39.2 FL (ref 37–54)
EGFRCR SERPLBLD CKD-EPI 2021: 51.9 ML/MIN/1.73
EOSINOPHIL # BLD AUTO: 0.02 10*3/MM3 (ref 0–0.4)
EOSINOPHIL NFR BLD AUTO: 0.2 % (ref 0.3–6.2)
ERYTHROCYTE [DISTWIDTH] IN BLOOD BY AUTOMATED COUNT: 13.6 % (ref 12.3–15.4)
GLOBULIN UR ELPH-MCNC: 3.4 GM/DL
GLUCOSE SERPL-MCNC: 130 MG/DL (ref 65–99)
GLUCOSE UR STRIP-MCNC: NEGATIVE MG/DL
HCT VFR BLD AUTO: 42.5 % (ref 34–46.6)
HGB BLD-MCNC: 14.7 G/DL (ref 12–15.9)
HGB UR QL STRIP.AUTO: ABNORMAL
HOLD SPECIMEN: NORMAL
HYALINE CASTS UR QL AUTO: ABNORMAL /LPF
IMM GRANULOCYTES # BLD AUTO: 0.02 10*3/MM3 (ref 0–0.05)
IMM GRANULOCYTES NFR BLD AUTO: 0.2 % (ref 0–0.5)
KETONES UR QL STRIP: ABNORMAL
LEUKOCYTE ESTERASE UR QL STRIP.AUTO: ABNORMAL
LIPASE SERPL-CCNC: 11 U/L (ref 13–60)
LYMPHOCYTES # BLD AUTO: 2.22 10*3/MM3 (ref 0.7–3.1)
LYMPHOCYTES NFR BLD AUTO: 27.6 % (ref 19.6–45.3)
MCH RBC QN AUTO: 27.6 PG (ref 26.6–33)
MCHC RBC AUTO-ENTMCNC: 34.6 G/DL (ref 31.5–35.7)
MCV RBC AUTO: 79.9 FL (ref 79–97)
MONOCYTES # BLD AUTO: 0.64 10*3/MM3 (ref 0.1–0.9)
MONOCYTES NFR BLD AUTO: 8 % (ref 5–12)
NEUTROPHILS NFR BLD AUTO: 5.13 10*3/MM3 (ref 1.7–7)
NEUTROPHILS NFR BLD AUTO: 63.8 % (ref 42.7–76)
NITRITE UR QL STRIP: NEGATIVE
NRBC BLD AUTO-RTO: 0 /100 WBC (ref 0–0.2)
PH UR STRIP.AUTO: <=5 [PH] (ref 5–8)
PLATELET # BLD AUTO: 192 10*3/MM3 (ref 140–450)
PMV BLD AUTO: 10.1 FL (ref 6–12)
POTASSIUM SERPL-SCNC: 3.5 MMOL/L (ref 3.5–5.2)
PROT SERPL-MCNC: 8.2 G/DL (ref 6–8.5)
PROT UR QL STRIP: ABNORMAL
RBC # BLD AUTO: 5.32 10*6/MM3 (ref 3.77–5.28)
RBC # UR STRIP: ABNORMAL /HPF
REF LAB TEST METHOD: ABNORMAL
SODIUM SERPL-SCNC: 139 MMOL/L (ref 136–145)
SP GR UR STRIP: 1.03 (ref 1–1.03)
SQUAMOUS #/AREA URNS HPF: ABNORMAL /HPF
UROBILINOGEN UR QL STRIP: ABNORMAL
WBC # UR STRIP: ABNORMAL /HPF
WBC NRBC COR # BLD: 8.05 10*3/MM3 (ref 3.4–10.8)
WHOLE BLOOD HOLD SPECIMEN: NORMAL
WHOLE BLOOD HOLD SPECIMEN: NORMAL

## 2022-03-29 PROCEDURE — 83605 ASSAY OF LACTIC ACID: CPT | Performed by: EMERGENCY MEDICINE

## 2022-03-29 PROCEDURE — 25010000002 HYDROMORPHONE PER 4 MG: Performed by: EMERGENCY MEDICINE

## 2022-03-29 PROCEDURE — 96375 TX/PRO/DX INJ NEW DRUG ADDON: CPT

## 2022-03-29 PROCEDURE — P9612 CATHETERIZE FOR URINE SPEC: HCPCS

## 2022-03-29 PROCEDURE — 25010000002 PROCHLORPERAZINE 10 MG/2ML SOLUTION: Performed by: PHYSICIAN ASSISTANT

## 2022-03-29 PROCEDURE — 80053 COMPREHEN METABOLIC PANEL: CPT | Performed by: EMERGENCY MEDICINE

## 2022-03-29 PROCEDURE — 83690 ASSAY OF LIPASE: CPT | Performed by: EMERGENCY MEDICINE

## 2022-03-29 PROCEDURE — 81001 URINALYSIS AUTO W/SCOPE: CPT | Performed by: EMERGENCY MEDICINE

## 2022-03-29 PROCEDURE — 71260 CT THORAX DX C+: CPT

## 2022-03-29 PROCEDURE — 74177 CT ABD & PELVIS W/CONTRAST: CPT

## 2022-03-29 PROCEDURE — 25010000002 IOPAMIDOL 61 % SOLUTION: Performed by: EMERGENCY MEDICINE

## 2022-03-29 PROCEDURE — 25010000002 HEPARIN LOCK FLUSH PER 10 UNITS: Performed by: EMERGENCY MEDICINE

## 2022-03-29 PROCEDURE — 36415 COLL VENOUS BLD VENIPUNCTURE: CPT

## 2022-03-29 PROCEDURE — 85025 COMPLETE CBC W/AUTO DIFF WBC: CPT | Performed by: EMERGENCY MEDICINE

## 2022-03-29 PROCEDURE — 99283 EMERGENCY DEPT VISIT LOW MDM: CPT

## 2022-03-29 PROCEDURE — 96374 THER/PROPH/DIAG INJ IV PUSH: CPT

## 2022-03-29 RX ORDER — HEPARIN SODIUM (PORCINE) LOCK FLUSH IV SOLN 100 UNIT/ML 100 UNIT/ML
300 SOLUTION INTRAVENOUS ONCE
Status: COMPLETED | OUTPATIENT
Start: 2022-03-29 | End: 2022-03-29

## 2022-03-29 RX ORDER — HYDROCODONE BITARTRATE AND ACETAMINOPHEN 5; 325 MG/1; MG/1
1 TABLET ORAL EVERY 6 HOURS PRN
Qty: 12 TABLET | Refills: 0 | Status: SHIPPED | OUTPATIENT
Start: 2022-03-29 | End: 2022-07-14

## 2022-03-29 RX ORDER — METRONIDAZOLE 500 MG/1
500 TABLET ORAL 3 TIMES DAILY
Qty: 21 TABLET | Refills: 0 | Status: SHIPPED | OUTPATIENT
Start: 2022-03-29 | End: 2023-02-24

## 2022-03-29 RX ORDER — HYDROMORPHONE HYDROCHLORIDE 1 MG/ML
0.5 INJECTION, SOLUTION INTRAMUSCULAR; INTRAVENOUS; SUBCUTANEOUS
Status: DISCONTINUED | OUTPATIENT
Start: 2022-03-29 | End: 2022-03-29 | Stop reason: HOSPADM

## 2022-03-29 RX ORDER — PROCHLORPERAZINE MALEATE 10 MG
10 TABLET ORAL EVERY 6 HOURS PRN
Qty: 30 TABLET | Refills: 0 | Status: SHIPPED | OUTPATIENT
Start: 2022-03-29 | End: 2023-02-03 | Stop reason: SDUPTHER

## 2022-03-29 RX ORDER — SODIUM CHLORIDE 9 MG/ML
10 INJECTION INTRAVENOUS AS NEEDED
Status: DISCONTINUED | OUTPATIENT
Start: 2022-03-29 | End: 2022-03-29 | Stop reason: HOSPADM

## 2022-03-29 RX ORDER — PROCHLORPERAZINE EDISYLATE 5 MG/ML
5 INJECTION INTRAMUSCULAR; INTRAVENOUS ONCE
Status: COMPLETED | OUTPATIENT
Start: 2022-03-29 | End: 2022-03-29

## 2022-03-29 RX ORDER — CIPROFLOXACIN 500 MG/1
500 TABLET, FILM COATED ORAL EVERY 12 HOURS
Qty: 14 TABLET | Refills: 0 | Status: SHIPPED | OUTPATIENT
Start: 2022-03-29 | End: 2022-04-05

## 2022-03-29 RX ADMIN — HEPARIN 300 UNITS: 100 SYRINGE at 12:16

## 2022-03-29 RX ADMIN — IOPAMIDOL 90 ML: 612 INJECTION, SOLUTION INTRAVENOUS at 10:27

## 2022-03-29 RX ADMIN — HYDROMORPHONE HYDROCHLORIDE 0.5 MG: 1 INJECTION, SOLUTION INTRAMUSCULAR; INTRAVENOUS; SUBCUTANEOUS at 08:52

## 2022-03-29 RX ADMIN — PROCHLORPERAZINE EDISYLATE 5 MG: 5 INJECTION INTRAMUSCULAR; INTRAVENOUS at 08:50

## 2022-03-30 ENCOUNTER — OFFICE VISIT (OUTPATIENT)
Dept: FAMILY MEDICINE CLINIC | Facility: CLINIC | Age: 57
End: 2022-03-30

## 2022-03-30 VITALS
DIASTOLIC BLOOD PRESSURE: 76 MMHG | BODY MASS INDEX: 22.6 KG/M2 | HEIGHT: 67 IN | HEART RATE: 102 BPM | WEIGHT: 144 LBS | SYSTOLIC BLOOD PRESSURE: 118 MMHG | OXYGEN SATURATION: 98 %

## 2022-03-30 DIAGNOSIS — R73.03 PRE-DIABETES: ICD-10-CM

## 2022-03-30 DIAGNOSIS — Z12.31 ENCOUNTER FOR SCREENING MAMMOGRAM FOR MALIGNANT NEOPLASM OF BREAST: ICD-10-CM

## 2022-03-30 DIAGNOSIS — Z12.11 COLON CANCER SCREENING: ICD-10-CM

## 2022-03-30 DIAGNOSIS — K31.84 GASTROPARESIS: Primary | Chronic | ICD-10-CM

## 2022-03-30 DIAGNOSIS — N17.9 ACUTE KIDNEY INJURY: ICD-10-CM

## 2022-03-30 DIAGNOSIS — R11.15 INTRACTABLE CYCLICAL VOMITING WITH NAUSEA: ICD-10-CM

## 2022-03-30 DIAGNOSIS — K21.9 GASTROESOPHAGEAL REFLUX DISEASE, UNSPECIFIED WHETHER ESOPHAGITIS PRESENT: ICD-10-CM

## 2022-03-30 DIAGNOSIS — M79.7 FIBROMYALGIA: Chronic | ICD-10-CM

## 2022-03-30 LAB
EXPIRATION DATE: ABNORMAL
HBA1C MFR BLD: 5.7 %
Lab: ABNORMAL

## 2022-03-30 PROCEDURE — 3044F HG A1C LEVEL LT 7.0%: CPT | Performed by: INTERNAL MEDICINE

## 2022-03-30 PROCEDURE — 83036 HEMOGLOBIN GLYCOSYLATED A1C: CPT | Performed by: INTERNAL MEDICINE

## 2022-03-30 PROCEDURE — 99214 OFFICE O/P EST MOD 30 MIN: CPT | Performed by: INTERNAL MEDICINE

## 2022-03-30 RX ORDER — DULOXETIN HYDROCHLORIDE 30 MG/1
30 CAPSULE, DELAYED RELEASE ORAL 2 TIMES DAILY
Qty: 180 CAPSULE | Refills: 3 | Status: SHIPPED | OUTPATIENT
Start: 2022-03-30 | End: 2023-02-03 | Stop reason: SDUPTHER

## 2022-03-30 RX ORDER — CYCLOBENZAPRINE HCL 10 MG
10 TABLET ORAL 3 TIMES DAILY PRN
Qty: 15 TABLET | Refills: 0 | Status: SHIPPED | OUTPATIENT
Start: 2022-03-30 | End: 2022-03-30 | Stop reason: SDUPTHER

## 2022-03-30 RX ORDER — DULOXETIN HYDROCHLORIDE 30 MG/1
30 CAPSULE, DELAYED RELEASE ORAL 2 TIMES DAILY
Qty: 180 CAPSULE | Refills: 3 | Status: SHIPPED | OUTPATIENT
Start: 2022-03-30 | End: 2022-03-30

## 2022-03-30 RX ORDER — MELOXICAM 7.5 MG/1
7.5 TABLET ORAL DAILY
Qty: 20 TABLET | Refills: 0 | Status: SHIPPED | OUTPATIENT
Start: 2022-03-30 | End: 2022-07-14

## 2022-03-30 RX ORDER — TRAZODONE HYDROCHLORIDE 50 MG/1
50 TABLET ORAL NIGHTLY
Qty: 90 TABLET | Refills: 3 | Status: SHIPPED | OUTPATIENT
Start: 2022-03-30 | End: 2023-02-03 | Stop reason: SDUPTHER

## 2022-03-30 RX ORDER — CYCLOBENZAPRINE HCL 10 MG
10 TABLET ORAL 3 TIMES DAILY PRN
Qty: 180 TABLET | Refills: 3 | Status: SHIPPED | OUTPATIENT
Start: 2022-03-30 | End: 2023-02-03 | Stop reason: SDUPTHER

## 2022-03-30 RX ORDER — DULOXETIN HYDROCHLORIDE 30 MG/1
30 CAPSULE, DELAYED RELEASE ORAL 2 TIMES DAILY
Qty: 180 CAPSULE | Refills: 3 | Status: SHIPPED | OUTPATIENT
Start: 2022-03-30 | End: 2022-03-30 | Stop reason: SDUPTHER

## 2022-03-30 RX ORDER — TRAZODONE HYDROCHLORIDE 50 MG/1
50 TABLET ORAL NIGHTLY
Qty: 30 TABLET | Refills: 0 | Status: SHIPPED | OUTPATIENT
Start: 2022-03-30 | End: 2022-03-30 | Stop reason: SDUPTHER

## 2022-03-30 NOTE — ASSESSMENT & PLAN NOTE
Renal condition is worsening.  Continue current treatment regimen.  Monitor daily weight.  Regular aerobic exercise.  Continue current medications.  Suspect this was exacerbated by nausea and vomiting however she intermittently takes an NSAID.  We will repeat a basic metabolic panel.  Renal condition will be reassessed in 6 months.

## 2022-03-30 NOTE — PROGRESS NOTES
"Geneva Haro  1965  [id number]  Patient Care Team:  Cesar Gao MD as PCP - General (Internal Medicine)  Darion Figueroa MD as Consulting Physician (Gastroenterology)    Geneva Haro is a 57 y.o. female here today for follow up.     This patient is accompanied by their self who contributes to the history of their care.    Chief Complaint:    Chief Complaint   Patient presents with   • Hospital Follow Up Visit         History of Present Illness:  I have reviewed and/or updated the patient's past medical, past surgical, family, social history, problem list and allergies as appropriate.     Reports feeling better with right sided pain/v.there was scant diarrhea. Deies blood in stool. Reports is past due for colonoscopy. Still needs generator for stomach stimulator looked at. Denies weight loss.     No recent hyper/hypoglyemic episodes recently. Has been off of medications. Has not been treated for DM for >10 years. ( achieved this thru diet and exercise).    Renal Function shows an increased creatinine in the emergency room.  Is been pushing fluids.  No further nausea and vomiting.  She does need a refill on her Cymbalta which is significantly helped her fibromyalgia.  Some needs a refill on her cyclobenzaprine.        Review of Systems   Constitutional: Negative.    HENT: Negative.    Eyes: Negative.    Respiratory: Negative.    Cardiovascular: Negative.    Gastrointestinal: Positive for nausea and vomiting.   Endocrine: Negative.    Musculoskeletal: Positive for arthralgias, myalgias and neck pain.   Neurological: Negative.    Hematological: Negative.        Vitals:    03/30/22 1426 03/30/22 1454   BP: 118/76    Pulse: (!) 140 102   SpO2: 98%    Weight: 65.3 kg (144 lb)    Height: 170.2 cm (67.01\")      Body mass index is 22.55 kg/m².    Physical Exam  Vitals and nursing note reviewed.   Constitutional:       General: She is not in acute distress.     Appearance: She is well-developed. She is " not diaphoretic.   HENT:      Head: Normocephalic and atraumatic.      Right Ear: Tympanic membrane and external ear normal.      Left Ear: Tympanic membrane and external ear normal.      Mouth/Throat:      Mouth: Mucous membranes are moist.      Pharynx: Oropharynx is clear. No oropharyngeal exudate.   Eyes:      General: No scleral icterus.        Right eye: No discharge.      Conjunctiva/sclera: Conjunctivae normal.   Neck:      Thyroid: No thyromegaly.      Vascular: No JVD.      Trachea: No tracheal deviation.   Cardiovascular:      Rate and Rhythm: Normal rate and regular rhythm.      Pulses: Normal pulses.      Heart sounds: Normal heart sounds.      Comments: PMI nondisplaced  Pulmonary:      Effort: Pulmonary effort is normal.      Breath sounds: Normal breath sounds. No wheezing or rales.   Abdominal:      General: Bowel sounds are normal.      Palpations: Abdomen is soft.      Tenderness: There is no abdominal tenderness. There is no guarding or rebound.   Musculoskeletal:      Cervical back: Normal range of motion and neck supple.      Comments: Normal gait   Lymphadenopathy:      Cervical: No cervical adenopathy.   Skin:     General: Skin is warm and dry.      Capillary Refill: Capillary refill takes less than 2 seconds.      Coloration: Skin is not pale.      Findings: No rash.   Neurological:      Mental Status: She is alert and oriented to person, place, and time.      Motor: No abnormal muscle tone.      Coordination: Coordination normal.   Psychiatric:         Mood and Affect: Mood normal.         Behavior: Behavior normal.         Judgment: Judgment normal.         Procedures    Results Review:    None    Assessment/Plan:     Problem List Items Addressed This Visit        Gastrointestinal Abdominal     Gastroparesis - Primary (Chronic)    Relevant Medications    esomeprazole (nexIUM) 40 MG capsule    GERD (gastroesophageal reflux disease)    Relevant Medications    esomeprazole (nexIUM) 40 MG  capsule    Other Relevant Orders    Basic metabolic panel    Intractable cyclical vomiting with nausea    Overview     Added automatically from request for surgery [id number]              Genitourinary and Reproductive     Acute kidney injury (HCC)    Current Assessment & Plan     Renal condition is worsening.  Continue current treatment regimen.  Monitor daily weight.  Regular aerobic exercise.  Continue current medications.  Suspect this was exacerbated by nausea and vomiting however she intermittently takes an NSAID.  We will repeat a basic metabolic panel.  Renal condition will be reassessed in 6 months.              Musculoskeletal and Injuries    Fibromyalgia (Chronic)    Overview     And chronic pain           Current Assessment & Plan     She seems fairly stable twice daily dosing of Cymbalta.  Continue this and Flexeril.             Other Visit Diagnoses     Pre-diabetes        Relevant Orders    POC Glycosylated Hemoglobin (Hb A1C) (Completed)    MicroAlbumin, Urine, Random - Urine, Clean Catch    Colon cancer screening        Relevant Orders    Ambulatory Referral For Screening Colonoscopy    Ambulatory Referral For Screening Colonoscopy    Encounter for screening mammogram for malignant neoplasm of breast        Relevant Orders    Ambulatory Referral For Screening Colonoscopy          Plan of care reviewed with patient at the conclusion of today's visit. Education was provided regarding diagnosis and management.  Patient verbalizes understanding of and agreement with management plan.    Return in about 6 months (around 9/30/2022) for Next scheduled follow up.    Cesar Gao MD      Please note than portions of this note were completed wth a Voice Recognition Program

## 2022-04-06 ENCOUNTER — HOSPITAL ENCOUNTER (OUTPATIENT)
Dept: ONCOLOGY | Facility: HOSPITAL | Age: 57
Setting detail: INFUSION SERIES
Discharge: HOME | End: 2022-04-06

## 2022-04-06 VITALS
DIASTOLIC BLOOD PRESSURE: 83 MMHG | HEART RATE: 92 BPM | RESPIRATION RATE: 18 BRPM | SYSTOLIC BLOOD PRESSURE: 128 MMHG | TEMPERATURE: 96.7 F | HEIGHT: 67 IN | WEIGHT: 144 LBS | BODY MASS INDEX: 22.6 KG/M2

## 2022-04-06 DIAGNOSIS — Z45.2 ENCOUNTER FOR CENTRAL LINE CARE: Primary | ICD-10-CM

## 2022-04-06 PROCEDURE — 25010000002 HEPARIN LOCK FLUSH PER 10 UNITS: Performed by: INTERNAL MEDICINE

## 2022-04-06 PROCEDURE — 96523 IRRIG DRUG DELIVERY DEVICE: CPT

## 2022-04-06 RX ORDER — SODIUM CHLORIDE 0.9 % (FLUSH) 0.9 %
10 SYRINGE (ML) INJECTION AS NEEDED
Status: CANCELLED | OUTPATIENT
Start: 2022-04-06

## 2022-04-06 RX ORDER — HEPARIN SODIUM (PORCINE) LOCK FLUSH IV SOLN 100 UNIT/ML 100 UNIT/ML
500 SOLUTION INTRAVENOUS AS NEEDED
Status: DISCONTINUED | OUTPATIENT
Start: 2022-04-06 | End: 2022-04-07 | Stop reason: HOSPADM

## 2022-04-06 RX ORDER — SODIUM CHLORIDE 0.9 % (FLUSH) 0.9 %
20 SYRINGE (ML) INJECTION AS NEEDED
Status: CANCELLED | OUTPATIENT
Start: 2022-04-06

## 2022-04-06 RX ORDER — HEPARIN SODIUM (PORCINE) LOCK FLUSH IV SOLN 100 UNIT/ML 100 UNIT/ML
500 SOLUTION INTRAVENOUS AS NEEDED
Status: CANCELLED | OUTPATIENT
Start: 2022-04-06

## 2022-04-06 RX ADMIN — HEPARIN 500 UNITS: 100 SYRINGE at 15:31

## 2022-04-07 ENCOUNTER — OFFICE VISIT (OUTPATIENT)
Dept: FAMILY MEDICINE CLINIC | Facility: CLINIC | Age: 57
End: 2022-04-07

## 2022-04-07 ENCOUNTER — LAB (OUTPATIENT)
Dept: LAB | Facility: HOSPITAL | Age: 57
End: 2022-04-07

## 2022-04-07 VITALS
BODY MASS INDEX: 25.21 KG/M2 | DIASTOLIC BLOOD PRESSURE: 78 MMHG | WEIGHT: 160.6 LBS | SYSTOLIC BLOOD PRESSURE: 126 MMHG | HEART RATE: 79 BPM | HEIGHT: 67 IN | OXYGEN SATURATION: 100 %

## 2022-04-07 DIAGNOSIS — R11.15 INTRACTABLE CYCLICAL VOMITING WITH NAUSEA: ICD-10-CM

## 2022-04-07 DIAGNOSIS — R53.82 CHRONIC FATIGUE: ICD-10-CM

## 2022-04-07 DIAGNOSIS — M79.7 FIBROMYALGIA: ICD-10-CM

## 2022-04-07 DIAGNOSIS — K21.9 GASTROESOPHAGEAL REFLUX DISEASE, UNSPECIFIED WHETHER ESOPHAGITIS PRESENT: ICD-10-CM

## 2022-04-07 DIAGNOSIS — K31.84 GASTROPARESIS: Chronic | ICD-10-CM

## 2022-04-07 DIAGNOSIS — M79.7 FIBROMYALGIA: Chronic | ICD-10-CM

## 2022-04-07 DIAGNOSIS — Z00.00 ANNUAL PHYSICAL EXAM: Primary | ICD-10-CM

## 2022-04-07 DIAGNOSIS — Z00.00 ANNUAL PHYSICAL EXAM: ICD-10-CM

## 2022-04-07 DIAGNOSIS — Z11.59 ENCOUNTER FOR HEPATITIS C SCREENING TEST FOR LOW RISK PATIENT: ICD-10-CM

## 2022-04-07 DIAGNOSIS — Z00.00 ENCOUNTER FOR ROUTINE HISTORY AND PHYSICAL EXAM IN FEMALE: ICD-10-CM

## 2022-04-07 LAB
BASOPHILS # BLD AUTO: 0.03 10*3/MM3 (ref 0–0.2)
BASOPHILS NFR BLD AUTO: 0.7 % (ref 0–1.5)
CHOLEST SERPL-MCNC: 163 MG/DL (ref 0–200)
DEPRECATED RDW RBC AUTO: 44.8 FL (ref 37–54)
EOSINOPHIL # BLD AUTO: 0.21 10*3/MM3 (ref 0–0.4)
EOSINOPHIL NFR BLD AUTO: 4.7 % (ref 0.3–6.2)
ERYTHROCYTE [DISTWIDTH] IN BLOOD BY AUTOMATED COUNT: 14.4 % (ref 12.3–15.4)
HCT VFR BLD AUTO: 34.8 % (ref 34–46.6)
HDLC SERPL-MCNC: 56 MG/DL (ref 40–60)
HGB BLD-MCNC: 11.3 G/DL (ref 12–15.9)
IMM GRANULOCYTES # BLD AUTO: 0.01 10*3/MM3 (ref 0–0.05)
IMM GRANULOCYTES NFR BLD AUTO: 0.2 % (ref 0–0.5)
LDLC SERPL CALC-MCNC: 82 MG/DL (ref 0–100)
LDLC/HDLC SERPL: 1.4 {RATIO}
LYMPHOCYTES # BLD AUTO: 1.27 10*3/MM3 (ref 0.7–3.1)
LYMPHOCYTES NFR BLD AUTO: 28.7 % (ref 19.6–45.3)
MCH RBC QN AUTO: 27.6 PG (ref 26.6–33)
MCHC RBC AUTO-ENTMCNC: 32.5 G/DL (ref 31.5–35.7)
MCV RBC AUTO: 85.1 FL (ref 79–97)
MONOCYTES # BLD AUTO: 0.36 10*3/MM3 (ref 0.1–0.9)
MONOCYTES NFR BLD AUTO: 8.1 % (ref 5–12)
NEUTROPHILS NFR BLD AUTO: 2.55 10*3/MM3 (ref 1.7–7)
NEUTROPHILS NFR BLD AUTO: 57.6 % (ref 42.7–76)
NRBC BLD AUTO-RTO: 0 /100 WBC (ref 0–0.2)
PLATELET # BLD AUTO: 167 10*3/MM3 (ref 140–450)
PMV BLD AUTO: 10.7 FL (ref 6–12)
RBC # BLD AUTO: 4.09 10*6/MM3 (ref 3.77–5.28)
TRIGL SERPL-MCNC: 142 MG/DL (ref 0–150)
VLDLC SERPL-MCNC: 25 MG/DL (ref 5–40)
WBC NRBC COR # BLD: 4.43 10*3/MM3 (ref 3.4–10.8)

## 2022-04-07 PROCEDURE — 84443 ASSAY THYROID STIM HORMONE: CPT

## 2022-04-07 PROCEDURE — 82607 VITAMIN B-12: CPT

## 2022-04-07 PROCEDURE — 86803 HEPATITIS C AB TEST: CPT

## 2022-04-07 PROCEDURE — 80053 COMPREHEN METABOLIC PANEL: CPT

## 2022-04-07 PROCEDURE — 80061 LIPID PANEL: CPT

## 2022-04-07 PROCEDURE — 99396 PREV VISIT EST AGE 40-64: CPT | Performed by: INTERNAL MEDICINE

## 2022-04-07 PROCEDURE — 36415 COLL VENOUS BLD VENIPUNCTURE: CPT

## 2022-04-07 PROCEDURE — 82746 ASSAY OF FOLIC ACID SERUM: CPT

## 2022-04-07 PROCEDURE — 85025 COMPLETE CBC W/AUTO DIFF WBC: CPT

## 2022-04-07 NOTE — PROGRESS NOTES
Geneva Haro  1965  [id number]  Patient Care Team:  Cesar Gao MD as PCP - General (Internal Medicine)  Darion Figueroa MD as Consulting Physician (Gastroenterology)    Geneva Haro is a 57 y.o. female here today for annual exam.  This patient is accompanied by their self who contributes to the history of their care.    Chief Complaint:    Chief Complaint   Patient presents with   • Annual Exam         History of Present Illness:   Currently in the midst of fibromyalgia flare secondary to weather changes.  Is awaiting her upcoming GI appointment.  Safety measures were discussed.  Maintains currency with dental.  With significant weight loss she has reversed her diabetes.  Past history of fibromyalgia with chronic pain.  She cannot take Neurontin as it caused seizures.  She is maintained on Cymbalta 30 mg p.o. twice daily.  Past Medical History:   Diagnosis Date   • Anxiety    • Arthritis    • Cancer (HCC)    • Depression    • Diabetes mellitus (HCC)    • Gall stones    • Hearing loss    • Hernia, hiatal    • History of stomach ulcers    • History of transfusion    • Stomach problems    • Ulcer of bile duct        Past Surgical History:   Procedure Laterality Date   • CHOLECYSTECTOMY     • ENDOSCOPY N/A 6/20/2018    Procedure: ESOPHAGOGASTRODUODENOSCOPY;  Surgeon: Mark I Brunner, MD;  Location:  [facility];  Service: Gastroenterology   • GASTRIC BYPASS      x2   • HERNIA REPAIR     • HERNIA REPAIR     • INTERVENTIONAL RADIOLOGY PROCEDURE Bilateral 7/22/2021    Procedure: IR myelogram, cervical;  Surgeon: Jose Angel Sauer MD;  Location:  [facility];  Service: Interventional Radiology;  Laterality: Bilateral;   • MASTECTOMY Bilateral     Left With sentinel lymph node sampling and prophylactic right mastectomy   • PACEMAKER IMPLANTATION     • PORTACATH PLACEMENT          Family History   Problem Relation Age of Onset   • Breast cancer Maternal Aunt    • Pancreatic cancer  Cousin    • Breast cancer Cousin    • Breast cancer Cousin    • Heart attack Mother        Social History     Socioeconomic History   • Marital status:    Tobacco Use   • Smoking status: Former Smoker     Packs/day: 0.50     Years: 20.00     Pack years: 10.00     Types: Cigarettes     Start date: 1986     Quit date: 2021     Years since quittin.2   • Smokeless tobacco: Never Used   Vaping Use   • Vaping Use: Never used   Substance and Sexual Activity   • Alcohol use: No   • Drug use: Yes     Types: Marijuana   • Sexual activity: Defer       Allergies   Allergen Reactions   • Gabapentin Other (See Comments)     Seizure   • Morphine And Related Itching and Swelling   • Aspirin GI Intolerance   • Ibuprofen GI Intolerance       Depression: PHQ-2 Depression Screening  Little interest or pleasure in doing things? 0-->not at all   Feeling down, depressed, or hopeless? 0-->not at all   PHQ-2 Total Score 0      Immunization History   Administered Date(s) Administered   • FluLaval/Fluarix/Fluzone >6 2021   • Pneumococcal Polysaccharide (PPSV23) 10/19/2020       Intimate partner violence ( Screen on initial visit, pregnant women, women with injuries, older adult with injury or evidence of neglect):  -Violence can be a problem in many people's lives, so I now ask every patient about trauma or abuse they may have experienced in a relationship.  • Stress/Safety - Do you feel safe in your relationship? y  • Afraid/Abused - Have you ever been in a relationship where you were threatened, hurt, or afraid? n/a  • Friend/Family - Are your friends aware you have been hurt? n/a  • Emergency Plan - Do you have a safe place to go and the resources you need in an emergency? Na/    Review of Systems:    Review of Systems   Constitutional: Positive for fatigue.   Eyes: Negative.    Respiratory: Negative.    Gastrointestinal: Positive for nausea and GERD.   Endocrine: Negative.    Genitourinary: Negative.   "  Musculoskeletal: Positive for myalgias.   Skin: Negative.    Allergic/Immunologic: Negative.    Neurological: Negative.    Hematological: Negative.    Psychiatric/Behavioral: Positive for sleep disturbance.       Vitals:    04/07/22 1125   BP: 126/78   Pulse: 79   SpO2: 100%   Weight: 72.8 kg (160 lb 9.6 oz)   Height: 170.2 cm (67.01\")     Body mass index is 25.15 kg/m².      Current Outpatient Medications:   •  cetirizine (zyrTEC) 10 MG tablet, Take 1 tablet by mouth Daily., Disp: 90 tablet, Rfl: 3  •  citalopram (CeleXA) 20 MG tablet, TAKE ONE TABLET BY MOUTH DAILY, Disp: 90 tablet, Rfl: 0  •  cyclobenzaprine (FLEXERIL) 10 MG tablet, Take 1 tablet by mouth 3 (Three) Times a Day As Needed for Muscle Spasms., Disp: 180 tablet, Rfl: 3  •  DULoxetine (CYMBALTA) 30 MG capsule, Take 1 capsule by mouth 2 (Two) Times a Day., Disp: 180 capsule, Rfl: 3  •  esomeprazole (nexIUM) 40 MG capsule, Take 40 mg by mouth 2 (Two) Times a Day., Disp: , Rfl:   •  HYDROcodone-acetaminophen (NORCO) 5-325 MG per tablet, Take 1 tablet by mouth Every 6 (Six) Hours As Needed for Severe Pain  for up to 12 doses., Disp: 12 tablet, Rfl: 0  •  meloxicam (MOBIC) 7.5 MG tablet, Take 1 tablet by mouth Daily., Disp: 20 tablet, Rfl: 0  •  metroNIDAZOLE (FLAGYL) 500 MG tablet, Take 1 tablet by mouth 3 (Three) Times a Day., Disp: 21 tablet, Rfl: 0  •  prochlorperazine (COMPAZINE) 10 MG tablet, Take 1 tablet by mouth Every 6 (Six) Hours As Needed for Nausea or Vomiting., Disp: 30 tablet, Rfl: 0  •  traZODone (DESYREL) 50 MG tablet, Take 1 tablet by mouth Every Night., Disp: 90 tablet, Rfl: 3  No current facility-administered medications for this visit.    Physical Exam:    Physical Exam  Vitals and nursing note reviewed.   Constitutional:       General: She is not in acute distress.     Appearance: Normal appearance. She is well-developed. She is not diaphoretic.   HENT:      Head: Normocephalic and atraumatic.      Right Ear: Tympanic membrane and " external ear normal.      Left Ear: Tympanic membrane and external ear normal.      Mouth/Throat:      Mouth: Mucous membranes are moist.      Pharynx: Oropharynx is clear. No oropharyngeal exudate.   Eyes:      General: No scleral icterus.        Right eye: No discharge.         Left eye: No discharge.      Extraocular Movements: Extraocular movements intact.      Conjunctiva/sclera: Conjunctivae normal.      Pupils: Pupils are equal, round, and reactive to light.   Neck:      Thyroid: No thyromegaly.      Vascular: No JVD.      Trachea: No tracheal deviation.   Cardiovascular:      Rate and Rhythm: Normal rate and regular rhythm.      Pulses: Normal pulses.      Heart sounds: Normal heart sounds.      Comments: PMI nondisplaced  Pulmonary:      Effort: Pulmonary effort is normal. No respiratory distress.      Breath sounds: Normal breath sounds. No wheezing or rales.   Abdominal:      General: Bowel sounds are normal.      Palpations: Abdomen is soft.      Tenderness: There is no abdominal tenderness. There is no guarding or rebound.      Comments: gastrc pacer palp rlq   Musculoskeletal:         General: No swelling. Normal range of motion.      Cervical back: Normal range of motion and neck supple.      Comments: Normal gait   Lymphadenopathy:      Cervical: No cervical adenopathy.   Skin:     General: Skin is warm and dry.      Capillary Refill: Capillary refill takes less than 2 seconds.      Coloration: Skin is not pale.      Findings: No rash.   Neurological:      Mental Status: She is alert and oriented to person, place, and time.      Motor: No abnormal muscle tone.      Coordination: Coordination normal.   Psychiatric:         Mood and Affect: Mood normal.         Behavior: Behavior normal.         Judgment: Judgment normal.         Procedures    Results Review:    None     Assessment/Plan:     Problem List Items Addressed This Visit        Gastrointestinal Abdominal     Gastroparesis (Chronic)     Relevant Medications    esomeprazole (nexIUM) 40 MG capsule    GERD (gastroesophageal reflux disease)    Relevant Medications    esomeprazole (nexIUM) 40 MG capsule    Other Relevant Orders    Comprehensive Metabolic Panel    CBC & Differential    TSH Rfx On Abnormal To Free T4    Hepatitis C Antibody    Ambulatory Referral to Gynecology    Intractable cyclical vomiting with nausea    Overview     Added automatically from request for surgery [id number]           Relevant Orders    Comprehensive Metabolic Panel    CBC & Differential    Lipid Panel    TSH Rfx On Abnormal To Free T4    Vitamin B12    Folate    Ambulatory Referral to Gynecology       Musculoskeletal and Injuries    Fibromyalgia (Chronic)    Overview     And chronic pain           Relevant Orders    Comprehensive Metabolic Panel    CBC & Differential    Lipid Panel    TSH Rfx On Abnormal To Free T4    Vitamin B12    Folate    Hepatitis C Antibody    Ambulatory Referral to Gynecology      Other Visit Diagnoses     Annual physical exam    -  Primary    Relevant Orders    Comprehensive Metabolic Panel    Encounter for hepatitis C screening test for low risk patient        Relevant Orders    Hepatitis C Antibody    Chronic fatigue        Relevant Orders    CBC & Differential    TSH Rfx On Abnormal To Free T4    Vitamin B12    Folate    Encounter for routine history and physical exam in female              Plan of care was reviewed with patient at the conclusion of today's visit. Counseled patient with regards to good nutrition and diet. Maintaining a healthy lifestyle including exercise and physical activities. Spoke with patient on ways to reduce stress, getting adequate sleep and injury prevention.  Discussed mammogram, colon cancer screening, osteoporosis and pap smear including benefit of early detection and potential need for follow-up. Patient agrees to screening mammogram, colonoscopy, bone density and gyn referral today. Annual dental and eye exams were  encouraged. Encouraged patient to continue to follow up with annual immunizations.     Return in about 6 months (around 10/7/2022) for gerd/gastroparesis.    Cesar Gao MD    Please note than portions of this note were completed wt a Voice Recognition Program

## 2022-04-08 DIAGNOSIS — E55.9 HYPOVITAMINOSIS D: ICD-10-CM

## 2022-04-08 DIAGNOSIS — E53.8 FOLATE DEFICIENCY: ICD-10-CM

## 2022-04-08 DIAGNOSIS — E53.8 B12 DEFICIENCY: Primary | ICD-10-CM

## 2022-04-08 DIAGNOSIS — E53.8 B12 DEFICIENCY: ICD-10-CM

## 2022-04-08 DIAGNOSIS — K31.84 GASTROPARESIS: Primary | ICD-10-CM

## 2022-04-08 LAB
ALBUMIN SERPL-MCNC: 3.7 G/DL (ref 3.5–5.2)
ALBUMIN/GLOB SERPL: 1.5 G/DL
ALP SERPL-CCNC: 111 U/L (ref 39–117)
ALT SERPL W P-5'-P-CCNC: 12 U/L (ref 1–33)
ANION GAP SERPL CALCULATED.3IONS-SCNC: 11.1 MMOL/L (ref 5–15)
AST SERPL-CCNC: 19 U/L (ref 1–32)
BILIRUB SERPL-MCNC: <0.2 MG/DL (ref 0–1.2)
BUN SERPL-MCNC: 10 MG/DL (ref 6–20)
BUN/CREAT SERPL: 9.9 (ref 7–25)
CALCIUM SPEC-SCNC: 9.1 MG/DL (ref 8.6–10.5)
CHLORIDE SERPL-SCNC: 108 MMOL/L (ref 98–107)
CO2 SERPL-SCNC: 22.9 MMOL/L (ref 22–29)
CREAT SERPL-MCNC: 1.01 MG/DL (ref 0.57–1)
EGFRCR SERPLBLD CKD-EPI 2021: 65.1 ML/MIN/1.73
FOLATE SERPL-MCNC: 3.93 NG/ML (ref 4.78–24.2)
GLOBULIN UR ELPH-MCNC: 2.5 GM/DL
GLUCOSE SERPL-MCNC: 93 MG/DL (ref 65–99)
HCV AB SER DONR QL: NORMAL
POTASSIUM SERPL-SCNC: 3.9 MMOL/L (ref 3.5–5.2)
PROT SERPL-MCNC: 6.2 G/DL (ref 6–8.5)
SODIUM SERPL-SCNC: 142 MMOL/L (ref 136–145)
TSH SERPL DL<=0.05 MIU/L-ACNC: 1.62 UIU/ML (ref 0.27–4.2)
VIT B12 BLD-MCNC: 273 PG/ML (ref 211–946)

## 2022-04-08 RX ORDER — FOLIC ACID 1 MG/1
1 TABLET ORAL DAILY
Qty: 30 TABLET | Refills: 9 | Status: SHIPPED | OUTPATIENT
Start: 2022-04-08 | End: 2023-02-03 | Stop reason: SDUPTHER

## 2022-04-08 RX ORDER — METHION/INOS/CHOL BT/B COM/LIV 110MG-86MG
100 CAPSULE ORAL DAILY
Qty: 30 TABLET | Refills: 9 | Status: SHIPPED | OUTPATIENT
Start: 2022-04-08 | End: 2023-02-03 | Stop reason: SDUPTHER

## 2022-04-08 RX ORDER — CYANOCOBALAMIN 1000 UG/ML
1000 INJECTION, SOLUTION INTRAMUSCULAR; SUBCUTANEOUS DAILY
Status: SHIPPED | OUTPATIENT
Start: 2022-04-11 | End: 2022-04-16

## 2022-04-11 ENCOUNTER — CLINICAL SUPPORT (OUTPATIENT)
Dept: FAMILY MEDICINE CLINIC | Facility: CLINIC | Age: 57
End: 2022-04-11

## 2022-04-11 PROCEDURE — 0051A COVID-19 (PFIZER) 12+ YRS: CPT | Performed by: INTERNAL MEDICINE

## 2022-04-11 PROCEDURE — 91305 COVID-19 (PFIZER) 12+ YRS: CPT | Performed by: INTERNAL MEDICINE

## 2022-04-11 PROCEDURE — 96372 THER/PROPH/DIAG INJ SC/IM: CPT | Performed by: INTERNAL MEDICINE

## 2022-04-11 RX ADMIN — CYANOCOBALAMIN 1000 MCG: 1000 INJECTION, SOLUTION INTRAMUSCULAR; SUBCUTANEOUS at 10:44

## 2022-04-12 ENCOUNTER — CLINICAL SUPPORT (OUTPATIENT)
Dept: FAMILY MEDICINE CLINIC | Facility: CLINIC | Age: 57
End: 2022-04-12

## 2022-04-12 PROCEDURE — 96372 THER/PROPH/DIAG INJ SC/IM: CPT | Performed by: INTERNAL MEDICINE

## 2022-04-12 RX ADMIN — CYANOCOBALAMIN 1000 MCG: 1000 INJECTION, SOLUTION INTRAMUSCULAR; SUBCUTANEOUS at 15:13

## 2022-04-13 ENCOUNTER — CLINICAL SUPPORT (OUTPATIENT)
Dept: FAMILY MEDICINE CLINIC | Facility: CLINIC | Age: 57
End: 2022-04-13

## 2022-04-13 DIAGNOSIS — E53.8 B12 DEFICIENCY: ICD-10-CM

## 2022-04-13 PROCEDURE — 96372 THER/PROPH/DIAG INJ SC/IM: CPT | Performed by: INTERNAL MEDICINE

## 2022-04-13 RX ADMIN — CYANOCOBALAMIN 1000 MCG: 1000 INJECTION, SOLUTION INTRAMUSCULAR; SUBCUTANEOUS at 14:34

## 2022-04-14 ENCOUNTER — CLINICAL SUPPORT (OUTPATIENT)
Dept: FAMILY MEDICINE CLINIC | Facility: CLINIC | Age: 57
End: 2022-04-14

## 2022-04-14 DIAGNOSIS — E53.8 VITAMIN B12 DEFICIENCY: ICD-10-CM

## 2022-04-14 PROCEDURE — 96372 THER/PROPH/DIAG INJ SC/IM: CPT | Performed by: INTERNAL MEDICINE

## 2022-04-14 RX ADMIN — CYANOCOBALAMIN 1000 MCG: 1000 INJECTION, SOLUTION INTRAMUSCULAR; SUBCUTANEOUS at 11:45

## 2022-04-21 ENCOUNTER — CLINICAL SUPPORT (OUTPATIENT)
Dept: FAMILY MEDICINE CLINIC | Facility: CLINIC | Age: 57
End: 2022-04-21

## 2022-04-21 ENCOUNTER — OFFICE VISIT (OUTPATIENT)
Dept: OBSTETRICS AND GYNECOLOGY | Facility: CLINIC | Age: 57
End: 2022-04-21

## 2022-04-21 VITALS
RESPIRATION RATE: 16 BRPM | BODY MASS INDEX: 24.11 KG/M2 | SYSTOLIC BLOOD PRESSURE: 110 MMHG | DIASTOLIC BLOOD PRESSURE: 70 MMHG | WEIGHT: 154 LBS

## 2022-04-21 DIAGNOSIS — Z01.419 ENCOUNTER FOR WELL WOMAN EXAM WITH ROUTINE GYNECOLOGICAL EXAM: Primary | ICD-10-CM

## 2022-04-21 DIAGNOSIS — Z78.0 POSTMENOPAUSAL: ICD-10-CM

## 2022-04-21 DIAGNOSIS — Z85.3 PERSONAL HISTORY OF BREAST CANCER: ICD-10-CM

## 2022-04-21 DIAGNOSIS — B37.31 YEAST VAGINITIS: ICD-10-CM

## 2022-04-21 PROCEDURE — 99396 PREV VISIT EST AGE 40-64: CPT | Performed by: NURSE PRACTITIONER

## 2022-04-21 PROCEDURE — 3008F BODY MASS INDEX DOCD: CPT | Performed by: NURSE PRACTITIONER

## 2022-04-21 PROCEDURE — 2014F MENTAL STATUS ASSESS: CPT | Performed by: NURSE PRACTITIONER

## 2022-04-21 NOTE — PROGRESS NOTES
Annual Visit     Patient Name: Geneva Haro  : 1965   MRN: [id number]   Care Team: Patient Care Team:  Cesar Gao MD as PCP - General (Internal Medicine)  Darion Figueroa MD as Consulting Physician (Gastroenterology)    Chief Complaint:    Chief Complaint   Patient presents with   • Annual Exam       HPI: Geneva Haro is a 57 y.o. year old  presenting to be seen for her gynecologic exam - new pt.   Hasn't had a pap smear in several yrs - no hx of abnormal results per pt     Hx bilateral breast cancer - diagnosed in    S/p bilateral total mastectomy   States she had chemotherapy as well   Out of f/u now   She still has tenderness in the areas of scarring - states she was told that would never resolve   She does perform chest wall exams     DEXA  WNL   Hasn't had one since     Hx Gastric bypass x 2   She can't walk to exercise now, but she is very diligent about her diet to maintain her weight   Hx gastroparesis - has a gastric stimulator - this is listed as a pacemaker in her medical hx - she has no cardiac problems   Had a GI virus a few wks ago that caused colitis   She is taking 3 antibiotics for txment - will cont through next wk   Thinks she has a yeast infection - vaginal itching and irritation present   Has been using Monistat x 2 doses now and it has helped     Her PCP Dr. Gao ordered colon screening for her         Subjective      /70   Resp 16   Wt 69.9 kg (154 lb)   Breastfeeding No   BMI 24.11 kg/m²     BMI reviewed: Body mass index is 24.11 kg/m².      Objective     Physical Exam    Neuro: alert and oriented to person, place and time   General:  alert; cooperative; well developed; well nourished   Skin:  No suspicious lesions seen   Thyroid: normal to inspection and palpation   Lungs:  breathing is unlabored  clear to auscultation bilaterally   Heart:  regular rate and rhythm, S1, S2 normal, no murmur, click, rub or gallop  normal apical impulse    Breasts:  Examined in supine position  Mastectomy site well healed without palpable abnormalities  s/p bilateral total mastectomy    Abdomen: soft, non-tender; no masses  no umbilical or inguinal hernias are present  no hepato-splenomegaly   Pelvis: Clinical staff was present for exam  External genitalia:  normal appearance of the external genitalia including Bartholin's and [place]'s glands.  :  urethral meatus normal;  Vaginal:  normal pink mucosa without prolapse or lesions. discharge present -  white and thick;  Cervix:  normal appearance.  Uterus:  normal size, shape and consistency.  Adnexa:  non palpable bilaterally.  Rectal:  digital rectal exam not performed; anus visually normal appearing.         Assessment / Plan      Assessment  Problems Addressed This Visit    ICD-10-CM ICD-9-CM   1. Encounter for well woman exam with routine gynecological exam  Z01.419 V72.31   2. Personal history of breast cancer  Z85.3 V10.3   3. Yeast vaginitis  B37.3 112.1   4. Postmenopausal  Z78.0 V49.81       Plan    Pap smear pending   Discussed monthly chest wall exams     Vaginitis with thick white d/c noted on exam - script for terazol given   If she has to cont antibiotic txment longer than next wk and she develops sx again, she will let me know for refill   Will call if sx do not resolve with medication     DEXA ordered   Discussed Calcium, 600 mg/ Vit. D, 400 IU daily    AV 1 yr         40 to 64: Counseling/Anticipatory Guidance Discussed: injury prevention and screenings    Follow Up  Return in about 1 year (around 4/21/2023) for Annual physical.  Patient was given instructions and counseling regarding her condition or for health maintenance advice. Please see specific information pulled into the AVS if appropriate.     Jazlyn Maguire, APRN  April 21, 2022  09:08 EDT

## 2022-04-28 ENCOUNTER — HOSPITAL ENCOUNTER (OUTPATIENT)
Dept: BONE DENSITY | Facility: HOSPITAL | Age: 57
Discharge: HOME | End: 2022-04-28
Admitting: NURSE PRACTITIONER

## 2022-04-28 DIAGNOSIS — Z78.0 POSTMENOPAUSAL: ICD-10-CM

## 2022-04-28 PROCEDURE — 77080 DXA BONE DENSITY AXIAL: CPT

## 2022-04-28 RX ORDER — CITALOPRAM 20 MG/1
TABLET ORAL
Qty: 90 TABLET | Refills: 0 | Status: SHIPPED | OUTPATIENT
Start: 2022-04-28 | End: 2022-08-12

## 2022-05-02 ENCOUNTER — IMMUNIZATION (OUTPATIENT)
Dept: FAMILY MEDICINE CLINIC | Facility: CLINIC | Age: 57
End: 2022-05-02

## 2022-05-02 DIAGNOSIS — Z23 IMMUNIZATION DUE: Primary | ICD-10-CM

## 2022-05-02 PROCEDURE — 91305 COVID-19 (PFIZER) 12+ YRS: CPT | Performed by: FAMILY MEDICINE

## 2022-05-02 PROCEDURE — 0052A COVID-19 (PFIZER) 12+ YRS: CPT | Performed by: FAMILY MEDICINE

## 2022-05-11 ENCOUNTER — HOSPITAL ENCOUNTER (OUTPATIENT)
Dept: ONCOLOGY | Facility: HOSPITAL | Age: 57
Setting detail: INFUSION SERIES
Discharge: HOME | End: 2022-05-11

## 2022-05-11 VITALS
RESPIRATION RATE: 18 BRPM | SYSTOLIC BLOOD PRESSURE: 107 MMHG | DIASTOLIC BLOOD PRESSURE: 75 MMHG | BODY MASS INDEX: 23.7 KG/M2 | TEMPERATURE: 97.9 F | HEIGHT: 67 IN | HEART RATE: 95 BPM | WEIGHT: 151 LBS

## 2022-05-11 DIAGNOSIS — Z45.2 ENCOUNTER FOR CENTRAL LINE CARE: Primary | ICD-10-CM

## 2022-05-11 PROCEDURE — 96523 IRRIG DRUG DELIVERY DEVICE: CPT

## 2022-05-11 PROCEDURE — 25010000002 HEPARIN LOCK FLUSH PER 10 UNITS: Performed by: INTERNAL MEDICINE

## 2022-05-11 RX ORDER — SODIUM CHLORIDE 0.9 % (FLUSH) 0.9 %
10 SYRINGE (ML) INJECTION AS NEEDED
Status: CANCELLED | OUTPATIENT
Start: 2022-05-11

## 2022-05-11 RX ORDER — SODIUM CHLORIDE 0.9 % (FLUSH) 0.9 %
20 SYRINGE (ML) INJECTION AS NEEDED
Status: CANCELLED | OUTPATIENT
Start: 2022-05-11

## 2022-05-11 RX ORDER — HEPARIN SODIUM (PORCINE) LOCK FLUSH IV SOLN 100 UNIT/ML 100 UNIT/ML
500 SOLUTION INTRAVENOUS AS NEEDED
Status: CANCELLED | OUTPATIENT
Start: 2022-05-11

## 2022-05-11 RX ORDER — HEPARIN SODIUM (PORCINE) LOCK FLUSH IV SOLN 100 UNIT/ML 100 UNIT/ML
500 SOLUTION INTRAVENOUS AS NEEDED
Status: DISCONTINUED | OUTPATIENT
Start: 2022-05-11 | End: 2022-05-12 | Stop reason: HOSPADM

## 2022-05-11 RX ADMIN — HEPARIN SODIUM (PORCINE) LOCK FLUSH IV SOLN 100 UNIT/ML 500 UNITS: 100 SOLUTION at 15:31

## 2022-05-11 NOTE — ADDENDUM NOTE
Encounter addended by: Kiarra Weston RN on: 5/11/2022 3:57 PM   Actions taken: Order Reconciliation Section accessed, Medication List reviewed, Allergies reviewed

## 2022-06-29 RX ORDER — ONDANSETRON HYDROCHLORIDE 8 MG/1
TABLET, FILM COATED ORAL
Qty: 30 TABLET | Refills: 6 | Status: SHIPPED | OUTPATIENT
Start: 2022-06-29 | End: 2023-02-03 | Stop reason: SDUPTHER

## 2022-07-01 ENCOUNTER — APPOINTMENT (OUTPATIENT)
Dept: ONCOLOGY | Facility: HOSPITAL | Age: 57
End: 2022-07-01

## 2022-07-07 ENCOUNTER — TELEPHONE (OUTPATIENT)
Dept: FAMILY MEDICINE CLINIC | Facility: CLINIC | Age: 57
End: 2022-07-07

## 2022-07-08 ENCOUNTER — HOSPITAL ENCOUNTER (OUTPATIENT)
Dept: ONCOLOGY | Facility: HOSPITAL | Age: 57
Setting detail: INFUSION SERIES
Discharge: HOME | End: 2022-07-08

## 2022-07-08 VITALS
TEMPERATURE: 97.6 F | SYSTOLIC BLOOD PRESSURE: 127 MMHG | BODY MASS INDEX: 22.13 KG/M2 | HEART RATE: 109 BPM | WEIGHT: 141 LBS | RESPIRATION RATE: 16 BRPM | DIASTOLIC BLOOD PRESSURE: 83 MMHG | HEIGHT: 67 IN

## 2022-07-08 DIAGNOSIS — Z45.2 ENCOUNTER FOR CENTRAL LINE CARE: Primary | ICD-10-CM

## 2022-07-08 DIAGNOSIS — E53.8 B12 DEFICIENCY: Primary | ICD-10-CM

## 2022-07-08 PROCEDURE — 96523 IRRIG DRUG DELIVERY DEVICE: CPT

## 2022-07-08 PROCEDURE — 25010000002 HEPARIN LOCK FLUSH PER 10 UNITS: Performed by: INTERNAL MEDICINE

## 2022-07-08 RX ORDER — HEPARIN SODIUM (PORCINE) LOCK FLUSH IV SOLN 100 UNIT/ML 100 UNIT/ML
500 SOLUTION INTRAVENOUS AS NEEDED
Status: DISCONTINUED | OUTPATIENT
Start: 2022-07-08 | End: 2022-07-09 | Stop reason: HOSPADM

## 2022-07-08 RX ORDER — HEPARIN SODIUM (PORCINE) LOCK FLUSH IV SOLN 100 UNIT/ML 100 UNIT/ML
500 SOLUTION INTRAVENOUS AS NEEDED
Status: CANCELLED | OUTPATIENT
Start: 2022-07-08

## 2022-07-08 RX ORDER — SODIUM CHLORIDE 0.9 % (FLUSH) 0.9 %
10 SYRINGE (ML) INJECTION AS NEEDED
Status: CANCELLED | OUTPATIENT
Start: 2022-07-08

## 2022-07-08 RX ORDER — SODIUM CHLORIDE 0.9 % (FLUSH) 0.9 %
20 SYRINGE (ML) INJECTION AS NEEDED
Status: CANCELLED | OUTPATIENT
Start: 2022-07-08

## 2022-07-08 RX ADMIN — HEPARIN SODIUM (PORCINE) LOCK FLUSH IV SOLN 100 UNIT/ML 500 UNITS: 100 SOLUTION at 15:36

## 2022-07-14 ENCOUNTER — LAB (OUTPATIENT)
Dept: LAB | Facility: HOSPITAL | Age: 57
End: 2022-07-14

## 2022-07-14 ENCOUNTER — OFFICE VISIT (OUTPATIENT)
Dept: FAMILY MEDICINE CLINIC | Facility: CLINIC | Age: 57
End: 2022-07-14

## 2022-07-14 VITALS
SYSTOLIC BLOOD PRESSURE: 128 MMHG | BODY MASS INDEX: 23.67 KG/M2 | HEIGHT: 67 IN | DIASTOLIC BLOOD PRESSURE: 88 MMHG | WEIGHT: 150.8 LBS

## 2022-07-14 DIAGNOSIS — R53.82 CHRONIC FATIGUE: Primary | ICD-10-CM

## 2022-07-14 DIAGNOSIS — R73.03 PRE-DIABETES: Primary | ICD-10-CM

## 2022-07-14 DIAGNOSIS — K92.0 HEMATEMESIS WITH NAUSEA: ICD-10-CM

## 2022-07-14 DIAGNOSIS — K31.84 GASTROPARESIS: ICD-10-CM

## 2022-07-14 DIAGNOSIS — M79.7 FIBROMYALGIA: ICD-10-CM

## 2022-07-14 PROCEDURE — 99214 OFFICE O/P EST MOD 30 MIN: CPT | Performed by: INTERNAL MEDICINE

## 2022-07-14 RX ORDER — ONDANSETRON 8 MG/1
8 TABLET, ORALLY DISINTEGRATING ORAL EVERY 8 HOURS PRN
Qty: 90 TABLET | Refills: 12 | Status: SHIPPED | OUTPATIENT
Start: 2022-07-14 | End: 2023-02-03 | Stop reason: SDUPTHER

## 2022-07-14 RX ORDER — ONDANSETRON 8 MG/1
TABLET, ORALLY DISINTEGRATING ORAL
COMMUNITY
Start: 2022-07-12 | End: 2022-07-14

## 2022-07-14 NOTE — PROGRESS NOTES
"Geneva Haro  1965  [id number]  Patient Care Team:  Cesar Gao MD as PCP - General (Internal Medicine)  Darion Figueroa MD as Consulting Physician (Gastroenterology)    Geneva Haro is a 57 y.o. female here today for follow up.     This patient is accompanied by their self who contributes to the history of their care.    Chief Complaint:    Chief Complaint   Patient presents with   • Vomiting     Started last month.          History of Present Illness:  I have reviewed and/or updated the patient's past medical, past surgical, family, social history, problem list and allergies as appropriate.     She has a new problem complaining of hematemesis.  Has gastroparesis. Had gastric stimulator placed in 2010 at . Apparently generator needs replacing, and she has not been able to get a hold of the,. Reports on going n/v. Typically controlled with ODT Zofran and phenergan. ( has had trouble keeping down pills.  Was able ot eat a whole meal yesterday. Tolerating broths and soups thus far. Last BM was last Saturday. Emesis is typically food. She has recently vomited  Small amount of blood- last Saturday. There is abdominal cramping. Denies fevers or chills.   Did not see GI locally. Continues to take nexium    Review of Systems   Constitutional: Positive for fatigue.   Eyes: Negative.    Respiratory: Negative.    Cardiovascular: Negative.    Gastrointestinal: Positive for nausea, vomiting and indigestion.        Occasional hemeqatemeis     Endocrine: Negative.    Musculoskeletal: Positive for arthralgias.   Hematological: Negative.    Psychiatric/Behavioral: Negative.        Vitals:    07/14/22 0915   BP: 128/88   Weight: 68.4 kg (150 lb 12.8 oz)   Height: 170.2 cm (67.01\")     Body mass index is 23.61 kg/m².    Physical Exam  Vitals and nursing note reviewed.   Constitutional:       General: She is not in acute distress.     Appearance: She is well-developed. She is not diaphoretic.   HENT:      " Head: Normocephalic and atraumatic.      Right Ear: External ear normal.      Left Ear: External ear normal.      Mouth/Throat:      Pharynx: No oropharyngeal exudate.   Eyes:      General: No scleral icterus.        Right eye: No discharge.      Conjunctiva/sclera: Conjunctivae normal.   Neck:      Thyroid: No thyromegaly.      Vascular: No JVD.      Trachea: No tracheal deviation.   Cardiovascular:      Rate and Rhythm: Normal rate and regular rhythm.      Heart sounds: Normal heart sounds.      Comments: PMI nondisplaced  Pulmonary:      Effort: Pulmonary effort is normal.      Breath sounds: Normal breath sounds. No wheezing or rales.   Abdominal:      General: Bowel sounds are normal.      Palpations: Abdomen is soft.      Tenderness: There is no abdominal tenderness. There is no guarding or rebound.   Musculoskeletal:      Cervical back: Normal range of motion and neck supple.      Comments: Normal gait   Lymphadenopathy:      Cervical: No cervical adenopathy.   Skin:     General: Skin is warm and dry.      Capillary Refill: Capillary refill takes less than 2 seconds.      Coloration: Skin is not pale.      Findings: No rash.   Neurological:      Mental Status: She is alert and oriented to person, place, and time.      Motor: No abnormal muscle tone.      Coordination: Coordination normal.   Psychiatric:         Judgment: Judgment normal.         Procedures    Results Review:    None     Assessment/Plan:       Problem List Items Addressed This Visit        Gastrointestinal Abdominal     Gastroparesis (Chronic)    Relevant Medications    esomeprazole (nexIUM) 40 MG capsule    ondansetron ODT (Zofran ODT) 8 MG disintegrating tablet    Other Relevant Orders    Ambulatory Referral to Gastroenterology (Completed)    Ambulatory Referral to Gastroenterology    CBC (No Diff)    Comprehensive Metabolic Panel      Other Visit Diagnoses     Pre-diabetes    -  Primary    Relevant Orders    MicroAlbumin, Urine, Random -  Urine, Clean Catch    Hematemesis with nausea        Relevant Orders    Ambulatory Referral to Gastroenterology      She has a known gastroparesis with the persistent nausea and vomiting hematemesis last week.  He does relate to GI locally and needs to be referred back to motility clinic in [place].  I do not understand what the difficulty has been contacting him.  Referrals for GI year for consideration of EGD long-term management of gastroparesis have been placed.  Continue Nexium 40 mg daily.  CBC today.  If hematemesis recurs, she needs to proceed to the emergency room    Plan of care reviewed with patient at the conclusion of today's visit. Education was provided regarding diagnosis and management.  Patient verbalizes understanding of and agreement with management plan.    No follow-ups on file.    Cesar Gao MD      Please note than portions of this note were completed [facility] a Voice Recognition Program

## 2022-07-18 ENCOUNTER — LAB (OUTPATIENT)
Dept: LAB | Facility: HOSPITAL | Age: 57
End: 2022-07-18

## 2022-07-18 ENCOUNTER — TELEPHONE (OUTPATIENT)
Dept: FAMILY MEDICINE CLINIC | Facility: CLINIC | Age: 57
End: 2022-07-18

## 2022-07-18 DIAGNOSIS — M79.7 FIBROMYALGIA: ICD-10-CM

## 2022-07-18 DIAGNOSIS — R53.82 CHRONIC FATIGUE: ICD-10-CM

## 2022-07-18 DIAGNOSIS — R73.03 PRE-DIABETES: ICD-10-CM

## 2022-07-18 DIAGNOSIS — Z01.818 PREOP TESTING: Primary | ICD-10-CM

## 2022-07-18 LAB
ALBUMIN SERPL-MCNC: 3.9 G/DL (ref 3.5–5.2)
ALBUMIN UR-MCNC: <1.2 MG/DL
ALBUMIN/GLOB SERPL: 1.5 G/DL
ALP SERPL-CCNC: 97 U/L (ref 39–117)
ALT SERPL W P-5'-P-CCNC: 10 U/L (ref 1–33)
ANION GAP SERPL CALCULATED.3IONS-SCNC: 7.3 MMOL/L (ref 5–15)
AST SERPL-CCNC: 15 U/L (ref 1–32)
BILIRUB SERPL-MCNC: <0.2 MG/DL (ref 0–1.2)
BUN SERPL-MCNC: 12 MG/DL (ref 6–20)
BUN/CREAT SERPL: 9.2 (ref 7–25)
CALCIUM SPEC-SCNC: 9.5 MG/DL (ref 8.6–10.5)
CHLORIDE SERPL-SCNC: 106 MMOL/L (ref 98–107)
CO2 SERPL-SCNC: 26.7 MMOL/L (ref 22–29)
CREAT SERPL-MCNC: 1.3 MG/DL (ref 0.57–1)
EGFRCR SERPLBLD CKD-EPI 2021: 48.1 ML/MIN/1.73
GLOBULIN UR ELPH-MCNC: 2.6 GM/DL
GLUCOSE SERPL-MCNC: 96 MG/DL (ref 65–99)
POTASSIUM SERPL-SCNC: 4.3 MMOL/L (ref 3.5–5.2)
PROT SERPL-MCNC: 6.5 G/DL (ref 6–8.5)
SODIUM SERPL-SCNC: 140 MMOL/L (ref 136–145)

## 2022-07-18 PROCEDURE — 82043 UR ALBUMIN QUANTITATIVE: CPT

## 2022-07-18 PROCEDURE — 85027 COMPLETE CBC AUTOMATED: CPT

## 2022-07-18 PROCEDURE — 36415 COLL VENOUS BLD VENIPUNCTURE: CPT

## 2022-07-18 PROCEDURE — 80053 COMPREHEN METABOLIC PANEL: CPT

## 2022-07-18 NOTE — TELEPHONE ENCOUNTER
Caller: Geneva Haro    Relationship: Self    Best call back number: [phone]    What orders are you requesting (i.e. lab or imaging): ORDER TO HAVE COVID TEST DONE    In what timeframe would the patient need to come in: 7/18    Where will you receive your lab/imaging services: OFFICE    Additional notes: PT STATED TO REQUEST TO HAVE A COVID TEST DONE TOMORROW FOR HER HER GO PROCEDURE WHICH IS SCHEDULED FOR 7/22/

## 2022-07-19 ENCOUNTER — TELEPHONE (OUTPATIENT)
Dept: FAMILY MEDICINE CLINIC | Facility: CLINIC | Age: 57
End: 2022-07-19

## 2022-07-19 ENCOUNTER — APPOINTMENT (OUTPATIENT)
Dept: PREADMISSION TESTING | Facility: HOSPITAL | Age: 57
End: 2022-07-19

## 2022-07-19 LAB
DEPRECATED RDW RBC AUTO: 39.7 FL (ref 37–54)
ERYTHROCYTE [DISTWIDTH] IN BLOOD BY AUTOMATED COUNT: 13.2 % (ref 12.3–15.4)
HCT VFR BLD AUTO: 37.8 % (ref 34–46.6)
HGB BLD-MCNC: 12.2 G/DL (ref 12–15.9)
MCH RBC QN AUTO: 27.2 PG (ref 26.6–33)
MCHC RBC AUTO-ENTMCNC: 32.3 G/DL (ref 31.5–35.7)
MCV RBC AUTO: 84.2 FL (ref 79–97)
PLATELET # BLD AUTO: 206 10*3/MM3 (ref 140–450)
PMV BLD AUTO: 9.8 FL (ref 6–12)
RBC # BLD AUTO: 4.49 10*6/MM3 (ref 3.77–5.28)
SARS-COV-2 RNA PNL SPEC NAA+PROBE: NOT DETECTED
WBC NRBC COR # BLD: 3.41 10*3/MM3 (ref 3.4–10.8)

## 2022-07-19 PROCEDURE — C9803 HOPD COVID-19 SPEC COLLECT: HCPCS

## 2022-07-19 PROCEDURE — U0004 COV-19 TEST NON-CDC HGH THRU: HCPCS

## 2022-07-19 NOTE — TELEPHONE ENCOUNTER
Attempted to call pt, received no answer. Left detailed vm informing pt of lab results below. Office number given for any questions or concerns.     Okay for hub to relay message and document.    ----- Message from Cesar Gao MD sent at 7/19/2022  9:56 AM EDT -----    Blood count looks good good and stable.

## 2022-07-19 NOTE — TELEPHONE ENCOUNTER
Attempted to call pt, received no answer. Left detailed vm informing pt of results below. Office number given for any questions or concerns.     Okay for hub to relay message and document.    ----- Message from Cesar Gao MD sent at 7/19/2022  9:57 AM EDT -----    Kidney function is mildly elevated.  Avoid nonsteroidal anti-inflammatory agents.  Have her push fluids as best she can.  This is likely secondary to her gastroparesis, vomiting, decreased oral intake.  Should repeat this in about 4 weeks.

## 2022-07-19 NOTE — TELEPHONE ENCOUNTER
Pt has pre-admission testing already scheduled in [place] location. No further action needed.

## 2022-07-22 ENCOUNTER — OUTSIDE FACILITY SERVICE (OUTPATIENT)
Dept: GASTROENTEROLOGY | Facility: CLINIC | Age: 57
End: 2022-07-22

## 2022-07-22 PROCEDURE — 43239 EGD BIOPSY SINGLE/MULTIPLE: CPT | Performed by: INTERNAL MEDICINE

## 2022-07-22 PROCEDURE — 43249 ESOPH EGD DILATION <30 MM: CPT | Performed by: INTERNAL MEDICINE

## 2022-07-22 PROCEDURE — 88305 TISSUE EXAM BY PATHOLOGIST: CPT | Performed by: INTERNAL MEDICINE

## 2022-07-25 ENCOUNTER — LAB REQUISITION (OUTPATIENT)
Dept: LAB | Facility: HOSPITAL | Age: 57
End: 2022-07-25

## 2022-07-25 DIAGNOSIS — K92.0 HEMATEMESIS: ICD-10-CM

## 2022-07-25 DIAGNOSIS — Z98.890 OTHER SPECIFIED POSTPROCEDURAL STATES: ICD-10-CM

## 2022-07-25 DIAGNOSIS — K22.89 OTHER SPECIFIED DISEASE OF ESOPHAGUS: ICD-10-CM

## 2022-07-25 DIAGNOSIS — R12 HEARTBURN: ICD-10-CM

## 2022-07-25 DIAGNOSIS — K22.2 ESOPHAGEAL OBSTRUCTION: ICD-10-CM

## 2022-07-25 DIAGNOSIS — R11.0 NAUSEA: ICD-10-CM

## 2022-07-25 DIAGNOSIS — R13.10 DYSPHAGIA, UNSPECIFIED: ICD-10-CM

## 2022-07-25 DIAGNOSIS — K31.84 GASTROPARESIS: ICD-10-CM

## 2022-07-25 DIAGNOSIS — R11.10 VOMITING, UNSPECIFIED: ICD-10-CM

## 2022-07-26 ENCOUNTER — TELEPHONE (OUTPATIENT)
Dept: ONCOLOGY | Facility: CLINIC | Age: 57
End: 2022-07-26

## 2022-07-26 NOTE — TELEPHONE ENCOUNTER
Caller: HaroKym    Relationship: Self    Best call back number: [phone]    What was the call regarding: KYM WAS RETURNING A CALL FROM LAST WEEK. SHE IS NOT SURE WHO CALLED OR WHAT IT WAS REGARDING.    Do you require a callback: YES

## 2022-07-26 NOTE — TELEPHONE ENCOUNTER
I called the patient and got her voicemail.  We have not been trying to call but it looks like the MA from Dr Gao had been calling last week.

## 2022-08-03 ENCOUNTER — HOSPITAL ENCOUNTER (OUTPATIENT)
Dept: ONCOLOGY | Facility: HOSPITAL | Age: 57
Setting detail: INFUSION SERIES
Discharge: HOME | End: 2022-08-03

## 2022-08-03 VITALS
TEMPERATURE: 97.1 F | HEART RATE: 105 BPM | WEIGHT: 151 LBS | SYSTOLIC BLOOD PRESSURE: 120 MMHG | DIASTOLIC BLOOD PRESSURE: 79 MMHG | RESPIRATION RATE: 18 BRPM | BODY MASS INDEX: 23.7 KG/M2 | HEIGHT: 67 IN

## 2022-08-03 DIAGNOSIS — Z45.2 ENCOUNTER FOR CENTRAL LINE CARE: Primary | ICD-10-CM

## 2022-08-03 PROCEDURE — G0463 HOSPITAL OUTPT CLINIC VISIT: HCPCS

## 2022-08-03 PROCEDURE — 25010000002 HEPARIN LOCK FLUSH PER 10 UNITS: Performed by: INTERNAL MEDICINE

## 2022-08-03 PROCEDURE — 96523 IRRIG DRUG DELIVERY DEVICE: CPT

## 2022-08-03 RX ORDER — HEPARIN SODIUM (PORCINE) LOCK FLUSH IV SOLN 100 UNIT/ML 100 UNIT/ML
500 SOLUTION INTRAVENOUS AS NEEDED
Status: CANCELLED | OUTPATIENT
Start: 2022-08-03

## 2022-08-03 RX ORDER — SODIUM CHLORIDE 0.9 % (FLUSH) 0.9 %
10 SYRINGE (ML) INJECTION AS NEEDED
Status: CANCELLED | OUTPATIENT
Start: 2022-08-03

## 2022-08-03 RX ORDER — SODIUM CHLORIDE 0.9 % (FLUSH) 0.9 %
20 SYRINGE (ML) INJECTION AS NEEDED
Status: CANCELLED | OUTPATIENT
Start: 2022-08-03

## 2022-08-03 RX ORDER — HEPARIN SODIUM (PORCINE) LOCK FLUSH IV SOLN 100 UNIT/ML 100 UNIT/ML
500 SOLUTION INTRAVENOUS AS NEEDED
Status: DISCONTINUED | OUTPATIENT
Start: 2022-08-03 | End: 2022-08-04 | Stop reason: HOSPADM

## 2022-08-03 RX ADMIN — HEPARIN SODIUM (PORCINE) LOCK FLUSH IV SOLN 100 UNIT/ML 500 UNITS: 100 SOLUTION at 14:45

## 2022-08-12 RX ORDER — CITALOPRAM 20 MG/1
TABLET ORAL
Qty: 90 TABLET | Refills: 0 | Status: SHIPPED | OUTPATIENT
Start: 2022-08-12 | End: 2022-12-30

## 2022-08-12 NOTE — TELEPHONE ENCOUNTER
Rx Refill Note  Requested Prescriptions     Pending Prescriptions Disp Refills   • citalopram (CeleXA) 20 MG tablet [Pharmacy Med Name: CITALOPRAM HBR 20 MG TABLET] 90 tablet 0     Sig: TAKE ONE TABLET BY MOUTH DAILY      Last office visit with prescribing clinician: 7/14/2022      Next office visit with prescribing clinician: Visit date not found            Julia Toledo MA  08/12/22, 10:06 EDT

## 2022-09-13 ENCOUNTER — OFFICE VISIT (OUTPATIENT)
Dept: GASTROENTEROLOGY | Facility: CLINIC | Age: 57
End: 2022-09-13

## 2022-09-13 VITALS
WEIGHT: 148.4 LBS | BODY MASS INDEX: 23.29 KG/M2 | SYSTOLIC BLOOD PRESSURE: 124 MMHG | HEART RATE: 86 BPM | OXYGEN SATURATION: 98 % | HEIGHT: 67 IN | TEMPERATURE: 97.5 F | DIASTOLIC BLOOD PRESSURE: 84 MMHG

## 2022-09-13 DIAGNOSIS — K31.84 GASTROPARESIS: Primary | ICD-10-CM

## 2022-09-13 DIAGNOSIS — R13.19 ESOPHAGEAL DYSPHAGIA: ICD-10-CM

## 2022-09-13 DIAGNOSIS — K30 NON-ULCER DYSPEPSIA: ICD-10-CM

## 2022-09-13 DIAGNOSIS — K59.89 GENERALIZED DYSMOTILITY OF INTESTINE: ICD-10-CM

## 2022-09-13 DIAGNOSIS — K59.04 CHRONIC IDIOPATHIC CONSTIPATION: ICD-10-CM

## 2022-09-13 PROCEDURE — 99214 OFFICE O/P EST MOD 30 MIN: CPT | Performed by: INTERNAL MEDICINE

## 2022-09-13 NOTE — PROGRESS NOTES
GASTROENTEROLOGY OFFICE NOTE  Geneva Haro  [id number]  1965        Chief Complaint   Patient presents with   • Follow-up     EGD on 7/22   • Gastroparesis        HISTORY OF PRESENT ILLNESS:  Patient presents for follow-up.  I met her for the first time on July 22 when she presented for an upper endoscopy.  She has well-documented history of gastroparesis resulting ultimately in gastric pacemaker implantation which is subsequently failed to resolve her symptomatology.  In addition to this she has failed metoclopramide, domperidone and, responded transiently to pyloric Botox.    She has been having problems with intractable nausea vomiting.  She has had 2 such episodes since her July procedure but these episodes last for 3 to 4 days at a time with intractable nausea and vomiting with nonbloody emesis.    She has abdominal cramping also denies dysphagia to solids or odynophagia denies early satiety or unexplained weight loss denies melena or bright red blood per rectum.    She is here today for further work-up and evaluation of her dyspeptic symptoms which are presumed to be secondary to gastroparesis.    She also has chronic idiopathic constipation with slow transit time and infrequent bowel movements.    Last colonoscopy 7 years ago was reportedly within normal limits.    PAST MEDICAL HISTORY  Past Medical History:   Diagnosis Date   • Anxiety    • Arthritis    • Cancer (HCC)    • Depression    • Diabetes mellitus (HCC)    • Gall stones    • Hearing loss    • Hernia, hiatal    • History of stomach ulcers    • History of transfusion    • Stomach problems    • Ulcer of bile duct         PAST SURGICAL HISTORY  Past Surgical History:   Procedure Laterality Date   • CHOLECYSTECTOMY     • ENDOSCOPY N/A 6/20/2018    Procedure: ESOPHAGOGASTRODUODENOSCOPY;  Surgeon: Mark I Brunner, MD;  Location: On license of [facility] ENDOSCOPY;  Service: Gastroenterology   • GASTRIC BYPASS      x2   • HERNIA REPAIR     • HERNIA REPAIR     •  INTERVENTIONAL RADIOLOGY PROCEDURE Bilateral 7/22/2021    Procedure: IR myelogram, cervical;  Surgeon: Jose Angel Sauer MD;  Location: [facility] INVASIVE LOCATION;  Service: Interventional Radiology;  Laterality: Bilateral;   • MASTECTOMY Bilateral     Left With sentinel lymph node sampling and prophylactic right mastectomy   • PACEMAKER IMPLANTATION     • PORTACATH PLACEMENT          MEDICATIONS:    Current Outpatient Medications:   •  cetirizine (zyrTEC) 10 MG tablet, Take 1 tablet by mouth Daily., Disp: 90 tablet, Rfl: 3  •  citalopram (CeleXA) 20 MG tablet, TAKE ONE TABLET BY MOUTH DAILY, Disp: 90 tablet, Rfl: 0  •  cyclobenzaprine (FLEXERIL) 10 MG tablet, Take 1 tablet by mouth 3 (Three) Times a Day As Needed for Muscle Spasms., Disp: 180 tablet, Rfl: 3  •  DULoxetine (CYMBALTA) 30 MG capsule, Take 1 capsule by mouth 2 (Two) Times a Day., Disp: 180 capsule, Rfl: 3  •  esomeprazole (nexIUM) 40 MG capsule, Take 40 mg by mouth 2 (Two) Times a Day., Disp: , Rfl:   •  folic acid (FOLVITE) 1 MG tablet, Take 1 tablet by mouth Daily., Disp: 30 tablet, Rfl: 9  •  metroNIDAZOLE (FLAGYL) 500 MG tablet, Take 1 tablet by mouth 3 (Three) Times a Day., Disp: 21 tablet, Rfl: 0  •  ondansetron (ZOFRAN) 8 MG tablet, TAKE ONE TABLET BY MOUTH EVERY 8 HOURS AS NEEDED FOR NAUSEA OR VOMITING, Disp: 30 tablet, Rfl: 6  •  ondansetron ODT (Zofran ODT) 8 MG disintegrating tablet, Place 1 tablet on the tongue Every 8 (Eight) Hours As Needed for Nausea or Vomiting., Disp: 90 tablet, Rfl: 12  •  prochlorperazine (COMPAZINE) 10 MG tablet, Take 1 tablet by mouth Every 6 (Six) Hours As Needed for Nausea or Vomiting., Disp: 30 tablet, Rfl: 0  •  thiamine (thiamine) 100 MG tablet tablet, Take 1 tablet by mouth Daily., Disp: 30 tablet, Rfl: 9  •  traZODone (DESYREL) 50 MG tablet, Take 1 tablet by mouth Every Night., Disp: 90 tablet, Rfl: 3    ALLERGIES  Allergies   Allergen Reactions   • Gabapentin Other (See Comments)     Seizure   • Morphine  "And Related Itching and Swelling   • Aspirin GI Intolerance   • Ibuprofen GI Intolerance       FAMILY HISTORY:  Family History   Problem Relation Age of Onset   • Breast cancer Maternal Aunt    • Pancreatic cancer Cousin    • Breast cancer Cousin    • Breast cancer Cousin    • Heart attack Mother        SOCIAL HISTORY  Social History     Socioeconomic History   • Marital status:    Tobacco Use   • Smoking status: Former Smoker     Packs/day: 0.50     Years: 20.00     Pack years: 10.00     Types: Cigarettes     Start date: 1986     Quit date: 2021     Years since quittin.6   • Smokeless tobacco: Never Used   Vaping Use   • Vaping Use: Never used   Substance and Sexual Activity   • Alcohol use: No   • Drug use: Yes     Types: Marijuana   • Sexual activity: Not Currently     Socioeconomic History:  .  Non-smoker.  Nondrinker.         PHYSICAL EXAM   /84 (BP Location: Right arm, Patient Position: Sitting, Cuff Size: Adult)   Pulse 86   Temp 97.5 °F (36.4 °C) (Infrared)   Ht 170.2 cm (67\")   Wt 67.3 kg (148 lb 6.4 oz)   SpO2 98%   BMI 23.24 kg/m²   General: Pleasant -American female no apparent or acute distress  HEENT: Anicteric sclera  Neck: Without observed rigidity  Lungs: Grossly normal respiration without labored breathing or audible wheezing  Abdomen: Without grossly obvious distention  Extremeties: Moving extremities normally  Neurologic: Normal cognition and affect.  Alert and oriented  Rectal exam: deferred        ASSESSMENT  1.-  Short segment nondysplastic Obrien's esophagus.  1 cm.  Repeat EGD in 3 to 5 years  2.-  Gastroparesis.  Cisapride recommended.  She understands risks of fatal arrhythmia and will be reviewing the informed consent in further detail and will get back to us if she wishes to pursue this  3.-  Chronic idiopathic constipation likely representing some element of global dysmotility.  This should also hopefully respond to a trial of cisapride  4.- "  Intermittent dysphagia to solids resolved following esophageal dilation  5.-patient up-to-date on her colon cancer screening./Average risk for colon cancer.    PLAN  1.- Check magnesium, phosphorus, CMP, CBC and electrocardiogram in anticipation of cisapride enrollment  2.-  Follow-up per cisapride protocol assuming patient wishes to pursue this as she indicated for leaving today  3.-  Consider referral to tertiary center for gastric peroral endoscopic myotomy  4.-  Repeat upper endoscopy in 3 years for Obrien's esophagus surveillance    Josh Snachez MD  9/13/2022   17:38 EDT

## 2022-10-08 RX ORDER — CETIRIZINE HYDROCHLORIDE 10 MG/1
TABLET ORAL
Qty: 90 TABLET | Refills: 3 | Status: SHIPPED | OUTPATIENT
Start: 2022-10-08 | End: 2023-02-03 | Stop reason: SDUPTHER

## 2022-10-11 ENCOUNTER — OFFICE VISIT (OUTPATIENT)
Dept: FAMILY MEDICINE CLINIC | Facility: CLINIC | Age: 57
End: 2022-10-11

## 2022-10-11 VITALS
WEIGHT: 148 LBS | HEART RATE: 102 BPM | SYSTOLIC BLOOD PRESSURE: 126 MMHG | OXYGEN SATURATION: 99 % | DIASTOLIC BLOOD PRESSURE: 96 MMHG | HEIGHT: 67 IN | BODY MASS INDEX: 23.23 KG/M2

## 2022-10-11 DIAGNOSIS — J06.9 UPPER RESPIRATORY TRACT INFECTION, UNSPECIFIED TYPE: Primary | ICD-10-CM

## 2022-10-11 DIAGNOSIS — R50.9 FEVER, UNSPECIFIED FEVER CAUSE: ICD-10-CM

## 2022-10-11 LAB
EXPIRATION DATE: NORMAL
FLUAV AG UPPER RESP QL IA.RAPID: NOT DETECTED
FLUBV AG UPPER RESP QL IA.RAPID: NOT DETECTED
INTERNAL CONTROL: NORMAL
Lab: NORMAL
SARS-COV-2 AG UPPER RESP QL IA.RAPID: NOT DETECTED

## 2022-10-11 PROCEDURE — 87428 SARSCOV & INF VIR A&B AG IA: CPT | Performed by: INTERNAL MEDICINE

## 2022-10-11 PROCEDURE — 99214 OFFICE O/P EST MOD 30 MIN: CPT | Performed by: INTERNAL MEDICINE

## 2022-10-12 NOTE — PROGRESS NOTES
Chief Complaint   Patient presents with   • Hot Flashes     Requesting covid test        HPI:  Geneva Haro is a 57 y.o. female who presents today for chills, sore throat congestion and fatigue.  She is requesting COVID test today.  Symptom onset 1 day.    ROS:  Constitutional: no fevers, night sweats or unexplained weight loss  Eyes: no vision changes  ENT: no runny nose, ear pain, +sore throat  Cardio: no chest pain, palpitations  Pulm: no shortness of breath, wheezing, + cough  GI: no abdominal pain or changes in bowel movements  : no difficulty urinating  MSK: no difficulty ambulating, no joint pain  Neuro: no weakness, dizziness or headache  Psych: no trouble sleeping  Endo: no change in appetite      Past Medical History:   Diagnosis Date   • Anxiety    • Arthritis    • Cancer (HCC)    • Depression    • Diabetes mellitus (HCC)    • Gall stones    • Hearing loss    • Hernia, hiatal    • History of stomach ulcers    • History of transfusion    • Stomach problems    • Ulcer of bile duct       Family History   Problem Relation Age of Onset   • Breast cancer Maternal Aunt    • Pancreatic cancer Cousin    • Breast cancer Cousin    • Breast cancer Cousin    • Heart attack Mother       Social History     Socioeconomic History   • Marital status:    Tobacco Use   • Smoking status: Former     Packs/day: 0.50     Years: 20.00     Pack years: 10.00     Types: Cigarettes     Start date: 1986     Quit date: 2021     Years since quittin.7   • Smokeless tobacco: Never   Vaping Use   • Vaping Use: Never used   Substance and Sexual Activity   • Alcohol use: No   • Drug use: Yes     Types: Marijuana   • Sexual activity: Not Currently      Allergies   Allergen Reactions   • Gabapentin Other (See Comments)     Seizure   • Morphine And Related Itching and Swelling   • Aspirin GI Intolerance   • Ibuprofen GI Intolerance      Immunization History   Administered Date(s) Administered   • Covid-19 (Pfizer) Gray  Cap 04/11/2022, 05/02/2022   • FluLaval/Fluzone >6mos 02/02/2021   • Pneumococcal Polysaccharide (PPSV23) 10/19/2020        PE:  Vitals:    10/11/22 1115   BP: 126/96   Pulse: 102   SpO2: 99%      Body mass index is 23.18 kg/m².    Gen Appearance: NAD  HEENT: Normocephalic, PERRLA, no thyromegaly, trache midline  Heart: RRR, normal S1 and S2, no murmur  Lungs: CTA b/l, no wheezing, no crackles  Abdomen: Soft, non-tender, non-distended, no guarding and BSx4  MSK: Moves all extremities well, normal gait, no peripheral edema  Pulses: Palpable and equal b/l  Lymph nodes: No palpable lymphadenopathy   Neuro: No focal deficits      Current Outpatient Medications   Medication Sig Dispense Refill   • cetirizine (zyrTEC) 10 MG tablet TAKE ONE TABLET BY MOUTH DAILY 90 tablet 3   • citalopram (CeleXA) 20 MG tablet TAKE ONE TABLET BY MOUTH DAILY 90 tablet 0   • cyclobenzaprine (FLEXERIL) 10 MG tablet Take 1 tablet by mouth 3 (Three) Times a Day As Needed for Muscle Spasms. 180 tablet 3   • DULoxetine (CYMBALTA) 30 MG capsule Take 1 capsule by mouth 2 (Two) Times a Day. 180 capsule 3   • esomeprazole (nexIUM) 40 MG capsule Take 40 mg by mouth 2 (Two) Times a Day.     • folic acid (FOLVITE) 1 MG tablet Take 1 tablet by mouth Daily. 30 tablet 9   • metroNIDAZOLE (FLAGYL) 500 MG tablet Take 1 tablet by mouth 3 (Three) Times a Day. 21 tablet 0   • ondansetron (ZOFRAN) 8 MG tablet TAKE ONE TABLET BY MOUTH EVERY 8 HOURS AS NEEDED FOR NAUSEA OR VOMITING 30 tablet 6   • ondansetron ODT (Zofran ODT) 8 MG disintegrating tablet Place 1 tablet on the tongue Every 8 (Eight) Hours As Needed for Nausea or Vomiting. 90 tablet 12   • prochlorperazine (COMPAZINE) 10 MG tablet Take 1 tablet by mouth Every 6 (Six) Hours As Needed for Nausea or Vomiting. 30 tablet 0   • thiamine (thiamine) 100 MG tablet tablet Take 1 tablet by mouth Daily. 30 tablet 9   • traZODone (DESYREL) 50 MG tablet Take 1 tablet by mouth Every Night. 90 tablet 3     No current  facility-administered medications for this visit.      Counseling was given to patient for the following topics: diagnostic results, instructions for management and impressions . Total time of the encounter was 30 minutes and 16 minutes was spent face to face counseling.    Diagnoses and all orders for this visit:    1. Upper respiratory tract infection, unspecified type (Primary)  Negative COVID and flu although she has had symptoms for less than 24 hours.  Recommend monitoring symptoms over the next week.  Call or return to clinic if no improvement.  Recommend retesting for COVID on day 5 if she is still symptomatic.  2. Fever, unspecified fever cause  -     POCT SARS-CoV-2 Antigen CHUCKY  3. HTN  Blood pressure high today although has typically been well controlled in the past.  Possibly illness related or anxiety?  Recommend follow-up with PCP 1 month for repeat.  No change in medication today.       No follow-ups on file.     Dictated Utilizing Dragon Dictation    Please note that portions of this note were completed with a voice recognition program.    Part of this note may be an electronic transcription/translation of spoken language to printed text using the Dragon Dictation System.

## 2022-10-21 ENCOUNTER — HOSPITAL ENCOUNTER (OUTPATIENT)
Dept: ONCOLOGY | Facility: HOSPITAL | Age: 57
Setting detail: INFUSION SERIES
Discharge: HOME | End: 2022-10-21

## 2022-10-21 ENCOUNTER — OFFICE VISIT (OUTPATIENT)
Dept: ONCOLOGY | Facility: CLINIC | Age: 57
End: 2022-10-21

## 2022-10-21 VITALS
TEMPERATURE: 97.3 F | OXYGEN SATURATION: 99 % | BODY MASS INDEX: 22.76 KG/M2 | DIASTOLIC BLOOD PRESSURE: 83 MMHG | HEIGHT: 67 IN | RESPIRATION RATE: 18 BRPM | SYSTOLIC BLOOD PRESSURE: 116 MMHG | HEART RATE: 93 BPM | WEIGHT: 145 LBS

## 2022-10-21 DIAGNOSIS — D50.8 OTHER IRON DEFICIENCY ANEMIA: Primary | ICD-10-CM

## 2022-10-21 DIAGNOSIS — Z45.2 ENCOUNTER FOR CENTRAL LINE CARE: Primary | ICD-10-CM

## 2022-10-21 LAB
BASOPHILS # BLD AUTO: 0.02 10*3/MM3 (ref 0–0.2)
BASOPHILS NFR BLD AUTO: 0.5 % (ref 0–1.5)
DEPRECATED RDW RBC AUTO: 39.2 FL (ref 37–54)
EOSINOPHIL # BLD AUTO: 0.07 10*3/MM3 (ref 0–0.4)
EOSINOPHIL NFR BLD AUTO: 1.7 % (ref 0.3–6.2)
ERYTHROCYTE [DISTWIDTH] IN BLOOD BY AUTOMATED COUNT: 13 % (ref 12.3–15.4)
FERRITIN SERPL-MCNC: 131.9 NG/ML (ref 13–150)
HCT VFR BLD AUTO: 37.8 % (ref 34–46.6)
HGB BLD-MCNC: 12.6 G/DL (ref 12–15.9)
IMM GRANULOCYTES # BLD AUTO: 0 10*3/MM3 (ref 0–0.05)
IMM GRANULOCYTES NFR BLD AUTO: 0 % (ref 0–0.5)
IRON 24H UR-MRATE: 72 MCG/DL (ref 37–145)
IRON SATN MFR SERPL: 15 % (ref 20–50)
LYMPHOCYTES # BLD AUTO: 1.09 10*3/MM3 (ref 0.7–3.1)
LYMPHOCYTES NFR BLD AUTO: 26.7 % (ref 19.6–45.3)
MCH RBC QN AUTO: 27.2 PG (ref 26.6–33)
MCHC RBC AUTO-ENTMCNC: 33.3 G/DL (ref 31.5–35.7)
MCV RBC AUTO: 81.5 FL (ref 79–97)
MONOCYTES # BLD AUTO: 0.33 10*3/MM3 (ref 0.1–0.9)
MONOCYTES NFR BLD AUTO: 8.1 % (ref 5–12)
NEUTROPHILS NFR BLD AUTO: 2.57 10*3/MM3 (ref 1.7–7)
NEUTROPHILS NFR BLD AUTO: 63 % (ref 42.7–76)
PLATELET # BLD AUTO: 158 10*3/MM3 (ref 140–450)
PMV BLD AUTO: 9.8 FL (ref 6–12)
RBC # BLD AUTO: 4.64 10*6/MM3 (ref 3.77–5.28)
TIBC SERPL-MCNC: 490 MCG/DL (ref 298–536)
TRANSFERRIN SERPL-MCNC: 329 MG/DL (ref 200–360)
WBC NRBC COR # BLD: 4.08 10*3/MM3 (ref 3.4–10.8)

## 2022-10-21 PROCEDURE — 83540 ASSAY OF IRON: CPT | Performed by: NURSE PRACTITIONER

## 2022-10-21 PROCEDURE — 99214 OFFICE O/P EST MOD 30 MIN: CPT | Performed by: NURSE PRACTITIONER

## 2022-10-21 PROCEDURE — 84466 ASSAY OF TRANSFERRIN: CPT | Performed by: NURSE PRACTITIONER

## 2022-10-21 PROCEDURE — 36591 DRAW BLOOD OFF VENOUS DEVICE: CPT

## 2022-10-21 PROCEDURE — 82728 ASSAY OF FERRITIN: CPT | Performed by: NURSE PRACTITIONER

## 2022-10-21 PROCEDURE — 25010000002 HEPARIN LOCK FLUSH PER 10 UNITS: Performed by: INTERNAL MEDICINE

## 2022-10-21 PROCEDURE — 85025 COMPLETE CBC W/AUTO DIFF WBC: CPT | Performed by: NURSE PRACTITIONER

## 2022-10-21 RX ORDER — SODIUM CHLORIDE 0.9 % (FLUSH) 0.9 %
10 SYRINGE (ML) INJECTION AS NEEDED
Status: CANCELLED | OUTPATIENT
Start: 2022-10-21

## 2022-10-21 RX ORDER — HEPARIN SODIUM (PORCINE) LOCK FLUSH IV SOLN 100 UNIT/ML 100 UNIT/ML
500 SOLUTION INTRAVENOUS AS NEEDED
Status: CANCELLED | OUTPATIENT
Start: 2022-10-21

## 2022-10-21 RX ORDER — HEPARIN SODIUM (PORCINE) LOCK FLUSH IV SOLN 100 UNIT/ML 100 UNIT/ML
500 SOLUTION INTRAVENOUS AS NEEDED
Status: DISCONTINUED | OUTPATIENT
Start: 2022-10-21 | End: 2022-10-22 | Stop reason: HOSPADM

## 2022-10-21 RX ORDER — SODIUM CHLORIDE 0.9 % (FLUSH) 0.9 %
20 SYRINGE (ML) INJECTION AS NEEDED
Status: CANCELLED | OUTPATIENT
Start: 2022-10-21

## 2022-10-21 RX ADMIN — HEPARIN 500 UNITS: 100 SYRINGE at 14:17

## 2022-10-21 NOTE — PROGRESS NOTES
"      PROBLEM LIST:  1. Stage I breast cancer, left upper outer quadrant:   a) Original diagnosis 06/20/2010.   b) Left mastectomy with sentinel lymph node sampling and prophylactic right  mastectomy.   c) [id number] stage I.   d) Estrogen receptor and progesterone receptor positive and HER-2 negative  with low risk Oncotype.   e) Completed adjuvant endocrine therapy 07/08/2015.   2. Fibromyalgia and chronic pain.   3. Recurring iron deficiency anemia.   4. Gastroparesis.       CHIEF COMPLAINT:    Subjective     HISTORY OF PRESENT ILLNESS:   Mrs. Haro is here for follow up evaluation of history of breast cancer and iron deficiency anemia.  She reports having spells where she feels dizzy, nervous and like she is going to pass out 2-3 times a month.  If she eats or drinks usually the spells resolved.  She has had an occasional spell that occurred right after eating or drinking well.  She has never lost consciousness with one of the episodes.    She complains of tenderness in her left axilla, shoulder and left back.  She does have fibromyalgia.          Past Medical History, Past Surgical History, Social History, Family History have been reviewed and are without significant changes except as mentioned.    Review of Systems   A comprehensive 14 point review of systems was performed and was negative except as mentioned.    Medications:  The current medication list was reviewed in the EMR    ALLERGIES:    Allergies   Allergen Reactions   • Gabapentin Other (See Comments)     Seizure   • Morphine And Related Itching and Swelling   • Aspirin GI Intolerance   • Ibuprofen GI Intolerance       Objective      /83   Pulse 93   Temp 97.3 °F (36.3 °C)   Resp 18   Ht 170.2 cm (67\")   Wt 65.8 kg (145 lb)   SpO2 99%   BMI 22.71 kg/m²    Vitals:    10/21/22 1300   PainSc: 0-No pain  Comment: No new pain                 General: well appearing, in no acute distress   HEENT: sclera anicteric, oropharynx clear  Lymphatics: " no cervical, supraclavicular, or axillary adenopathy  Cardiovascular: regular rate and rhythm, no murmurs  Lungs: clear to auscultation bilaterally  Abdomen: soft, nontender, nondistended.  No palpable masses or organomegaly  Extremeties: no lower extremity edema, cords or calf tenderness  Skin: no rashes, lesions, bruising, or petechiae  Breasts: bilateral mastectomy incisions well healed with no masses or skin changes noted very sensitive to palpation on bilateral chest walls and left axilla    RECENT LABS:  Hematology WBC   Date Value Ref Range Status   07/18/2022 3.41 3.40 - 10.80 10*3/mm3 Final     Hemoglobin   Date Value Ref Range Status   07/18/2022 12.2 12.0 - 15.9 g/dL Final     Hematocrit   Date Value Ref Range Status   07/18/2022 37.8 34.0 - 46.6 % Final     MCV   Date Value Ref Range Status   07/18/2022 84.2 79.0 - 97.0 fL Final     RDW   Date Value Ref Range Status   07/18/2022 13.2 12.3 - 15.4 % Final     MPV   Date Value Ref Range Status   07/18/2022 9.8 6.0 - 12.0 fL Final     Platelets   Date Value Ref Range Status   07/18/2022 206 140 - 450 10*3/mm3 Final     Immature Grans %   Date Value Ref Range Status   04/07/2022 0.2 0.0 - 0.5 % Final     Neutrophils, Absolute   Date Value Ref Range Status   04/07/2022 2.55 1.70 - 7.00 10*3/mm3 Final     Lymphocytes, Absolute   Date Value Ref Range Status   04/07/2022 1.27 0.70 - 3.10 10*3/mm3 Final     Monocytes, Absolute   Date Value Ref Range Status   04/07/2022 0.36 0.10 - 0.90 10*3/mm3 Final     Eosinophils, Absolute   Date Value Ref Range Status   04/07/2022 0.21 0.00 - 0.40 10*3/mm3 Final     Basophils, Absolute   Date Value Ref Range Status   04/07/2022 0.03 0.00 - 0.20 10*3/mm3 Final     Immature Grans, Absolute   Date Value Ref Range Status   04/07/2022 0.01 0.00 - 0.05 10*3/mm3 Final     nRBC   Date Value Ref Range Status   04/07/2022 0.0 0.0 - 0.2 /100 WBC Final       Glucose   Date Value Ref Range Status   07/18/2022 96 65 - 99 mg/dL Final      Sodium   Date Value Ref Range Status   07/18/2022 140 136 - 145 mmol/L Final     Potassium   Date Value Ref Range Status   07/18/2022 4.3 3.5 - 5.2 mmol/L Final     CO2   Date Value Ref Range Status   07/18/2022 26.7 22.0 - 29.0 mmol/L Final     Chloride   Date Value Ref Range Status   07/18/2022 106 98 - 107 mmol/L Final     Anion Gap   Date Value Ref Range Status   07/18/2022 7.3 5.0 - 15.0 mmol/L Final     Creatinine   Date Value Ref Range Status   07/18/2022 1.30 (H) 0.57 - 1.00 mg/dL Final     BUN   Date Value Ref Range Status   07/18/2022 12 6 - 20 mg/dL Final     BUN/Creatinine Ratio   Date Value Ref Range Status   07/18/2022 9.2 7.0 - 25.0 Final     Calcium   Date Value Ref Range Status   07/18/2022 9.5 8.6 - 10.5 mg/dL Final     Alkaline Phosphatase   Date Value Ref Range Status   07/18/2022 97 39 - 117 U/L Final     Total Protein   Date Value Ref Range Status   07/18/2022 6.5 6.0 - 8.5 g/dL Final     ALT (SGPT)   Date Value Ref Range Status   07/18/2022 10 1 - 33 U/L Final     AST (SGOT)   Date Value Ref Range Status   07/18/2022 15 1 - 32 U/L Final     Total Bilirubin   Date Value Ref Range Status   07/18/2022 <0.2 0.0 - 1.2 mg/dL Final     Albumin   Date Value Ref Range Status   07/18/2022 3.90 3.50 - 5.20 g/dL Final     Globulin   Date Value Ref Range Status   07/18/2022 2.6 gm/dL Final     A/G Ratio   Date Value Ref Range Status   06/19/2015 1.6 0.7 - 2.0 Final       LDH   Date Value Ref Range Status   06/19/2015 174 120 - 246 Units/L Final          Assessment & Plan   IMPRESSION:   1. Anemia.  She has had recurring iron deficiency anemia. She has responded to parenteral iron in the past.  We will obtain labs today.  2. History of breast cancer. She has done well after adjuvant endocrine therapy.   Repeat bone scan from 11/16/2021 showed:The previously seen uptake tracer in the right. Anterior  second rib has resolved. Some degenerative changes seen within the right knee. Remainder of the bony  skeleton is unremarkable.  Clinically she is doing well with no evidence of disease recurrence at this time.  She had CT scans of the chest, abdomen, and pelvis March 2021 that showed no evidence of cancer.  3. Fibromyalgia.  Recommend follow-up with primary care provider.  4.  Dizziness with pre-syncope: She has not had any syncopal episodes.  We will check labs today to make sure she is not anemic.  If her labs are normal I would like for her to check her blood pressure during 1 of these episodes as well as check her blood sugar.  She needs to follow-up with her primary care provider.  She may need a cardiology evaluation.      PLAN:   1.   Labs today including CBC, iron profile and ferritin levels.  2.  Follow-up in 1 year.                  Michelle Drake, [facility]    10/21/2022          CC:

## 2022-10-24 ENCOUNTER — TELEPHONE (OUTPATIENT)
Dept: ONCOLOGY | Facility: CLINIC | Age: 57
End: 2022-10-24

## 2022-11-10 ENCOUNTER — TELEPHONE (OUTPATIENT)
Dept: FAMILY MEDICINE CLINIC | Facility: CLINIC | Age: 57
End: 2022-11-10

## 2022-11-10 NOTE — TELEPHONE ENCOUNTER
Patient is currently established with Dr. Baumann, she requested her office number. Contact info was provided

## 2022-11-10 NOTE — TELEPHONE ENCOUNTER
Caller: Geneva Haro    Relationship: Self    Best call back number:    [phone]       What is the medical concern/diagnosis: PATIENT NEEDS A SHOT IN BOTH OF FEET. CYST ON TOP OF FOOT.     What specialty or service is being requested: ORTHOPEDICS    What is the provider, practice or medical service name:  [facility].      What is the office location: [address] [place] [address].    Any additional details: PATIENT WANTS WHO SHE WAS REFERRED TO LAST TIME BUT SHE DOES NOT REMEMBER WHO IT IS.

## 2022-11-17 ENCOUNTER — TELEPHONE (OUTPATIENT)
Dept: ORTHOPEDIC SURGERY | Facility: CLINIC | Age: 57
End: 2022-11-17

## 2022-11-17 NOTE — TELEPHONE ENCOUNTER
Caller: PATIENT     Relationship to patient: SELF     Best call back number: [phone]    Chief complaint: BILATERAL FOOT PAIN    Type of visit: CORTISONE INJECTIONS     Requested date: ASAP

## 2022-12-07 ENCOUNTER — OFFICE VISIT (OUTPATIENT)
Dept: ORTHOPEDIC SURGERY | Facility: CLINIC | Age: 57
End: 2022-12-07

## 2022-12-07 VITALS
SYSTOLIC BLOOD PRESSURE: 118 MMHG | WEIGHT: 137.2 LBS | DIASTOLIC BLOOD PRESSURE: 68 MMHG | BODY MASS INDEX: 21.53 KG/M2 | HEIGHT: 67 IN

## 2022-12-07 DIAGNOSIS — M79.672 BILATERAL FOOT PAIN: Primary | ICD-10-CM

## 2022-12-07 DIAGNOSIS — M79.671 BILATERAL FOOT PAIN: Primary | ICD-10-CM

## 2022-12-07 PROCEDURE — 99214 OFFICE O/P EST MOD 30 MIN: CPT | Performed by: ORTHOPAEDIC SURGERY

## 2022-12-07 PROCEDURE — 20605 DRAIN/INJ JOINT/BURSA W/O US: CPT | Performed by: ORTHOPAEDIC SURGERY

## 2022-12-07 RX ORDER — LIDOCAINE HYDROCHLORIDE 10 MG/ML
1 INJECTION, SOLUTION EPIDURAL; INFILTRATION; INTRACAUDAL; PERINEURAL
Status: COMPLETED | OUTPATIENT
Start: 2022-12-07 | End: 2022-12-07

## 2022-12-07 RX ORDER — TRIAMCINOLONE ACETONIDE 40 MG/ML
40 INJECTION, SUSPENSION INTRA-ARTICULAR; INTRAMUSCULAR
Status: COMPLETED | OUTPATIENT
Start: 2022-12-07 | End: 2022-12-07

## 2022-12-07 RX ADMIN — LIDOCAINE HYDROCHLORIDE 1 ML: 10 INJECTION, SOLUTION EPIDURAL; INFILTRATION; INTRACAUDAL; PERINEURAL at 16:01

## 2022-12-07 RX ADMIN — TRIAMCINOLONE ACETONIDE 40 MG: 40 INJECTION, SUSPENSION INTRA-ARTICULAR; INTRAMUSCULAR at 16:01

## 2022-12-07 NOTE — PROGRESS NOTES
Procedure   Small Joint Arthrocentesis-Right TMT  Consent given by: patient  Site marked: site marked  Timeout: Immediately prior to procedure a time out was called to verify the correct patient, procedure, equipment, support staff and site/side marked as required   Supporting Documentation  Indications: pain   Procedure Details  Location: foot - Foot joint: Right TMT.  Preparation: Patient was prepped and draped in the usual sterile fashion  Needle size: 22 G  Approach: dorsal  Medications administered: 1 mL lidocaine PF 1% 1 %; 40 mg triamcinolone acetonide 40 MG/ML  Patient tolerance: patient tolerated the procedure well with no immediate complications

## 2022-12-07 NOTE — PROGRESS NOTES
ESTABLISHED PATIENT    Patient: Geneva Haro  : 1965    Primary Care Provider: Cesar Goa MD    Requesting Provider: As above    Pain of the Left Foot and Follow-up (Arthritis of ankle or foot, degenerative, right)      History    Chief Complaint: bilateral foot pain    History of Present Illness: Ms Bean returns with increased pain in both feet.  She has worsening of her fibromyaligia.      Current Outpatient Medications on File Prior to Visit   Medication Sig Dispense Refill   • B Complex Vitamins (VITAMIN B COMPLEX PO) Take 100 mg by mouth Daily.     • cetirizine (zyrTEC) 10 MG tablet TAKE ONE TABLET BY MOUTH DAILY 90 tablet 3   • citalopram (CeleXA) 20 MG tablet TAKE ONE TABLET BY MOUTH DAILY 90 tablet 0   • cyclobenzaprine (FLEXERIL) 10 MG tablet Take 1 tablet by mouth 3 (Three) Times a Day As Needed for Muscle Spasms. 180 tablet 3   • DULoxetine (CYMBALTA) 30 MG capsule Take 1 capsule by mouth 2 (Two) Times a Day. 180 capsule 3   • esomeprazole (nexIUM) 40 MG capsule Take 40 mg by mouth 2 (Two) Times a Day.     • folic acid (FOLVITE) 1 MG tablet Take 1 tablet by mouth Daily. 30 tablet 9   • metroNIDAZOLE (FLAGYL) 500 MG tablet Take 1 tablet by mouth 3 (Three) Times a Day. 21 tablet 0   • ondansetron (ZOFRAN) 8 MG tablet TAKE ONE TABLET BY MOUTH EVERY 8 HOURS AS NEEDED FOR NAUSEA OR VOMITING 30 tablet 6   • ondansetron ODT (Zofran ODT) 8 MG disintegrating tablet Place 1 tablet on the tongue Every 8 (Eight) Hours As Needed for Nausea or Vomiting. 90 tablet 12   • prochlorperazine (COMPAZINE) 10 MG tablet Take 1 tablet by mouth Every 6 (Six) Hours As Needed for Nausea or Vomiting. 30 tablet 0   • thiamine (thiamine) 100 MG tablet tablet Take 1 tablet by mouth Daily. 30 tablet 9   • traZODone (DESYREL) 50 MG tablet Take 1 tablet by mouth Every Night. 90 tablet 3     No current facility-administered medications on file prior to visit.      Allergies   Allergen Reactions   • Gabapentin Other (See  Comments)     Seizure   • Morphine And Related Itching and Swelling   • Aspirin GI Intolerance   • Ibuprofen GI Intolerance      Past Medical History:   Diagnosis Date   • Anxiety    • Arthritis    • Cancer (HCC)    • Depression    • Diabetes mellitus (HCC)    • Gall stones    • Hearing loss    • Hernia, hiatal    • History of stomach ulcers    • History of transfusion    • Stomach problems    • Ulcer of bile duct      Past Surgical History:   Procedure Laterality Date   • CHOLECYSTECTOMY     • ENDOSCOPY N/A 2018    Procedure: ESOPHAGOGASTRODUODENOSCOPY;  Surgeon: Mark I Brunner, MD;  Location:  [email or web address] ENDOSCOPY;  Service: Gastroenterology   • GASTRIC BYPASS      x2   • HERNIA REPAIR     • HERNIA REPAIR     • INTERVENTIONAL RADIOLOGY PROCEDURE Bilateral 2021    Procedure: IR myelogram, cervical;  Surgeon: Jose Angel Sauer MD;  Location:  [email or web address] CATH INVASIVE LOCATION;  Service: Interventional Radiology;  Laterality: Bilateral;   • MASTECTOMY Bilateral     Left With sentinel lymph node sampling and prophylactic right mastectomy   • PACEMAKER IMPLANTATION     • PORTACATH PLACEMENT       Family History   Problem Relation Age of Onset   • Breast cancer Maternal Aunt    • Pancreatic cancer Cousin    • Breast cancer Cousin    • Breast cancer Cousin    • Heart attack Mother       Social History     Socioeconomic History   • Marital status:    Tobacco Use   • Smoking status: Former     Packs/day: 0.50     Years: 20.00     Pack years: 10.00     Types: Cigarettes     Start date: 1986     Quit date: 2021     Years since quittin.9   • Smokeless tobacco: Never   Vaping Use   • Vaping Use: Never used   Substance and Sexual Activity   • Alcohol use: No   • Drug use: Yes     Types: Marijuana   • Sexual activity: Not Currently        Review of Systems   Constitutional: Negative.    HENT: Negative.    Eyes: Negative.    Respiratory: Negative.    Cardiovascular: Negative.    Gastrointestinal: Negative.   "  Endocrine: Negative.    Genitourinary: Negative.    Musculoskeletal: Positive for arthralgias.   Skin: Negative.    Allergic/Immunologic: Negative.    Neurological: Negative.    Hematological: Negative.    Psychiatric/Behavioral: Negative.        The following portions of the patient's history were reviewed and updated as appropriate: allergies, current medications, past family history, past medical history, past social history, past surgical history and problem list.    Physical Exam:   /68   Ht 170.2 cm (67.01\")   Wt 62.2 kg (137 lb 3.2 oz)   BMI 21.48 kg/m²   Hypersensitive to palpation throughout both feet, mild redness over left bunion suggesting shoe pressure.      Medical Decision Making    Data Review:   ordered and reviewed x-rays today    Assessment/Plan/Diagnosis/Treatment Options:   1. Bilateral foot pain  As we have discussed previously, I think she has such hypersensitivity that surgery is not useful.  I think surgery would have a very high chance of making her worse.  I recommend wider shoes.  She would like an injection in the right 2nd TMT where we have done this previously, and we will do that.  I also advised her that she can seek another opinion- [facility].   - XR Foot 2 View Bilateral    After discussing the risks (including infection, skin atrophy, etc), time out was called,  the right 2nd tarsal metatarsal joint was prepped and using sterile technique injected with 1cc of 1% lidocaine and 1cc (40mg) triamcinolone A band aid was applied.  No complications.             Jimena Pennnigton MD                      "

## 2022-12-30 RX ORDER — CITALOPRAM 20 MG/1
TABLET ORAL
Qty: 90 TABLET | Refills: 0 | Status: SHIPPED | OUTPATIENT
Start: 2022-12-30

## 2022-12-30 NOTE — TELEPHONE ENCOUNTER
Rx Refill Note  Requested Prescriptions     Pending Prescriptions Disp Refills   • citalopram (CeleXA) 20 MG tablet [Pharmacy Med Name: CITALOPRAM HBR 20 MG TABLET] 90 tablet 0     Sig: TAKE ONE TABLET BY MOUTH DAILY      Last office visit with prescribing clinician: 7/14/2022   Last telemedicine visit with prescribing clinician: Visit date not found   Next office visit with prescribing clinician: Visit date not found                         Would you like a call back once the refill request has been completed: [] Yes [] No    If the office needs to give you a call back, can they leave a voicemail: [] Yes [] No    Julia Toledo MA  12/30/22, 15:42 EST

## 2023-01-31 ENCOUNTER — HOSPITAL ENCOUNTER (OUTPATIENT)
Dept: ONCOLOGY | Facility: HOSPITAL | Age: 58
Setting detail: INFUSION SERIES
Discharge: HOME | End: 2023-01-31
Payer: MEDICAID

## 2023-01-31 VITALS
TEMPERATURE: 98 F | DIASTOLIC BLOOD PRESSURE: 74 MMHG | WEIGHT: 128 LBS | HEIGHT: 67 IN | BODY MASS INDEX: 20.09 KG/M2 | HEART RATE: 122 BPM | RESPIRATION RATE: 16 BRPM | SYSTOLIC BLOOD PRESSURE: 118 MMHG

## 2023-01-31 DIAGNOSIS — D50.8 OTHER IRON DEFICIENCY ANEMIA: ICD-10-CM

## 2023-01-31 DIAGNOSIS — Z95.828 PORT-A-CATH IN PLACE: Primary | ICD-10-CM

## 2023-01-31 DIAGNOSIS — K31.84 GASTROPARESIS: ICD-10-CM

## 2023-01-31 PROCEDURE — 25010000002 HEPARIN LOCK FLUSH PER 10 UNITS: Performed by: INTERNAL MEDICINE

## 2023-01-31 PROCEDURE — 96523 IRRIG DRUG DELIVERY DEVICE: CPT

## 2023-01-31 RX ORDER — HEPARIN SODIUM (PORCINE) LOCK FLUSH IV SOLN 100 UNIT/ML 100 UNIT/ML
500 SOLUTION INTRAVENOUS AS NEEDED
OUTPATIENT
Start: 2023-01-31

## 2023-01-31 RX ORDER — SODIUM CHLORIDE 0.9 % (FLUSH) 0.9 %
20 SYRINGE (ML) INJECTION AS NEEDED
Status: CANCELLED | OUTPATIENT
Start: 2023-01-31

## 2023-01-31 RX ORDER — SODIUM CHLORIDE 0.9 % (FLUSH) 0.9 %
20 SYRINGE (ML) INJECTION AS NEEDED
OUTPATIENT
Start: 2023-01-31

## 2023-01-31 RX ORDER — HEPARIN SODIUM (PORCINE) LOCK FLUSH IV SOLN 100 UNIT/ML 100 UNIT/ML
500 SOLUTION INTRAVENOUS AS NEEDED
Status: DISCONTINUED | OUTPATIENT
Start: 2023-01-31 | End: 2023-02-01 | Stop reason: HOSPADM

## 2023-01-31 RX ORDER — SODIUM CHLORIDE 0.9 % (FLUSH) 0.9 %
10 SYRINGE (ML) INJECTION AS NEEDED
OUTPATIENT
Start: 2023-01-31

## 2023-01-31 RX ORDER — SODIUM CHLORIDE 0.9 % (FLUSH) 0.9 %
10 SYRINGE (ML) INJECTION AS NEEDED
Status: CANCELLED | OUTPATIENT
Start: 2023-01-31

## 2023-01-31 RX ADMIN — HEPARIN SODIUM (PORCINE) LOCK FLUSH IV SOLN 100 UNIT/ML 500 UNITS: 100 SOLUTION at 14:08

## 2023-02-03 DIAGNOSIS — K31.84 GASTROPARESIS: ICD-10-CM

## 2023-02-03 RX ORDER — CITALOPRAM 20 MG/1
20 TABLET ORAL DAILY
Qty: 90 TABLET | Refills: 0 | Status: CANCELLED | OUTPATIENT
Start: 2023-02-03

## 2023-02-03 RX ORDER — METRONIDAZOLE 500 MG/1
500 TABLET ORAL 3 TIMES DAILY
Qty: 21 TABLET | Refills: 0 | Status: CANCELLED | OUTPATIENT
Start: 2023-02-03

## 2023-02-03 NOTE — TELEPHONE ENCOUNTER
Caller: Estrellita Geneva WHITLOCK    Relationship: Self    Best call back number: [phone]    Requested Prescriptions:   Requested Prescriptions     Pending Prescriptions Disp Refills   • folic acid (FOLVITE) 1 MG tablet 30 tablet 9     Sig: Take 1 tablet by mouth Daily.   • citalopram (CeleXA) 20 MG tablet 90 tablet 0     Sig: Take 1 tablet by mouth Daily.   • cetirizine (zyrTEC) 10 MG tablet 90 tablet 3     Sig: Take 1 tablet by mouth Daily.   • cyclobenzaprine (FLEXERIL) 10 MG tablet 180 tablet 3     Sig: Take 1 tablet by mouth 3 (Three) Times a Day As Needed for Muscle Spasms.   • DULoxetine (CYMBALTA) 30 MG capsule 180 capsule 3     Sig: Take 1 capsule by mouth 2 (Two) Times a Day.   • esomeprazole (nexIUM) 40 MG capsule       Sig: Take 1 capsule by mouth 2 (Two) Times a Day.   • metroNIDAZOLE (FLAGYL) 500 MG tablet 21 tablet 0     Sig: Take 1 tablet by mouth 3 (Three) Times a Day.   • ondansetron (ZOFRAN) 8 MG tablet 30 tablet 6     Sig: Take 1 tablet by mouth Every 8 (Eight) Hours As Needed for Nausea or Vomiting.   • ondansetron ODT (Zofran ODT) 8 MG disintegrating tablet 90 tablet 12     Sig: Place 1 tablet on the tongue Every 8 (Eight) Hours As Needed for Nausea or Vomiting.   • prochlorperazine (COMPAZINE) 10 MG tablet 30 tablet 0     Sig: Take 1 tablet by mouth Every 6 (Six) Hours As Needed for Nausea or Vomiting.   • thiamine (VITAMIN B-1) 100 MG tablet tablet 30 tablet 9     Sig: Take 1 tablet by mouth Daily.   • traZODone (DESYREL) 50 MG tablet 90 tablet 3     Sig: Take 1 tablet by mouth Every Night.      Pharmacy where request should be sent: [id number] - [place], [place] - [address] - [phone] [facility] [phone] [facility]     Does the patient have less than a 3 day supply:  [] Yes  [x] No    Would you like a call back once the refill request has been completed: [x] Yes [] No    If the office needs to give you a call back, can they leave a voicemail: [x] Yes [] No    Steafno Stone  Rep   02/03/23 15:57 EST

## 2023-02-06 RX ORDER — TRAZODONE HYDROCHLORIDE 50 MG/1
50 TABLET ORAL NIGHTLY
Qty: 30 TABLET | Refills: 0 | Status: SHIPPED | OUTPATIENT
Start: 2023-02-06 | End: 2023-04-04

## 2023-02-06 RX ORDER — PROCHLORPERAZINE MALEATE 10 MG
10 TABLET ORAL EVERY 6 HOURS PRN
Qty: 30 TABLET | Refills: 0 | Status: SHIPPED | OUTPATIENT
Start: 2023-02-06

## 2023-02-06 RX ORDER — ONDANSETRON HYDROCHLORIDE 8 MG/1
8 TABLET, FILM COATED ORAL EVERY 8 HOURS PRN
Qty: 30 TABLET | Refills: 0 | Status: SHIPPED | OUTPATIENT
Start: 2023-02-06 | End: 2023-02-24 | Stop reason: SDUPTHER

## 2023-02-06 RX ORDER — FOLIC ACID 1 MG/1
1 TABLET ORAL DAILY
Qty: 30 TABLET | Refills: 0 | Status: SHIPPED | OUTPATIENT
Start: 2023-02-06 | End: 2023-02-24 | Stop reason: SDUPTHER

## 2023-02-06 RX ORDER — METHION/INOS/CHOL BT/B COM/LIV 110MG-86MG
100 CAPSULE ORAL DAILY
Qty: 30 TABLET | Refills: 0 | Status: SHIPPED | OUTPATIENT
Start: 2023-02-06

## 2023-02-06 RX ORDER — DULOXETIN HYDROCHLORIDE 30 MG/1
30 CAPSULE, DELAYED RELEASE ORAL 2 TIMES DAILY
Qty: 60 CAPSULE | Refills: 0 | Status: SHIPPED | OUTPATIENT
Start: 2023-02-06

## 2023-02-06 RX ORDER — CETIRIZINE HYDROCHLORIDE 10 MG/1
10 TABLET ORAL DAILY
Qty: 30 TABLET | Refills: 0 | Status: SHIPPED | OUTPATIENT
Start: 2023-02-06 | End: 2023-02-24

## 2023-02-06 RX ORDER — ONDANSETRON 8 MG/1
8 TABLET, ORALLY DISINTEGRATING ORAL EVERY 8 HOURS PRN
Qty: 90 TABLET | Refills: 0 | Status: SHIPPED | OUTPATIENT
Start: 2023-02-06

## 2023-02-06 RX ORDER — CYCLOBENZAPRINE HCL 10 MG
10 TABLET ORAL 3 TIMES DAILY PRN
Qty: 90 TABLET | Refills: 0 | Status: SHIPPED | OUTPATIENT
Start: 2023-02-06

## 2023-02-06 RX ORDER — ESOMEPRAZOLE MAGNESIUM 40 MG/1
40 CAPSULE, DELAYED RELEASE ORAL 2 TIMES DAILY
Qty: 90 CAPSULE | Refills: 2 | Status: SHIPPED | OUTPATIENT
Start: 2023-02-06

## 2023-02-24 ENCOUNTER — OFFICE VISIT (OUTPATIENT)
Dept: FAMILY MEDICINE CLINIC | Facility: CLINIC | Age: 58
End: 2023-02-24
Payer: MEDICAID

## 2023-02-24 VITALS
BODY MASS INDEX: 21.19 KG/M2 | HEART RATE: 80 BPM | HEIGHT: 67 IN | SYSTOLIC BLOOD PRESSURE: 122 MMHG | OXYGEN SATURATION: 92 % | DIASTOLIC BLOOD PRESSURE: 76 MMHG | WEIGHT: 135 LBS

## 2023-02-24 DIAGNOSIS — K59.89 GENERALIZED DYSMOTILITY OF INTESTINE: ICD-10-CM

## 2023-02-24 DIAGNOSIS — Z12.11 COLON CANCER SCREENING: ICD-10-CM

## 2023-02-24 DIAGNOSIS — R73.03 PRE-DIABETES: ICD-10-CM

## 2023-02-24 DIAGNOSIS — K31.84 GASTROPARESIS: Primary | Chronic | ICD-10-CM

## 2023-02-24 DIAGNOSIS — M79.7 FIBROMYALGIA: Chronic | ICD-10-CM

## 2023-02-24 PROBLEM — Z72.0 TOBACCO ABUSE: Status: RESOLVED | Noted: 2021-08-06 | Resolved: 2023-02-24

## 2023-02-24 LAB
EXPIRATION DATE: NORMAL
HBA1C MFR BLD: 5.5 %
Lab: NORMAL

## 2023-02-24 PROCEDURE — 83036 HEMOGLOBIN GLYCOSYLATED A1C: CPT | Performed by: INTERNAL MEDICINE

## 2023-02-24 PROCEDURE — 91312 COVID-19 (PFIZER) BIVALENT BOOSTER 12+YRS: CPT | Performed by: INTERNAL MEDICINE

## 2023-02-24 PROCEDURE — 0124A COVID-19 (PFIZER) BIVALENT BOOSTER 12+YRS: CPT | Performed by: INTERNAL MEDICINE

## 2023-02-24 PROCEDURE — 3044F HG A1C LEVEL LT 7.0%: CPT | Performed by: INTERNAL MEDICINE

## 2023-02-24 PROCEDURE — 90686 IIV4 VACC NO PRSV 0.5 ML IM: CPT | Performed by: INTERNAL MEDICINE

## 2023-02-24 PROCEDURE — 90677 PCV20 VACCINE IM: CPT | Performed by: INTERNAL MEDICINE

## 2023-02-24 PROCEDURE — 90471 IMMUNIZATION ADMIN: CPT | Performed by: INTERNAL MEDICINE

## 2023-02-24 PROCEDURE — 99214 OFFICE O/P EST MOD 30 MIN: CPT | Performed by: INTERNAL MEDICINE

## 2023-02-24 PROCEDURE — 90472 IMMUNIZATION ADMIN EACH ADD: CPT | Performed by: INTERNAL MEDICINE

## 2023-02-24 RX ORDER — FOLIC ACID 1 MG/1
1 TABLET ORAL DAILY
Qty: 90 TABLET | Refills: 3 | Status: SHIPPED | OUTPATIENT
Start: 2023-02-24

## 2023-02-24 RX ORDER — B-COMPLEX WITH VITAMIN C
1 TABLET ORAL DAILY
Qty: 90 EACH | Refills: 3 | Status: SHIPPED | OUTPATIENT
Start: 2023-02-24

## 2023-02-24 RX ORDER — PROMETHAZINE HYDROCHLORIDE 25 MG/1
25 TABLET ORAL EVERY 6 HOURS PRN
Qty: 120 TABLET | Refills: 1 | Status: SHIPPED | OUTPATIENT
Start: 2023-02-24

## 2023-02-24 RX ORDER — ONDANSETRON HYDROCHLORIDE 8 MG/1
8 TABLET, FILM COATED ORAL EVERY 8 HOURS PRN
Qty: 30 TABLET | Refills: 0 | Status: SHIPPED | OUTPATIENT
Start: 2023-02-24

## 2023-02-24 NOTE — PROGRESS NOTES
"Geneva Haro  1965  [id number]  Patient Care Team:  Cesar Gao MD as PCP - General (Internal Medicine)  Darion Figueroa MD as Consulting Physician (Gastroenterology)    Geneva Haro is a 58 y.o. female here today for follow up.     This patient is accompanied by their self who contributes to the history of their care.    Chief Complaint:    Chief Complaint   Patient presents with   • Prediabetes         History of Present Illness:  I have reviewed and/or updated the patient's past medical, past surgical, family, social history, problem list and allergies as appropriate.      Still with spells of N/v has phenergan and zofran at home. An inventory of her medications- 4-5 potential interactions with recommended cisapride. ( she did not start).  Denies any hematemesis.  She continues on Nexium as well.  She is due apparently for screening colonoscopy.  She would like to be referred back to gastroenterology for this and any other recommendations of gastroparesis.    Her A1c today is 5.5.  No polyuria or polydipsia.  Essentially nondiabetic.  She continues on duloxetine, trazodone and some Celexa for fibromyalgia.  The weather fluctuations have really impacted her fibromyalgia.  She still attempts to walk.  She is due for pneumonia vaccination as well as a COVID booster today.    Review of Systems   Constitutional: Negative.    Respiratory: Negative.    Gastrointestinal: Positive for nausea, vomiting and indigestion.   Endocrine: Negative.    Genitourinary: Negative.    Neurological: Negative.    Hematological: Negative.        Vitals:    02/24/23 1237   BP: 122/76   Pulse: 80   SpO2: 92%   Weight: 61.2 kg (135 lb)   Height: 170.2 cm (67.01\")     Body mass index is 21.14 kg/m².    Physical Exam  Vitals and nursing note reviewed.   Constitutional:       General: She is not in acute distress.     Appearance: She is well-developed. She is not diaphoretic.   HENT:      Head: Normocephalic and " atraumatic.      Right Ear: External ear normal.      Left Ear: External ear normal.      Mouth/Throat:      Pharynx: No oropharyngeal exudate.   Eyes:      General: No scleral icterus.        Right eye: No discharge.      Conjunctiva/sclera: Conjunctivae normal.   Neck:      Thyroid: No thyromegaly.      Vascular: No JVD.      Trachea: No tracheal deviation.   Cardiovascular:      Rate and Rhythm: Normal rate and regular rhythm.      Heart sounds: Normal heart sounds.      Comments: PMI nondisplaced  Pulmonary:      Effort: Pulmonary effort is normal.      Breath sounds: Normal breath sounds. No wheezing or rales.   Abdominal:      General: Bowel sounds are normal.      Palpations: Abdomen is soft.      Tenderness: There is no abdominal tenderness. There is no guarding or rebound.   Musculoskeletal:      Cervical back: Normal range of motion and neck supple.      Comments: Normal gait   Lymphadenopathy:      Cervical: No cervical adenopathy.   Skin:     General: Skin is warm and dry.      Capillary Refill: Capillary refill takes less than 2 seconds.      Coloration: Skin is not pale.      Findings: No rash.   Neurological:      Mental Status: She is alert and oriented to person, place, and time.      Motor: No abnormal muscle tone.      Coordination: Coordination normal.   Psychiatric:         Judgment: Judgment normal.         Procedures    Results Review:    I reviewed the patient's new clinical results.  Endoscopy reviewed  Assessment/Plan:     Problem List Items Addressed This Visit        Gastrointestinal Abdominal     Gastroparesis - Primary (Chronic)    Relevant Medications    esomeprazole (nexIUM) 40 MG capsule    ondansetron ODT (Zofran ODT) 8 MG disintegrating tablet    promethazine (PHENERGAN) 6.25 MG/5ML solution oral solution    promethazine (PHENERGAN) 25 MG tablet    Other Relevant Orders    Ambulatory Referral to Gastroenterology    Generalized dysmotility of intestine       Musculoskeletal and  Injuries    Fibromyalgia (Chronic)    Overview     And chronic pain         Current Assessment & Plan     Stable on current dose of Cymbalta trazodone and Celexa.  She will continue her efforts in physical activity.        Other Visit Diagnoses     Pre-diabetes        Relevant Orders    POC Glycosylated Hemoglobin (Hb A1C) (Completed)    Colon cancer screening        Relevant Orders    Ambulatory Referral For Screening Colonoscopy          Plan of care reviewed with patient at the conclusion of today's visit. Education was provided regarding diagnosis and management.  Patient verbalizes understanding of and agreement with management plan.    Return in about 3 months (around 5/24/2023) for Annual.    Cesar Gao MD      Please note than portions of this note were completed [facility] a Voice Recognition Program

## 2023-04-03 ENCOUNTER — HOSPITAL ENCOUNTER (EMERGENCY)
Facility: HOSPITAL | Age: 58
Discharge: HOME | End: 2023-04-03
Attending: EMERGENCY MEDICINE | Admitting: EMERGENCY MEDICINE
Payer: MEDICAID

## 2023-04-03 VITALS
BODY MASS INDEX: 20.4 KG/M2 | TEMPERATURE: 99.1 F | HEIGHT: 67 IN | SYSTOLIC BLOOD PRESSURE: 128 MMHG | OXYGEN SATURATION: 100 % | HEART RATE: 106 BPM | RESPIRATION RATE: 18 BRPM | WEIGHT: 130 LBS | DIASTOLIC BLOOD PRESSURE: 90 MMHG

## 2023-04-03 DIAGNOSIS — Z86.39 HISTORY OF DIABETIC GASTROPARESIS: ICD-10-CM

## 2023-04-03 DIAGNOSIS — R11.0 CHRONIC NAUSEA: ICD-10-CM

## 2023-04-03 DIAGNOSIS — R11.10 ACUTE VOMITING: Primary | ICD-10-CM

## 2023-04-03 LAB
ALBUMIN SERPL-MCNC: 4.7 G/DL (ref 3.5–5.2)
ALBUMIN/GLOB SERPL: 1.4 G/DL
ALP SERPL-CCNC: 142 U/L (ref 39–117)
ALT SERPL W P-5'-P-CCNC: 9 U/L (ref 1–33)
ANION GAP SERPL CALCULATED.3IONS-SCNC: 20 MMOL/L (ref 5–15)
AST SERPL-CCNC: 19 U/L (ref 1–32)
BACTERIA UR QL AUTO: ABNORMAL /HPF
BASOPHILS # BLD AUTO: 0.02 10*3/MM3 (ref 0–0.2)
BASOPHILS NFR BLD AUTO: 0.3 % (ref 0–1.5)
BILIRUB SERPL-MCNC: 0.5 MG/DL (ref 0–1.2)
BILIRUB UR QL STRIP: NEGATIVE
BUN SERPL-MCNC: 23 MG/DL (ref 6–20)
BUN/CREAT SERPL: 13.9 (ref 7–25)
CALCIUM SPEC-SCNC: 10.1 MG/DL (ref 8.6–10.5)
CHLORIDE SERPL-SCNC: 96 MMOL/L (ref 98–107)
CLARITY UR: CLEAR
CO2 SERPL-SCNC: 23 MMOL/L (ref 22–29)
COLOR UR: YELLOW
CREAT SERPL-MCNC: 1.65 MG/DL (ref 0.57–1)
D-LACTATE SERPL-SCNC: 1.2 MMOL/L (ref 0.5–2)
DEPRECATED RDW RBC AUTO: 38.8 FL (ref 37–54)
EGFRCR SERPLBLD CKD-EPI 2021: 35.9 ML/MIN/1.73
EOSINOPHIL # BLD AUTO: 0.01 10*3/MM3 (ref 0–0.4)
EOSINOPHIL NFR BLD AUTO: 0.2 % (ref 0.3–6.2)
ERYTHROCYTE [DISTWIDTH] IN BLOOD BY AUTOMATED COUNT: 13.3 % (ref 12.3–15.4)
GLOBULIN UR ELPH-MCNC: 3.4 GM/DL
GLUCOSE SERPL-MCNC: 109 MG/DL (ref 65–99)
GLUCOSE UR STRIP-MCNC: NEGATIVE MG/DL
HCT VFR BLD AUTO: 42.6 % (ref 34–46.6)
HGB BLD-MCNC: 14.4 G/DL (ref 12–15.9)
HGB UR QL STRIP.AUTO: NEGATIVE
HOLD SPECIMEN: NORMAL
HYALINE CASTS UR QL AUTO: ABNORMAL /LPF
IMM GRANULOCYTES # BLD AUTO: 0.02 10*3/MM3 (ref 0–0.05)
IMM GRANULOCYTES NFR BLD AUTO: 0.3 % (ref 0–0.5)
KETONES UR QL STRIP: ABNORMAL
LEUKOCYTE ESTERASE UR QL STRIP.AUTO: ABNORMAL
LIPASE SERPL-CCNC: 11 U/L (ref 13–60)
LYMPHOCYTES # BLD AUTO: 1.61 10*3/MM3 (ref 0.7–3.1)
LYMPHOCYTES NFR BLD AUTO: 24.4 % (ref 19.6–45.3)
MCH RBC QN AUTO: 27.3 PG (ref 26.6–33)
MCHC RBC AUTO-ENTMCNC: 33.8 G/DL (ref 31.5–35.7)
MCV RBC AUTO: 80.7 FL (ref 79–97)
MONOCYTES # BLD AUTO: 0.5 10*3/MM3 (ref 0.1–0.9)
MONOCYTES NFR BLD AUTO: 7.6 % (ref 5–12)
NEUTROPHILS NFR BLD AUTO: 4.43 10*3/MM3 (ref 1.7–7)
NEUTROPHILS NFR BLD AUTO: 67.2 % (ref 42.7–76)
NITRITE UR QL STRIP: NEGATIVE
NRBC BLD AUTO-RTO: 0 /100 WBC (ref 0–0.2)
PH UR STRIP.AUTO: <=5 [PH] (ref 5–8)
PLATELET # BLD AUTO: 190 10*3/MM3 (ref 140–450)
PMV BLD AUTO: 9.5 FL (ref 6–12)
POTASSIUM SERPL-SCNC: 3.4 MMOL/L (ref 3.5–5.2)
PROT SERPL-MCNC: 8.1 G/DL (ref 6–8.5)
PROT UR QL STRIP: ABNORMAL
RBC # BLD AUTO: 5.28 10*6/MM3 (ref 3.77–5.28)
RBC # UR STRIP: ABNORMAL /HPF
REF LAB TEST METHOD: ABNORMAL
SODIUM SERPL-SCNC: 139 MMOL/L (ref 136–145)
SP GR UR STRIP: 1.02 (ref 1–1.03)
SQUAMOUS #/AREA URNS HPF: ABNORMAL /HPF
UROBILINOGEN UR QL STRIP: ABNORMAL
WBC # UR STRIP: ABNORMAL /HPF
WBC NRBC COR # BLD: 6.59 10*3/MM3 (ref 3.4–10.8)
WHOLE BLOOD HOLD COAG: NORMAL
WHOLE BLOOD HOLD SPECIMEN: NORMAL

## 2023-04-03 PROCEDURE — 83605 ASSAY OF LACTIC ACID: CPT

## 2023-04-03 PROCEDURE — 36415 COLL VENOUS BLD VENIPUNCTURE: CPT

## 2023-04-03 PROCEDURE — 25010000002 ONDANSETRON PER 1 MG: Performed by: NURSE PRACTITIONER

## 2023-04-03 PROCEDURE — 96375 TX/PRO/DX INJ NEW DRUG ADDON: CPT

## 2023-04-03 PROCEDURE — 85025 COMPLETE CBC W/AUTO DIFF WBC: CPT

## 2023-04-03 PROCEDURE — 96374 THER/PROPH/DIAG INJ IV PUSH: CPT

## 2023-04-03 PROCEDURE — 80053 COMPREHEN METABOLIC PANEL: CPT

## 2023-04-03 PROCEDURE — 83690 ASSAY OF LIPASE: CPT

## 2023-04-03 PROCEDURE — 99284 EMERGENCY DEPT VISIT MOD MDM: CPT

## 2023-04-03 PROCEDURE — 25010000002 FENTANYL CITRATE (PF) 50 MCG/ML SOLUTION: Performed by: EMERGENCY MEDICINE

## 2023-04-03 PROCEDURE — 81001 URINALYSIS AUTO W/SCOPE: CPT | Performed by: EMERGENCY MEDICINE

## 2023-04-03 RX ORDER — FENTANYL CITRATE 50 UG/ML
25 INJECTION, SOLUTION INTRAMUSCULAR; INTRAVENOUS ONCE
Status: COMPLETED | OUTPATIENT
Start: 2023-04-03 | End: 2023-04-03

## 2023-04-03 RX ORDER — ONDANSETRON 2 MG/ML
4 INJECTION INTRAMUSCULAR; INTRAVENOUS ONCE
Status: COMPLETED | OUTPATIENT
Start: 2023-04-03 | End: 2023-04-03

## 2023-04-03 RX ORDER — SODIUM CHLORIDE 9 MG/ML
10 INJECTION INTRAVENOUS AS NEEDED
Status: DISCONTINUED | OUTPATIENT
Start: 2023-04-03 | End: 2023-04-04 | Stop reason: HOSPADM

## 2023-04-03 RX ORDER — SODIUM CHLORIDE 0.9 % (FLUSH) 0.9 %
10 SYRINGE (ML) INJECTION AS NEEDED
Status: DISCONTINUED | OUTPATIENT
Start: 2023-04-03 | End: 2023-04-04 | Stop reason: HOSPADM

## 2023-04-03 RX ADMIN — FENTANYL CITRATE 25 MCG: 50 INJECTION, SOLUTION INTRAMUSCULAR; INTRAVENOUS at 20:42

## 2023-04-03 RX ADMIN — ONDANSETRON 4 MG: 2 INJECTION INTRAMUSCULAR; INTRAVENOUS at 19:11

## 2023-04-03 RX ADMIN — SODIUM CHLORIDE 1000 ML: 9 INJECTION, SOLUTION INTRAVENOUS at 18:46

## 2023-04-03 RX ADMIN — SODIUM CHLORIDE 1000 ML: 9 INJECTION, SOLUTION INTRAVENOUS at 20:39

## 2023-04-03 NOTE — DISCHARGE INSTRUCTIONS
Home to rest.  Maintain your very best hydration and nutrition.  Continue your current medication regimen.  Follow-up with your primary care provider and your secondary gastric providers to monitor your recovery.  Thank you

## 2023-04-03 NOTE — ED PROVIDER NOTES
EMERGENCY DEPARTMENT ENCOUNTER    Pt Name: Geneva Haro  MRN: [id number]  Pt :   1965  Room Number:  10/10  Date of encounter:  4/3/2023  PCP: Cesar Gao MD  ED Provider: JAM Valenzuela    Historian: Patient      HPI:  Chief Complaint: Generalized weakness        Context: Geneva Haro is a 58 y.o. female who presents to the ED c/o acute and chronic nausea and acute on chronic pain that she recognizes is similar presentation in intensity to previous and frequent occasions of gastroparesis and fibromyalgia.  Patient states that she serially experiences bouts of gastroparesis that last 3 to 4 days without explanation of triggers or resolution.  She states this is her third day of symptoms.  She is feeling generally weak and dehydrated.  She has had no GI bleeding.  She denies any abdominal pain.  She specifies that her pain is located in her joints and muscles and is familiar to her fibromyalgia experience.  She has a gastric stimulator.    Review of systems is negative for fever or chills.  Negative for chest pain or cough or shortness of breath.  Positive for nausea and frequent vomiting without diarrhea.  No abdominal pain.  No flank pain.  Positive for generalized weakness with orthostatic dizziness without syncope.  No neurosensory complaint or focal      PAST MEDICAL HISTORY  Past Medical History:   Diagnosis Date   • Allergic At a young age   • Anxiety    • Arthritis    • Cancer    • Depression    • Diabetes mellitus    • Fibromyalgia, primary 33 years ago   • Gall stones    • GERD (gastroesophageal reflux disease)    • Hearing loss    • Hernia, hiatal    • History of stomach ulcers    • History of transfusion    • Stomach problems    • Ulcer of bile duct          PAST SURGICAL HISTORY  Past Surgical History:   Procedure Laterality Date   • BREAST SURGERY     • CHOLECYSTECTOMY     • CORONARY ARTERY BYPASS GRAFT     • ENDOMETRIAL ABLATION     • ENDOSCOPY N/A 2018    Procedure:  ESOPHAGOGASTRODUODENOSCOPY;  Surgeon: Mark I Brunner, MD;  Location: [facility];  Service: Gastroenterology   • GASTRIC BYPASS      x2   • HERNIA REPAIR     • HERNIA REPAIR     • INTERVENTIONAL RADIOLOGY PROCEDURE Bilateral 2021    Procedure: IR myelogram, cervical;  Surgeon: Jose Angel Sauer MD;  Location:  [facility]ATION;  Service: Interventional Radiology;  Laterality: Bilateral;   • LYMPH NODE BIOPSY     • MASTECTOMY Bilateral     Left With sentinel lymph node sampling and prophylactic right mastectomy   • PACEMAKER IMPLANTATION     • PORTACATH PLACEMENT     • SMALL INTESTINE SURGERY           FAMILY HISTORY  Family History   Problem Relation Age of Onset   • Breast cancer Maternal Aunt    • Cancer Maternal Aunt    • Pancreatic cancer Cousin    • Breast cancer Cousin    • Breast cancer Cousin    • Heart attack Mother    • Hypertension Mother    • COPD Sister          SOCIAL HISTORY  Social History     Socioeconomic History   • Marital status:    Tobacco Use   • Smoking status: Former     Packs/day: 0.50     Years: 20.00     Pack years: 10.00     Types: Cigarettes     Start date: 1986     Quit date: 2021     Years since quittin.2   • Smokeless tobacco: Never   Vaping Use   • Vaping Use: Never used   Substance and Sexual Activity   • Alcohol use: No   • Drug use: Yes     Types: Marijuana   • Sexual activity: Not Currently     Partners: Male     Birth control/protection: None         ALLERGIES  Gabapentin, Morphine and related, Aspirin, and Ibuprofen        REVIEW OF SYSTEMS  Review of Systems     All systems reviewed and negative except for those discussed in HPI.       PHYSICAL EXAM    I have reviewed the triage vital signs and nursing notes.    ED Triage Vitals [23 1525]   Temp Heart Rate Resp BP SpO2   99.1 °F (37.3 °C) (!) 137 24 111/79 100 %      Temp src Heart Rate Source Patient Position BP Location FiO2 (%)   Oral Monitor Sitting Left arm --       Physical  Exam  GENERAL:   Appears in no acute distress.  She is tachycardic.  She appears underweight.   HENT: Nares patent.  EYES: No scleral icterus.  CV: Regular rhythm, she is tachycardic.  No peripheral edema.  RESPIRATORY: Normal effort.  No audible wheezes, rales or rhonchi.  ABDOMEN: Soft, nontender.  Her gastric stimulator is palpable on the right.  MUSCULOSKELETAL: No deformities.   NEURO: Alert, moves all extremities, follows commands.  SKIN: Warm, dry, no rash visualized.      LAB RESULTS  Recent Results (from the past 24 hour(s))   Comprehensive Metabolic Panel    Collection Time: 04/03/23  4:01 PM    Specimen: Blood   Result Value Ref Range    Glucose 109 (H) 65 - 99 mg/dL    BUN 23 (H) 6 - 20 mg/dL    Creatinine 1.65 (H) 0.57 - 1.00 mg/dL    Sodium 139 136 - 145 mmol/L    Potassium 3.4 (L) 3.5 - 5.2 mmol/L    Chloride 96 (L) 98 - 107 mmol/L    CO2 23.0 22.0 - 29.0 mmol/L    Calcium 10.1 8.6 - 10.5 mg/dL    Total Protein 8.1 6.0 - 8.5 g/dL    Albumin 4.7 3.5 - 5.2 g/dL    ALT (SGPT) 9 1 - 33 U/L    AST (SGOT) 19 1 - 32 U/L    Alkaline Phosphatase 142 (H) 39 - 117 U/L    Total Bilirubin 0.5 0.0 - 1.2 mg/dL    Globulin 3.4 gm/dL    A/G Ratio 1.4 g/dL    BUN/Creatinine Ratio 13.9 7.0 - 25.0    Anion Gap 20.0 (H) 5.0 - 15.0 mmol/L    eGFR 35.9 (L) >60.0 mL/min/1.73   Lipase    Collection Time: 04/03/23  4:01 PM    Specimen: Blood   Result Value Ref Range    Lipase 11 (L) 13 - 60 U/L   Lactic Acid, Plasma    Collection Time: 04/03/23  4:01 PM    Specimen: Blood   Result Value Ref Range    Lactate 1.2 0.5 - 2.0 mmol/L   Green Top (Gel)    Collection Time: 04/03/23  4:01 PM   Result Value Ref Range    Extra Tube Hold for add-ons.    Lavender Top    Collection Time: 04/03/23  4:01 PM   Result Value Ref Range    Extra Tube hold for add-on    Gold Top - SST    Collection Time: 04/03/23  4:01 PM   Result Value Ref Range    Extra Tube Hold for add-ons.    Light Blue Top    Collection Time: 04/03/23  4:01 PM   Result Value  Ref Range    Extra Tube Hold for add-ons.    CBC Auto Differential    Collection Time: 04/03/23  4:01 PM    Specimen: Blood   Result Value Ref Range    WBC 6.59 3.40 - 10.80 10*3/mm3    RBC 5.28 3.77 - 5.28 10*6/mm3    Hemoglobin 14.4 12.0 - 15.9 g/dL    Hematocrit 42.6 34.0 - 46.6 %    MCV 80.7 79.0 - 97.0 fL    MCH 27.3 26.6 - 33.0 pg    MCHC 33.8 31.5 - 35.7 g/dL    RDW 13.3 12.3 - 15.4 %    RDW-SD 38.8 37.0 - 54.0 fl    MPV 9.5 6.0 - 12.0 fL    Platelets 190 140 - 450 10*3/mm3    Neutrophil % 67.2 42.7 - 76.0 %    Lymphocyte % 24.4 19.6 - 45.3 %    Monocyte % 7.6 5.0 - 12.0 %    Eosinophil % 0.2 (L) 0.3 - 6.2 %    Basophil % 0.3 0.0 - 1.5 %    Immature Grans % 0.3 0.0 - 0.5 %    Neutrophils, Absolute 4.43 1.70 - 7.00 10*3/mm3    Lymphocytes, Absolute 1.61 0.70 - 3.10 10*3/mm3    Monocytes, Absolute 0.50 0.10 - 0.90 10*3/mm3    Eosinophils, Absolute 0.01 0.00 - 0.40 10*3/mm3    Basophils, Absolute 0.02 0.00 - 0.20 10*3/mm3    Immature Grans, Absolute 0.02 0.00 - 0.05 10*3/mm3    nRBC 0.0 0.0 - 0.2 /100 WBC   Urinalysis With Microscopic If Indicated (No Culture) - Urine, Clean Catch    Collection Time: 04/03/23  6:11 PM    Specimen: Urine, Clean Catch   Result Value Ref Range    Color, UA Yellow Yellow, Straw    Appearance, UA Clear Clear    pH, UA <=5.0 5.0 - 8.0    Specific Gravity, UA 1.018 1.001 - 1.030    Glucose, UA Negative Negative    Ketones, UA 15 mg/dL (1+) (A) Negative    Bilirubin, UA Negative Negative    Blood, UA Negative Negative    Protein,  mg/dL (2+) (A) Negative    Leuk Esterase, UA Trace (A) Negative    Nitrite, UA Negative Negative    Urobilinogen, UA 0.2 E.U./dL 0.2 - 1.0 E.U./dL   Urinalysis, Microscopic Only - Urine, Clean Catch    Collection Time: 04/03/23  6:11 PM    Specimen: Urine, Clean Catch   Result Value Ref Range    RBC, UA 3-6 (A) None Seen, 0-2 /HPF    WBC, UA 3-5 (A) None Seen, 0-2 /HPF    Bacteria, UA 1+ (A) None Seen, Trace /HPF    Squamous Epithelial Cells, UA 0-2 None  Seen, 0-2 /HPF    Hyaline Casts, UA 7-12 0 - 6 /LPF    Methodology Manual Light Microscopy        If labs were ordered, I independently reviewed the results and considered them in treating the patient.        RADIOLOGY  No Radiology Exams Resulted Within Past 24 Hours      PROCEDURES    Procedures    No orders to display       MEDICATIONS GIVEN IN ER    Medications   Sodium Chloride (PF) 0.9 % 10 mL (has no administration in time range)   sodium chloride 0.9 % flush 10 mL (has no administration in time range)   sodium chloride 0.9 % bolus 1,000 mL (has no administration in time range)   fentaNYL citrate (PF) (SUBLIMAZE) injection 25 mcg (has no administration in time range)   sodium chloride 0.9 % bolus 1,000 mL (1,000 mL Intravenous New Bag 4/3/23 1846)   ondansetron (ZOFRAN) injection 4 mg (4 mg Intravenous Given 4/3/23 1911)         MEDICAL DECISION MAKING, PROGRESS, and CONSULTS    All labs have been independently reviewed by me.  All radiology studies have been reviewed by me and the radiologist dictating the report.  All EKG's have been independently viewed and interpreted by me.        Orders placed during this visit:  Orders Placed This Encounter   Procedures   • [place] Draw   • Comprehensive Metabolic Panel   • Lipase   • Urinalysis With Microscopic If Indicated (No Culture) - Urine, Clean Catch   • Lactic Acid, Plasma   • CBC Auto Differential   • Urinalysis, Microscopic Only - Urine, Clean Catch   • NPO Diet NPO Type: Strict NPO   • Undress & Gown   • Insert Peripheral IV   • Insert Peripheral IV   • CBC & Differential   • Green Top (Gel)   • Lavender Top   • Gold Top - SST   • Gray Top   • Light Blue Top         Additional orders considered but not ordered:      ED Course:    Consultants:      ED Course as of 04/03/23 2003 Mon Apr 03, 2023   1826 BUN(!): 23 [MS]   1826 Creatinine(!): 1.65 [MS]   1826 Anion Gap(!): 20.0 [MS]   1826 eGFR(!): 35.9 [MS]   1827 Patient's work-up is most remarkable for  tachycardia on exam which is drastically improved with administration of fluids.  Patient will be discharged to close follow-up.  Patient understands and concurs this outpatient plan [MS]      ED Course User Index  [MS] Julia Schwarz APRN                  AS OF 20:03 EDT VITALS:    BP - (!) 138/104  HR - 108  TEMP - 99.1 °F (37.3 °C) (Oral)  O2 SATS - 100%                  DIAGNOSIS  Final diagnoses:   Acute vomiting   Chronic nausea   History of diabetic gastroparesis         DISPOSITION    DISCHARGE    Patient discharged in stable condition.    Reviewed implications of results, diagnosis, meds, responsibility to follow up, warning signs and symptoms of possible worsening, potential complications and reasons to return to ER.    Patient/Family voiced understanding of above instructions.    Discussed plan for discharge, as there is no emergent indication for admission.  Pt/family is agreeable and understands need for follow up and possible repeat testing.  Pt/family is aware that discharge does not mean that nothing is wrong but that it indicates no emergency is currently present that requires admission and they must continue care with follow-up as given below or with a physician of their choice.     FOLLOW-UP  Cesar Gao MD  [facility] [place] [id number] [phone]    Schedule an appointment as soon as possible for a visit in 2 days  If symptoms worsen         Medication List      No changes were made to your prescriptions during this visit.             Please note that portions of this document were completed with voice recognition software.      Julia Schwarz APRN  04/03/23 2003       Julia Schwarz APRN  04/04/23 [id number]

## 2023-04-04 RX ORDER — CETIRIZINE HYDROCHLORIDE 10 MG/1
TABLET ORAL
Qty: 30 TABLET | Refills: 0 | Status: SHIPPED | OUTPATIENT
Start: 2023-04-04

## 2023-04-04 RX ORDER — TRAZODONE HYDROCHLORIDE 50 MG/1
TABLET ORAL
Qty: 30 TABLET | Refills: 0 | Status: SHIPPED | OUTPATIENT
Start: 2023-04-04

## 2023-04-15 RX ORDER — DULOXETIN HYDROCHLORIDE 30 MG/1
CAPSULE, DELAYED RELEASE ORAL
Qty: 60 CAPSULE | Refills: 0 | Status: SHIPPED | OUTPATIENT
Start: 2023-04-15

## 2023-04-15 NOTE — TELEPHONE ENCOUNTER
Rx Refill Note  Requested Prescriptions     Pending Prescriptions Disp Refills   • DULoxetine (CYMBALTA) 30 MG capsule [Pharmacy Med Name: DULOXETINE HCL DR 30 MG CAP] 60 capsule 0     Sig: TAKE 1 CAPSULE BY MOUTH TWICE A DAY      Last office visit with prescribing clinician: 2/24/2023   Last telemedicine visit with prescribing clinician: 5/24/2023   Next office visit with prescribing clinician: 5/24/2023                         Would you like a call back once the refill request has been completed: [] Yes [] No    If the office needs to give you a call back, can they leave a voicemail: [] Yes [] No    Jimena Rankin MA  04/15/23, 09:27 EDT

## 2023-04-25 ENCOUNTER — OFFICE VISIT (OUTPATIENT)
Dept: OBSTETRICS AND GYNECOLOGY | Facility: CLINIC | Age: 58
End: 2023-04-25
Payer: MEDICAID

## 2023-04-25 VITALS
BODY MASS INDEX: 22.08 KG/M2 | WEIGHT: 141 LBS | DIASTOLIC BLOOD PRESSURE: 80 MMHG | RESPIRATION RATE: 16 BRPM | SYSTOLIC BLOOD PRESSURE: 118 MMHG

## 2023-04-25 DIAGNOSIS — Z85.3 PERSONAL HISTORY OF BREAST CANCER: ICD-10-CM

## 2023-04-25 DIAGNOSIS — Z01.419 ENCOUNTER FOR GYNECOLOGICAL EXAMINATION WITHOUT ABNORMAL FINDING: Primary | ICD-10-CM

## 2023-04-25 DIAGNOSIS — Z78.0 POSTMENOPAUSAL: ICD-10-CM

## 2023-05-03 ENCOUNTER — HOSPITAL ENCOUNTER (OUTPATIENT)
Dept: ONCOLOGY | Facility: HOSPITAL | Age: 58
Discharge: HOME | End: 2023-05-03
Admitting: INTERNAL MEDICINE
Payer: MEDICAID

## 2023-05-03 DIAGNOSIS — Z95.828 PORT-A-CATH IN PLACE: Primary | ICD-10-CM

## 2023-05-03 DIAGNOSIS — D50.8 OTHER IRON DEFICIENCY ANEMIA: ICD-10-CM

## 2023-05-03 DIAGNOSIS — K31.84 GASTROPARESIS: ICD-10-CM

## 2023-05-03 PROCEDURE — 96523 IRRIG DRUG DELIVERY DEVICE: CPT

## 2023-05-03 PROCEDURE — 25010000002 HEPARIN LOCK FLUSH PER 10 UNITS: Performed by: INTERNAL MEDICINE

## 2023-05-03 RX ORDER — SODIUM CHLORIDE 0.9 % (FLUSH) 0.9 %
10 SYRINGE (ML) INJECTION AS NEEDED
OUTPATIENT
Start: 2023-05-03

## 2023-05-03 RX ORDER — SODIUM CHLORIDE 0.9 % (FLUSH) 0.9 %
20 SYRINGE (ML) INJECTION AS NEEDED
OUTPATIENT
Start: 2023-05-03

## 2023-05-03 RX ORDER — HEPARIN SODIUM (PORCINE) LOCK FLUSH IV SOLN 100 UNIT/ML 100 UNIT/ML
500 SOLUTION INTRAVENOUS AS NEEDED
OUTPATIENT
Start: 2023-05-03

## 2023-05-03 RX ORDER — HEPARIN SODIUM (PORCINE) LOCK FLUSH IV SOLN 100 UNIT/ML 100 UNIT/ML
500 SOLUTION INTRAVENOUS AS NEEDED
Status: DISCONTINUED | OUTPATIENT
Start: 2023-05-03 | End: 2023-05-04 | Stop reason: HOSPADM

## 2023-05-03 RX ADMIN — HEPARIN 500 UNITS: 100 SYRINGE at 15:23

## 2023-05-12 RX ORDER — CITALOPRAM 20 MG/1
20 TABLET ORAL DAILY
Qty: 90 TABLET | Refills: 0 | Status: SHIPPED | OUTPATIENT
Start: 2023-05-12

## 2023-05-12 NOTE — TELEPHONE ENCOUNTER
Caller: Geneva Haro    Relationship: Self    Best call back number: [phone]    Requested Prescriptions:   Requested Prescriptions     Pending Prescriptions Disp Refills   • citalopram (CeleXA) 20 MG tablet 90 tablet 0     Sig: Take 1 tablet by mouth Daily.        Pharmacy where request should be sent: [id number] - [place], [place] - [address] - [phone]  - [phone] FX     Last office visit with prescribing clinician: 2/24/2023   Last telemedicine visit with prescribing clinician: 4/14/2023   Next office visit with prescribing clinician: 5/24/2023     Additional details provided by patient:     Does the patient have less than a 3 day supply:  [x] Yes  [] No    Would you like a call back once the refill request has been completed: [] Yes [x] No    If the office needs to give you a call back, can they leave a voicemail: [x] Yes [] No    Stefano Bermudez Rep   05/12/23 14:28 EDT

## 2023-05-16 ENCOUNTER — OFFICE VISIT (OUTPATIENT)
Dept: GASTROENTEROLOGY | Facility: CLINIC | Age: 58
End: 2023-05-16
Payer: MEDICAID

## 2023-05-16 VITALS
SYSTOLIC BLOOD PRESSURE: 100 MMHG | HEIGHT: 67 IN | BODY MASS INDEX: 22.13 KG/M2 | DIASTOLIC BLOOD PRESSURE: 70 MMHG | WEIGHT: 141 LBS

## 2023-05-16 DIAGNOSIS — K59.04 CHRONIC IDIOPATHIC CONSTIPATION: ICD-10-CM

## 2023-05-16 DIAGNOSIS — Z98.890 STATUS POST BALLOON DILATATION OF ESOPHAGEAL STRICTURE: ICD-10-CM

## 2023-05-16 DIAGNOSIS — K59.89 GENERALIZED DYSMOTILITY OF INTESTINE: ICD-10-CM

## 2023-05-16 DIAGNOSIS — K31.84 GASTROPARESIS: Primary | ICD-10-CM

## 2023-05-16 DIAGNOSIS — K30 NON-ULCER DYSPEPSIA: ICD-10-CM

## 2023-05-16 DIAGNOSIS — R13.19 ESOPHAGEAL DYSPHAGIA: ICD-10-CM

## 2023-05-16 PROCEDURE — 1159F MED LIST DOCD IN RCRD: CPT | Performed by: INTERNAL MEDICINE

## 2023-05-16 PROCEDURE — 1160F RVW MEDS BY RX/DR IN RCRD: CPT | Performed by: INTERNAL MEDICINE

## 2023-05-16 PROCEDURE — 99214 OFFICE O/P EST MOD 30 MIN: CPT | Performed by: INTERNAL MEDICINE

## 2023-05-16 NOTE — PROGRESS NOTES
GASTROENTEROLOGY OFFICE NOTE  Geneva Haro  [id number]  1965      Chief Complaint   Patient presents with   • Gastroparesis, heartburn, constipation, nausea in AM        HISTORY OF PRESENT ILLNESS:  Follow-up visit for this very pleasant 58-year-old -American female with a very well-established history of gastroparesis which has been extensively worked up prior to her first visit to me.  She has already failed a gastric pacemaker and prior to seeing me had failed metoclopramide, domperidone but did respond fairly dramatically albeit transiently to pyloric Botox injection.    Surprisingly she is having exacerbation of her gastroparesis symptoms with occasional problems with vomiting, worsening heartburn, nausea particularly in the mornings.  She does admit to dietary indiscretions particularly with fried chicken that exacerbates her symptoms despite knowing that she should adhere to low volume, low-fat meals and converting to liquids only once her symptoms exacerbate.    She has multiple medications that prolong her QT interval and therefore I have precluded her enrolling in our cisapride trial.    She is here today for further work-up and evaluation of her well-established, longstanding gastroparesis.    She is up-to-date on colon cancer screening.  She is average risk for colon cancer colonoscopy 7 years ago which was reportedly within normal limits.  She has longstanding problems with chronic idiopathic constipation with slow transit resulting in infrequent bowel movements.  This is not particularly changed since I last saw her.    PAST MEDICAL HISTORY  Past Medical History:   • Allergic   • Anxiety   • Arthritis   • Cancer   • Depression   • Diabetes mellitus   • Fibromyalgia, primary   • Gall stones   • GERD (gastroesophageal reflux disease)   • Hearing loss   • Hernia, hiatal   • History of stomach ulcers   • History of transfusion   • Stomach problems   • Ulcer of bile duct        PAST SURGICAL  HISTORY  Past Surgical History:   • BREAST SURGERY   • CHOLECYSTECTOMY   • CORONARY ARTERY BYPASS GRAFT   • ENDOSCOPY    Procedure: ESOPHAGOGASTRODUODENOSCOPY;  Surgeon: Mark I Brunner, MD;  Location:  [facility];  Service: Gastroenterology   • GASTRIC BYPASS    x2   • HERNIA REPAIR   • HERNIA REPAIR   • INTERVENTIONAL RADIOLOGY PROCEDURE    Procedure: IR myelogram, cervical;  Surgeon: Jose Angel Sauer MD;  Location:  [facility];  Service: Interventional Radiology;  Laterality: Bilateral;   • LYMPH NODE BIOPSY   • MASTECTOMY    Left With sentinel lymph node sampling and prophylactic right mastectomy   • PACEMAKER IMPLANTATION   • PORTACATH PLACEMENT   • SMALL INTESTINE SURGERY        MEDICATIONS:    Current Outpatient Medications:   •  cetirizine (zyrTEC) 10 MG tablet, TAKE 1 TABLET BY MOUTH EVERY DAY, Disp: 30 tablet, Rfl: 0  •  citalopram (CeleXA) 20 MG tablet, Take 1 tablet by mouth Daily., Disp: 90 tablet, Rfl: 0  •  cyclobenzaprine (FLEXERIL) 10 MG tablet, Take 1 tablet by mouth 3 (Three) Times a Day As Needed for Muscle Spasms., Disp: 90 tablet, Rfl: 0  •  DULoxetine (CYMBALTA) 30 MG capsule, TAKE 1 CAPSULE BY MOUTH TWICE A DAY, Disp: 60 capsule, Rfl: 0  •  esomeprazole (nexIUM) 40 MG capsule, Take 1 capsule by mouth 2 (Two) Times a Day., Disp: 90 capsule, Rfl: 2  •  folic acid (FOLVITE) 1 MG tablet, Take 1 tablet by mouth Daily., Disp: 90 tablet, Rfl: 3  •  ondansetron (ZOFRAN) 8 MG tablet, Take 1 tablet by mouth Every 8 (Eight) Hours As Needed for Nausea or Vomiting., Disp: 30 tablet, Rfl: 0  •  prochlorperazine (COMPAZINE) 10 MG tablet, Take 1 tablet by mouth Every 6 (Six) Hours As Needed for Nausea or Vomiting., Disp: 30 tablet, Rfl: 0  •  promethazine (PHENERGAN) 25 MG tablet, Take 1 tablet by mouth Every 6 (Six) Hours As Needed for Nausea or Vomiting., Disp: 120 tablet, Rfl: 1  •  promethazine (PHENERGAN) 6.25 MG/5ML solution oral solution, Take 20 mL by mouth Every 6 (Six) Hours  "As Needed (n/v)., Disp: 1200 mL, Rfl: 3  •  traZODone (DESYREL) 50 MG tablet, TAKE 1 TABLET BY MOUTH EVERY DAY AT NIGHT, Disp: 30 tablet, Rfl: 0  •  B Complex Vitamins (Vitamin B Complex) tablet, Take 1 tablet by mouth Daily., Disp: 90 each, Rfl: 3  •  ondansetron ODT (Zofran ODT) 8 MG disintegrating tablet, Place 1 tablet on the tongue Every 8 (Eight) Hours As Needed for Nausea or Vomiting., Disp: 90 tablet, Rfl: 0  •  thiamine (VITAMIN B-1) 100 MG tablet tablet, Take 1 tablet by mouth Daily., Disp: 30 tablet, Rfl: 0    ALLERGIES  is allergic to gabapentin, morphine and related, aspirin, and ibuprofen.    FAMILY HISTORY:  Cancer-related family history includes Breast cancer in her cousin, cousin, and maternal aunt; Cancer in her maternal aunt; Pancreatic cancer in her cousin.  Colon Cancer-related family history is not on file.    SOCIAL HISTORY  She  reports that she quit smoking about 2 years ago. Her smoking use included cigarettes. She started smoking about 37 years ago. She has a 10.00 pack-year smoking history. She has never used smokeless tobacco. She reports current drug use. Drug: Marijuana. She reports that she does not drink alcohol.   .  She is accompanied by her .  She is a non-smoker.  She is a nondrinker.      PHYSICAL EXAM   /70 (BP Location: Right arm, Patient Position: Sitting, Cuff Size: Adult)   Ht 170.2 cm (67\")   Wt 64 kg (141 lb)   BMI 22.08 kg/m²   General: Alert and oriented x 3. In no apparent or acute distress.  and No stigmata of chronic liver disease  HEENT: Anicteric sclerae. Normal oropharynx  Neck: Supple. Without lymphadenopathy  CV: Regular rate and rhythm, S1, S2  Lungs: Clear to ausculation. Without rales, rhonchi and wheezing  Abdomen:  Soft,non-distended without palpable masses or hepatosplenomeagaly, areas of rebound tenderness or guarding.   Extremeties: without clubbing, cyanosis or edema  Neurologic:  Alert and oriented x 3 without focal motor or " sensory deficits  Rectal exam: deferred       ASSESSMENT  1.- Idiopathic gastroparesis.  We have discussed before referral for gastric peroral endoscopic myotomy, G_POEM, she is interested in looking into this and we will refer her to [facility] interventional endoscopy.  I think she would be an excellent candidate.  In the meantime to try to get her some immediate relief, since she has had an excellent response to pyloric Botox I think it is reasonable to offer this to her.  She does wish to pursue this option as it will take some time to get her in to [facility] and ultimately get a G-POEM performed.  2.-  Failed gastric stimulator.  She wishes to have this removed.  I told her we would have to refer back to the surgeon that implanted it, ideally,.  She says she has had a very hard time communicating with that office but she is encouraged to continue to do so as it would be best suited for its removal.  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  5.-  Intermittent dysphagia to solids.  Known esophageal stricture.  This responded well to esophageal dilation in the past and she is again having some dysphagia.  Repeat dilation at time of EGD is recommended    PLAN  1.-  Schedule EGD for both pyloric Botox injection as well as esophageal dilation of esophageal stricture  2.-  Referral to [facility] interventional endoscopy for possible G-POEM  3.-  Patient is again encouraged to adhere to dietary modification gastroparesis.  She acknowledges she can do a lot better and will redouble her efforts to avoid greasy/fatty foods and large volume meals which clearly exacerbate her symptoms.  4.-  History of Nissen fundoplication      Josh Sanchez MD  5/16/2023   13:50 EDT

## 2023-05-24 ENCOUNTER — HOSPITAL ENCOUNTER (OUTPATIENT)
Dept: GENERAL RADIOLOGY | Facility: HOSPITAL | Age: 58
Discharge: HOME | End: 2023-05-24
Admitting: INTERNAL MEDICINE
Payer: MEDICAID

## 2023-05-24 ENCOUNTER — OFFICE VISIT (OUTPATIENT)
Dept: FAMILY MEDICINE CLINIC | Facility: CLINIC | Age: 58
End: 2023-05-24
Payer: MEDICAID

## 2023-05-24 VITALS
WEIGHT: 144.8 LBS | DIASTOLIC BLOOD PRESSURE: 84 MMHG | OXYGEN SATURATION: 97 % | HEART RATE: 109 BPM | HEIGHT: 67 IN | SYSTOLIC BLOOD PRESSURE: 122 MMHG | BODY MASS INDEX: 22.73 KG/M2

## 2023-05-24 DIAGNOSIS — M25.562 ACUTE PAIN OF LEFT KNEE: ICD-10-CM

## 2023-05-24 DIAGNOSIS — Z00.00 ANNUAL PHYSICAL EXAM: ICD-10-CM

## 2023-05-24 DIAGNOSIS — Z12.11 COLON CANCER SCREENING: Primary | ICD-10-CM

## 2023-05-24 DIAGNOSIS — K31.84 GASTROPARESIS: Chronic | ICD-10-CM

## 2023-05-24 PROCEDURE — 73560 X-RAY EXAM OF KNEE 1 OR 2: CPT

## 2023-05-24 RX ORDER — CYCLOBENZAPRINE HCL 10 MG
10 TABLET ORAL 3 TIMES DAILY PRN
Qty: 90 TABLET | Refills: 3 | Status: SHIPPED | OUTPATIENT
Start: 2023-05-24

## 2023-05-24 RX ORDER — PROMETHAZINE HYDROCHLORIDE 25 MG/1
25 TABLET ORAL EVERY 6 HOURS PRN
Qty: 120 TABLET | Refills: 1 | Status: SHIPPED | OUTPATIENT
Start: 2023-05-24

## 2023-05-24 RX ORDER — ESOMEPRAZOLE MAGNESIUM 40 MG/1
40 CAPSULE, DELAYED RELEASE ORAL 2 TIMES DAILY
Qty: 90 CAPSULE | Refills: 2 | Status: SHIPPED | OUTPATIENT
Start: 2023-05-24

## 2023-05-24 RX ORDER — TRAZODONE HYDROCHLORIDE 50 MG/1
50 TABLET ORAL
Qty: 90 TABLET | Refills: 3 | Status: SHIPPED | OUTPATIENT
Start: 2023-05-24

## 2023-05-24 RX ORDER — PROCHLORPERAZINE MALEATE 10 MG
10 TABLET ORAL EVERY 6 HOURS PRN
Qty: 30 TABLET | Refills: 9 | Status: SHIPPED | OUTPATIENT
Start: 2023-05-24

## 2023-05-24 RX ORDER — CITALOPRAM 20 MG/1
20 TABLET ORAL DAILY
Qty: 90 TABLET | Refills: 3 | Status: SHIPPED | OUTPATIENT
Start: 2023-05-24

## 2023-05-24 RX ORDER — DULOXETIN HYDROCHLORIDE 30 MG/1
30 CAPSULE, DELAYED RELEASE ORAL 2 TIMES DAILY
Qty: 180 CAPSULE | Refills: 3 | Status: SHIPPED | OUTPATIENT
Start: 2023-05-24

## 2023-05-24 RX ORDER — ONDANSETRON HYDROCHLORIDE 8 MG/1
8 TABLET, FILM COATED ORAL EVERY 8 HOURS PRN
Qty: 30 TABLET | Refills: 9 | Status: SHIPPED | OUTPATIENT
Start: 2023-05-24

## 2023-05-24 NOTE — LETTER
"VACCINE CONSENT FORM      Patient Name:  Geneva Haro    Patient :  1965      I/We have read, or have been explained, the information about the diseases and the vaccines listed below.  There was an opportunity to ask questions and any questions were answered satisfactorily.  I/We believe that I/we understand the benefits and risks of the vaccines(s) cited, and ask the vaccine(s) listed below be given to me/us or the person named above (for which i have authorized to make the request).      Vaccine(s) give:    Orders Placed This Encounter   Procedures   • Hepatitis B Vaccine Adult IM (ENERGIX/RECOMBIVAX)         Medicare patients:    The only vaccine covered under your medical benefit is flu/pneumonia and hepatitis B.  All other may be covered under your \"Part D\" prescription plan and requires you to go to a pharmacy with a Physician orders for administration.  If you still prefer to have it administered at our office, you will receive a bill for the vaccine and administration cost.               Patient Initials                     Patient or Parent/Guardian Signature                    Date        A copy of the appropriate Centers for Disease Control and Prevention Vaccine Information Statements has been provided.   "

## 2023-05-24 NOTE — PROGRESS NOTES
Geneva Haro  1965  [id number]  Patient Care Team:  Cesar Gao MD as PCP - General (Internal Medicine)  Darion Figueroa MD as Consulting Physician (Gastroenterology)  Jazlyn Maguire APRN as Nurse Practitioner (Nurse Practitioner)    Geneva Haro is a 58 y.o. female here today for annual exam.  This patient is accompanied by their self who contributes to the history of their care.    Chief Complaint:    Chief Complaint   Patient presents with   • Annual Exam   • Leg Pain     Lt leg        History of Present Illness:   She is here for annual exam. Awaiting POEms and Botox in jection sof Gastroparesis.    She reports onset left leg cramping with drawing in hip, knee and toes. lasted about 34 min.  This am she noted left leg pain around her knee while walking, alleviating with rubbing. There is no back pain,  Left leg numbeness or tingling. There has been no swelling in her knee except 3 months ago. Denies any bowel or bladder incontinence. Denies dark colored urine. She does take flexeril tid when needed. This has helped.    She has been abstinent of tobacco now for 2 years.  She tries to be physically active however is limited by her fibromyalgia.  Vaccinations were discussed and she accepts hepatitis B series.  She will look into picking up shingles vaccine at her pharmacy.  Current with dentistry as well as ophthalmology.  Safety measures were discussed.  She no longer takes mammograms as she has had a bilateral mastectomy.  Screening colonoscopy is due.  Past Medical History:   Diagnosis Date   • Allergic At a young age   • Anxiety    • Arthritis    • Cancer    • Depression    • Diabetes mellitus    • Fibromyalgia, primary 33 years ago   • Gall stones    • GERD (gastroesophageal reflux disease) 2008   • Hearing loss    • Hernia, hiatal    • History of stomach ulcers    • History of transfusion    • Stomach problems    • Ulcer of bile duct        Past Surgical History:   Procedure  Laterality Date   • BREAST SURGERY     • CHOLECYSTECTOMY     • CORONARY ARTERY BYPASS GRAFT     • ENDOSCOPY N/A 2018    Procedure: ESOPHAGOGASTRODUODENOSCOPY;  Surgeon: Mark I Brunner, MD;  Location:  [facility];  Service: Gastroenterology   • GASTRIC BYPASS      x2   • HERNIA REPAIR     • HERNIA REPAIR     • INTERVENTIONAL RADIOLOGY PROCEDURE Bilateral 2021    Procedure: IR myelogram, cervical;  Surgeon: Jose Angel Sauer MD;  Location:  [facility];  Service: Interventional Radiology;  Laterality: Bilateral;   • LYMPH NODE BIOPSY     • MASTECTOMY Bilateral     Left With sentinel lymph node sampling and prophylactic right mastectomy   • PACEMAKER IMPLANTATION     • PORTACATH PLACEMENT     • SMALL INTESTINE SURGERY          Family History   Problem Relation Age of Onset   • Breast cancer Maternal Aunt    • Cancer Maternal Aunt    • Pancreatic cancer Cousin    • Breast cancer Cousin    • Breast cancer Cousin    • Heart attack Mother    • Hypertension Mother    • COPD Sister        Social History     Socioeconomic History   • Marital status:    Tobacco Use   • Smoking status: Former     Packs/day: 0.50     Years: 20.00     Pack years: 10.00     Types: Cigarettes     Start date: 1986     Quit date: 2021     Years since quittin.3   • Smokeless tobacco: Never   Vaping Use   • Vaping Use: Never used   Substance and Sexual Activity   • Alcohol use: No   • Drug use: Yes     Types: Marijuana   • Sexual activity: Not Currently     Partners: Male     Birth control/protection: None       Allergies   Allergen Reactions   • Gabapentin Other (See Comments)     Seizure   • Morphine And Related Itching and Swelling   • Aspirin GI Intolerance   • Ibuprofen GI Intolerance       Depression: PHQ-2 Depression Screening  Little interest or pleasure in doing things?     Feeling down, depressed, or hopeless?     PHQ-2 Total Score        Immunization History   Administered Date(s)  "Administered   • COVID-19 (PFIZER) BIVALENT 12+YRS 02/24/2023   • Covid-19 (Pfizer) Gray Cap Monovalent 04/11/2022, 05/02/2022   • FluLaval/Fluzone >6mos 02/02/2021, 02/24/2023   • Hepatitis B Adult/Adolescent IM 05/24/2023   • Pneumococcal Conjugate 20-Valent (PCV20) 02/24/2023   • Pneumococcal Polysaccharide (PPSV23) 10/19/2020       Intimate partner violence ( Screen on initial visit, pregnant women, women with injuries, older adult with injury or evidence of neglect):  -Violence can be a problem in many people's lives, so I now ask every patient about trauma or abuse they may have experienced in a relationship.  • Stress/Safety - Do you feel safe in your relationship? y  • Afraid/Abused - Have you ever been in a relationship where you were threatened, hurt, or afraid? n  • Friend/Family - Are your friends aware you have been hurt? na  • Emergency Plan - Do you have a safe place to go and the resources you need in an emergency? na    Review of Systems:    Review of Systems   Constitutional: Positive for fatigue.   Eyes: Negative.    Respiratory: Negative.    Cardiovascular: Negative.    Musculoskeletal: Positive for arthralgias and myalgias.   Neurological: Negative.    Hematological: Negative.    Psychiatric/Behavioral: Negative.        Vitals:    05/24/23 1524   BP: 122/84   Pulse: 109   SpO2: 97%   Weight: 65.7 kg (144 lb 12.8 oz)   Height: 170.2 cm (67.01\")     Body mass index is 22.67 kg/m².      Current Outpatient Medications:   •  cetirizine (zyrTEC) 10 MG tablet, TAKE 1 TABLET BY MOUTH EVERY DAY, Disp: 30 tablet, Rfl: 0  •  citalopram (CeleXA) 20 MG tablet, Take 1 tablet by mouth Daily., Disp: 90 tablet, Rfl: 3  •  cyclobenzaprine (FLEXERIL) 10 MG tablet, Take 1 tablet by mouth 3 (Three) Times a Day As Needed for Muscle Spasms., Disp: 90 tablet, Rfl: 3  •  DULoxetine (CYMBALTA) 30 MG capsule, Take 1 capsule by mouth 2 (Two) Times a Day., Disp: 180 capsule, Rfl: 3  •  esomeprazole (nexIUM) 40 MG capsule, " Take 1 capsule by mouth 2 (Two) Times a Day., Disp: 90 capsule, Rfl: 2  •  folic acid (FOLVITE) 1 MG tablet, Take 1 tablet by mouth Daily., Disp: 90 tablet, Rfl: 3  •  ondansetron (ZOFRAN) 8 MG tablet, Take 1 tablet by mouth Every 8 (Eight) Hours As Needed for Nausea or Vomiting., Disp: 30 tablet, Rfl: 9  •  prochlorperazine (COMPAZINE) 10 MG tablet, Take 1 tablet by mouth Every 6 (Six) Hours As Needed for Nausea or Vomiting., Disp: 30 tablet, Rfl: 9  •  promethazine (PHENERGAN) 25 MG tablet, Take 1 tablet by mouth Every 6 (Six) Hours As Needed for Nausea or Vomiting., Disp: 120 tablet, Rfl: 1  •  promethazine (PHENERGAN) 6.25 MG/5ML solution oral solution, Take 20 mL by mouth Every 6 (Six) Hours As Needed (n/v)., Disp: 1200 mL, Rfl: 3  •  traZODone (DESYREL) 50 MG tablet, Take 1 tablet by mouth every night at bedtime., Disp: 90 tablet, Rfl: 3    Physical Exam:    Physical Exam    Procedures    Results Review:    I reviewed the patient's new clinical results.    Assessment/Plan:    Problem List Items Addressed This Visit        Gastrointestinal Abdominal     Gastroparesis (Chronic)    Relevant Medications    promethazine (PHENERGAN) 6.25 MG/5ML solution oral solution    esomeprazole (nexIUM) 40 MG capsule    promethazine (PHENERGAN) 25 MG tablet       Health Encounters    Annual physical exam   Other Visit Diagnoses     Colon cancer screening    -  Primary    Relevant Orders    Ambulatory Referral For Screening Colonoscopy    Acute pain of left knee        Relevant Orders    XR Knee 1 or 2 View Left          Plan of care was reviewed with patient at the conclusion of today's visit. Counseled patient with regards to good nutrition and diet. Maintaining a healthy lifestyle including exercise and physical activities. Spoke with patient on ways to reduce stress, getting adequate sleep and injury prevention.  Discussed mammogram, colon cancer screening, osteoporosis and pap smear including benefit of early detection and  potential need for follow-up. Patient agrees to screening mammogram, colonoscopy, bone density and gyn referral today. Annual dental and eye exams were encouraged. Encouraged patient to continue to follow up with annual immunizations.     Return in about 6 months (around 11/24/2023) for fms/gastroparesis.    Cesar Gao MD    Please note than portions of this note were completed wth a Voice Recognition Program

## 2023-05-25 ENCOUNTER — TELEPHONE (OUTPATIENT)
Dept: FAMILY MEDICINE CLINIC | Facility: CLINIC | Age: 58
End: 2023-05-25
Payer: MEDICAID

## 2023-05-25 NOTE — TELEPHONE ENCOUNTER
..Attempted to contact patient no answer left  Detailed vm      Message from Cesar Gao MD sent at 5/24/2023   Slight arthritis. If pain persists, or worsens, we can send he rack to Dr. Balderrama        ...HUB MAY RELAY MESSAGE AND DOCUMENT

## 2023-05-30 RX ORDER — CETIRIZINE HYDROCHLORIDE 10 MG/1
TABLET ORAL
Qty: 30 TABLET | Refills: 0 | Status: SHIPPED | OUTPATIENT
Start: 2023-05-30

## 2023-05-30 NOTE — TELEPHONE ENCOUNTER
Rx Refill Note  Requested Prescriptions     Pending Prescriptions Disp Refills   • cetirizine (zyrTEC) 10 MG tablet [Pharmacy Med Name: CETIRIZINE HCL 10 MG TABLET] 30 tablet 0     Sig: TAKE 1 TABLET BY MOUTH EVERY DAY      Last office visit with prescribing clinician: 5/24/2023   Last telemedicine visit with prescribing clinician: Visit date not found   Next office visit with prescribing clinician: Visit date not found                         Would you like a call back once the refill request has been completed: [] Yes [] No    If the office needs to give you a call back, can they leave a voicemail: [] Yes [] No    Julia Toledo MA  05/30/23, 09:35 EDT

## 2023-06-01 ENCOUNTER — TELEPHONE (OUTPATIENT)
Dept: FAMILY MEDICINE CLINIC | Facility: CLINIC | Age: 58
End: 2023-06-01

## 2023-06-01 DIAGNOSIS — M25.562 ACUTE PAIN OF LEFT KNEE: Primary | ICD-10-CM

## 2023-06-01 RX ORDER — CETIRIZINE HYDROCHLORIDE 10 MG/1
TABLET ORAL
Qty: 30 TABLET | Refills: 0 | OUTPATIENT
Start: 2023-06-01

## 2023-06-01 NOTE — TELEPHONE ENCOUNTER
Caller: Geneva Haro    Relationship: Self    Best call back number: [phone]    Caller requesting test results: PATIENT     What test was performed: X-RAY OF LEFT KNEE     When was the test performed: 5/26/23    Where was the test performed: [facility]     Additional notes: PATIENT WOULD LIKE A CALLBACK WITH TEST RESULTS.

## 2023-06-15 ENCOUNTER — OFFICE VISIT (OUTPATIENT)
Dept: ORTHOPEDIC SURGERY | Facility: CLINIC | Age: 58
End: 2023-06-15
Payer: MEDICAID

## 2023-06-15 ENCOUNTER — HOSPITAL ENCOUNTER (OUTPATIENT)
Dept: ONCOLOGY | Facility: HOSPITAL | Age: 58
Discharge: HOME | End: 2023-06-15
Admitting: INTERNAL MEDICINE
Payer: MEDICAID

## 2023-06-15 VITALS
BODY MASS INDEX: 22.52 KG/M2 | WEIGHT: 143.5 LBS | HEIGHT: 67 IN | DIASTOLIC BLOOD PRESSURE: 62 MMHG | SYSTOLIC BLOOD PRESSURE: 110 MMHG

## 2023-06-15 VITALS
SYSTOLIC BLOOD PRESSURE: 114 MMHG | HEART RATE: 97 BPM | HEIGHT: 67 IN | TEMPERATURE: 97.6 F | BODY MASS INDEX: 23.07 KG/M2 | RESPIRATION RATE: 16 BRPM | DIASTOLIC BLOOD PRESSURE: 74 MMHG | WEIGHT: 147 LBS

## 2023-06-15 DIAGNOSIS — M25.562 LEFT KNEE PAIN, UNSPECIFIED CHRONICITY: Primary | ICD-10-CM

## 2023-06-15 DIAGNOSIS — M17.12 PRIMARY OSTEOARTHRITIS OF LEFT KNEE: ICD-10-CM

## 2023-06-15 DIAGNOSIS — Z95.828 PORT-A-CATH IN PLACE: ICD-10-CM

## 2023-06-15 DIAGNOSIS — K31.84 GASTROPARESIS: ICD-10-CM

## 2023-06-15 DIAGNOSIS — S89.92XA INJURY OF LEFT KNEE, INITIAL ENCOUNTER: ICD-10-CM

## 2023-06-15 DIAGNOSIS — D50.8 OTHER IRON DEFICIENCY ANEMIA: Primary | ICD-10-CM

## 2023-06-15 PROCEDURE — 25010000002 HEPARIN LOCK FLUSH PER 10 UNITS: Performed by: INTERNAL MEDICINE

## 2023-06-15 PROCEDURE — 96523 IRRIG DRUG DELIVERY DEVICE: CPT

## 2023-06-15 RX ORDER — SODIUM CHLORIDE 0.9 % (FLUSH) 0.9 %
10 SYRINGE (ML) INJECTION AS NEEDED
OUTPATIENT
Start: 2023-06-15

## 2023-06-15 RX ORDER — HEPARIN SODIUM (PORCINE) LOCK FLUSH IV SOLN 100 UNIT/ML 100 UNIT/ML
500 SOLUTION INTRAVENOUS AS NEEDED
Status: DISCONTINUED | OUTPATIENT
Start: 2023-06-15 | End: 2023-06-16 | Stop reason: HOSPADM

## 2023-06-15 RX ORDER — HEPARIN SODIUM (PORCINE) LOCK FLUSH IV SOLN 100 UNIT/ML 100 UNIT/ML
500 SOLUTION INTRAVENOUS AS NEEDED
OUTPATIENT
Start: 2023-06-15

## 2023-06-15 RX ORDER — SODIUM CHLORIDE 0.9 % (FLUSH) 0.9 %
20 SYRINGE (ML) INJECTION AS NEEDED
OUTPATIENT
Start: 2023-06-15

## 2023-06-15 RX ADMIN — HEPARIN SODIUM (PORCINE) LOCK FLUSH IV SOLN 100 UNIT/ML 500 UNITS: 100 SOLUTION at 13:55
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
## 2023-08-17 RX ORDER — CETIRIZINE HYDROCHLORIDE 10 MG/1
TABLET ORAL
Qty: 90 TABLET | Refills: 0 | Status: SHIPPED | OUTPATIENT
Start: 2023-08-17

## 2023-08-17 NOTE — TELEPHONE ENCOUNTER
Rx Refill Note  Requested Prescriptions     Pending Prescriptions Disp Refills    cetirizine (zyrTEC) 10 MG tablet [Pharmacy Med Name: CETIRIZINE HCL 10 MG TABLET] 30 tablet 0     Sig: TAKE 1 TABLET BY MOUTH EVERY DAY      Last office visit with prescribing clinician: 5/24/2023   Last telemedicine visit with prescribing clinician: Visit date not found   Next office visit with prescribing clinician: Visit date not found                         Would you like a call back once the refill request has been completed: [] Yes [] No    If the office needs to give you a call back, can they leave a voicemail: [] Yes [] No    Kenia Goldman MA  08/17/23, 09:19 EDT

## 2023-08-27 ENCOUNTER — TELEPHONE (OUTPATIENT)
Dept: GASTROENTEROLOGY | Facility: OTHER | Age: 58
End: 2023-08-27
Payer: MEDICAID

## 2023-08-28 ENCOUNTER — OUTSIDE FACILITY SERVICE (OUTPATIENT)
Dept: GASTROENTEROLOGY | Facility: CLINIC | Age: 58
End: 2023-08-28
Payer: MEDICARE

## 2023-09-10 ENCOUNTER — HOSPITAL ENCOUNTER (OUTPATIENT)
Dept: MRI IMAGING | Facility: HOSPITAL | Age: 58
Discharge: HOME | End: 2023-09-10
Payer: MEDICAID

## 2023-09-29 ENCOUNTER — HOSPITAL ENCOUNTER (OUTPATIENT)
Dept: ONCOLOGY | Facility: HOSPITAL | Age: 58
Discharge: HOME | End: 2023-09-29
Payer: MEDICAID

## 2023-09-29 VITALS
BODY MASS INDEX: 23.7 KG/M2 | TEMPERATURE: 97.7 F | HEART RATE: 84 BPM | RESPIRATION RATE: 16 BRPM | WEIGHT: 151 LBS | DIASTOLIC BLOOD PRESSURE: 81 MMHG | SYSTOLIC BLOOD PRESSURE: 115 MMHG | HEIGHT: 67 IN

## 2023-09-29 DIAGNOSIS — K31.84 GASTROPARESIS: ICD-10-CM

## 2023-09-29 DIAGNOSIS — D50.8 OTHER IRON DEFICIENCY ANEMIA: Primary | ICD-10-CM

## 2023-09-29 DIAGNOSIS — Z95.828 PORT-A-CATH IN PLACE: ICD-10-CM

## 2023-09-29 PROCEDURE — 96523 IRRIG DRUG DELIVERY DEVICE: CPT

## 2023-09-29 PROCEDURE — 25010000002 HEPARIN LOCK FLUSH PER 10 UNITS: Performed by: INTERNAL MEDICINE

## 2023-09-29 RX ORDER — HEPARIN SODIUM (PORCINE) LOCK FLUSH IV SOLN 100 UNIT/ML 100 UNIT/ML
500 SOLUTION INTRAVENOUS AS NEEDED
Status: DISCONTINUED | OUTPATIENT
Start: 2023-09-29 | End: 2023-09-30 | Stop reason: HOSPADM

## 2023-09-29 RX ORDER — SODIUM CHLORIDE 0.9 % (FLUSH) 0.9 %
20 SYRINGE (ML) INJECTION AS NEEDED
OUTPATIENT
Start: 2023-09-29

## 2023-09-29 RX ORDER — HEPARIN SODIUM (PORCINE) LOCK FLUSH IV SOLN 100 UNIT/ML 100 UNIT/ML
500 SOLUTION INTRAVENOUS AS NEEDED
OUTPATIENT
Start: 2023-09-29

## 2023-09-29 RX ORDER — SODIUM CHLORIDE 0.9 % (FLUSH) 0.9 %
10 SYRINGE (ML) INJECTION AS NEEDED
OUTPATIENT
Start: 2023-09-29

## 2023-09-29 RX ADMIN — HEPARIN 500 UNITS: 100 SYRINGE at 14:56

## 2023-10-20 ENCOUNTER — OFFICE VISIT (OUTPATIENT)
Dept: ONCOLOGY | Facility: CLINIC | Age: 58
End: 2023-10-20
Payer: MEDICAID

## 2023-10-20 ENCOUNTER — HOSPITAL ENCOUNTER (OUTPATIENT)
Dept: ONCOLOGY | Facility: HOSPITAL | Age: 58
Discharge: HOME | End: 2023-10-20
Payer: MEDICAID

## 2023-10-20 VITALS
HEART RATE: 86 BPM | BODY MASS INDEX: 24.01 KG/M2 | HEIGHT: 67 IN | OXYGEN SATURATION: 100 % | TEMPERATURE: 97.1 F | SYSTOLIC BLOOD PRESSURE: 114 MMHG | RESPIRATION RATE: 12 BRPM | DIASTOLIC BLOOD PRESSURE: 81 MMHG | WEIGHT: 153 LBS

## 2023-10-20 DIAGNOSIS — D50.8 OTHER IRON DEFICIENCY ANEMIA: ICD-10-CM

## 2023-10-20 DIAGNOSIS — D50.8 OTHER IRON DEFICIENCY ANEMIA: Primary | ICD-10-CM

## 2023-10-20 DIAGNOSIS — Z85.3 HISTORY OF LEFT BREAST CANCER: Primary | ICD-10-CM

## 2023-10-20 DIAGNOSIS — K31.84 GASTROPARESIS: ICD-10-CM

## 2023-10-20 DIAGNOSIS — Z95.828 PORT-A-CATH IN PLACE: ICD-10-CM

## 2023-10-20 LAB
BASOPHILS # BLD AUTO: 0.03 10*3/MM3 (ref 0–0.2)
BASOPHILS NFR BLD AUTO: 0.7 % (ref 0–1.5)
DEPRECATED RDW RBC AUTO: 42.8 FL (ref 37–54)
EOSINOPHIL # BLD AUTO: 0.15 10*3/MM3 (ref 0–0.4)
EOSINOPHIL NFR BLD AUTO: 3.5 % (ref 0.3–6.2)
ERYTHROCYTE [DISTWIDTH] IN BLOOD BY AUTOMATED COUNT: 14.1 % (ref 12.3–15.4)
FERRITIN SERPL-MCNC: 31.01 NG/ML (ref 13–150)
HCT VFR BLD AUTO: 34.6 % (ref 34–46.6)
HGB BLD-MCNC: 11.3 G/DL (ref 12–15.9)
IMM GRANULOCYTES # BLD AUTO: 0 10*3/MM3 (ref 0–0.05)
IMM GRANULOCYTES NFR BLD AUTO: 0 % (ref 0–0.5)
IRON 24H UR-MRATE: 92 MCG/DL (ref 37–145)
IRON SATN MFR SERPL: 18 % (ref 20–50)
LYMPHOCYTES # BLD AUTO: 1.52 10*3/MM3 (ref 0.7–3.1)
LYMPHOCYTES NFR BLD AUTO: 35.4 % (ref 19.6–45.3)
MCH RBC QN AUTO: 27 PG (ref 26.6–33)
MCHC RBC AUTO-ENTMCNC: 32.7 G/DL (ref 31.5–35.7)
MCV RBC AUTO: 82.6 FL (ref 79–97)
MONOCYTES # BLD AUTO: 0.42 10*3/MM3 (ref 0.1–0.9)
MONOCYTES NFR BLD AUTO: 9.8 % (ref 5–12)
NEUTROPHILS NFR BLD AUTO: 2.17 10*3/MM3 (ref 1.7–7)
NEUTROPHILS NFR BLD AUTO: 50.6 % (ref 42.7–76)
PLATELET # BLD AUTO: 165 10*3/MM3 (ref 140–450)
PMV BLD AUTO: 9.4 FL (ref 6–12)
RBC # BLD AUTO: 4.19 10*6/MM3 (ref 3.77–5.28)
TIBC SERPL-MCNC: 505 MCG/DL (ref 298–536)
TRANSFERRIN SERPL-MCNC: 339 MG/DL (ref 200–360)
WBC NRBC COR # BLD: 4.29 10*3/MM3 (ref 3.4–10.8)

## 2023-10-20 PROCEDURE — 83540 ASSAY OF IRON: CPT | Performed by: NURSE PRACTITIONER

## 2023-10-20 PROCEDURE — 84466 ASSAY OF TRANSFERRIN: CPT | Performed by: NURSE PRACTITIONER

## 2023-10-20 PROCEDURE — 82728 ASSAY OF FERRITIN: CPT | Performed by: NURSE PRACTITIONER

## 2023-10-20 PROCEDURE — 85025 COMPLETE CBC W/AUTO DIFF WBC: CPT | Performed by: NURSE PRACTITIONER

## 2023-10-20 PROCEDURE — 25010000002 HEPARIN LOCK FLUSH PER 10 UNITS: Performed by: INTERNAL MEDICINE

## 2023-10-20 PROCEDURE — 36591 DRAW BLOOD OFF VENOUS DEVICE: CPT

## 2023-10-20 RX ORDER — SODIUM CHLORIDE 0.9 % (FLUSH) 0.9 %
10 SYRINGE (ML) INJECTION AS NEEDED
OUTPATIENT
Start: 2023-10-20

## 2023-10-20 RX ORDER — SODIUM CHLORIDE 0.9 % (FLUSH) 0.9 %
20 SYRINGE (ML) INJECTION AS NEEDED
OUTPATIENT
Start: 2023-10-20

## 2023-10-20 RX ORDER — HEPARIN SODIUM (PORCINE) LOCK FLUSH IV SOLN 100 UNIT/ML 100 UNIT/ML
500 SOLUTION INTRAVENOUS AS NEEDED
Status: DISCONTINUED | OUTPATIENT
Start: 2023-10-20 | End: 2023-10-21 | Stop reason: HOSPADM

## 2023-10-20 RX ORDER — HEPARIN SODIUM (PORCINE) LOCK FLUSH IV SOLN 100 UNIT/ML 100 UNIT/ML
500 SOLUTION INTRAVENOUS AS NEEDED
OUTPATIENT
Start: 2023-10-20

## 2023-10-20 RX ADMIN — HEPARIN 500 UNITS: 100 SYRINGE at 13:22

## 2023-10-20 NOTE — PROGRESS NOTES
"      PROBLEM LIST:  1. Stage I breast cancer, left upper outer quadrant:   a) Original diagnosis 06/20/2010.   b) Left mastectomy with sentinel lymph node sampling and prophylactic right  mastectomy.   c) [id number] stage I.   d) Estrogen receptor and progesterone receptor positive and HER-2 negative  with low risk Oncotype.   e) Completed adjuvant endocrine therapy 07/08/2015.   2. Fibromyalgia and chronic pain.   3. Recurring iron deficiency anemia.   4. Gastroparesis.       CHIEF COMPLAINT:    Subjective     HISTORY OF PRESENT ILLNESS:   Mrs. Haro is here for follow up evaluation of history of breast cancer and iron deficiency anemia.  She continues to have generalized pain from her fibromyalgia.  She has noticed some increased muscle spasms recently.  She denies any new medical concerns today.          Past Medical History, Past Surgical History, Social History, Family History have been reviewed and are without significant changes except as mentioned.    Review of Systems   A comprehensive 14 point review of systems was performed and was negative except as mentioned.    Medications:  The current medication list was reviewed in the EMR    ALLERGIES:    Allergies   Allergen Reactions    Gabapentin Other (See Comments)     Seizure    Morphine And Related Itching and Swelling    Aspirin GI Intolerance    Ibuprofen GI Intolerance       Objective      /81 Comment: RUE  Pulse 86   Temp 97.1 °F (36.2 °C) (Infrared)   Resp 12   Ht 168.9 cm (66.5\")   Wt 69.4 kg (153 lb)   SpO2 100% Comment: RA  BMI 24.32 kg/m²    Vitals:    10/20/23 1256   PainSc: 0-No pain                   General: well appearing, in no acute distress   HEENT: sclera anicteric, oropharynx clear  Lymphatics: no cervical, supraclavicular, or axillary adenopathy  Cardiovascular: regular rate and rhythm, no murmurs  Lungs: clear to auscultation bilaterally  Chest: Bilateral mastectomy incisions well-healed with no masses or skin changes.  Very " sensitive to palpation bilateral chest walls and left axilla.  Abdomen: soft, nontender, nondistended.  No palpable masses or organomegaly  Extremeties: no lower extremity edema, cords or calf tenderness  Skin: no rashes, lesions, bruising, or petechiae      RECENT LABS:  Hematology WBC   Date Value Ref Range Status   04/03/2023 6.59 3.40 - 10.80 10*3/mm3 Final     Hemoglobin   Date Value Ref Range Status   04/03/2023 14.4 12.0 - 15.9 g/dL Final     Hematocrit   Date Value Ref Range Status   04/03/2023 42.6 34.0 - 46.6 % Final     MCV   Date Value Ref Range Status   04/03/2023 80.7 79.0 - 97.0 fL Final     RDW   Date Value Ref Range Status   04/03/2023 13.3 12.3 - 15.4 % Final     MPV   Date Value Ref Range Status   04/03/2023 9.5 6.0 - 12.0 fL Final     Platelets   Date Value Ref Range Status   04/03/2023 190 140 - 450 10*3/mm3 Final     Immature Grans %   Date Value Ref Range Status   04/03/2023 0.3 0.0 - 0.5 % Final     Neutrophils, Absolute   Date Value Ref Range Status   04/03/2023 4.43 1.70 - 7.00 10*3/mm3 Final     Lymphocytes, Absolute   Date Value Ref Range Status   04/03/2023 1.61 0.70 - 3.10 10*3/mm3 Final     Monocytes, Absolute   Date Value Ref Range Status   04/03/2023 0.50 0.10 - 0.90 10*3/mm3 Final     Eosinophils, Absolute   Date Value Ref Range Status   04/03/2023 0.01 0.00 - 0.40 10*3/mm3 Final     Basophils, Absolute   Date Value Ref Range Status   04/03/2023 0.02 0.00 - 0.20 10*3/mm3 Final     Immature Grans, Absolute   Date Value Ref Range Status   04/03/2023 0.02 0.00 - 0.05 10*3/mm3 Final     nRBC   Date Value Ref Range Status   04/03/2023 0.0 0.0 - 0.2 /100 WBC Final       Glucose   Date Value Ref Range Status   04/03/2023 109 (H) 65 - 99 mg/dL Final     Sodium   Date Value Ref Range Status   04/03/2023 139 136 - 145 mmol/L Final     Potassium   Date Value Ref Range Status   04/03/2023 3.4 (L) 3.5 - 5.2 mmol/L Final     CO2   Date Value Ref Range Status   04/03/2023 23.0 22.0 - 29.0 mmol/L  Final     Chloride   Date Value Ref Range Status   04/03/2023 96 (L) 98 - 107 mmol/L Final     Anion Gap   Date Value Ref Range Status   04/03/2023 20.0 (H) 5.0 - 15.0 mmol/L Final     Creatinine   Date Value Ref Range Status   04/03/2023 1.65 (H) 0.57 - 1.00 mg/dL Final     BUN   Date Value Ref Range Status   04/03/2023 23 (H) 6 - 20 mg/dL Final     BUN/Creatinine Ratio   Date Value Ref Range Status   04/03/2023 13.9 7.0 - 25.0 Final     Calcium   Date Value Ref Range Status   04/03/2023 10.1 8.6 - 10.5 mg/dL Final     Alkaline Phosphatase   Date Value Ref Range Status   04/03/2023 142 (H) 39 - 117 U/L Final     Total Protein   Date Value Ref Range Status   04/03/2023 8.1 6.0 - 8.5 g/dL Final     ALT (SGPT)   Date Value Ref Range Status   04/03/2023 9 1 - 33 U/L Final     AST (SGOT)   Date Value Ref Range Status   04/03/2023 19 1 - 32 U/L Final     Total Bilirubin   Date Value Ref Range Status   04/03/2023 0.5 0.0 - 1.2 mg/dL Final     Albumin   Date Value Ref Range Status   04/03/2023 4.7 3.5 - 5.2 g/dL Final     Globulin   Date Value Ref Range Status   04/03/2023 3.4 gm/dL Final     Comment:     Calculated Result     A/G Ratio   Date Value Ref Range Status   06/19/2015 1.6 0.7 - 2.0 Final       LDH   Date Value Ref Range Status   06/19/2015 174 120 - 246 Units/L Final          Assessment & Plan   IMPRESSION:   1. Anemia.  She has had recurring iron deficiency anemia. She has responded to parenteral iron in the past.  We will obtain labs today.  Hemoglobin in April was 14.  2. History of breast cancer. She has done well after adjuvant endocrine therapy.  Clinically she is doing well with no evidence of disease recurrence at this time.  3. Fibromyalgia.  Recommend follow-up with primary care provider.        PLAN:   1.   Labs today including CBC, iron profile and ferritin levels.  2.  Follow-up in 1 year.                  Michelle Drake APRDALLIN  [facility]    10/20/2023          CC:

## 2023-10-26 ENCOUNTER — TELEPHONE (OUTPATIENT)
Dept: ONCOLOGY | Facility: CLINIC | Age: 58
End: 2023-10-26
Payer: MEDICAID

## 2023-10-26 DIAGNOSIS — D50.8 OTHER IRON DEFICIENCY ANEMIA: Primary | Chronic | ICD-10-CM

## 2023-10-26 RX ORDER — CYCLOBENZAPRINE HCL 10 MG
10 TABLET ORAL 3 TIMES DAILY PRN
Qty: 90 TABLET | Refills: 0 | Status: SHIPPED | OUTPATIENT
Start: 2023-10-26

## 2023-10-26 NOTE — TELEPHONE ENCOUNTER
Rx Refill Note  Requested Prescriptions     Pending Prescriptions Disp Refills    cyclobenzaprine (FLEXERIL) 10 MG tablet [Pharmacy Med Name: CYCLOBENZAPRINE 10 MG TABLET] 90 tablet 3     Sig: TAKE 1 TABLET BY MOUTH 3 TIMES A DAY AS NEEDED FOR MUSCLE SPASMS.      Last office visit with prescribing clinician: 5/24/2023     Next office visit with prescribing clinician: Return in about 6 months (around 11/24/2023) for fms/gastroparesis.      Shannon Zafar MA  10/26/23, 11:36 EDT

## 2023-10-26 NOTE — TELEPHONE ENCOUNTER
Called patient to review lab results.  Hemoglobin is slightly low at 11.3.  Iron and ferritin levels are normal.  I will have patient recheck CBC, iron, and ferritin level in 6 weeks.  If she remains anemic and iron or ferritin level is low we may have to give her parental iron.

## 2024-01-02 RX ORDER — ESOMEPRAZOLE MAGNESIUM 40 MG/1
40 CAPSULE, DELAYED RELEASE ORAL 2 TIMES DAILY
Qty: 180 CAPSULE | Refills: 1 | Status: SHIPPED | OUTPATIENT
Start: 2024-01-02

## 2024-01-02 RX ORDER — CYCLOBENZAPRINE HCL 10 MG
10 TABLET ORAL 3 TIMES DAILY PRN
Qty: 90 TABLET | Refills: 0 | Status: SHIPPED | OUTPATIENT
Start: 2024-01-02

## 2024-01-02 NOTE — TELEPHONE ENCOUNTER
Rx Refill Note  Requested Prescriptions     Pending Prescriptions Disp Refills    cyclobenzaprine (FLEXERIL) 10 MG tablet [Pharmacy Med Name: CYCLOBENZAPRINE 10 MG TABLET] 90 tablet 0     Sig: TAKE 1 TABLET BY MOUTH THREE TIMES A DAY AS NEEDED FOR MUSCLE SPASM    esomeprazole (nexIUM) 40 MG capsule [Pharmacy Med Name: ESOMEPRAZOLE MAG DR 40 MG CAP] 180 capsule 1     Sig: TAKE 1 CAPSULE BY MOUTH TWICE A DAY      Last office visit with prescribing clinician: 5/24/2023     Next office visit with prescribing clinician: Visit date not found       Beti Greco MA  01/02/24, 08:53 EST

## 2024-01-04 ENCOUNTER — HOSPITAL ENCOUNTER (OUTPATIENT)
Dept: ONCOLOGY | Facility: HOSPITAL | Age: 59
Discharge: HOME | End: 2024-01-04
Admitting: INTERNAL MEDICINE
Payer: MEDICAID

## 2024-01-04 VITALS
HEIGHT: 67 IN | TEMPERATURE: 97.7 F | RESPIRATION RATE: 18 BRPM | BODY MASS INDEX: 24.8 KG/M2 | SYSTOLIC BLOOD PRESSURE: 118 MMHG | WEIGHT: 158 LBS | DIASTOLIC BLOOD PRESSURE: 79 MMHG | HEART RATE: 105 BPM

## 2024-01-04 DIAGNOSIS — D50.8 OTHER IRON DEFICIENCY ANEMIA: Primary | ICD-10-CM

## 2024-01-04 DIAGNOSIS — Z95.828 PORT-A-CATH IN PLACE: ICD-10-CM

## 2024-01-04 DIAGNOSIS — K31.84 GASTROPARESIS: ICD-10-CM

## 2024-01-04 LAB
BASOPHILS # BLD AUTO: 0.02 10*3/MM3 (ref 0–0.2)
BASOPHILS NFR BLD AUTO: 0.5 % (ref 0–1.5)
DEPRECATED RDW RBC AUTO: 41.5 FL (ref 37–54)
EOSINOPHIL # BLD AUTO: 0.09 10*3/MM3 (ref 0–0.4)
EOSINOPHIL NFR BLD AUTO: 2.4 % (ref 0.3–6.2)
ERYTHROCYTE [DISTWIDTH] IN BLOOD BY AUTOMATED COUNT: 14 % (ref 12.3–15.4)
FERRITIN SERPL-MCNC: 71.76 NG/ML (ref 13–150)
HCT VFR BLD AUTO: 33.5 % (ref 34–46.6)
HGB BLD-MCNC: 11 G/DL (ref 12–15.9)
IMM GRANULOCYTES # BLD AUTO: 0.01 10*3/MM3 (ref 0–0.05)
IMM GRANULOCYTES NFR BLD AUTO: 0.3 % (ref 0–0.5)
IRON 24H UR-MRATE: 47 MCG/DL (ref 37–145)
IRON SATN MFR SERPL: 10 % (ref 20–50)
LYMPHOCYTES # BLD AUTO: 1.23 10*3/MM3 (ref 0.7–3.1)
LYMPHOCYTES NFR BLD AUTO: 32.2 % (ref 19.6–45.3)
MCH RBC QN AUTO: 26.6 PG (ref 26.6–33)
MCHC RBC AUTO-ENTMCNC: 32.8 G/DL (ref 31.5–35.7)
MCV RBC AUTO: 80.9 FL (ref 79–97)
MONOCYTES # BLD AUTO: 0.31 10*3/MM3 (ref 0.1–0.9)
MONOCYTES NFR BLD AUTO: 8.1 % (ref 5–12)
NEUTROPHILS NFR BLD AUTO: 2.16 10*3/MM3 (ref 1.7–7)
NEUTROPHILS NFR BLD AUTO: 56.5 % (ref 42.7–76)
PLATELET # BLD AUTO: 195 10*3/MM3 (ref 140–450)
PMV BLD AUTO: 9.9 FL (ref 6–12)
RBC # BLD AUTO: 4.14 10*6/MM3 (ref 3.77–5.28)
TIBC SERPL-MCNC: 457 MCG/DL (ref 298–536)
TRANSFERRIN SERPL-MCNC: 307 MG/DL (ref 200–360)
WBC NRBC COR # BLD AUTO: 3.82 10*3/MM3 (ref 3.4–10.8)

## 2024-01-04 PROCEDURE — 83540 ASSAY OF IRON: CPT | Performed by: NURSE PRACTITIONER

## 2024-01-04 PROCEDURE — 85025 COMPLETE CBC W/AUTO DIFF WBC: CPT | Performed by: NURSE PRACTITIONER

## 2024-01-04 PROCEDURE — 84466 ASSAY OF TRANSFERRIN: CPT | Performed by: NURSE PRACTITIONER

## 2024-01-04 PROCEDURE — 36591 DRAW BLOOD OFF VENOUS DEVICE: CPT

## 2024-01-04 PROCEDURE — 25010000002 HEPARIN LOCK FLUSH PER 10 UNITS: Performed by: INTERNAL MEDICINE

## 2024-01-04 PROCEDURE — 82728 ASSAY OF FERRITIN: CPT | Performed by: NURSE PRACTITIONER

## 2024-01-04 RX ORDER — HEPARIN SODIUM (PORCINE) LOCK FLUSH IV SOLN 100 UNIT/ML 100 UNIT/ML
500 SOLUTION INTRAVENOUS AS NEEDED
Status: DISCONTINUED | OUTPATIENT
Start: 2024-01-04 | End: 2024-01-05 | Stop reason: HOSPADM

## 2024-01-04 RX ORDER — SODIUM CHLORIDE 0.9 % (FLUSH) 0.9 %
10 SYRINGE (ML) INJECTION AS NEEDED
OUTPATIENT
Start: 2024-01-04

## 2024-01-04 RX ORDER — HEPARIN SODIUM (PORCINE) LOCK FLUSH IV SOLN 100 UNIT/ML 100 UNIT/ML
500 SOLUTION INTRAVENOUS AS NEEDED
OUTPATIENT
Start: 2024-01-04

## 2024-01-04 RX ORDER — SODIUM CHLORIDE 0.9 % (FLUSH) 0.9 %
20 SYRINGE (ML) INJECTION AS NEEDED
OUTPATIENT
Start: 2024-01-04

## 2024-01-04 RX ADMIN — HEPARIN 500 UNITS: 100 SYRINGE at 13:35

## 2024-01-05 ENCOUNTER — TELEPHONE (OUTPATIENT)
Dept: ONCOLOGY | Facility: CLINIC | Age: 59
End: 2024-01-05
Payer: MEDICAID

## 2024-01-05 DIAGNOSIS — K90.9 IRON MALABSORPTION: Primary | ICD-10-CM

## 2024-01-05 DIAGNOSIS — D50.8 OTHER IRON DEFICIENCY ANEMIA: ICD-10-CM

## 2024-01-05 RX ORDER — SODIUM CHLORIDE 9 MG/ML
20 INJECTION, SOLUTION INTRAVENOUS ONCE
OUTPATIENT
Start: 2024-01-12

## 2024-01-05 RX ORDER — SODIUM CHLORIDE 9 MG/ML
20 INJECTION, SOLUTION INTRAVENOUS ONCE
OUTPATIENT
Start: 2024-01-19

## 2024-01-05 NOTE — TELEPHONE ENCOUNTER
Left message for patient that her hemoglobin is down to 11.0 and iron saturation 10%.  We will get her set up for IV Feraheme next week.  I will have our  contact her with appointment date and time.

## 2024-01-17 ENCOUNTER — TELEPHONE (OUTPATIENT)
Dept: FAMILY MEDICINE CLINIC | Facility: CLINIC | Age: 59
End: 2024-01-17
Payer: MEDICAID

## 2024-01-17 ENCOUNTER — HOSPITAL ENCOUNTER (OUTPATIENT)
Dept: ONCOLOGY | Facility: HOSPITAL | Age: 59
Discharge: HOME | End: 2024-01-17
Admitting: NURSE PRACTITIONER
Payer: MEDICAID

## 2024-01-17 VITALS
SYSTOLIC BLOOD PRESSURE: 114 MMHG | HEART RATE: 93 BPM | WEIGHT: 160 LBS | DIASTOLIC BLOOD PRESSURE: 69 MMHG | BODY MASS INDEX: 25.11 KG/M2 | RESPIRATION RATE: 18 BRPM | TEMPERATURE: 97.4 F | HEIGHT: 67 IN

## 2024-01-17 DIAGNOSIS — D50.8 OTHER IRON DEFICIENCY ANEMIA: ICD-10-CM

## 2024-01-17 DIAGNOSIS — K90.9 IRON MALABSORPTION: Primary | ICD-10-CM

## 2024-01-17 DIAGNOSIS — Z95.828 PORT-A-CATH IN PLACE: ICD-10-CM

## 2024-01-17 DIAGNOSIS — K31.84 GASTROPARESIS: ICD-10-CM

## 2024-01-17 PROCEDURE — 25010000002 HEPARIN LOCK FLUSH PER 10 UNITS: Performed by: INTERNAL MEDICINE

## 2024-01-17 PROCEDURE — 96365 THER/PROPH/DIAG IV INF INIT: CPT

## 2024-01-17 PROCEDURE — 25810000003 SODIUM CHLORIDE 0.9 % SOLUTION: Performed by: NURSE PRACTITIONER

## 2024-01-17 PROCEDURE — 25010000002 FERUMOXYTOL 510 MG/17ML SOLUTION 17 ML VIAL: Performed by: NURSE PRACTITIONER

## 2024-01-17 PROCEDURE — 96374 THER/PROPH/DIAG INJ IV PUSH: CPT

## 2024-01-17 RX ORDER — SODIUM CHLORIDE 9 MG/ML
20 INJECTION, SOLUTION INTRAVENOUS ONCE
Status: COMPLETED | OUTPATIENT
Start: 2024-01-17 | End: 2024-01-17

## 2024-01-17 RX ORDER — HEPARIN SODIUM (PORCINE) LOCK FLUSH IV SOLN 100 UNIT/ML 100 UNIT/ML
500 SOLUTION INTRAVENOUS AS NEEDED
Status: DISCONTINUED | OUTPATIENT
Start: 2024-01-17 | End: 2024-01-18 | Stop reason: HOSPADM

## 2024-01-17 RX ORDER — SODIUM CHLORIDE 0.9 % (FLUSH) 0.9 %
10 SYRINGE (ML) INJECTION AS NEEDED
OUTPATIENT
Start: 2024-01-17

## 2024-01-17 RX ORDER — HEPARIN SODIUM (PORCINE) LOCK FLUSH IV SOLN 100 UNIT/ML 100 UNIT/ML
500 SOLUTION INTRAVENOUS AS NEEDED
OUTPATIENT
Start: 2024-01-17

## 2024-01-17 RX ORDER — SODIUM CHLORIDE 0.9 % (FLUSH) 0.9 %
20 SYRINGE (ML) INJECTION AS NEEDED
OUTPATIENT
Start: 2024-01-17

## 2024-01-17 RX ADMIN — SODIUM CHLORIDE 20 ML/HR: 9 INJECTION, SOLUTION INTRAVENOUS at 14:46

## 2024-01-17 RX ADMIN — FERUMOXYTOL 510 MG: 510 INJECTION INTRAVENOUS at 14:48

## 2024-01-17 RX ADMIN — HEPARIN 500 UNITS: 100 SYRINGE at 15:34

## 2024-01-17 NOTE — TELEPHONE ENCOUNTER
Pt called stating that she has been having pain on her left side starting at her hips and radiating downward. She has numbness and tingling on her right side which includes her face. When she bends down, her ears ring. She states this has been going on for about a month. She believes it has to do with some disc damage to her back. She was advised to go to the ER but wanted to make an appt with Dr. Gao as well. She has been scheduled for tomorrow. 1/18.

## 2024-01-18 ENCOUNTER — LAB (OUTPATIENT)
Dept: LAB | Facility: HOSPITAL | Age: 59
End: 2024-01-18
Payer: MEDICAID

## 2024-01-18 ENCOUNTER — OFFICE VISIT (OUTPATIENT)
Dept: FAMILY MEDICINE CLINIC | Facility: CLINIC | Age: 59
End: 2024-01-18
Payer: MEDICAID

## 2024-01-18 ENCOUNTER — HOSPITAL ENCOUNTER (OUTPATIENT)
Dept: GENERAL RADIOLOGY | Facility: HOSPITAL | Age: 59
Discharge: HOME | End: 2024-01-18
Payer: MEDICAID

## 2024-01-18 ENCOUNTER — TELEPHONE (OUTPATIENT)
Dept: FAMILY MEDICINE CLINIC | Facility: CLINIC | Age: 59
End: 2024-01-18

## 2024-01-18 ENCOUNTER — HOSPITAL ENCOUNTER (OUTPATIENT)
Dept: CT IMAGING | Facility: HOSPITAL | Age: 59
Discharge: HOME | End: 2024-01-18
Payer: MEDICAID

## 2024-01-18 VITALS
BODY MASS INDEX: 25.58 KG/M2 | OXYGEN SATURATION: 100 % | SYSTOLIC BLOOD PRESSURE: 114 MMHG | WEIGHT: 159.2 LBS | HEIGHT: 66 IN | DIASTOLIC BLOOD PRESSURE: 72 MMHG | HEART RATE: 102 BPM

## 2024-01-18 DIAGNOSIS — R73.03 PRE-DIABETES: ICD-10-CM

## 2024-01-18 DIAGNOSIS — R20.0 RIGHT FACIAL NUMBNESS: ICD-10-CM

## 2024-01-18 DIAGNOSIS — R20.0 RIGHT ARM NUMBNESS: ICD-10-CM

## 2024-01-18 DIAGNOSIS — R29.898 RIGHT ARM WEAKNESS: ICD-10-CM

## 2024-01-18 DIAGNOSIS — M54.42 ACUTE LEFT-SIDED LOW BACK PAIN WITH LEFT-SIDED SCIATICA: ICD-10-CM

## 2024-01-18 DIAGNOSIS — R20.0 RIGHT ARM NUMBNESS: Primary | ICD-10-CM

## 2024-01-18 PROCEDURE — 83036 HEMOGLOBIN GLYCOSYLATED A1C: CPT

## 2024-01-18 PROCEDURE — 36415 COLL VENOUS BLD VENIPUNCTURE: CPT

## 2024-01-18 PROCEDURE — 85027 COMPLETE CBC AUTOMATED: CPT

## 2024-01-18 PROCEDURE — 70450 CT HEAD/BRAIN W/O DYE: CPT

## 2024-01-18 PROCEDURE — 72040 X-RAY EXAM NECK SPINE 2-3 VW: CPT

## 2024-01-18 PROCEDURE — 80053 COMPREHEN METABOLIC PANEL: CPT

## 2024-01-18 PROCEDURE — 99214 OFFICE O/P EST MOD 30 MIN: CPT | Performed by: INTERNAL MEDICINE

## 2024-01-18 RX ORDER — TRAMADOL HYDROCHLORIDE 50 MG/1
50 TABLET ORAL EVERY 8 HOURS PRN
Qty: 60 TABLET | Refills: 0 | Status: SHIPPED | OUTPATIENT
Start: 2024-01-18

## 2024-01-18 NOTE — PROGRESS NOTES
"Geneva Haro  1965  [id number]  Patient Care Team:  Cesar Gao MD as PCP - General (Internal Medicine)  Darion Figueroa MD as Consulting Physician (Gastroenterology)  Jazlyn Maguire APRN as Nurse Practitioner (Nurse Practitioner)    Geneva Haro is a 58 y.o. female here today for follow up.     This patient is accompanied by their self who contributes to the history of their care.    Chief Complaint:    Chief Complaint   Patient presents with    Numbness     Tingling in Rt side and Rt side of face.     Back Pain     Radiates down Lt leg        History of Present Illness:  I have reviewed and/or updated the patient's past medical, past surgical, family, social history, problem list and allergies as appropriate.     She reports right facial numbness for 2-3 weeks. Awoke one morning with it. She denies any drooling, eye droop smile changes. Numbness extends from right eye to level of right lower lip. Denies dysphagia She has had chronic right arm tingling x 3 month. There is left sided neck pain.. denies any headaches or visual changes.   She reports right sided tingling and slight rue weakness for one month. Feels as if her whole arm is asleep. Eases with elevation above head Started with pains in right arm. She denies any right leg pain, weakness or tingling.    She reports lower back pain radiating into left leg. Pain radiates to level of knee. Worsens with sitting on , standing and walking too long. she has noted no weakness.she does not trip or drag her foot. She has had issues  with being off balance for a couple of months. She uses pillow to prop herself up getting some relief.      Review of Systems    Vitals:    01/18/24 1336   BP: 114/72   Pulse: 102   SpO2: 100%   Weight: 72.2 kg (159 lb 3.2 oz)   Height: 168.9 cm (66.5\")   PainSc:   7   PainLoc: Back     Body mass index is 25.31 kg/m².    Physical Exam  Constitutional:       General: She is in acute distress.   HENT:      " Right Ear: Tympanic membrane normal.      Left Ear: Tympanic membrane normal.      Nose: Nose normal.      Mouth/Throat:      Mouth: Mucous membranes are moist.      Pharynx: Oropharynx is clear.   Eyes:      General:         Right eye: No discharge.         Left eye: No discharge.      Conjunctiva/sclera: Conjunctivae normal.      Pupils: Pupils are equal, round, and reactive to light.   Cardiovascular:      Rate and Rhythm: Normal rate and regular rhythm.   Pulmonary:      Effort: Pulmonary effort is normal.   Abdominal:      General: There is no distension.      Tenderness: There is no abdominal tenderness. There is no right CVA tenderness or guarding.   Musculoskeletal:      Comments: She has somewhat limited range of motion with neck rotation AB flexion extension.  Upper extremity strength is 5 proximally distally flexor extensor compartments.  She does however have a right pronator drift.   strength normal.  There is a positive Tinel's sign.  Left lower extremity shows negative straight leg raise.  She does have 4 out of 5 dorsi flexion of her left foot.  1+ patellar reflex on the left 2+ on the right.  JEN ER negative.  She is tender to palpation in the lower lumbar region with some paraspinous guarding.   Neurological:      Mental Status: She is oriented to person, place, and time.      Sensory: Sensory deficit present.      Coordination: Coordination normal.      Deep Tendon Reflexes: Reflexes abnormal.      Comments: Cranial nerves II through XII appear intact with the exception of the V1 through 3 diminished sensation on the right   Psychiatric:         Mood and Affect: Mood normal.         Behavior: Behavior normal.         Procedures    Results Review:    None    Assessment/Plan:    Problem List Items Addressed This Visit    None  Visit Diagnoses       Right arm numbness    -  Primary    Relevant Orders    Ambulatory Referral to Neurology    XR Spine Cervical 2 or 3 View    MRI Brain Without  Contrast    EMG & Nerve Conduction Test    CT Head Without Contrast    Acute left-sided low back pain with left-sided sciatica        Relevant Medications    traMADol (ULTRAM) 50 MG tablet    Other Relevant Orders    XR Spine Lumbar 2 or 3 View    Ambulatory Referral to Physical Therapy Evaluate and treat    EMG & Nerve Conduction Test    Right facial numbness        Relevant Orders    Ambulatory Referral to Neurology    MRI Brain Without Contrast    CBC (No Diff)    Hemoglobin A1c    CT Head Without Contrast    Right arm weakness        Relevant Orders    Ambulatory Referral to Neurology    XR Spine Cervical 2 or 3 View    MRI Brain Without Contrast    EMG & Nerve Conduction Test    CT Head Without Contrast    Pre-diabetes        Relevant Orders    Comprehensive Metabolic Panel    CBC (No Diff)    Hemoglobin A1c        Send urgent CT to rule out structural lesions and bleeding.  Also requested MRI of her spine.  The etiology of her symptoms is unknown clear at present.  Neurologic referral as well as EMG of the right upper extremity and left lower extremity have been requested.  Labs as above as well as plain films of her neck and lumbar region.  MRIs of her C and L-spine may be coming.  Other recommendations once CT is resulted.    Plan of care reviewed with patient at the conclusion of today's visit. Education was provided regarding diagnosis and management.  Patient verbalizes understanding of and agreement with management plan.    Return if symptoms worsen or fail to improve, for Follow up after testing.    Cesar Gao MD      Please note than portions of this note were completed [facility] a Voice Recognition Program

## 2024-01-18 NOTE — TELEPHONE ENCOUNTER
Caller: Geneva Haro    Relationship: Self    Best call back number: [phone]     Which medication are you concerned about: traMADol (ULTRAM) 50 MG tablet     Who prescribed you this medication: Cesar Penn MD     When did you start taking this medication: NA    What are your concerns: PHARMACY TOLD PATIENT SHE NEEDS A PRIOR AUTHORIZATION FROM DR PENN FOR THIS MEDICATION     How long have you had these concerns: 1/18/24

## 2024-01-18 NOTE — TELEPHONE ENCOUNTER
Unable to reach Geneva WHITLOCK Estrellita by phone or leave message.    Hub may relay message and document.    Cesar Gao MD  [facility]  Clinical Pool15 hours ago (4:38 PM)     WB  Sounds urgent. May need imaging. Could better get sooner at hospital   Dr. Gao recommends patient to be seen at the ER more urgently due to her sx.

## 2024-01-18 NOTE — PROGRESS NOTES
ET showed no evidence of bleeding or masses.  No evidence of stroke.  Await MRI and neurologic evaluation

## 2024-01-19 ENCOUNTER — PRIOR AUTHORIZATION (OUTPATIENT)
Dept: FAMILY MEDICINE CLINIC | Facility: CLINIC | Age: 59
End: 2024-01-19
Payer: MEDICAID

## 2024-01-19 ENCOUNTER — TELEPHONE (OUTPATIENT)
Dept: FAMILY MEDICINE CLINIC | Facility: CLINIC | Age: 59
End: 2024-01-19
Payer: MEDICAID

## 2024-01-19 LAB
ALBUMIN SERPL-MCNC: 4 G/DL (ref 3.5–5.2)
ALBUMIN/GLOB SERPL: 1.3 G/DL
ALP SERPL-CCNC: 138 U/L (ref 39–117)
ALT SERPL W P-5'-P-CCNC: 12 U/L (ref 1–33)
ANION GAP SERPL CALCULATED.3IONS-SCNC: 7.4 MMOL/L (ref 5–15)
AST SERPL-CCNC: 21 U/L (ref 1–32)
BILIRUB SERPL-MCNC: 0.3 MG/DL (ref 0–1.2)
BUN SERPL-MCNC: 11 MG/DL (ref 6–20)
BUN/CREAT SERPL: 9.1 (ref 7–25)
CALCIUM SPEC-SCNC: 10 MG/DL (ref 8.6–10.5)
CHLORIDE SERPL-SCNC: 107 MMOL/L (ref 98–107)
CO2 SERPL-SCNC: 26.6 MMOL/L (ref 22–29)
CREAT SERPL-MCNC: 1.21 MG/DL (ref 0.57–1)
DEPRECATED RDW RBC AUTO: 41.2 FL (ref 37–54)
EGFRCR SERPLBLD CKD-EPI 2021: 52.1 ML/MIN/1.73
ERYTHROCYTE [DISTWIDTH] IN BLOOD BY AUTOMATED COUNT: 14.2 % (ref 12.3–15.4)
GLOBULIN UR ELPH-MCNC: 3 GM/DL
GLUCOSE SERPL-MCNC: 108 MG/DL (ref 65–99)
HBA1C MFR BLD: 5.9 % (ref 4.8–5.6)
HCT VFR BLD AUTO: 38.1 % (ref 34–46.6)
HGB BLD-MCNC: 12.5 G/DL (ref 12–15.9)
MCH RBC QN AUTO: 26.8 PG (ref 26.6–33)
MCHC RBC AUTO-ENTMCNC: 32.8 G/DL (ref 31.5–35.7)
MCV RBC AUTO: 81.8 FL (ref 79–97)
PLATELET # BLD AUTO: 207 10*3/MM3 (ref 140–450)
PMV BLD AUTO: 9.6 FL (ref 6–12)
POTASSIUM SERPL-SCNC: 4.3 MMOL/L (ref 3.5–5.2)
PROT SERPL-MCNC: 7 G/DL (ref 6–8.5)
RBC # BLD AUTO: 4.66 10*6/MM3 (ref 3.77–5.28)
SODIUM SERPL-SCNC: 141 MMOL/L (ref 136–145)
WBC NRBC COR # BLD AUTO: 3.4 10*3/MM3 (ref 3.4–10.8)

## 2024-01-19 NOTE — TELEPHONE ENCOUNTER
----- Message from Cesar Gao MD sent at 1/19/2024  8:54 AM EST -----  Cervical spine x-ray shows likely arthritis changes possibly encroaching on exiting nerve roots.  This could be causing her arm pain.  May need MRI.    CT showed no evidence of bleeding or masses.  No evidence of stroke.  Await MRI and neurologic evaluation     Blood work looks okay.  A1c stable, renal function improved.     Unable to reach Geneva Haro and/or leave message to return my call.    Please relay message and document.

## 2024-01-19 NOTE — TELEPHONE ENCOUNTER
Per medicarley PA has been dismissed  the medication is not covered under medicaid pharmacy benefit. It is covered under Part D only.

## 2024-01-19 NOTE — PROGRESS NOTES
Cervical spine x-ray shows likely arthritis changes possibly encroaching on exiting nerve roots.  This could be causing her arm pain.  May need MRI.

## 2024-01-23 NOTE — TELEPHONE ENCOUNTER
Letter sent    ----- Message from Cesar Gao MD sent at 1/19/2024  8:54 AM EST -----  Cervical spine x-ray shows likely arthritis changes possibly encroaching on exiting nerve roots.  This could be causing her arm pain.  May need MRI.     CT showed no evidence of bleeding or masses.  No evidence of stroke.  Await MRI and neurologic evaluation      Blood work looks okay.  A1c stable, renal function improved.      Unable to reach Geneva Haro and/or leave message to return my call.     Please relay message and document.

## 2024-01-24 ENCOUNTER — HOSPITAL ENCOUNTER (OUTPATIENT)
Dept: ONCOLOGY | Facility: HOSPITAL | Age: 59
Discharge: HOME | End: 2024-01-24
Payer: MEDICAID

## 2024-02-08 ENCOUNTER — HOSPITAL ENCOUNTER (OUTPATIENT)
Dept: ONCOLOGY | Facility: HOSPITAL | Age: 59
Discharge: HOME | End: 2024-02-08
Payer: MEDICAID

## 2024-02-08 VITALS
DIASTOLIC BLOOD PRESSURE: 89 MMHG | HEART RATE: 87 BPM | SYSTOLIC BLOOD PRESSURE: 122 MMHG | WEIGHT: 158 LBS | BODY MASS INDEX: 24.8 KG/M2 | HEIGHT: 67 IN | TEMPERATURE: 97.7 F | RESPIRATION RATE: 20 BRPM

## 2024-02-08 DIAGNOSIS — K90.9 IRON MALABSORPTION: Primary | ICD-10-CM

## 2024-02-08 DIAGNOSIS — K31.84 GASTROPARESIS: ICD-10-CM

## 2024-02-08 DIAGNOSIS — Z95.828 PORT-A-CATH IN PLACE: ICD-10-CM

## 2024-02-08 DIAGNOSIS — D50.8 OTHER IRON DEFICIENCY ANEMIA: ICD-10-CM

## 2024-02-08 PROCEDURE — 25010000002 HEPARIN LOCK FLUSH PER 10 UNITS: Performed by: INTERNAL MEDICINE

## 2024-02-08 PROCEDURE — 25810000003 SODIUM CHLORIDE 0.9 % SOLUTION: Performed by: NURSE PRACTITIONER

## 2024-02-08 PROCEDURE — 96365 THER/PROPH/DIAG IV INF INIT: CPT

## 2024-02-08 PROCEDURE — 96374 THER/PROPH/DIAG INJ IV PUSH: CPT

## 2024-02-08 PROCEDURE — 25010000002 FERUMOXYTOL 510 MG/17ML SOLUTION 17 ML VIAL: Performed by: NURSE PRACTITIONER

## 2024-02-08 RX ORDER — SODIUM CHLORIDE 9 MG/ML
20 INJECTION, SOLUTION INTRAVENOUS ONCE
Status: COMPLETED | OUTPATIENT
Start: 2024-02-08 | End: 2024-02-08

## 2024-02-08 RX ORDER — HEPARIN SODIUM (PORCINE) LOCK FLUSH IV SOLN 100 UNIT/ML 100 UNIT/ML
500 SOLUTION INTRAVENOUS AS NEEDED
Status: DISCONTINUED | OUTPATIENT
Start: 2024-02-08 | End: 2024-02-09 | Stop reason: HOSPADM

## 2024-02-08 RX ADMIN — FERUMOXYTOL 510 MG: 510 INJECTION INTRAVENOUS at 15:04

## 2024-02-08 RX ADMIN — HEPARIN 500 UNITS: 100 SYRINGE at 16:00

## 2024-02-08 RX ADMIN — SODIUM CHLORIDE 20 ML/HR: 9 INJECTION, SOLUTION INTRAVENOUS at 15:04

## 2024-03-06 ENCOUNTER — TELEPHONE (OUTPATIENT)
Dept: FAMILY MEDICINE CLINIC | Facility: CLINIC | Age: 59
End: 2024-03-06
Payer: MEDICAID

## 2024-03-06 NOTE — TELEPHONE ENCOUNTER
Caller: HaroGeneva    Relationship: Self    Best call back number: [phone]     What is the best time to reach you: ANY    Who are you requesting to speak with (clinical staff, provider,  specific staff member): CLINICAL     What was the call regarding:     traMADol (ULTRAM) 50 MG tablet     PATIENT STATED THAT SHE HAS BEEN WAITING OVER A MONTH FOR A PRIOR AUTHORIZATION FOR THIS MEDICATION AND HAVE NOT HEARD BACK .   PLEASE CALL AND ADVISE PATIENT ON WHAT IS GOING ON WITH THIS MEDICATION AND WHAT NEXT STEPS NEEDS TO BE DONE.     PATIENT IS ALSO WONDERING ABOUT HER ALLERGIE MEDICATION BUT WAS UNSURE OF THE NAME AND WANTED TO DISCUSS THIS AS WELL.       Is it okay if the provider responds through [employer]: NO, PLEASE CALL

## 2024-03-19 ENCOUNTER — HOSPITAL ENCOUNTER (OUTPATIENT)
Dept: NEUROLOGY | Facility: HOSPITAL | Age: 59
Discharge: HOME | End: 2024-03-19
Admitting: INTERNAL MEDICINE
Payer: MEDICAID

## 2024-03-19 DIAGNOSIS — R29.898 RIGHT ARM WEAKNESS: ICD-10-CM

## 2024-03-19 DIAGNOSIS — M54.42 ACUTE LEFT-SIDED LOW BACK PAIN WITH LEFT-SIDED SCIATICA: ICD-10-CM

## 2024-03-19 DIAGNOSIS — R20.0 RIGHT ARM NUMBNESS: ICD-10-CM

## 2024-03-19 PROCEDURE — 95911 NRV CNDJ TEST 9-10 STUDIES: CPT

## 2024-03-19 PROCEDURE — 95886 MUSC TEST DONE W/N TEST COMP: CPT

## 2024-03-19 NOTE — PROGRESS NOTES
Nerve test essentially normal with the exception of very mild right carpal tunnel syndrome. She can pickup a carpal tunnel splint at any pharmacy

## 2024-03-29 RX ORDER — CYCLOBENZAPRINE HCL 10 MG
10 TABLET ORAL 3 TIMES DAILY PRN
Qty: 90 TABLET | Refills: 0 | Status: SHIPPED | OUTPATIENT
Start: 2024-03-29

## 2024-04-09 ENCOUNTER — LAB (OUTPATIENT)
Dept: LAB | Facility: HOSPITAL | Age: 59
End: 2024-04-09
Payer: MEDICAID

## 2024-04-09 ENCOUNTER — OFFICE VISIT (OUTPATIENT)
Dept: NEUROLOGY | Facility: CLINIC | Age: 59
End: 2024-04-09
Payer: MEDICAID

## 2024-04-09 VITALS
DIASTOLIC BLOOD PRESSURE: 68 MMHG | OXYGEN SATURATION: 99 % | HEART RATE: 97 BPM | HEIGHT: 66 IN | BODY MASS INDEX: 24.01 KG/M2 | SYSTOLIC BLOOD PRESSURE: 116 MMHG | WEIGHT: 149.4 LBS

## 2024-04-09 DIAGNOSIS — R20.2 PARESTHESIA: Primary | ICD-10-CM

## 2024-04-09 DIAGNOSIS — G56.01 CARPAL TUNNEL SYNDROME, RIGHT: ICD-10-CM

## 2024-04-09 DIAGNOSIS — R20.2 PARESTHESIA: ICD-10-CM

## 2024-04-09 PROCEDURE — 83825 ASSAY OF MERCURY: CPT

## 2024-04-09 PROCEDURE — 86053 AQAPRN-4 ANTB FLO CYTMTRY EA: CPT

## 2024-04-09 PROCEDURE — 86364 TISS TRNSGLTMNASE EA IG CLAS: CPT

## 2024-04-09 PROCEDURE — 82300 ASSAY OF CADMIUM: CPT

## 2024-04-09 PROCEDURE — 84155 ASSAY OF PROTEIN SERUM: CPT

## 2024-04-09 PROCEDURE — 36415 COLL VENOUS BLD VENIPUNCTURE: CPT

## 2024-04-09 PROCEDURE — 82164 ANGIOTENSIN I ENZYME TEST: CPT

## 2024-04-09 PROCEDURE — 85652 RBC SED RATE AUTOMATED: CPT

## 2024-04-09 PROCEDURE — 82175 ASSAY OF ARSENIC: CPT

## 2024-04-09 PROCEDURE — 82607 VITAMIN B-12: CPT

## 2024-04-09 PROCEDURE — 86231 EMA EACH IG CLASS: CPT

## 2024-04-09 PROCEDURE — 82746 ASSAY OF FOLIC ACID SERUM: CPT

## 2024-04-09 PROCEDURE — 82550 ASSAY OF CK (CPK): CPT

## 2024-04-09 PROCEDURE — 82595 ASSAY OF CRYOGLOBULIN: CPT

## 2024-04-09 PROCEDURE — 82570 ASSAY OF URINE CREATININE: CPT

## 2024-04-09 PROCEDURE — 84165 PROTEIN E-PHORESIS SERUM: CPT

## 2024-04-09 PROCEDURE — 82784 ASSAY IGA/IGD/IGG/IGM EACH: CPT

## 2024-04-09 PROCEDURE — 82306 VITAMIN D 25 HYDROXY: CPT

## 2024-04-09 PROCEDURE — 83655 ASSAY OF LEAD: CPT

## 2024-04-09 PROCEDURE — 86334 IMMUNOFIX E-PHORESIS SERUM: CPT

## 2024-04-09 PROCEDURE — 86431 RHEUMATOID FACTOR QUANT: CPT

## 2024-04-09 PROCEDURE — 86140 C-REACTIVE PROTEIN: CPT

## 2024-04-09 PROCEDURE — 86362 MOG-IGG1 ANTB CBA EACH: CPT

## 2024-04-09 PROCEDURE — 99214 OFFICE O/P EST MOD 30 MIN: CPT | Performed by: NURSE PRACTITIONER

## 2024-04-09 PROCEDURE — 86617 LYME DISEASE ANTIBODY: CPT

## 2024-04-09 RX ORDER — DULOXETIN HYDROCHLORIDE 60 MG/1
60 CAPSULE, DELAYED RELEASE ORAL DAILY
Qty: 90 CAPSULE | Refills: 1 | Status: SHIPPED | OUTPATIENT
Start: 2024-04-09

## 2024-04-09 NOTE — PROGRESS NOTES
Neuro Office Visit      Encounter Date: 2024   Patient Name: Geneva Haro  : 1965   MRN: [id number]   PCP: Dr Gao  Chief Complaint:    Chief Complaint   Patient presents with    Numbness       History of Present Illness: Geneva Haro is a 59 y.o. female who is here today in Neurology for  numbness.    Paresthesia  Right facial numbness and tingling started 2024. Woke up with symptoms. Right eye to right lip. No facial weakness or drooling. Denies dysphagia. Chronic right arm tingling for 3 months. Left sided neck pain. Denies headaches and vision changes.  Right side tingling and RUE weakness for one month. Whole arm feels alseep. Better with elevation of arm.  Symptoms started on left arm and hand and scapula in the last month.  Symptoms will develop into stabbing and cramping type pain by the end of the day  Poor balance for 2 months.    Denies vision changes, slurred speech, dysphagia, incontinence, constipation, falls, change in gait, change in mood.     MRI brain-ins would not cover.  CT Head Without Contrast (2024 15:19)-neg  EMG & Nerve Conduction Test (2024 14:00)-Median neuropathy at the wrist on the right, exceedingly mild (carpal tunnel)     No evidence for radiculopathy is seen in the right arm     Normal NCS/EMG of the left leg and back    CT Cervical Spine With Intrathecal Contrast (2021 08:16)-IMPRESSION:  Overall mild cervical spondylosis change present, most  notably involving the lower cervical levels as above with areas of  mild-to-moderate spinal canal and neuroforaminal involvemen.    Takind duloxetine 30 for fibromyalgia      Labs: A1c 5.9, cbc, cmp 52.1,     Has gastric stimulator cannot have MRI.    PMH: pre-diabetes, gastroparesis, GERD, anemia, breast cancer 2014 tx'd with chemo x1. No radiation. Anxiety and depression, fibromyalgia, m  FH:-MS  SH:marijuana use + tob  Subjective      Past Medical History:   Past Medical History:    Diagnosis Date    Allergic At a young age    Anxiety     Arthritis     Arthritis of back 2000    Cancer     Depression     Diabetes mellitus     Fibromyalgia, primary 33 years ago    Gall stones     GERD (gastroesophageal reflux disease) 2008    Hearing loss     Hernia, hiatal     Hip arthrosis     History of stomach ulcers     History of transfusion     Knee swelling     Periarthritis of shoulder     Stomach problems     Ulcer of bile duct        Past Surgical History:   Past Surgical History:   Procedure Laterality Date    BREAST SURGERY      CHOLECYSTECTOMY      CORONARY ARTERY BYPASS GRAFT      ENDOSCOPY N/A 06/20/2018    Procedure: ESOPHAGOGASTRODUODENOSCOPY;  Surgeon: Mark I Brunner, MD;  Location:  [facility];  Service: Gastroenterology    GASTRIC BYPASS      x2    GASTRIC STIMULATOR IMPLANT SURGERY      HAND SURGERY  1992    HERNIA REPAIR      HERNIA REPAIR      INTERVENTIONAL RADIOLOGY PROCEDURE Bilateral 07/22/2021    Procedure: IR myelogram, cervical;  Surgeon: Jose Angel Sauer MD;  Location:  [facility];  Service: Interventional Radiology;  Laterality: Bilateral;    LYMPH NODE BIOPSY      MASTECTOMY Bilateral     Left With sentinel lymph node sampling and prophylactic right mastectomy    PACEMAKER IMPLANTATION      PORTACATH PLACEMENT      SMALL INTESTINE SURGERY         Family History:   Family History   Problem Relation Age of Onset    Breast cancer Maternal Aunt     Cancer Maternal Aunt     Pancreatic cancer Cousin     Breast cancer Cousin     Breast cancer Cousin     Heart attack Mother     Hypertension Mother     COPD Sister        Social History:   Social History     Socioeconomic History    Marital status:    Tobacco Use    Smoking status: Former     Current packs/day: 0.00     Average packs/day: 0.3 packs/day for 35.0 years (8.8 ttl pk-yrs)     Types: Cigarettes     Start date: 1/1/1986     Quit date: 1/1/2021     Years since quitting: 3.2    Smokeless tobacco:  Never   Vaping Use    Vaping status: Never Used   Substance and Sexual Activity    Alcohol use: No    Drug use: Yes     Frequency: 6.0 times per week     Types: Marijuana    Sexual activity: Not Currently     Partners: Male     Birth control/protection: Abstinence, Post-menopausal, None, Tubal ligation       Medications:     Current Outpatient Medications:     cetirizine (zyrTEC) 10 MG tablet, TAKE 1 TABLET BY MOUTH EVERY DAY, Disp: 90 tablet, Rfl: 0    citalopram (CeleXA) 20 MG tablet, Take 1 tablet by mouth Daily., Disp: 90 tablet, Rfl: 3    cyclobenzaprine (FLEXERIL) 10 MG tablet, TAKE 1 TABLET BY MOUTH THREE TIMES A DAY AS NEEDED FOR MUSCLE SPASM, Disp: 90 tablet, Rfl: 0    esomeprazole (nexIUM) 40 MG capsule, TAKE 1 CAPSULE BY MOUTH TWICE A DAY, Disp: 180 capsule, Rfl: 1    folic acid (FOLVITE) 1 MG tablet, Take 1 tablet by mouth Daily., Disp: 90 tablet, Rfl: 3    ondansetron (ZOFRAN) 8 MG tablet, Take 1 tablet by mouth Every 8 (Eight) Hours As Needed for Nausea or Vomiting., Disp: 30 tablet, Rfl: 9    prochlorperazine (COMPAZINE) 10 MG tablet, Take 1 tablet by mouth Every 6 (Six) Hours As Needed for Nausea or Vomiting., Disp: 30 tablet, Rfl: 9    promethazine (PHENERGAN) 25 MG tablet, Take 1 tablet by mouth Every 6 (Six) Hours As Needed for Nausea or Vomiting., Disp: 120 tablet, Rfl: 1    promethazine (PHENERGAN) 6.25 MG/5ML solution oral solution, Take 20 mL by mouth Every 6 (Six) Hours As Needed (n/v)., Disp: 1200 mL, Rfl: 3    traMADol (ULTRAM) 50 MG tablet, Take 1 tablet by mouth Every 8 (Eight) Hours As Needed for Moderate Pain., Disp: 60 tablet, Rfl: 0    traZODone (DESYREL) 50 MG tablet, Take 1 tablet by mouth every night at bedtime., Disp: 90 tablet, Rfl: 3    DULoxetine (Cymbalta) 60 MG capsule, Take 1 capsule by mouth Daily., Disp: 90 capsule, Rfl: 1    Allergies:   Allergies   Allergen Reactions    Gabapentin Other (See Comments)     Seizure    Morphine And Related Itching and Swelling    Melatonin  Palpitations     Pt states it makes her heart race    Aspirin GI Intolerance    Ibuprofen GI Intolerance       PHQ-9 Total Score:     ALLI Fall Risk Assessment has not been completed.    Objective     Physical Exam:   Physical Exam  Neurological:      Mental Status: She is oriented to person, place, and time.      Coordination: Finger-Nose-Finger Test and Romberg Test normal.      Gait: Gait is intact. Tandem walk normal.      Deep Tendon Reflexes:      Reflex Scores:       Tricep reflexes are 2+ on the right side and 2+ on the left side.       Bicep reflexes are 2+ on the right side and 2+ on the left side.       Brachioradialis reflexes are 2+ on the right side and 2+ on the left side.       Patellar reflexes are 2+ on the right side and 2+ on the left side.       Achilles reflexes are 2+ on the right side and 2+ on the left side.  Psychiatric:         Speech: Speech normal.         Neurologic Exam     Mental Status   Oriented to person, place, and time.   Follows 3 step commands.   Attention: normal. Concentration: normal.   Speech: speech is normal   Level of consciousness: alert  Knowledge: consistent with education.   Normal comprehension.     Cranial Nerves     CN III, IV, VI   Right pupil: Accommodation: intact.   Left pupil: Accommodation: intact.   CN III: no CN III palsy  CN VI: no CN VI palsy  Nystagmus: none   Diplopia: none  Upgaze: normal  Downgaze: normal  Conjugate gaze: present    CN VII   Facial expression full, symmetric.     CN VIII   Hearing: intact    CN XII   CN XII normal.     Motor Exam   Muscle bulk: normal  Overall muscle tone: normal    Strength   Right biceps: 4/5  Left biceps: 4/5  Right triceps: 4/5  Left triceps: 4/5  Right interossei: 4/5  Left interossei: 4/5  Right quadriceps: 4/5  Left quadriceps: 4/5  Right anterior tibial: 4/5  Left anterior tibial: 4/5  Right posterior tibial: 4/5  Left posterior tibial: 4/5    Sensory Exam   Light touch normal.   Skin is tender to touch.  "Decreased sensation on right leg and left arm     Gait, Coordination, and Reflexes     Gait  Gait: normal    Coordination   Romberg: negative  Finger to nose coordination: normal  Tandem walking coordination: normal    Tremor   Resting tremor: absent  Action tremor: absent    Reflexes   Right brachioradialis: 2+  Left brachioradialis: 2+  Right biceps: 2+  Left biceps: 2+  Right triceps: 2+  Left triceps: 2+  Right patellar: 2+  Left patellar: 2+  Right achilles: 2+  Left achilles: 2+  Right : 2+  Left : 2+       Vital Signs:   Vitals:    04/09/24 1355   BP: 116/68   Pulse: 97   SpO2: 99%   Weight: 67.8 kg (149 lb 6.4 oz)   Height: 168.9 cm (66.5\")     Body mass index is 23.76 kg/m².     Results:   Imaging:   EMG & Nerve Conduction Test    Result Date: 3/19/2024  Median neuropathy at the wrist on the right, exceedingly mild (carpal tunnel) No evidence for radiculopathy is seen in the right arm Normal NCS/EMG of the left leg and back This report is transcribed using the Dragon dictation system.     XR Spine Cervical 2 or 3 View    Result Date: 1/19/2024  Impression: Vertebral body heights are maintained without evidence of acute fracture and alignment is anatomic without evidence of listhesis or subluxation. Multilevel spondylosis change is present, with areas of disc osteophyte complex formation and facet arthropathy, most notable at C4-5 and C5-6. Electronically Signed: Larry Peterson MD  1/19/2024 6:33 AM EST  Workstation ID: [id number]    CT Head Without Contrast    Result Date: 1/18/2024  Impression: No evidence of acute hemorrhage or large territory infarct. Electronically Signed: Nikunj Edward MD  1/18/2024 3:45 PM EST  Workstation ID: [id number]           Assessment / Plan      Assessment/Plan:   Diagnoses and all orders for this visit:    1. Paresthesia (Primary)  -     Anti-Myelin Oligodendrocyte Glycoprotein (MOG), Serum; Future  -     Neuromyelitis Optica (NMO) Auto Antibody, IgG; Future  -     " Angiotensin Converting Enzyme; Future  -     Rheumatoid Factor; Future  -     Sedimentation Rate; Future  -     Vitamin B12 & Folate; Future  -     Vitamin D,25-Hydroxy; Future  -     Celiac Disease Panel; Future  -     CK; Future  -     C-reactive Protein; Future  -     Cryoglobulin; Future  -     Heavy Metals Profile II, Urine - Urine, Clean Catch; Future  -     ESTELITA + PE; Future  -     Lyme Disease, Line Blot; Future  -     Cancel: MRI Brain With & Without Contrast; Future  -     DULoxetine (Cymbalta) 60 MG capsule; Take 1 capsule by mouth Daily.  Dispense: 90 capsule; Refill: 1    2. Carpal tunnel syndrome, right  Comments:  cock up splints at night     Pt is unable to have MRI due to gastric stimulator. Check labs. If all labs are negative consider LP.      Patient Education:     Reviewed medications, potential side effects and signs and symptoms to report. Discussed risk versus benefits of treatment plan with patient and/or family-including medications, labs and radiology that may be ordered. Addressed questions and concerns during visit. Patient and/or family verbalized understanding and agree with plan. Instructed to call the office with any questions and report to ER with any life-threatening symptoms.     Follow Up:   Return in about 3 months (around 7/9/2024) for Recheck.    During this visit the following were done:  Labs Reviewed [x]    Labs Ordered [x]    Radiology Reports Reviewed []    Radiology Ordered []    PCP Records Reviewed []    Referring Provider Records Reviewed []    ER Records Reviewed []    Hospital Records Reviewed []    History Obtained From Family []    Radiology Images Reviewed []    Other Reviewed [x]    Records Requested []      Chelly Hill, JAME, APRN

## 2024-04-09 NOTE — LETTER
April 10, 2024     Cesar Gao MD  [address] [facility] [address]    Patient: Geneva Haro   YOB: 1965   Date of Visit: 2024     Dear Cesar Gao MD:       Thank you for referring Geneva Haro to me for evaluation. Below are the relevant portions of my assessment and plan of care.    If you have questions, please do not hesitate to call me. I look forward to following Geneva along with you.         Sincerely,        Chelly Hill DNP, APRN        CC: No Recipients    Chelly Hill DNP, APRN  [phone]  Signed     Neuro Office Visit      Encounter Date: 2024   Patient Name: Geneva Haro  : 1965   MRN: [id number]   PCP: Dr Gao  Chief Complaint:    Chief Complaint   Patient presents with   • Numbness       History of Present Illness: Geneva Haro is a 59 y.o. female who is here today in Neurology for  numbness.    Paresthesia  Right facial numbness and tingling started 2024. Woke up with symptoms. Right eye to right lip. No facial weakness or drooling. Denies dysphagia. Chronic right arm tingling for 3 months. Left sided neck pain. Denies headaches and vision changes.  Right side tingling and RUE weakness for one month. Whole arm feels alseep. Better with elevation of arm.  Symptoms started on left arm and hand and scapula in the last month.  Symptoms will develop into stabbing and cramping type pain by the end of the day  Poor balance for 2 months.    Denies vision changes, slurred speech, dysphagia, incontinence, constipation, falls, change in gait, change in mood.     MRI brain-ins would not cover.  CT Head Without Contrast (2024 15:19)-neg  EMG & Nerve Conduction Test (2024 14:00)-Median neuropathy at the wrist on the right, exceedingly mild (carpal tunnel)     No evidence for radiculopathy is seen in the right arm     Normal NCS/EMG of the left leg and back    CT Cervical Spine With Intrathecal  Contrast (07/22/2021 08:16)-IMPRESSION:  Overall mild cervical spondylosis change present, most  notably involving the lower cervical levels as above with areas of  mild-to-moderate spinal canal and neuroforaminal involvemen.    Takind duloxetine 30 for fibromyalgia      Labs: A1c 5.9, cbc, cmp 52.1,     Has gastric stimulator cannot have MRI.    PMH: pre-diabetes, gastroparesis, GERD, anemia, breast cancer 2014 tx'd with chemo x1. No radiation. Anxiety and depression, fibromyalgia, m  FH:-MS  SH:marijuana use + tob  Subjective      Past Medical History:   Past Medical History:   Diagnosis Date   • Allergic At a young age   • Anxiety    • Arthritis    • Arthritis of back 2000   • Cancer    • Depression    • Diabetes mellitus    • Fibromyalgia, primary 33 years ago   • Gall stones    • GERD (gastroesophageal reflux disease) 2008   • Hearing loss    • Hernia, hiatal    • Hip arthrosis    • History of stomach ulcers    • History of transfusion    • Knee swelling    • Periarthritis of shoulder    • Stomach problems    • Ulcer of bile duct        Past Surgical History:   Past Surgical History:   Procedure Laterality Date   • BREAST SURGERY     • CHOLECYSTECTOMY     • CORONARY ARTERY BYPASS GRAFT     • ENDOSCOPY N/A 06/20/2018    Procedure: ESOPHAGOGASTRODUODENOSCOPY;  Surgeon: Mark I Brunner, MD;  Location:  [facility];  Service: Gastroenterology   • GASTRIC BYPASS      x2   • GASTRIC STIMULATOR IMPLANT SURGERY     • HAND SURGERY  1992   • HERNIA REPAIR     • HERNIA REPAIR     • INTERVENTIONAL RADIOLOGY PROCEDURE Bilateral 07/22/2021    Procedure: IR myelogram, cervical;  Surgeon: Jose Angel Sauer MD;  Location:  [facility];  Service: Interventional Radiology;  Laterality: Bilateral;   • LYMPH NODE BIOPSY     • MASTECTOMY Bilateral     Left With sentinel lymph node sampling and prophylactic right mastectomy   • PACEMAKER IMPLANTATION     • PORTACATH PLACEMENT     • SMALL INTESTINE SURGERY          Family History:   Family History   Problem Relation Age of Onset   • Breast cancer Maternal Aunt    • Cancer Maternal Aunt    • Pancreatic cancer Cousin    • Breast cancer Cousin    • Breast cancer Cousin    • Heart attack Mother    • Hypertension Mother    • COPD Sister        Social History:   Social History     Socioeconomic History   • Marital status:    Tobacco Use   • Smoking status: Former     Current packs/day: 0.00     Average packs/day: 0.3 packs/day for 35.0 years (8.8 ttl pk-yrs)     Types: Cigarettes     Start date: 1/1/1986     Quit date: 1/1/2021     Years since quitting: 3.2   • Smokeless tobacco: Never   Vaping Use   • Vaping status: Never Used   Substance and Sexual Activity   • Alcohol use: No   • Drug use: Yes     Frequency: 6.0 times per week     Types: Marijuana   • Sexual activity: Not Currently     Partners: Male     Birth control/protection: Abstinence, Post-menopausal, None, Tubal ligation       Medications:     Current Outpatient Medications:   •  cetirizine (zyrTEC) 10 MG tablet, TAKE 1 TABLET BY MOUTH EVERY DAY, Disp: 90 tablet, Rfl: 0  •  citalopram (CeleXA) 20 MG tablet, Take 1 tablet by mouth Daily., Disp: 90 tablet, Rfl: 3  •  cyclobenzaprine (FLEXERIL) 10 MG tablet, TAKE 1 TABLET BY MOUTH THREE TIMES A DAY AS NEEDED FOR MUSCLE SPASM, Disp: 90 tablet, Rfl: 0  •  esomeprazole (nexIUM) 40 MG capsule, TAKE 1 CAPSULE BY MOUTH TWICE A DAY, Disp: 180 capsule, Rfl: 1  •  folic acid (FOLVITE) 1 MG tablet, Take 1 tablet by mouth Daily., Disp: 90 tablet, Rfl: 3  •  ondansetron (ZOFRAN) 8 MG tablet, Take 1 tablet by mouth Every 8 (Eight) Hours As Needed for Nausea or Vomiting., Disp: 30 tablet, Rfl: 9  •  prochlorperazine (COMPAZINE) 10 MG tablet, Take 1 tablet by mouth Every 6 (Six) Hours As Needed for Nausea or Vomiting., Disp: 30 tablet, Rfl: 9  •  promethazine (PHENERGAN) 25 MG tablet, Take 1 tablet by mouth Every 6 (Six) Hours As Needed for Nausea or Vomiting., Disp: 120  tablet, Rfl: 1  •  promethazine (PHENERGAN) 6.25 MG/5ML solution oral solution, Take 20 mL by mouth Every 6 (Six) Hours As Needed (n/v)., Disp: 1200 mL, Rfl: 3  •  traMADol (ULTRAM) 50 MG tablet, Take 1 tablet by mouth Every 8 (Eight) Hours As Needed for Moderate Pain., Disp: 60 tablet, Rfl: 0  •  traZODone (DESYREL) 50 MG tablet, Take 1 tablet by mouth every night at bedtime., Disp: 90 tablet, Rfl: 3  •  DULoxetine (Cymbalta) 60 MG capsule, Take 1 capsule by mouth Daily., Disp: 90 capsule, Rfl: 1    Allergies:   Allergies   Allergen Reactions   • Gabapentin Other (See Comments)     Seizure   • Morphine And Related Itching and Swelling   • Melatonin Palpitations     Pt states it makes her heart race   • Aspirin GI Intolerance   • Ibuprofen GI Intolerance       PHQ-9 Total Score:     STEADI Fall Risk Assessment has not been completed.    Objective     Physical Exam:   Physical Exam  Neurological:      Mental Status: She is oriented to person, place, and time.      Coordination: Finger-Nose-Finger Test and Romberg Test normal.      Gait: Gait is intact. Tandem walk normal.      Deep Tendon Reflexes:      Reflex Scores:       Tricep reflexes are 2+ on the right side and 2+ on the left side.       Bicep reflexes are 2+ on the right side and 2+ on the left side.       Brachioradialis reflexes are 2+ on the right side and 2+ on the left side.       Patellar reflexes are 2+ on the right side and 2+ on the left side.       Achilles reflexes are 2+ on the right side and 2+ on the left side.  Psychiatric:         Speech: Speech normal.         Neurologic Exam     Mental Status   Oriented to person, place, and time.   Follows 3 step commands.   Attention: normal. Concentration: normal.   Speech: speech is normal   Level of consciousness: alert  Knowledge: consistent with education.   Normal comprehension.     Cranial Nerves     CN III, IV, VI   Right pupil: Accommodation: intact.   Left pupil: Accommodation: intact.   CN III:  "no CN III palsy  CN VI: no CN VI palsy  Nystagmus: none   Diplopia: none  Upgaze: normal  Downgaze: normal  Conjugate gaze: present    CN VII   Facial expression full, symmetric.     CN VIII   Hearing: intact    CN XII   CN XII normal.     Motor Exam   Muscle bulk: normal  Overall muscle tone: normal    Strength   Right biceps: 4/5  Left biceps: 4/5  Right triceps: 4/5  Left triceps: 4/5  Right interossei: 4/5  Left interossei: 4/5  Right quadriceps: 4/5  Left quadriceps: 4/5  Right anterior tibial: 4/5  Left anterior tibial: 4/5  Right posterior tibial: 4/5  Left posterior tibial: 4/5    Sensory Exam   Light touch normal.   Skin is tender to touch. Decreased sensation on right leg and left arm     Gait, Coordination, and Reflexes     Gait  Gait: normal    Coordination   Romberg: negative  Finger to nose coordination: normal  Tandem walking coordination: normal    Tremor   Resting tremor: absent  Action tremor: absent    Reflexes   Right brachioradialis: 2+  Left brachioradialis: 2+  Right biceps: 2+  Left biceps: 2+  Right triceps: 2+  Left triceps: 2+  Right patellar: 2+  Left patellar: 2+  Right achilles: 2+  Left achilles: 2+  Right : 2+  Left : 2+       Vital Signs:   Vitals:    04/09/24 1355   BP: 116/68   Pulse: 97   SpO2: 99%   Weight: 67.8 kg (149 lb 6.4 oz)   Height: 168.9 cm (66.5\")     Body mass index is 23.76 kg/m².     Results:   Imaging:   EMG & Nerve Conduction Test    Result Date: 3/19/2024  Median neuropathy at the wrist on the right, exceedingly mild (carpal tunnel) No evidence for radiculopathy is seen in the right arm Normal NCS/EMG of the left leg and back This report is transcribed using the Dragon dictation system.     XR Spine Cervical 2 or 3 View    Result Date: 1/19/2024  Impression: Vertebral body heights are maintained without evidence of acute fracture and alignment is anatomic without evidence of listhesis or subluxation. Multilevel spondylosis change is present, with areas of " disc osteophyte complex formation and facet arthropathy, most notable at C4-5 and C5-6. Electronically Signed: Larry Peterson MD  1/19/2024 6:33 AM EST  Workstation ID: [id number]    CT Head Without Contrast    Result Date: 1/18/2024  Impression: No evidence of acute hemorrhage or large territory infarct. Electronically Signed: Nikunj Edward MD  1/18/2024 3:45 PM EST  Workstation ID: [id number]           Assessment / Plan      Assessment/Plan:   Diagnoses and all orders for this visit:    1. Paresthesia (Primary)  -     Anti-Myelin Oligodendrocyte Glycoprotein (MOG), Serum; Future  -     Neuromyelitis Optica (NMO) Auto Antibody, IgG; Future  -     Angiotensin Converting Enzyme; Future  -     Rheumatoid Factor; Future  -     Sedimentation Rate; Future  -     Vitamin B12 & Folate; Future  -     Vitamin D,25-Hydroxy; Future  -     Celiac Disease Panel; Future  -     CK; Future  -     C-reactive Protein; Future  -     Cryoglobulin; Future  -     Heavy Metals Profile II, Urine - Urine, Clean Catch; Future  -     ESTELITA + PE; Future  -     Lyme Disease, Line Blot; Future  -     Cancel: MRI Brain With & Without Contrast; Future  -     DULoxetine (Cymbalta) 60 MG capsule; Take 1 capsule by mouth Daily.  Dispense: 90 capsule; Refill: 1    2. Carpal tunnel syndrome, right  Comments:  cock up splints at night     Pt is unable to have MRI due to gastric stimulator. Check labs. If all labs are negative consider LP.      Patient Education:     Reviewed medications, potential side effects and signs and symptoms to report. Discussed risk versus benefits of treatment plan with patient and/or family-including medications, labs and radiology that may be ordered. Addressed questions and concerns during visit. Patient and/or family verbalized understanding and agree with plan. Instructed to call the office with any questions and report to ER with any life-threatening symptoms.     Follow Up:   Return in about 3 months (around 7/9/2024) for  Recheck.    During this visit the following were done:  Labs Reviewed [x]    Labs Ordered [x]    Radiology Reports Reviewed []    Radiology Ordered []    PCP Records Reviewed []    Referring Provider Records Reviewed []    ER Records Reviewed []    Hospital Records Reviewed []    History Obtained From Family []    Radiology Images Reviewed []    Other Reviewed [x]    Records Requested []      Chelly Hill, DNP, APRN

## 2024-04-10 DIAGNOSIS — E55.9 VITAMIN D DEFICIENCY: Primary | ICD-10-CM

## 2024-04-10 LAB
25(OH)D3 SERPL-MCNC: 10 NG/ML (ref 30–100)
CHROMATIN AB SERPL-ACNC: 12 IU/ML (ref 0–14)
CK SERPL-CCNC: 107 U/L (ref 20–180)
CRP SERPL-MCNC: 1.77 MG/DL (ref 0–0.5)
ERYTHROCYTE [SEDIMENTATION RATE] IN BLOOD: 56 MM/HR (ref 0–30)
FOLATE SERPL-MCNC: 14.6 NG/ML (ref 4.78–24.2)
VIT B12 BLD-MCNC: 463 PG/ML (ref 211–946)

## 2024-04-10 RX ORDER — ERGOCALCIFEROL 1.25 MG/1
50000 CAPSULE ORAL WEEKLY
Qty: 12 CAPSULE | Refills: 0 | Status: SHIPPED | OUTPATIENT
Start: 2024-04-10

## 2024-04-11 ENCOUNTER — TELEPHONE (OUTPATIENT)
Dept: NEUROLOGY | Facility: CLINIC | Age: 59
End: 2024-04-11
Payer: MEDICAID

## 2024-04-11 LAB
ACE SERPL-CCNC: 48 U/L (ref 14–82)
ALBUMIN SERPL ELPH-MCNC: 4 G/DL (ref 2.9–4.4)
ALBUMIN/GLOB SERPL: 1.3 {RATIO} (ref 0.7–1.7)
ALPHA1 GLOB SERPL ELPH-MCNC: 0.3 G/DL (ref 0–0.4)
ALPHA2 GLOB SERPL ELPH-MCNC: 0.8 G/DL (ref 0.4–1)
B-GLOBULIN SERPL ELPH-MCNC: 1.1 G/DL (ref 0.7–1.3)
ENDOMYSIUM IGA SER QL: NEGATIVE
GAMMA GLOB SERPL ELPH-MCNC: 0.9 G/DL (ref 0.4–1.8)
GLOBULIN SER-MCNC: 3.1 G/DL (ref 2.2–3.9)
IGA SERPL-MCNC: 350 MG/DL (ref 87–352)
IGA SERPL-MCNC: 350 MG/DL (ref 87–352)
IGG SERPL-MCNC: 940 MG/DL (ref 586–1602)
IGM SERPL-MCNC: 80 MG/DL (ref 26–217)
INTERPRETATION SERPL IEP-IMP: NORMAL
LABORATORY COMMENT REPORT: NORMAL
M PROTEIN SERPL ELPH-MCNC: NORMAL G/DL
PROT SERPL-MCNC: 7.1 G/DL (ref 6–8.5)
TTG IGA SER-ACNC: <2 U/ML (ref 0–3)

## 2024-04-11 NOTE — TELEPHONE ENCOUNTER
Called patient and left  with results.      ----- Message from Chelly Hill DNP, JAM sent at 4/10/2024  4:37 PM EDT -----  Please notify pt Vit D is very low at 10. Should be 30. I am sending in a prescription vit D supplement to take daily. Pt to follow up with PCP in 12 weeks for repeat vit D level. We will call when other labs are resulted.

## 2024-04-12 LAB
ARSENIC UR-MCNC: NORMAL UG/L (ref 0–9)
CADMIUM 24H UR-MCNC: 1.1 UG/L
CADMIUM/CREAT 24H UR: 0.6 UG/G CREAT (ref 0–1.9)
CREAT UR-MCNC: 1.85 G/L (ref 0.3–3)
LEAD 24H UR-MCNC: 1 UG/L (ref 0–49)
LEAD/CREAT UR: 1 UG/G CREAT (ref 0–49)
MERCURY 24H UR-MCNC: NORMAL UG/L (ref 0–19)
MOG AB SER QL CBA IFA: NEGATIVE

## 2024-04-15 LAB
B BURGDOR IGG PATRN SER IB-IMP: NEGATIVE
B BURGDOR IGM PATRN SER IB-IMP: NEGATIVE
B BURGDOR18KD IGG SER QL IB: ABNORMAL
B BURGDOR23KD IGG SER QL IB: ABNORMAL
B BURGDOR23KD IGM SER QL IB: ABNORMAL
B BURGDOR28KD IGG SER QL IB: ABNORMAL
B BURGDOR30KD IGG SER QL IB: ABNORMAL
B BURGDOR39KD IGG SER QL IB: ABNORMAL
B BURGDOR39KD IGM SER QL IB: PRESENT
B BURGDOR41KD IGG SER QL IB: ABNORMAL
B BURGDOR41KD IGM SER QL IB: ABNORMAL
B BURGDOR45KD IGG SER QL IB: ABNORMAL
B BURGDOR58KD IGG SER QL IB: ABNORMAL
B BURGDOR66KD IGG SER QL IB: ABNORMAL
B BURGDOR93KD IGG SER QL IB: ABNORMAL
CRYOGLOB SER QL 1D COLD INC: NORMAL

## 2024-04-18 LAB — AQP4 H2O CHANNEL IGG SERPL QL: NEGATIVE

## 2024-04-19 ENCOUNTER — TELEPHONE (OUTPATIENT)
Dept: NEUROLOGY | Facility: CLINIC | Age: 59
End: 2024-04-19
Payer: MEDICAID

## 2024-04-19 NOTE — TELEPHONE ENCOUNTER
Called patient and gave results.  Patient was understanding and appreciative.    ----- Message from Chelly Hill, JAME, APRN sent at 4/19/2024  1:25 PM EDT -----  Please notify pt labs for autoimmune diseases that mimic MS, lyme disease, cryoglobulins, heavy metal toxins, immune labs, sarcoidosis, celiac disease, vit b12, folate, rheumatoid arthritis, muscle breakdown are all normal.

## 2024-05-06 RX ORDER — CYCLOBENZAPRINE HCL 10 MG
10 TABLET ORAL 3 TIMES DAILY PRN
Qty: 90 TABLET | Refills: 0 | Status: SHIPPED | OUTPATIENT
Start: 2024-05-06

## 2024-05-28 RX ORDER — TRAZODONE HYDROCHLORIDE 50 MG/1
50 TABLET ORAL
Qty: 90 TABLET | Refills: 0 | Status: SHIPPED | OUTPATIENT
Start: 2024-05-28

## 2024-05-28 NOTE — TELEPHONE ENCOUNTER
Rx Refill Note  Requested Prescriptions     Pending Prescriptions Disp Refills    traZODone (DESYREL) 50 MG tablet [Pharmacy Med Name: TRAZODONE 50 MG TABLET] 90 tablet 3     Sig: TAKE 1 TABLET BY MOUTH EVERYDAY AT BEDTIME      Last office visit with prescribing clinician: 1/18/2024   Next office visit with prescribing clinician: Return if symptoms worsen or fail to improve, for Follow up after testing.     Shannon Zafar MA  05/28/24, 10:51 EDT

## 2024-05-28 NOTE — ASSESSMENT & PLAN NOTE
[facility]        NAME: Andriy Castillo is a 64 y.o. male  : 1959    MRN: [id number]  DATE: May 28, 2024  TIME: 8:50 AM    Assessment and Plan   Acute pharyngitis, unspecified etiology [J02.9]  1. Acute pharyngitis, unspecified etiology  amoxicillin (AMOXIL) 500 mg capsule      2. Sore throat  POCT rapid ANTIGEN strepA        Exam of throat appears to be clinical strep. Treated despite negative POC strep. Patient decline throat culture to be sent out.     Patient Instructions     Start antibiotic as prescribed  Change toothbrush on day 3 of antibiotic  Tylenol or Ibuprofen as needed for fever or pain  Drink plenty of fluids  Tea with honey  Gargle with salt water  If symptoms fail to improve follow up with PCP  If symptoms worsen have yourself rechecked    Follow up with PCP in 3-5 days.  Proceed to  ER if symptoms worsen.    If tests have been performed at [facility], our office will contact you with results if changes need to be made to the care plan discussed with you at the visit.  [facility]    Chief Complaint     Chief Complaint   Patient presents with   • Sore Throat     Sore throat since . Feels like he is swallowing glass.         History of Present Illness       Patient presents with a 2-day history of sore throat.  He denies fever, chills or further cold-like symptoms.  Patient states it feels like he is swallowing glass.  Point-of-care strep test negative.        Review of Systems   Review of Systems   Constitutional:  Negative for chills and fever.   HENT:  Positive for sore throat.    Respiratory:  Negative for cough.    Skin:  Negative for rash.         Current Medications       Current Outpatient Medications:   •  amoxicillin (AMOXIL) 500 mg capsule, Take 1 capsule (500 mg total) by mouth every 8 (eight) hours for 10 days, Disp: 30 capsule, Rfl: 0  •  atorvastatin (LIPITOR) 20 mg tablet, Take 20 mg by mouth daily, Disp: , Rfl:  Stable on current dose of Cymbalta trazodone and Celexa.  She will continue her efforts in physical activity.     •  ezetimibe (ZETIA) 10 mg tablet, Take 10 mg by mouth daily, Disp: , Rfl:   •  levothyroxine 100 mcg tablet, Take 100 mcg by mouth daily, Disp: , Rfl:   •  nebivolol (BYSTOLIC) 5 mg tablet, Take 5 mg by mouth daily, Disp: , Rfl:   •  pantoprazole (PROTONIX) 40 mg tablet, Take 1 tablet (40 mg total) by mouth 2 (two) times a day, Disp: 60 tablet, Rfl: 0  •  candesartan (ATACAND) 16 mg tablet, Take 16 mg by mouth daily (Patient not taking: Reported on 5/28/2024), Disp: , Rfl:   •  capsicum (ZOSTRIX) 0.075 % topical cream, Apply topically 3 (three) times a day as needed (rash pain), Disp: 120 g, Rfl: 0  •  valACYclovir (VALTREX) 1,000 mg tablet, Take 1 tablet (1,000 mg total) by mouth 3 (three) times a day for 7 days, Disp: 21 tablet, Rfl: 0    Current Allergies     Allergies as of 05/28/2024   • (No Known Allergies)            The following portions of the patient's history were reviewed and updated as appropriate: allergies, current medications, past family history, past medical history, past social history, past surgical history and problem list.     Past Medical History:   Diagnosis Date   • Disease of thyroid gland    • GERD (gastroesophageal reflux disease)    • Hyperlipidemia    • Hypertension        Past Surgical History:   Procedure Laterality Date   • CARPAL TUNNEL RELEASE     • TONSILLECTOMY         Family History   Problem Relation Age of Onset   • Thyroid disease Mother    • Heart attack Father    • Heart attack Paternal Grandmother    • Heart disease Paternal Grandmother         Pacemaker   • Heart disease Paternal Grandfather          Medications have been verified.        Objective   /76   Pulse 64   Temp 97.9 °F (36.6 °C)   Resp 20   Wt 97.5 kg (215 lb)   SpO2 94%   BMI 30.85 kg/m²   No LMP for male patient.       Physical Exam     Physical Exam  Vitals and nursing note reviewed.   Constitutional:       Appearance: He is well-developed.   HENT:      Head: Normocephalic and atraumatic.       Right Ear: Tympanic membrane normal.      Left Ear: Tympanic membrane normal.      Mouth/Throat:      Mouth: Mucous membranes are moist.      Pharynx: Uvula midline. No uvula swelling.      Comments: Beefy erythema of the soft palate and uvula, suspicious for strep  Eyes:      Conjunctiva/sclera: Conjunctivae normal.   Cardiovascular:      Rate and Rhythm: Normal rate and regular rhythm.      Heart sounds: Normal heart sounds.   Pulmonary:      Effort: Pulmonary effort is normal.      Breath sounds: Normal breath sounds.   Musculoskeletal:      Cervical back: Neck supple.   Lymphadenopathy:      Cervical: No cervical adenopathy.   Skin:     General: Skin is warm.   Neurological:      Mental Status: He is alert.

## 2024-05-31 RX ORDER — CETIRIZINE HYDROCHLORIDE 10 MG/1
10 TABLET ORAL DAILY
Qty: 90 TABLET | Refills: 0 | Status: SHIPPED | OUTPATIENT
Start: 2024-05-31

## 2024-05-31 NOTE — TELEPHONE ENCOUNTER
Caller: Geneva Haro    Relationship: Self    Best call back number: [phone]    Requested Prescriptions:   Requested Prescriptions     Pending Prescriptions Disp Refills    cetirizine (zyrTEC) 10 MG tablet 90 tablet 0     Sig: Take 1 tablet by mouth Daily.        Pharmacy where request should be sent: [facility][id number] - [place], [place] - [address] - [phone]  - [phone]     Last office visit with prescribing clinician: 1/18/2024   Last telemedicine visit with prescribing clinician: Visit date not found   Next office visit with prescribing clinician: Visit date not found     Does the patient have less than a 3 day supply:  [x] Yes  [] No    Would you like a call back once the refill request has been completed: [] Yes [x] No    If the office needs to give you a call back, can they leave a voicemail: [] Yes [x] No    Stefano Dumont Rep   05/31/24 14:44 EDT

## 2024-06-03 DIAGNOSIS — K31.84 GASTROPARESIS: Chronic | ICD-10-CM

## 2024-06-04 ENCOUNTER — TELEPHONE (OUTPATIENT)
Dept: FAMILY MEDICINE CLINIC | Facility: CLINIC | Age: 59
End: 2024-06-04
Payer: MEDICAID

## 2024-06-04 RX ORDER — LORATADINE 10 MG/1
10 TABLET ORAL DAILY
Qty: 90 TABLET | Refills: 2 | Status: SHIPPED | OUTPATIENT
Start: 2024-06-04

## 2024-06-04 NOTE — TELEPHONE ENCOUNTER
Caller: Geneva Haro    Relationship: Self    Best call back number:      [phone] (Mobile)       What was the call regarding:  PATIENT STATES THE PHARMACY SAID THE  cetirizine (zyrTEC) 10 MG tablet IS NOT COVERED WITH HER INSURANCE AND IT WILL COST $25.00  SHE IS REQUESTING ANOTHER MEDICATION SENT TO THE PHARMACY     [facility] [address]

## 2024-06-05 RX ORDER — DULOXETIN HYDROCHLORIDE 30 MG/1
30 CAPSULE, DELAYED RELEASE ORAL 2 TIMES DAILY
Qty: 180 CAPSULE | Refills: 3 | OUTPATIENT
Start: 2024-06-05

## 2024-06-05 RX ORDER — TRAZODONE HYDROCHLORIDE 50 MG/1
50 TABLET ORAL
Qty: 90 TABLET | Refills: 0 | OUTPATIENT
Start: 2024-06-05

## 2024-06-05 RX ORDER — CITALOPRAM 20 MG/1
20 TABLET ORAL DAILY
Qty: 90 TABLET | Refills: 3 | OUTPATIENT
Start: 2024-06-05

## 2024-06-05 RX ORDER — PROMETHAZINE HYDROCHLORIDE 25 MG/1
25 TABLET ORAL EVERY 6 HOURS PRN
Qty: 120 TABLET | Refills: 1 | Status: SHIPPED | OUTPATIENT
Start: 2024-06-05

## 2024-06-05 RX ORDER — FOLIC ACID 1 MG/1
1000 TABLET ORAL DAILY
Qty: 90 TABLET | Refills: 3 | Status: SHIPPED | OUTPATIENT
Start: 2024-06-05

## 2024-06-05 NOTE — TELEPHONE ENCOUNTER
Rx Refill Note  Requested Prescriptions     Pending Prescriptions Disp Refills    folic acid (FOLVITE) 1 MG tablet [Pharmacy Med Name: FOLIC ACID 1 MG TABLET] 90 tablet 3     Sig: TAKE 1 TABLET BY MOUTH EVERY DAY    traZODone (DESYREL) 50 MG tablet [Pharmacy Med Name: TRAZODONE 50 MG TABLET] 90 tablet 0     Sig: TAKE 1 TABLET BY MOUTH EVERYDAY AT BEDTIME    DULoxetine (CYMBALTA) 30 MG capsule [Pharmacy Med Name: DULOXETINE HCL DR 30 MG CAP] 180 capsule 3     Sig: TAKE 1 CAPSULE BY MOUTH TWICE A DAY    promethazine (PHENERGAN) 25 MG tablet [Pharmacy Med Name: PROMETHAZINE 25 MG TABLET] 120 tablet 1     Sig: TAKE 1 TABLET BY MOUTH EVERY 6 HOURS AS NEEDED FOR NAUSEA OR VOMITING.    citalopram (CeleXA) 20 MG tablet [Pharmacy Med Name: CITALOPRAM HBR 20 MG TABLET] 90 tablet 3     Sig: TAKE 1 TABLET BY MOUTH EVERY DAY      Last office visit with prescribing clinician: 1/18/2024   Last telemedicine visit with prescribing clinician: Visit date not found   Next office visit with prescribing clinician: 6/4/2024                         Would you like a call back once the refill request has been completed: [] Yes [] No    If the office needs to give you a call back, can they leave a voicemail: [] Yes [] No    Julia Toledo MA  06/05/24, 09:20 EDT

## 2024-07-08 ENCOUNTER — APPOINTMENT (OUTPATIENT)
Dept: CT IMAGING | Facility: HOSPITAL | Age: 59
End: 2024-07-08
Payer: MEDICARE

## 2024-07-08 ENCOUNTER — HOSPITAL ENCOUNTER (INPATIENT)
Facility: HOSPITAL | Age: 59
LOS: 2 days | Discharge: HOME | End: 2024-07-10
Attending: EMERGENCY MEDICINE | Admitting: FAMILY MEDICINE
Payer: MEDICAID

## 2024-07-08 DIAGNOSIS — M79.671 RIGHT FOOT PAIN: ICD-10-CM

## 2024-07-08 DIAGNOSIS — K31.84 GASTROPARESIS: Chronic | ICD-10-CM

## 2024-07-08 DIAGNOSIS — K59.89 GENERALIZED DYSMOTILITY OF INTESTINE: ICD-10-CM

## 2024-07-08 DIAGNOSIS — D50.8 OTHER IRON DEFICIENCY ANEMIA: Chronic | ICD-10-CM

## 2024-07-08 DIAGNOSIS — R11.15 INTRACTABLE CYCLICAL VOMITING WITH NAUSEA: ICD-10-CM

## 2024-07-08 DIAGNOSIS — K21.9 GASTROESOPHAGEAL REFLUX DISEASE, UNSPECIFIED WHETHER ESOPHAGITIS PRESENT: ICD-10-CM

## 2024-07-08 DIAGNOSIS — N17.9 AKI (ACUTE KIDNEY INJURY): Primary | ICD-10-CM

## 2024-07-08 DIAGNOSIS — M19.90 ARTHRITIS: ICD-10-CM

## 2024-07-08 DIAGNOSIS — R25.2 MUSCLE CRAMPS: ICD-10-CM

## 2024-07-08 DIAGNOSIS — M47.812 CERVICAL SPONDYLOSIS WITHOUT MYELOPATHY: ICD-10-CM

## 2024-07-08 DIAGNOSIS — M79.7 FIBROMYALGIA: Chronic | ICD-10-CM

## 2024-07-08 DIAGNOSIS — R11.2 INTRACTABLE NAUSEA AND VOMITING: ICD-10-CM

## 2024-07-08 LAB
ALBUMIN SERPL-MCNC: 4.6 G/DL (ref 3.5–5.2)
ALBUMIN/GLOB SERPL: 1.1 G/DL
ALP SERPL-CCNC: 151 U/L (ref 39–117)
ALT SERPL W P-5'-P-CCNC: 18 U/L (ref 1–33)
ANION GAP SERPL CALCULATED.3IONS-SCNC: 24 MMOL/L (ref 5–15)
AST SERPL-CCNC: 22 U/L (ref 1–32)
BACTERIA UR QL AUTO: ABNORMAL /HPF
BASOPHILS # BLD MANUAL: 0.05 10*3/MM3 (ref 0–0.2)
BASOPHILS NFR BLD MANUAL: 1 % (ref 0–1.5)
BILIRUB SERPL-MCNC: 0.4 MG/DL (ref 0–1.2)
BILIRUB UR QL STRIP: NEGATIVE
BUN SERPL-MCNC: 54 MG/DL (ref 6–20)
BUN/CREAT SERPL: 17.8 (ref 7–25)
CALCIUM SPEC-SCNC: 9.7 MG/DL (ref 8.6–10.5)
CHLORIDE SERPL-SCNC: 95 MMOL/L (ref 98–107)
CLARITY UR: ABNORMAL
CO2 SERPL-SCNC: 16 MMOL/L (ref 22–29)
COLOR UR: YELLOW
CREAT SERPL-MCNC: 3.03 MG/DL (ref 0.57–1)
D-LACTATE SERPL-SCNC: 2.5 MMOL/L (ref 0.5–2)
DEPRECATED RDW RBC AUTO: 37.2 FL (ref 37–54)
EGFRCR SERPLBLD CKD-EPI 2021: 17.2 ML/MIN/1.73
EOSINOPHIL # BLD MANUAL: 0 10*3/MM3 (ref 0–0.4)
EOSINOPHIL NFR BLD MANUAL: 0 % (ref 0.3–6.2)
ERYTHROCYTE [DISTWIDTH] IN BLOOD BY AUTOMATED COUNT: 13.2 % (ref 12.3–15.4)
FINE GRAN CASTS URNS QL MICRO: ABNORMAL /LPF
GLOBULIN UR ELPH-MCNC: 4.1 GM/DL
GLUCOSE SERPL-MCNC: 151 MG/DL (ref 65–99)
GLUCOSE UR STRIP-MCNC: NEGATIVE MG/DL
HCT VFR BLD AUTO: 49.7 % (ref 34–46.6)
HGB BLD-MCNC: 17 G/DL (ref 12–15.9)
HGB UR QL STRIP.AUTO: ABNORMAL
HOLD SPECIMEN: NORMAL
HOLD SPECIMEN: NORMAL
HYALINE CASTS UR QL AUTO: ABNORMAL /LPF
KETONES UR QL STRIP: ABNORMAL
LEUKOCYTE ESTERASE UR QL STRIP.AUTO: NEGATIVE
LIPASE SERPL-CCNC: 19 U/L (ref 13–60)
LYMPHOCYTES # BLD MANUAL: 1.53 10*3/MM3 (ref 0.7–3.1)
LYMPHOCYTES NFR BLD MANUAL: 9 % (ref 5–12)
MCH RBC QN AUTO: 27.3 PG (ref 26.6–33)
MCHC RBC AUTO-ENTMCNC: 34.2 G/DL (ref 31.5–35.7)
MCV RBC AUTO: 79.8 FL (ref 79–97)
MONOCYTES # BLD: 0.44 10*3/MM3 (ref 0.1–0.9)
MUCOUS THREADS URNS QL MICRO: ABNORMAL /HPF
MYELOCYTES NFR BLD MANUAL: 1 % (ref 0–0)
NEUTROPHILS # BLD AUTO: 2.85 10*3/MM3 (ref 1.7–7)
NEUTROPHILS NFR BLD MANUAL: 53 % (ref 42.7–76)
NEUTS BAND NFR BLD MANUAL: 5 % (ref 0–5)
NITRITE UR QL STRIP: NEGATIVE
NRBC SPEC MANUAL: 0 /100 WBC (ref 0–0.2)
PH UR STRIP.AUTO: <=5 [PH] (ref 5–8)
PLAT MORPH BLD: NORMAL
PLATELET # BLD AUTO: 138 10*3/MM3 (ref 140–450)
PMV BLD AUTO: 9.9 FL (ref 6–12)
POTASSIUM SERPL-SCNC: 3.1 MMOL/L (ref 3.5–5.2)
PROT SERPL-MCNC: 8.7 G/DL (ref 6–8.5)
PROT UR QL STRIP: ABNORMAL
RBC # BLD AUTO: 6.23 10*6/MM3 (ref 3.77–5.28)
RBC # UR STRIP: ABNORMAL /HPF
RBC MORPH BLD: NORMAL
REF LAB TEST METHOD: ABNORMAL
SODIUM SERPL-SCNC: 135 MMOL/L (ref 136–145)
SP GR UR STRIP: 1.01 (ref 1–1.03)
SQUAMOUS #/AREA URNS HPF: ABNORMAL /HPF
UROBILINOGEN UR QL STRIP: ABNORMAL
VARIANT LYMPHS NFR BLD MANUAL: 24 % (ref 19.6–45.3)
VARIANT LYMPHS NFR BLD MANUAL: 7 % (ref 0–5)
WBC # UR STRIP: ABNORMAL /HPF
WBC MORPH BLD: NORMAL
WBC NRBC COR # BLD AUTO: 4.92 10*3/MM3 (ref 3.4–10.8)
WHOLE BLOOD HOLD SPECIMEN: NORMAL

## 2024-07-08 PROCEDURE — 85025 COMPLETE CBC W/AUTO DIFF WBC: CPT | Performed by: EMERGENCY MEDICINE

## 2024-07-08 PROCEDURE — 81001 URINALYSIS AUTO W/SCOPE: CPT | Performed by: EMERGENCY MEDICINE

## 2024-07-08 PROCEDURE — 25810000003 SODIUM CHLORIDE 0.9 % SOLUTION: Performed by: EMERGENCY MEDICINE

## 2024-07-08 PROCEDURE — 25810000003 LACTATED RINGERS PER 1000 ML: Performed by: INTERNAL MEDICINE

## 2024-07-08 PROCEDURE — 25010000002 FENTANYL CITRATE (PF) 50 MCG/ML SOLUTION: Performed by: EMERGENCY MEDICINE

## 2024-07-08 PROCEDURE — 83690 ASSAY OF LIPASE: CPT | Performed by: EMERGENCY MEDICINE

## 2024-07-08 PROCEDURE — 99291 CRITICAL CARE FIRST HOUR: CPT

## 2024-07-08 PROCEDURE — 74176 CT ABD & PELVIS W/O CONTRAST: CPT

## 2024-07-08 PROCEDURE — 36415 COLL VENOUS BLD VENIPUNCTURE: CPT

## 2024-07-08 PROCEDURE — 83605 ASSAY OF LACTIC ACID: CPT | Performed by: EMERGENCY MEDICINE

## 2024-07-08 PROCEDURE — 25010000002 ONDANSETRON PER 1 MG: Performed by: EMERGENCY MEDICINE

## 2024-07-08 PROCEDURE — 80053 COMPREHEN METABOLIC PANEL: CPT | Performed by: EMERGENCY MEDICINE

## 2024-07-08 PROCEDURE — 85007 BL SMEAR W/DIFF WBC COUNT: CPT | Performed by: EMERGENCY MEDICINE

## 2024-07-08 PROCEDURE — 25010000002 HYDROMORPHONE 1 MG/ML SOLUTION: Performed by: EMERGENCY MEDICINE

## 2024-07-08 PROCEDURE — 25810000003 LACTATED RINGERS SOLUTION: Performed by: INTERNAL MEDICINE

## 2024-07-08 PROCEDURE — 99223 1ST HOSP IP/OBS HIGH 75: CPT | Performed by: INTERNAL MEDICINE

## 2024-07-08 RX ORDER — ACETAMINOPHEN 650 MG/1
650 SUPPOSITORY RECTAL EVERY 4 HOURS PRN
Status: DISCONTINUED | OUTPATIENT
Start: 2024-07-08 | End: 2024-07-10 | Stop reason: HOSPADM

## 2024-07-08 RX ORDER — HEPARIN SODIUM 5000 [USP'U]/ML
5000 INJECTION, SOLUTION INTRAVENOUS; SUBCUTANEOUS EVERY 12 HOURS SCHEDULED
Status: DISCONTINUED | OUTPATIENT
Start: 2024-07-09 | End: 2024-07-10 | Stop reason: HOSPADM

## 2024-07-08 RX ORDER — SUCRALFATE 1 G/1
1 TABLET ORAL ONCE
Status: COMPLETED | OUTPATIENT
Start: 2024-07-08 | End: 2024-07-08

## 2024-07-08 RX ORDER — SODIUM CHLORIDE 0.9 % (FLUSH) 0.9 %
10 SYRINGE (ML) INJECTION AS NEEDED
Status: DISCONTINUED | OUTPATIENT
Start: 2024-07-08 | End: 2024-07-08 | Stop reason: SDUPTHER

## 2024-07-08 RX ORDER — HYDROMORPHONE HYDROCHLORIDE 2 MG/1
2 TABLET ORAL EVERY 6 HOURS PRN
Status: DISCONTINUED | OUTPATIENT
Start: 2024-07-08 | End: 2024-07-08

## 2024-07-08 RX ORDER — METOCLOPRAMIDE HYDROCHLORIDE 5 MG/ML
10 INJECTION INTRAMUSCULAR; INTRAVENOUS EVERY 6 HOURS PRN
Status: DISCONTINUED | OUTPATIENT
Start: 2024-07-08 | End: 2024-07-08

## 2024-07-08 RX ORDER — FENTANYL CITRATE 50 UG/ML
50 INJECTION, SOLUTION INTRAMUSCULAR; INTRAVENOUS ONCE
Status: COMPLETED | OUTPATIENT
Start: 2024-07-08 | End: 2024-07-08

## 2024-07-08 RX ORDER — PANTOPRAZOLE SODIUM 40 MG/10ML
40 INJECTION, POWDER, LYOPHILIZED, FOR SOLUTION INTRAVENOUS
Status: DISCONTINUED | OUTPATIENT
Start: 2024-07-08 | End: 2024-07-10 | Stop reason: HOSPADM

## 2024-07-08 RX ORDER — SODIUM CHLORIDE, SODIUM LACTATE, POTASSIUM CHLORIDE, CALCIUM CHLORIDE 600; 310; 30; 20 MG/100ML; MG/100ML; MG/100ML; MG/100ML
75 INJECTION, SOLUTION INTRAVENOUS CONTINUOUS
Status: DISCONTINUED | OUTPATIENT
Start: 2024-07-08 | End: 2024-07-10 | Stop reason: HOSPADM

## 2024-07-08 RX ORDER — ONDANSETRON 2 MG/ML
4 INJECTION INTRAMUSCULAR; INTRAVENOUS ONCE
Status: COMPLETED | OUTPATIENT
Start: 2024-07-08 | End: 2024-07-08

## 2024-07-08 RX ORDER — SODIUM CHLORIDE 0.9 % (FLUSH) 0.9 %
10 SYRINGE (ML) INJECTION AS NEEDED
Status: DISCONTINUED | OUTPATIENT
Start: 2024-07-08 | End: 2024-07-10 | Stop reason: HOSPADM

## 2024-07-08 RX ORDER — HYDROMORPHONE HYDROCHLORIDE 1 MG/ML
0.5 INJECTION, SOLUTION INTRAMUSCULAR; INTRAVENOUS; SUBCUTANEOUS
Status: DISCONTINUED | OUTPATIENT
Start: 2024-07-08 | End: 2024-07-10 | Stop reason: HOSPADM

## 2024-07-08 RX ORDER — SODIUM CHLORIDE 0.9 % (FLUSH) 0.9 %
10 SYRINGE (ML) INJECTION EVERY 12 HOURS SCHEDULED
Status: DISCONTINUED | OUTPATIENT
Start: 2024-07-09 | End: 2024-07-10 | Stop reason: HOSPADM

## 2024-07-08 RX ORDER — ONDANSETRON 2 MG/ML
4 INJECTION INTRAMUSCULAR; INTRAVENOUS EVERY 6 HOURS PRN
Status: DISCONTINUED | OUTPATIENT
Start: 2024-07-08 | End: 2024-07-10 | Stop reason: HOSPADM

## 2024-07-08 RX ORDER — ALUMINA, MAGNESIA, AND SIMETHICONE 2400; 2400; 240 MG/30ML; MG/30ML; MG/30ML
15 SUSPENSION ORAL ONCE
Status: DISCONTINUED | OUTPATIENT
Start: 2024-07-08 | End: 2024-07-08

## 2024-07-08 RX ORDER — OXYCODONE HYDROCHLORIDE AND ACETAMINOPHEN 5; 325 MG/1; MG/1
1 TABLET ORAL EVERY 8 HOURS PRN
Status: DISCONTINUED | OUTPATIENT
Start: 2024-07-08 | End: 2024-07-10 | Stop reason: HOSPADM

## 2024-07-08 RX ORDER — POTASSIUM CHLORIDE 750 MG/1
20 CAPSULE, EXTENDED RELEASE ORAL ONCE
Status: COMPLETED | OUTPATIENT
Start: 2024-07-08 | End: 2024-07-08

## 2024-07-08 RX ORDER — METOCLOPRAMIDE HYDROCHLORIDE 5 MG/ML
10 INJECTION INTRAMUSCULAR; INTRAVENOUS EVERY 6 HOURS
Status: DISCONTINUED | OUTPATIENT
Start: 2024-07-08 | End: 2024-07-08

## 2024-07-08 RX ORDER — CITALOPRAM 20 MG/1
20 TABLET ORAL DAILY
Status: DISCONTINUED | OUTPATIENT
Start: 2024-07-09 | End: 2024-07-10 | Stop reason: HOSPADM

## 2024-07-08 RX ORDER — ACETAMINOPHEN 325 MG/1
650 TABLET ORAL EVERY 4 HOURS PRN
Status: DISCONTINUED | OUTPATIENT
Start: 2024-07-08 | End: 2024-07-10 | Stop reason: HOSPADM

## 2024-07-08 RX ORDER — METOCLOPRAMIDE HYDROCHLORIDE 5 MG/ML
5 INJECTION INTRAMUSCULAR; INTRAVENOUS EVERY 6 HOURS PRN
Status: DISCONTINUED | OUTPATIENT
Start: 2024-07-08 | End: 2024-07-10 | Stop reason: HOSPADM

## 2024-07-08 RX ORDER — ACETAMINOPHEN 160 MG/5ML
650 SOLUTION ORAL EVERY 4 HOURS PRN
Status: DISCONTINUED | OUTPATIENT
Start: 2024-07-08 | End: 2024-07-10 | Stop reason: HOSPADM

## 2024-07-08 RX ORDER — SODIUM CHLORIDE 0.9 % (FLUSH) 0.9 %
10 SYRINGE (ML) INJECTION EVERY 12 HOURS SCHEDULED
Status: DISCONTINUED | OUTPATIENT
Start: 2024-07-08 | End: 2024-07-08 | Stop reason: SDUPTHER

## 2024-07-08 RX ORDER — SODIUM CHLORIDE 9 MG/ML
40 INJECTION, SOLUTION INTRAVENOUS AS NEEDED
Status: DISCONTINUED | OUTPATIENT
Start: 2024-07-08 | End: 2024-07-08 | Stop reason: SDUPTHER

## 2024-07-08 RX ORDER — SODIUM CHLORIDE 9 MG/ML
40 INJECTION, SOLUTION INTRAVENOUS AS NEEDED
Status: DISCONTINUED | OUTPATIENT
Start: 2024-07-08 | End: 2024-07-10 | Stop reason: HOSPADM

## 2024-07-08 RX ADMIN — SODIUM CHLORIDE, POTASSIUM CHLORIDE, SODIUM LACTATE AND CALCIUM CHLORIDE 1000 ML: 600; 310; 30; 20 INJECTION, SOLUTION INTRAVENOUS at 18:50

## 2024-07-08 RX ADMIN — PANTOPRAZOLE SODIUM 40 MG: 40 INJECTION, POWDER, FOR SOLUTION INTRAVENOUS at 21:28

## 2024-07-08 RX ADMIN — FENTANYL CITRATE 50 MCG: 50 INJECTION, SOLUTION INTRAMUSCULAR; INTRAVENOUS at 15:23

## 2024-07-08 RX ADMIN — POTASSIUM CHLORIDE 20 MEQ: 750 CAPSULE, EXTENDED RELEASE ORAL at 18:51

## 2024-07-08 RX ADMIN — ONDANSETRON 4 MG: 2 INJECTION INTRAMUSCULAR; INTRAVENOUS at 15:24

## 2024-07-08 RX ADMIN — HYDROMORPHONE HYDROCHLORIDE 1 MG: 1 INJECTION, SOLUTION INTRAMUSCULAR; INTRAVENOUS; SUBCUTANEOUS at 17:51

## 2024-07-08 RX ADMIN — SODIUM CHLORIDE, POTASSIUM CHLORIDE, SODIUM LACTATE AND CALCIUM CHLORIDE 150 ML/HR: 600; 310; 30; 20 INJECTION, SOLUTION INTRAVENOUS at 20:29

## 2024-07-08 RX ADMIN — SUCRALFATE 1 G: 1 TABLET ORAL at 17:51

## 2024-07-08 RX ADMIN — SODIUM CHLORIDE 1000 ML: 9 INJECTION, SOLUTION INTRAVENOUS at 15:22

## 2024-07-08 NOTE — ED PROVIDER NOTES
"Subjective  History of Present Illness:    Chief Complaint: Pain all over, nausea vomiting  History of Present Illness: 59-year-old female presents with a complaint of \"pain all over\" and nausea vomiting, she has significant past medical history for fibromyalgia, gastroparesis, she has a gastric stimulator in place, and a port for access.  She states she began to have the symptoms a couple days ago, it started with bodyaches all over, and pain, and then she began to have nausea vomiting.  She states she cannot keep down solids or liquids.  Onset: Gradual onset  Duration: 2 days  Exacerbating / Alleviating factors: History of fibromyalgia, gastroparesis with a gastric stimulator  Associated symptoms: Nausea vomiting cannot keep down solids or liquids      Nurses Notes reviewed and agree, including vitals, allergies, social history and prior medical history.     REVIEW OF SYSTEMS: All systems reviewed and not pertinent unless noted.    Review of Systems   Gastrointestinal:  Positive for abdominal pain, nausea and vomiting.   All other systems reviewed and are negative.      Past Medical History:   Diagnosis Date    Allergic At a young age    Anxiety     Arthritis     Arthritis of back 2000    Cancer     Depression     Diabetes mellitus     Fibromyalgia, primary 33 years ago    Gall stones     GERD (gastroesophageal reflux disease) 2008    Hearing loss     Hernia, hiatal     Hip arthrosis     History of stomach ulcers     History of transfusion     Knee swelling     Periarthritis of shoulder     Stomach problems     Ulcer of bile duct        Allergies:    Gabapentin, Morphine and codeine, Melatonin, Aspirin, and Ibuprofen      Past Surgical History:   Procedure Laterality Date    BREAST SURGERY      CHOLECYSTECTOMY      CORONARY ARTERY BYPASS GRAFT      ENDOSCOPY N/A 06/20/2018    Procedure: ESOPHAGOGASTRODUODENOSCOPY;  Surgeon: Mark I Brunner, MD;  Location: [facility] ENDOSCOPY;  Service: Gastroenterology    GASTRIC " "BYPASS      x2    GASTRIC STIMULATOR IMPLANT SURGERY      HAND SURGERY  1992    HERNIA REPAIR      HERNIA REPAIR      INTERVENTIONAL RADIOLOGY PROCEDURE Bilateral 07/22/2021    Procedure: IR myelogram, cervical;  Surgeon: Jose Angel Sauer MD;  Location: [facility] INVASIVE LOCATION;  Service: Interventional Radiology;  Laterality: Bilateral;    LYMPH NODE BIOPSY      MASTECTOMY Bilateral     Left With sentinel lymph node sampling and prophylactic right mastectomy    PACEMAKER IMPLANTATION      PORTACATH PLACEMENT      SMALL INTESTINE SURGERY           Social History     Socioeconomic History    Marital status:    Tobacco Use    Smoking status: Former     Current packs/day: 0.00     Average packs/day: 0.3 packs/day for 35.0 years (8.8 ttl pk-yrs)     Types: Cigarettes     Start date: 1/1/1986     Quit date: 1/1/2021     Years since quitting: 3.5     Passive exposure: Current    Smokeless tobacco: Never   Vaping Use    Vaping status: Never Used   Substance and Sexual Activity    Alcohol use: No    Drug use: Yes     Frequency: 6.0 times per week     Types: Marijuana    Sexual activity: Not Currently     Partners: Male     Birth control/protection: Abstinence, Post-menopausal, None, Tubal ligation         Family History   Problem Relation Age of Onset    Breast cancer Maternal Aunt     Cancer Maternal Aunt     Pancreatic cancer Cousin     Breast cancer Cousin     Breast cancer Cousin     Heart attack Mother     Hypertension Mother     COPD Sister        Objective  Physical Exam:  /88 (BP Location: Right arm, Patient Position: Lying)   Pulse 90   Temp 98.2 °F (36.8 °C) (Oral)   Resp 16   Ht 170.2 cm (67\")   Wt 65.8 kg (145 lb)   SpO2 100%   BMI 22.71 kg/m²      Physical Exam  Vitals and nursing note reviewed.   Constitutional:       Appearance: She is well-developed.   HENT:      Head: Normocephalic and atraumatic.   Cardiovascular:      Rate and Rhythm: Normal rate and regular rhythm.   Pulmonary: "      Effort: Pulmonary effort is normal.      Breath sounds: Normal breath sounds.   Abdominal:      Palpations: Abdomen is soft.      Tenderness: generalized    Musculoskeletal:         General: Normal range of motion.      Cervical back: Normal range of motion and neck supple.   Skin:     General: Skin is warm and dry.   Neurological:      Mental Status: She is alert and oriented to person, place, and time.      Deep Tendon Reflexes: Reflexes are normal and symmetric.           Critical Care    Performed by: Luca Chisholm Jr., PA-C  Authorized by: Charanjit Syed MD    Critical care provider statement:     Critical care time (minutes):  30    Critical care time was exclusive of:  Separately billable procedures and treating other patients and teaching time    Critical care was necessary to treat or prevent imminent or life-threatening deterioration of the following conditions: Renal failure, elevations in BUN/creatinine, heart rate of 121 admitted to the hospitalist for further care.    Critical care was time spent personally by me on the following activities:  Blood draw for specimens, development of treatment plan with patient or surrogate, discussions with consultants, discussions with primary provider, evaluation of patient's response to treatment, examination of patient, ordering and performing treatments and interventions, ordering and review of laboratory studies, ordering and review of radiographic studies, pulse oximetry, re-evaluation of patient's condition and review of old charts    Care discussed with: admitting provider        ED Course:    ED Course as of 07/08/24 2159 Mon Jul 08, 2024   1326 Review of previous  non ED visits, prior labs, prior imaging, available notes from prior evaluations or visits with specialists, medication list, allergies, past medical history, past surgical history     [CS]   1327 Outside prior test review of studies including recent CT scan from primary care  physician [CS]   1511 BUN(!): 54 [CS]   1511 Creatinine(!): 3.03  Acute kidney insufficiency, abdominal pain, CT scan noncontrast ordered, IV fluids, pain medication, anticipate admission [CS]   1512 Heart Rate(!): 124 [CS]   1618 I updated the patient/family  on all pending labs and lab results, and all pending radiology and  results and answered all questions.   [CS]   1800 Message sent to Dr. Fenton for admission [CS]      ED Course User Index  [CS] Luca Chisholm Jr., NISHANT       Lab Results (last 24 hours)       Procedure Component Value Units Date/Time    CBC & Differential [[id number]]  (Abnormal) Collected: 07/08/24 1330    Specimen: Blood Updated: 07/08/24 1524    Narrative:      The following orders were created for panel order CBC & Differential.  Procedure                               Abnormality         Status                     ---------                               -----------         ------                     CBC Auto Differential[[id number]]        Abnormal            Final result               Scan Slide[[id number]]                                                                    Please view results for these tests on the individual orders.    Comprehensive Metabolic Panel [[id number]]  (Abnormal) Collected: 07/08/24 1330    Specimen: Blood Updated: 07/08/24 1411     Glucose 151 mg/dL      BUN 54 mg/dL      Creatinine 3.03 mg/dL      Sodium 135 mmol/L      Potassium 3.1 mmol/L      Chloride 95 mmol/L      CO2 16.0 mmol/L      Calcium 9.7 mg/dL      Total Protein 8.7 g/dL      Albumin 4.6 g/dL      ALT (SGPT) 18 U/L      AST (SGOT) 22 U/L      Alkaline Phosphatase 151 U/L      Total Bilirubin 0.4 mg/dL      Globulin 4.1 gm/dL      Comment: Calculated Result        A/G Ratio 1.1 g/dL      BUN/Creatinine Ratio 17.8     Anion Gap 24.0 mmol/L      eGFR 17.2 mL/min/1.73     Narrative:      GFR Normal >60  Chronic Kidney Disease <60  Kidney Failure <15      Lipase [[id number]]  (Normal) Collected:  07/08/24 1330    Specimen: Blood Updated: 07/08/24 1411     Lipase 19 U/L     CBC Auto Differential [[id number]]  (Abnormal) Collected: 07/08/24 1330    Specimen: Blood Updated: 07/08/24 1524     WBC 4.92 10*3/mm3      RBC 6.23 10*6/mm3      Hemoglobin 17.0 g/dL      Hematocrit 49.7 %      MCV 79.8 fL      MCH 27.3 pg      MCHC 34.2 g/dL      RDW 13.2 %      RDW-SD 37.2 fl      MPV 9.9 fL      Platelets 138 10*3/mm3     Narrative:      Verified by repeat analysis.      Manual Differential [[id number]]  (Abnormal) Collected: 07/08/24 1330    Specimen: Blood Updated: 07/08/24 1524     Neutrophil % 53.0 %      Lymphocyte % 24.0 %      Monocyte % 9.0 %      Eosinophil % 0.0 %      Basophil % 1.0 %      Bands %  5.0 %      Myelocyte % 1.0 %      Atypical Lymphocyte % 7.0 %      Neutrophils Absolute 2.85 10*3/mm3      Lymphocytes Absolute 1.53 10*3/mm3      Monocytes Absolute 0.44 10*3/mm3      Eosinophils Absolute 0.00 10*3/mm3      Basophils Absolute 0.05 10*3/mm3      nRBC 0.0 /100 WBC      RBC Morphology Normal     WBC Morphology Normal     Platelet Morphology Normal    Lactic Acid, Plasma [[id number]]  (Abnormal) Collected: 07/08/24 1554    Specimen: Blood from Arm, Right Updated: 07/08/24 1626     Lactate 2.5 mmol/L      Comment: Falsely depressed results may occur on samples drawn from patients receiving N-Acetylcysteine (NAC) or Metamizole.       Urinalysis With Microscopic If Indicated (No Culture) - Urine, Clean Catch [[id number]]  (Abnormal) Collected: 07/08/24 1716    Specimen: Urine, Clean Catch Updated: 07/08/24 1733     Color, UA Yellow     Appearance, UA Cloudy     pH, UA <=5.0     Specific Gravity, UA 1.014     Glucose, UA Negative     Ketones, UA Trace     Bilirubin, UA Negative     Blood, UA Trace     Protein,  mg/dL (2+)     Leuk Esterase, UA Negative     Nitrite, UA Negative     Urobilinogen, UA 0.2 E.U./dL    Urinalysis, Microscopic Only - Urine, Clean Catch [[id number]]  (Abnormal) Collected:  07/08/24 1716    Specimen: Urine, Clean Catch Updated: 07/08/24 1740     RBC, UA 6-10 /HPF      WBC, UA 0-2 /HPF      Bacteria, UA None Seen /HPF      Squamous Epithelial Cells, UA 0-2 /HPF      Hyaline Casts, UA 0-6 /LPF      Fine Granular Casts, UA 0-2 /LPF      Mucus, UA Trace /HPF      Methodology Manual Light Microscopy             CT Abdomen Pelvis Without Contrast    Result Date: 7/8/2024  CT ABDOMEN PELVIS WO CONTRAST Date of Exam: 7/8/2024 5:00 PM EDT Indication: abd pain nv. Comparison: CT of the abdomen and pelvis dated 3/29/2022 Technique: Axial CT images were obtained of the abdomen and pelvis without the administration of contrast. Reconstructed coronal and sagittal images were also obtained. Automated exposure control and iterative construction methods were used. Findings: Liver: Limited evaluation of the liver due to lack of IV contrast administration. Probable small cyst within the left hepatic dome. No biliary dilation is seen. Gallbladder: Surgically absent. Pancreas: Pancreas is not well evaluated due to lack of IV contrast. Spleen: Unremarkable. Adrenal glands: There is a 3.3 cm left adrenal mass with low CT density (-4 Hounsfield units) compatible with benign, lipid rich adenoma. Right adrenal gland is unremarkable. The right adrenal gland is unremarkable. Genitourinary tract: Punctate 1 mm nonobstructing calculus within the left kidney. Kidneys are otherwise unremarkable. No hydronephrosis is seen. The ureters are not well visualized. The urinary bladder is unremarkable. Pelvic organs are poorly visualized due to lack of IV contrast. Gastrointestinal tract: Limited evaluation of the hollow viscera due to lack of IV contrast administration. There is no evidence of bowel obstruction. Small hiatal hernia with surgical changes of Nissen fundoplication noted. Appendix: No findings to suggest acute appendicitis. Other findings: No free air or free fluid is identified. No pathologically enlarged  lymph nodes are seen. Mild vascular calcifications are present.. Bones and soft tissues: No acute or suspicious osseous or soft tissue lesion is identified. Gastric pacing device is implanted within the right abdominal wall. There is a fat-containing ventral hernia just above the umbilicus. Lung bases: The visualized lung bases are clear. Mild bilateral lower lobe bronchiectasis noted.     Impression: Impression: 1.Examination is limited due to lack of IV contrast administration. 2.Given this limitation, no acute abnormality is identified within the abdomen or pelvis. 3.3.3 cm lipid rich adenoma within the left adrenal gland. 4.Additional findings as detailed above. Electronically Signed: Regino Moore MD  7/8/2024 5:22 PM EDT  Workstation ID: [id number]        Medical Decision Making  Patient Presentation 59-year-old female presented with nausea vomiting and abdominal pain, reported unable to keep anything down.  She had diffuse generalized abdominal pain on my physical exam    DDX bowel obstruction, partial bowel obstruction, gastritis, enteritis, colitis, dehydration, acute kidney insufficiency, renal failure    Data Review/ Non ED Records /Analysis/Ordering unique tests  Review of previous  non ED visits, prior labs, prior imaging, available notes from prior evaluations or visits with specialists, medication list, allergies, past medical history, past surgical history        Independent Review Studies  I Personally reviewed all laboratory studies performed in the emergency department     Intervention/Re-evaluation intervention included IV fluid bolus pain medications and antiemetics    Independent Clinician discussed with my supervising physician Dr. Dr. Miller discussed with the on-call hospitalist Dr. Fenton    Risk Stratification tools/clinical decision rules patient presented with abdominal pain, nausea vomiting unable to keep down solids or liquids, he is concerned about a partial or full bowel obstruction,  ileus dehydration, acute kidney insufficiency, renal failure but as far as a UTI.  Her patient's labs were consistent with acute kidney insufficiency; she had abnormal heart rate of 121, she was given fluid boluses, pain medicine and antiemetics and admitted for further care.    Shared Decision Making discussed this plan with patient and she was agreeable    Disposition patient stable for admission    Problems Addressed:  VIRIDIANA (acute kidney injury): complicated acute illness or injury    Amount and/or Complexity of Data Reviewed  Independent Historian: spouse  External Data Reviewed: labs and notes.     Details: Review of previous  non ED visits, prior labs, prior imaging, available notes from prior evaluations or visits with specialists, medication list, allergies, past medical history, past surgical history      Labs: ordered. Decision-making details documented in ED Course.  Radiology: ordered.  Discussion of management or test interpretation with external provider(s): Discussed with the hospitalist Dr. Fenton who accepted for admission    Risk  Prescription drug management.  Decision regarding hospitalization.          Final diagnoses:   VIRIDIANA (acute kidney injury)           Disposition admission       Luca Chisholm Jr., PA-C  07/08/24 2158       Luca Chisholm Jr., PA-C  07/08/24 2151

## 2024-07-08 NOTE — ED NOTES
Geneva Haro    Nursing Report ED to Floor:  Mental status: alert and oriented  Ambulatory status: one assist  Oxygen Therapy:  room air  Cardiac Rhythm: sinus tachycardia  Admitted from: ED  Safety Concerns:  none noted  Social Issues: none noted  ED Room #:  15    ED Nurse Phone Extension - [phone] or may call [phone].      HPI:   Chief Complaint   Patient presents with    Abdominal Pain    Vomiting       Past Medical History:  Past Medical History:   Diagnosis Date    Allergic At a young age    Anxiety     Arthritis     Arthritis of back 2000    Cancer     Depression     Diabetes mellitus     Fibromyalgia, primary 33 years ago    Gall stones     GERD (gastroesophageal reflux disease) 2008    Hearing loss     Hernia, hiatal     Hip arthrosis     History of stomach ulcers     History of transfusion     Knee swelling     Periarthritis of shoulder     Stomach problems     Ulcer of bile duct         Past Surgical History:  Past Surgical History:   Procedure Laterality Date    BREAST SURGERY      CHOLECYSTECTOMY      CORONARY ARTERY BYPASS GRAFT      ENDOSCOPY N/A 06/20/2018    Procedure: ESOPHAGOGASTRODUODENOSCOPY;  Surgeon: Mark I Brunner, MD;  Location:  [facility];  Service: Gastroenterology    GASTRIC BYPASS      x2    GASTRIC STIMULATOR IMPLANT SURGERY      HAND SURGERY  1992    HERNIA REPAIR      HERNIA REPAIR      INTERVENTIONAL RADIOLOGY PROCEDURE Bilateral 07/22/2021    Procedure: IR myelogram, cervical;  Surgeon: Jose Angel Sauer MD;  Location:  [facility];  Service: Interventional Radiology;  Laterality: Bilateral;    LYMPH NODE BIOPSY      MASTECTOMY Bilateral     Left With sentinel lymph node sampling and prophylactic right mastectomy    PACEMAKER IMPLANTATION      PORTACATH PLACEMENT      SMALL INTESTINE SURGERY          Admitting Doctor:   Cony Fenton MD    Consulting Provider(s):  Consults       No orders found from 6/9/2024 to 7/9/2024.             Admitting Diagnosis:    There were no encounter diagnoses.    Most Recent Vitals:   Vitals:    07/08/24 1753 07/08/24 1754 07/08/24 1801 07/08/24 1811   BP:   123/96    Pulse: 113 110 103 108   Resp:       Temp:       TempSrc:       SpO2: 100% 97% 96% 94%   Weight:       Height:           Active LDAs/IV Access:   Lines, Drains & Airways       Active LDAs       Name Placement date Placement time Site Days    Single Lumen Implantable Port Right Subclavian --  --  Subclavian  --                    Labs (abnormal labs have a star):   Labs Reviewed   COMPREHENSIVE METABOLIC PANEL - Abnormal; Notable for the following components:       Result Value    Glucose 151 (*)     BUN 54 (*)     Creatinine 3.03 (*)     Sodium 135 (*)     Potassium 3.1 (*)     Chloride 95 (*)     CO2 16.0 (*)     Total Protein 8.7 (*)     Alkaline Phosphatase 151 (*)     Anion Gap 24.0 (*)     eGFR 17.2 (*)     All other components within normal limits    Narrative:     GFR Normal >60  Chronic Kidney Disease <60  Kidney Failure <15     LACTIC ACID, PLASMA - Abnormal; Notable for the following components:    Lactate 2.5 (*)     All other components within normal limits   CBC WITH AUTO DIFFERENTIAL - Abnormal; Notable for the following components:    RBC 6.23 (*)     Hemoglobin 17.0 (*)     Hematocrit 49.7 (*)     Platelets 138 (*)     All other components within normal limits    Narrative:     Verified by repeat analysis.     MANUAL DIFFERENTIAL - Abnormal; Notable for the following components:    Eosinophil % 0.0 (*)     Myelocyte % 1.0 (*)     Atypical Lymphocyte % 7.0 (*)     All other components within normal limits   URINALYSIS W/ MICROSCOPIC IF INDICATED (NO CULTURE) - Abnormal; Notable for the following components:    Appearance, UA Cloudy (*)     Ketones, UA Trace (*)     Blood, UA Trace (*)     Protein,  mg/dL (2+) (*)     All other components within normal limits   URINALYSIS, MICROSCOPIC ONLY - Abnormal; Notable for the following components:    RBC, UA  6-10 (*)     All other components within normal limits   LIPASE - Normal   RAINBOW DRAW    Narrative:     The following orders were created for panel order [place] Draw.  Procedure                               Abnormality         Status                     ---------                               -----------         ------                     Green Top (Gel)[[id number]]                                  Final result               Lavender Top[[id number]]                                     Final result               Gold Top - SST[[id number]]                                                              Verde Top[[id number]]                                         Final result               Light Blue Top[[id number]]                                                                Please view results for these tests on the individual orders.   LACTIC ACID, REFLEX   CBC AND DIFFERENTIAL    Narrative:     The following orders were created for panel order CBC & Differential.  Procedure                               Abnormality         Status                     ---------                               -----------         ------                     CBC Auto Differential[[id number]]        Abnormal            Final result               Scan Slide[[id number]]                                                                    Please view results for these tests on the individual orders.   GREEN TOP   LAVENDER TOP   GRAY TOP   GOLD TOP - SST   LIGHT BLUE TOP       Meds Given in ED:   Medications   sodium chloride 0.9 % flush 10 mL (has no administration in time range)   lactated ringers bolus 1,000 mL (has no administration in time range)   lactated ringers infusion (has no administration in time range)   potassium chloride (MICRO-K/KLOR-CON) CR capsule (has no administration in time range)   sodium chloride 0.9 % bolus 1,000 mL (0 mL Intravenous Stopped 7/8/24 1710)   ondansetron (ZOFRAN) injection 4 mg (4 mg Intravenous Given 7/8/24  1524)   fentaNYL citrate (PF) (SUBLIMAZE) injection 50 mcg (50 mcg Intravenous Given 7/8/24 1523)   HYDROmorphone (DILAUDID) injection 1 mg (1 mg Intravenous Given 7/8/24 1751)   sucralfate (CARAFATE) tablet 1 g (1 g Oral Given 7/8/24 1751)     lactated ringers, 100 mL/hr         Last NIH score:                                                          Dysphagia screening results:        Bates Coma Scale:  No data recorded     CIWA:        Restraint Type:            Isolation Status:  No active isolations

## 2024-07-09 LAB
AMPHET+METHAMPHET UR QL: NEGATIVE
AMPHETAMINES UR QL: NEGATIVE
ANION GAP SERPL CALCULATED.3IONS-SCNC: 14 MMOL/L (ref 5–15)
BARBITURATES UR QL SCN: NEGATIVE
BENZODIAZ UR QL SCN: NEGATIVE
BUN SERPL-MCNC: 44 MG/DL (ref 6–20)
BUN/CREAT SERPL: 26.2 (ref 7–25)
BUPRENORPHINE SERPL-MCNC: NEGATIVE NG/ML
CALCIUM SPEC-SCNC: 8.7 MG/DL (ref 8.6–10.5)
CANNABINOIDS SERPL QL: POSITIVE
CHLORIDE SERPL-SCNC: 103 MMOL/L (ref 98–107)
CO2 SERPL-SCNC: 21 MMOL/L (ref 22–29)
COCAINE UR QL: POSITIVE
CREAT SERPL-MCNC: 1.68 MG/DL (ref 0.57–1)
D-LACTATE SERPL-SCNC: 0.9 MMOL/L (ref 0.5–2)
DEPRECATED RDW RBC AUTO: 38.9 FL (ref 37–54)
EGFRCR SERPLBLD CKD-EPI 2021: 34.9 ML/MIN/1.73
ERYTHROCYTE [DISTWIDTH] IN BLOOD BY AUTOMATED COUNT: 13.2 % (ref 12.3–15.4)
FENTANYL UR-MCNC: POSITIVE NG/ML
GLUCOSE SERPL-MCNC: 89 MG/DL (ref 65–99)
HCT VFR BLD AUTO: 40.9 % (ref 34–46.6)
HGB BLD-MCNC: 13.7 G/DL (ref 12–15.9)
MCH RBC QN AUTO: 27.3 PG (ref 26.6–33)
MCHC RBC AUTO-ENTMCNC: 33.5 G/DL (ref 31.5–35.7)
MCV RBC AUTO: 81.5 FL (ref 79–97)
METHADONE UR QL SCN: NEGATIVE
OPIATES UR QL: NEGATIVE
OXYCODONE UR QL SCN: POSITIVE
PCP UR QL SCN: NEGATIVE
PLATELET # BLD AUTO: 108 10*3/MM3 (ref 140–450)
PMV BLD AUTO: 10 FL (ref 6–12)
POTASSIUM SERPL-SCNC: 3.7 MMOL/L (ref 3.5–5.2)
RBC # BLD AUTO: 5.02 10*6/MM3 (ref 3.77–5.28)
SODIUM SERPL-SCNC: 138 MMOL/L (ref 136–145)
TRICYCLICS UR QL SCN: NEGATIVE
WBC NRBC COR # BLD AUTO: 4.43 10*3/MM3 (ref 3.4–10.8)

## 2024-07-09 PROCEDURE — 85027 COMPLETE CBC AUTOMATED: CPT | Performed by: INTERNAL MEDICINE

## 2024-07-09 PROCEDURE — 25010000002 HEPARIN (PORCINE) PER 1000 UNITS: Performed by: INTERNAL MEDICINE

## 2024-07-09 PROCEDURE — 80048 BASIC METABOLIC PNL TOTAL CA: CPT | Performed by: INTERNAL MEDICINE

## 2024-07-09 PROCEDURE — 83605 ASSAY OF LACTIC ACID: CPT | Performed by: INTERNAL MEDICINE

## 2024-07-09 PROCEDURE — 99254 IP/OBS CNSLTJ NEW/EST MOD 60: CPT | Performed by: PHYSICIAN ASSISTANT

## 2024-07-09 PROCEDURE — 99232 SBSQ HOSP IP/OBS MODERATE 35: CPT | Performed by: FAMILY MEDICINE

## 2024-07-09 PROCEDURE — 80307 DRUG TEST PRSMV CHEM ANLYZR: CPT | Performed by: PHYSICIAN ASSISTANT

## 2024-07-09 PROCEDURE — 25810000003 LACTATED RINGERS PER 1000 ML: Performed by: INTERNAL MEDICINE

## 2024-07-09 RX ORDER — NITROGLYCERIN 0.4 MG/1
0.4 TABLET SUBLINGUAL
Status: DISCONTINUED | OUTPATIENT
Start: 2024-07-09 | End: 2024-07-10 | Stop reason: HOSPADM

## 2024-07-09 RX ADMIN — SODIUM CHLORIDE, POTASSIUM CHLORIDE, SODIUM LACTATE AND CALCIUM CHLORIDE 150 ML/HR: 600; 310; 30; 20 INJECTION, SOLUTION INTRAVENOUS at 06:11

## 2024-07-09 RX ADMIN — HEPARIN SODIUM 5000 UNITS: 5000 INJECTION INTRAVENOUS; SUBCUTANEOUS at 21:00

## 2024-07-09 RX ADMIN — CITALOPRAM HYDROBROMIDE 20 MG: 20 TABLET ORAL at 08:30

## 2024-07-09 RX ADMIN — SODIUM CHLORIDE, POTASSIUM CHLORIDE, SODIUM LACTATE AND CALCIUM CHLORIDE 150 ML/HR: 600; 310; 30; 20 INJECTION, SOLUTION INTRAVENOUS at 13:59

## 2024-07-09 RX ADMIN — OXYCODONE HYDROCHLORIDE AND ACETAMINOPHEN 1 TABLET: 5; 325 TABLET ORAL at 17:36

## 2024-07-09 RX ADMIN — HEPARIN SODIUM 5000 UNITS: 5000 INJECTION INTRAVENOUS; SUBCUTANEOUS at 08:34

## 2024-07-09 RX ADMIN — HEPARIN SODIUM 5000 UNITS: 5000 INJECTION INTRAVENOUS; SUBCUTANEOUS at 01:34

## 2024-07-09 RX ADMIN — Medication 10 ML: at 08:31

## 2024-07-09 RX ADMIN — OXYCODONE HYDROCHLORIDE AND ACETAMINOPHEN 1 TABLET: 5; 325 TABLET ORAL at 07:55

## 2024-07-09 RX ADMIN — PANTOPRAZOLE SODIUM 40 MG: 40 INJECTION, POWDER, FOR SOLUTION INTRAVENOUS at 17:29

## 2024-07-09 RX ADMIN — PANTOPRAZOLE SODIUM 40 MG: 40 INJECTION, POWDER, FOR SOLUTION INTRAVENOUS at 07:36

## 2024-07-09 NOTE — PLAN OF CARE
Goal Outcome Evaluation:     Problem: Adult Inpatient Plan of Care  Goal: Plan of Care Review  Outcome: Ongoing, Progressing  Goal: Patient-Specific Goal (Individualized)  Outcome: Ongoing, Progressing  Goal: Absence of Hospital-Acquired Illness or Injury  Outcome: Ongoing, Progressing  Intervention: Identify and Manage Fall Risk  Recent Flowsheet Documentation  Taken 7/9/2024 0400 by Bela Arroyo RN  Safety Promotion/Fall Prevention:   activity supervised   assistive device/personal items within reach   clutter free environment maintained   fall prevention program maintained   lighting adjusted   nonskid shoes/slippers when out of bed   room organization consistent   safety round/check completed   toileting scheduled  Taken 7/9/2024 0200 by Bela Arroyo RN  Safety Promotion/Fall Prevention:   activity supervised   assistive device/personal items within reach   clutter free environment maintained   fall prevention program maintained   lighting adjusted   nonskid shoes/slippers when out of bed   room organization consistent   safety round/check completed   toileting scheduled  Taken 7/9/2024 0000 by Bela Arroyo RN  Safety Promotion/Fall Prevention:   activity supervised   assistive device/personal items within reach   clutter free environment maintained   fall prevention program maintained   lighting adjusted   nonskid shoes/slippers when out of bed   room organization consistent   safety round/check completed   toileting scheduled  Taken 7/8/2024 2200 by Bela Arroyo RN  Safety Promotion/Fall Prevention:   activity supervised   assistive device/personal items within reach   clutter free environment maintained   fall prevention program maintained   lighting adjusted   nonskid shoes/slippers when out of bed   room organization consistent   safety round/check completed   toileting scheduled  Taken 7/8/2024 2018 by Bela Arroyo, RN  Safety Promotion/Fall Prevention:   activity supervised    assistive device/personal items within reach   clutter free environment maintained   fall prevention program maintained   lighting adjusted   nonskid shoes/slippers when out of bed   room organization consistent   safety round/check completed   toileting scheduled  Intervention: Prevent Skin Injury  Recent Flowsheet Documentation  Taken 7/9/2024 0400 by Bela Arroyo RN  Body Position: position changed independently  Taken 7/9/2024 0200 by Bela Arroyo RN  Body Position: position changed independently  Taken 7/9/2024 0000 by Bela Arroyo RN  Body Position: position changed independently  Taken 7/8/2024 2200 by Bela Arroyo RN  Body Position: position changed independently  Taken 7/8/2024 2018 by Bela Arroyo RN  Body Position: position changed independently  Skin Protection: adhesive use limited  Intervention: Prevent and Manage VTE (Venous Thromboembolism) Risk  Recent Flowsheet Documentation  Taken 7/9/2024 0400 by Bela Arroyo RN  Activity Management: activity minimized  Taken 7/9/2024 0200 by Bela Arroyo RN  Activity Management: activity minimized  Taken 7/9/2024 0000 by Bela Arroyo RN  Activity Management: activity minimized  Taken 7/8/2024 2200 by Bela Arroyo RN  Activity Management: activity minimized  Taken 7/8/2024 2018 by Bela Arroyo RN  Activity Management: activity minimized  VTE Prevention/Management: patient refused intervention  Range of Motion: ROM (range of motion) performed  Intervention: Prevent Infection  Recent Flowsheet Documentation  Taken 7/9/2024 0400 by Bela Arroyo RN  Infection Prevention:   environmental surveillance performed   equipment surfaces disinfected   hand hygiene promoted   rest/sleep promoted   single patient room provided   visitors restricted/screened  Taken 7/9/2024 0200 by Bela Arroyo RN  Infection Prevention:   environmental surveillance performed   equipment surfaces disinfected   hand hygiene promoted    rest/sleep promoted   single patient room provided   visitors restricted/screened  Taken 7/9/2024 0000 by Bela Arroyo, RN  Infection Prevention:   environmental surveillance performed   equipment surfaces disinfected   hand hygiene promoted   rest/sleep promoted   single patient room provided   visitors restricted/screened  Taken 7/8/2024 2200 by Bela Arroyo, RN  Infection Prevention:   environmental surveillance performed   equipment surfaces disinfected   hand hygiene promoted   rest/sleep promoted   single patient room provided   visitors restricted/screened  Taken 7/8/2024 2018 by Bela Arroyo RN  Infection Prevention:   environmental surveillance performed   equipment surfaces disinfected   hand hygiene promoted   rest/sleep promoted   single patient room provided   visitors restricted/screened  Goal: Optimal Comfort and Wellbeing  Outcome: Ongoing, Progressing  Intervention: Monitor Pain and Promote Comfort  Recent Flowsheet Documentation  Taken 7/8/2024 2018 by Bela Arroyo RN  Pain Management Interventions: position adjusted  Intervention: Provide Person-Centered Care  Recent Flowsheet Documentation  Taken 7/8/2024 2018 by Bela Arroyo, RN  Trust Relationship/Rapport:   care explained   emotional support provided   choices provided   empathic listening provided   questions answered   questions encouraged   reassurance provided   thoughts/feelings acknowledged  Goal: Readiness for Transition of Care  Outcome: Ongoing, Progressing  Intervention: Mutually Develop Transition Plan  Recent Flowsheet Documentation  Taken 7/8/2024 2005 by Bela Arroyo, RN  Transportation Anticipated: family or friend will provide  Patient/Family Anticipated Services at Transition: none  Patient/Family Anticipates Transition to: home with family  Taken 7/8/2024 2004 by Bela Arroyo, RN  Equipment Currently Used at Home: walker, standard

## 2024-07-09 NOTE — PROGRESS NOTES
Discharge Planning Assessment  [facility]     Patient Name: Geneva Haro  MRN: [id number]  Today's Date: 7/9/2024    Admit Date: 7/8/2024        Discharge Needs Assessment       Row Name 07/09/24 1158       Living Environment    Current Living Arrangements home    Primary Care Provided by self    Provides Primary Care For no one    Quality of Family Relationships helpful       Resource/Environmental Concerns    Resource/Environmental Concerns none       Transition Planning    Patient/Family Anticipates Transition to home with family    Patient/Family Anticipated Services at Transition none    Transportation Anticipated family or friend will provide       Discharge Needs Assessment    Readmission Within the Last 30 Days no previous admission in last 30 days    Equipment Currently Used at Home none    Concerns to be Addressed discharge planning    Anticipated Changes Related to Illness none    Equipment Needed After Discharge none                   Discharge Plan       Row Name 07/09/24 1159       Plan    Plan Comments Met with Mrs. Haro for a discharge planning assessment. She lives in [place] and she has Medicare insurance coverage. She denies having home health or using equipment.  Case management is following for discharge needs.    Final Discharge Disposition Code 01 - home or self-care      Row Name 07/09/24 1155       Plan    Final Discharge Disposition Code 01 - home or self-care                  Continued Care and Services - Admitted Since 7/8/2024    No active coordination exists for this encounter.       Expected Discharge Date and Time       Expected Discharge Date Expected Discharge Time    Jul 10, 2024            Demographic Summary       Row Name 07/09/24 1155       General Information    Admission Type inpatient    Arrived From home    Referral Source patient    Reason for Consult discharge planning    Preferred Language English       Contact Information    Permission Granted to Share  Info With     Contact Information Obtained for                    Functional Status       Row Name 07/09/24 1156       Functional Status    Usual Activity Tolerance moderate    Current Activity Tolerance moderate       Functional Status, IADL    Medications independent    Meal Preparation independent    Housekeeping independent    Laundry independent    Shopping independent                   Psychosocial    No documentation.                  Abuse/Neglect    No documentation.                  Legal    No documentation.                  Substance Abuse    No documentation.                  Patient Forms    No documentation.                     ABRAM Bravo

## 2024-07-09 NOTE — CONSULTS
[facility]    Referring Provider: Cony Fenton MD     PCP: Cesar Gao MD    Reason for Consultation: Gastroparesis     Chief complaint: Nausea, vomiting, diffuse pain     History of present illness:    Geneva Haro is a very pleasant 59 y.o. female who is admitted with nausea, vomiting and diffuse body pain attributed to fibromyalgia.   She has long standing history of gastroparesis, previously extensively worked up by Dr. Jonny Flaherty at [facility].   She has previously failed a gastric pacemaker, metoclopramide and domperidone.    She underwent EGD with pyloric dilation last June with Dr. Sanchez.        She states she felt worse after the 4th of July holiday with body aches, nausea, vomiting.   She typically has constipation but had recent looser stools.       She typically takes Phenegran at home as needed (usually one daily) for nausea and vomiting.   She states her previous gastroparesis physician, Dr. Flaherty, moved to [place] and her insurance will not provide coverage there.   She is currently seeking a new gastroparesis specialist.      She is feeling some improved overnight after a 1 liter bolus of NS, IV PPI and a one  time dose of IV Zofran.   No emesis overnight.   Tolerating clear liquids.       Allergies:  Gabapentin, Morphine and codeine, Melatonin, Aspirin, and Ibuprofen    Scheduled Meds:  citalopram, 20 mg, Oral, Daily  heparin (porcine), 5,000 Units, Subcutaneous, Q12H  pantoprazole, 40 mg, Intravenous, BID AC  sodium chloride, 10 mL, Intravenous, Q12H         Infusions:  lactated ringers, 150 mL/hr, Last Rate: 150 mL/hr (07/09/24 0611)        PRN Meds:    acetaminophen **OR** acetaminophen **OR** acetaminophen    HYDROmorphone    metoclopramide    nitroglycerin    ondansetron    oxyCODONE-acetaminophen    promethazine    sodium chloride    sodium chloride    Home Meds:  Medications Prior to Admission   Medication Sig Dispense Refill Last Dose     To me, it does look better.  I don't think we have to do anything different at this point.   cetirizine (zyrTEC) 10 MG tablet Take 1 tablet by mouth Daily. 90 tablet 0 7/7/2024    citalopram (CeleXA) 20 MG tablet Take 1 tablet by mouth Daily. 90 tablet 3 7/7/2024    cyclobenzaprine (FLEXERIL) 10 MG tablet TAKE 1 TABLET BY MOUTH THREE TIMES A DAY AS NEEDED FOR MUSCLE SPASM 90 tablet 0 Past Week    DULoxetine (Cymbalta) 60 MG capsule Take 1 capsule by mouth Daily. 90 capsule 1 7/7/2024    esomeprazole (nexIUM) 40 MG capsule TAKE 1 CAPSULE BY MOUTH TWICE A  capsule 1 7/7/2024    promethazine (PHENERGAN) 25 MG tablet TAKE 1 TABLET BY MOUTH EVERY 6 HOURS AS NEEDED FOR NAUSEA OR VOMITING. 120 tablet 1 Past Week    traZODone (DESYREL) 50 MG tablet TAKE 1 TABLET BY MOUTH EVERYDAY AT BEDTIME 90 tablet 0 7/7/2024       ROS: Review of Systems   Constitutional:  Positive for fatigue.   Respiratory: Negative.     Cardiovascular: Negative.    Gastrointestinal:  Positive for abdominal pain, nausea and vomiting.   Endocrine: Negative.    Genitourinary: Negative.    Musculoskeletal:  Positive for arthralgias and myalgias.   Neurological: Negative.    Hematological: Negative.    Psychiatric/Behavioral: Negative.         PAST MED HX:  Past Medical History:   Diagnosis Date    Allergic At a young age    Anxiety     Arthritis     Arthritis of back 2000    Cancer     Depression     Diabetes mellitus     Fibromyalgia, primary 33 years ago    Gall stones     GERD (gastroesophageal reflux disease) 2008    Hearing loss     Hernia, hiatal     Hip arthrosis     History of stomach ulcers     History of transfusion     Knee swelling     Periarthritis of shoulder     Stomach problems     Ulcer of bile duct        PAST SURG HX:  Past Surgical History:   Procedure Laterality Date    BREAST SURGERY      CHOLECYSTECTOMY      CORONARY ARTERY BYPASS GRAFT      ENDOSCOPY N/A 06/20/2018    Procedure: ESOPHAGOGASTRODUODENOSCOPY;  Surgeon: Mark I Brunner, MD;  Location: [facility];  Service: Gastroenterology    GASTRIC BYPASS      x2  "   GASTRIC STIMULATOR IMPLANT SURGERY      HAND SURGERY  1992    HERNIA REPAIR      HERNIA REPAIR      INTERVENTIONAL RADIOLOGY PROCEDURE Bilateral 07/22/2021    Procedure: IR myelogram, cervical;  Surgeon: Jose Angel Sauer MD;  Location: [facility] INVASIVE LOCATION;  Service: Interventional Radiology;  Laterality: Bilateral;    LYMPH NODE BIOPSY      MASTECTOMY Bilateral     Left With sentinel lymph node sampling and prophylactic right mastectomy    PACEMAKER IMPLANTATION      PORTACATH PLACEMENT      SMALL INTESTINE SURGERY         FAM HX:  Family History   Problem Relation Age of Onset    Breast cancer Maternal Aunt     Cancer Maternal Aunt     Pancreatic cancer Cousin     Breast cancer Cousin     Breast cancer Cousin     Heart attack Mother     Hypertension Mother     COPD Sister        SOC HX:  Social History     Socioeconomic History    Marital status:    Tobacco Use    Smoking status: Former     Current packs/day: 0.00     Average packs/day: 0.3 packs/day for 35.0 years (8.8 ttl pk-yrs)     Types: Cigarettes     Start date: 1/1/1986     Quit date: 1/1/2021     Years since quitting: 3.5     Passive exposure: Current    Smokeless tobacco: Never   Vaping Use    Vaping status: Never Used   Substance and Sexual Activity    Alcohol use: No    Drug use: Yes     Frequency: 6.0 times per week     Types: Marijuana    Sexual activity: Not Currently     Partners: Male     Birth control/protection: Abstinence, Post-menopausal, None, Tubal ligation       PHYSICAL EXAM  /73 (BP Location: Right arm, Patient Position: Lying)   Pulse 83   Temp 97.8 °F (36.6 °C) (Oral)   Resp 18   Ht 170.2 cm (67\")   Wt 65.8 kg (145 lb)   SpO2 99%   BMI 22.71 kg/m²   Wt Readings from Last 3 Encounters:   07/08/24 65.8 kg (145 lb)   04/09/24 67.8 kg (149 lb 6.4 oz)   02/08/24 71.7 kg (158 lb)   ,body mass index is 22.71 kg/m².  Physical Exam  Constitutional:       General: She is not in acute distress.  HENT:      Head: " "Normocephalic and atraumatic.   Cardiovascular:      Rate and Rhythm: Normal rate and regular rhythm.   Pulmonary:      Effort: Pulmonary effort is normal. No respiratory distress.   Abdominal:      General: Bowel sounds are normal. There is no distension.      Palpations: Abdomen is soft.      Tenderness: There is no abdominal tenderness.   Neurological:      Mental Status: She is alert and oriented to person, place, and time.   Psychiatric:         Behavior: Behavior normal.         Results Review:   I reviewed the patient's new clinical results.    Lab Results   Component Value Date    WBC 4.43 07/09/2024    HGB 13.7 07/09/2024    HGB 17.0 (H) 07/08/2024    HGB 12.5 01/18/2024    HCT 40.9 07/09/2024    MCV 81.5 07/09/2024     (L) 07/09/2024       No results found for: \"INR\"    Lab Results   Component Value Date    GLUCOSE 89 07/09/2024    BUN 44 (H) 07/09/2024    CREATININE 1.68 (H) 07/09/2024    EGFRIFAFRI 73 10/09/2020    BCR 26.2 (H) 07/09/2024     07/09/2024    K 3.7 07/09/2024    CO2 21.0 (L) 07/09/2024    CALCIUM 8.7 07/09/2024    PROTENTOTREF 7.1 04/09/2024    ALBUMIN 4.6 07/08/2024    ALKPHOS 151 (H) 07/08/2024    BILITOT 0.4 07/08/2024    ALT 18 07/08/2024    AST 22 07/08/2024       ASSESSMENTS/PLANS    Nausea and vomiting.    Gastroparesis   GERD  Acute kidney injury  Hypokalemia   Remote history of illicit drug use, Cocaine     Ms. Haro is a  pleasant 59 year old female, longstanding history of gastroparesis s/p previously failed gastric stimulator presenting with acute nausea, vomiting, myalgias and diarrhea.  Suspect viral syndrome has exacerbated symptoms.    She is clinically improving this morning.       >> Continue IV LR  >> Continue IV BID PPI   >> Antiemetics including Reglan as needed.     >> Clear liquid diet   >> Obtain uds   >> Recommend OTC Ginger Root 500 mg 3 times daily AC.     >> Patient to follow up with Dr. Sanchez outpatient.    Patient previously referred to " Referral to [facility] interventional endoscopy for possible G-POEM though patient states they did not reach out to her     I discussed the patient's findings and my recommendations with patient    ARASH Marin  07/09/24  09:20 EDT

## 2024-07-09 NOTE — PROGRESS NOTES
Nutrition Services    Patient Name:  Geneva Haro  YOB: 1965  MRN: [id number]  Admit Date:  7/8/2024    Request per Food S[employer] to check suppl appropriate for pt. Pt allows most likely to try/use BID vs TID. Order adjusted. Pt allows has had material to follow re diet for gastroparesis but has mislaid. Material provided. Follow per protocol.     Electronically signed by:  Delma Hsu RD  07/09/24 18:37 EDT

## 2024-07-09 NOTE — PROGRESS NOTES
[facility] Medicine Services  PROGRESS NOTE    Patient Name: Geneva Haro  : 1965  MRN: [id number]    Date of Admission: 2024  Primary Care Physician: Cesar Gao MD    Subjective   Subjective     CC:  Nausea vomiting diffuse pain    HPI:  Patient tolerating clears this morning.  Denies nausea vomiting. No loose stool today.       Objective   Objective     Vital Signs:   Temp:  [97.9 °F (36.6 °C)-98.2 °F (36.8 °C)] 98.1 °F (36.7 °C)  Heart Rate:  [] 83  Resp:  [16] 16  BP: (100-168)/() 100/67     Physical Exam:  Constitutional: -American female no acute distress, awake, alert  HENT: NCAT, mucous membranes moist  Respiratory: Clear to auscultation bilaterally, respiratory effort normal   Cardiovascular: RRR, no murmurs, rubs, or gallops  Gastrointestinal: Positive bowel sounds, soft, nontender, nondistended  Musculoskeletal: No bilateral ankle edema  Psychiatric: Appropriate affect, cooperative  Neurologic: Oriented x 3, speech clear  Skin: No rashes    Results Reviewed:  LAB RESULTS:      Lab 24  0144 24  1554 24  1330   WBC 4.43  --  4.92   HEMOGLOBIN 13.7  --  17.0*   HEMATOCRIT 40.9  --  49.7*   PLATELETS 108*  --  138*   NEUTROS ABS  --   --  2.85   EOS ABS  --   --  0.00   MCV 81.5  --  79.8   LACTATE 0.9 2.5*  --          Lab 24  0144 24  1330   SODIUM 138 135*   POTASSIUM 3.7 3.1*   CHLORIDE 103 95*   CO2 21.0* 16.0*   ANION GAP 14.0 24.0*   BUN 44* 54*   CREATININE 1.68* 3.03*   EGFR 34.9* 17.2*   GLUCOSE 89 151*   CALCIUM 8.7 9.7         Lab 24  1330   TOTAL PROTEIN 8.7*   ALBUMIN 4.6   GLOBULIN 4.1   ALT (SGPT) 18   AST (SGOT) 22   BILIRUBIN 0.4   ALK PHOS 151*   LIPASE 19                     Brief Urine Lab Results  (Last result in the past 365 days)        Color   Clarity   Blood   Leuk Est   Nitrite   Protein   CREAT   Urine HCG        24 1716 Yellow   Cloudy   Trace   Negative   Negative    "100 mg/dL (2+)                   Cultures:  No results found for: \"BLOODCX\", \"URINECX\", \"WOUNDCX\", \"MRSACX\", \"RESPCX\", \"STOOLCX\"    Microbiology Results Abnormal       None            CT Abdomen Pelvis Without Contrast    Result Date: 7/8/2024  CT ABDOMEN PELVIS WO CONTRAST Date of Exam: 7/8/2024 5:00 PM EDT Indication: abd pain nv. Comparison: CT of the abdomen and pelvis dated 3/29/2022 Technique: Axial CT images were obtained of the abdomen and pelvis without the administration of contrast. Reconstructed coronal and sagittal images were also obtained. Automated exposure control and iterative construction methods were used. Findings: Liver: Limited evaluation of the liver due to lack of IV contrast administration. Probable small cyst within the left hepatic dome. No biliary dilation is seen. Gallbladder: Surgically absent. Pancreas: Pancreas is not well evaluated due to lack of IV contrast. Spleen: Unremarkable. Adrenal glands: There is a 3.3 cm left adrenal mass with low CT density (-4 Hounsfield units) compatible with benign, lipid rich adenoma. Right adrenal gland is unremarkable. The right adrenal gland is unremarkable. Genitourinary tract: Punctate 1 mm nonobstructing calculus within the left kidney. Kidneys are otherwise unremarkable. No hydronephrosis is seen. The ureters are not well visualized. The urinary bladder is unremarkable. Pelvic organs are poorly visualized due to lack of IV contrast. Gastrointestinal tract: Limited evaluation of the hollow viscera due to lack of IV contrast administration. There is no evidence of bowel obstruction. Small hiatal hernia with surgical changes of Nissen fundoplication noted. Appendix: No findings to suggest acute appendicitis. Other findings: No free air or free fluid is identified. No pathologically enlarged lymph nodes are seen. Mild vascular calcifications are present.. Bones and soft tissues: No acute or suspicious osseous or soft tissue lesion is identified. " Gastric pacing device is implanted within the right abdominal wall. There is a fat-containing ventral hernia just above the umbilicus. Lung bases: The visualized lung bases are clear. Mild bilateral lower lobe bronchiectasis noted.     Impression: Impression: 1.Examination is limited due to lack of IV contrast administration. 2.Given this limitation, no acute abnormality is identified within the abdomen or pelvis. 3.3.3 cm lipid rich adenoma within the left adrenal gland. 4.Additional findings as detailed above. Electronically Signed: Regino Moore MD  7/8/2024 5:22 PM EDT  Workstation ID: [id number]         Current medications:  Scheduled Meds:citalopram, 20 mg, Oral, Daily  heparin (porcine), 5,000 Units, Subcutaneous, Q12H  pantoprazole, 40 mg, Intravenous, BID AC  sodium chloride, 10 mL, Intravenous, Q12H      Continuous Infusions:lactated ringers, 150 mL/hr, Last Rate: 150 mL/hr (07/09/24 0611)      PRN Meds:.  acetaminophen **OR** acetaminophen **OR** acetaminophen    HYDROmorphone    metoclopramide    nitroglycerin    ondansetron    oxyCODONE-acetaminophen    promethazine    sodium chloride    sodium chloride    Assessment & Plan   Assessment & Plan     Active Hospital Problems    Diagnosis  POA    **VIRIDIANA (acute kidney injury) [N17.9]  Yes      Resolved Hospital Problems   No resolved problems to display.        Brief Hospital Course to date:  Geneva Haro is a 59 y.o. female w gastroparesis s/p gastric stimulator + port placement (previously followed at [facility], now without care), GERD c/b PUD who presents for 4 days of intractable nausea/vomiting. Found to have severe VIRIDIANA, metabolic acidosis, hypokalemia c/w history above     Severe VIRIDIANA 2/2 intractable nausea/vomiting/diarrhea  Hypokalemia 2/2 acute n/v  Gastroparesis  -low dose potassium replacement  -aggressive IVF, repeat labs in AM (PVR wnl)  -reglan, PPI, other antiemetics for supportive care. GI panel pending  -advance diet as able  -help  establish OP GI provider as able, patient reports no one at [facility] managing this subsect of patients currently     Mood disorder - home celexa, hold duloxetine given kidney function    +cocaine and THC on UDS  -Patient is also positive for fentanyl and oxycodone however these were given to patient on admission    Expected Discharge Location and Transportation: home   Expected Discharge 07/10/2024  Expected Discharge Date: 7/10/2024; Expected Discharge Time:      VTE Prophylaxis:  Pharmacologic & mechanical VTE prophylaxis orders are present.         AM-PAC 6 Clicks Score (PT): 24 (07/08/24 1958)    CODE STATUS:   Code Status and Medical Interventions:   Ordered at: 07/08/24 4357     Level Of Support Discussed With:    Patient     Code Status (Patient has no pulse and is not breathing):    CPR (Attempt to Resuscitate)     Medical Interventions (Patient has pulse or is breathing):    Full Support       Maisha Drake MD  07/09/24

## 2024-07-09 NOTE — H&P
"    [facility] Medicine Services  HISTORY AND PHYSICAL    Patient Name: Geneva Haro  : 1965  MRN: [id number]  Primary Care Physician: Cesar Gao MD  Date of admission: 2024      Subjective   Subjective     Chief Complaint:  Inability to tolerate PO intake, acute nausea/vomiting/diarrhea x 4 days    HPI:  Geneva Haro is a 59 y.o. female w gastroparesis s/p gastric stimulator + port placement (previously followed at [facility], now without care), GERD c/b PUD who presents for 4 days of intractable nausea/vomiting    Reports that after  get-together, began to have severe all-over abdominal pain, vomiting and diarrhea. Denies hematemesis/melena/hematochezia. Reports pain flaired up \"all over\" from her fibromyalgia as well during this time period. Unable to hold anything down, even fluids. No known other sick contacts, no fevers. She reports prior gastroparesis flairs she was able to manage at home but this persisted longer than that.    Reports previously followed at [facility] s/p gastric stimulator x2 but that provider now has moved and she cannot find a provider in the [facility] to care for her    Has port but no home health, etc set up. Reports use of IVF in past when better established    At baseline, some vomiting from gastroparesis but nothing to this extent  Reports remote h/o PUD  Denies recent abx use      Personal History     Past Medical History:   Diagnosis Date    Allergic At a young age    Anxiety     Arthritis     Arthritis of back     Cancer     Depression     Diabetes mellitus     Fibromyalgia, primary 33 years ago    Gall stones     GERD (gastroesophageal reflux disease)     Hearing loss     Hernia, hiatal     Hip arthrosis     History of stomach ulcers     History of transfusion     Knee swelling     Periarthritis of shoulder     Stomach problems     Ulcer of bile duct            Past Surgical History:   Procedure Laterality " Date    BREAST SURGERY      CHOLECYSTECTOMY      CORONARY ARTERY BYPASS GRAFT      ENDOSCOPY N/A 06/20/2018    Procedure: ESOPHAGOGASTRODUODENOSCOPY;  Surgeon: Mark I Brunner, MD;  Location:  [facility];  Service: Gastroenterology    GASTRIC BYPASS      x2    GASTRIC STIMULATOR IMPLANT SURGERY      HAND SURGERY  1992    HERNIA REPAIR      HERNIA REPAIR      INTERVENTIONAL RADIOLOGY PROCEDURE Bilateral 07/22/2021    Procedure: IR myelogram, cervical;  Surgeon: Jose Angel Sauer MD;  Location:  [facility];  Service: Interventional Radiology;  Laterality: Bilateral;    LYMPH NODE BIOPSY      MASTECTOMY Bilateral     Left With sentinel lymph node sampling and prophylactic right mastectomy    PACEMAKER IMPLANTATION      PORTACATH PLACEMENT      SMALL INTESTINE SURGERY         Family History: family history includes Breast cancer in her cousin, cousin, and maternal aunt; COPD in her sister; Cancer in her maternal aunt; Heart attack in her mother; Hypertension in her mother; Pancreatic cancer in her cousin.     Social History:  reports that she quit smoking about 3 years ago. Her smoking use included cigarettes. She started smoking about 38 years ago. She has a 8.8 pack-year smoking history. She has been exposed to tobacco smoke. She has never used smokeless tobacco. She reports current drug use. Frequency: 6.00 times per week. Drug: Marijuana. She reports that she does not drink alcohol.  Social History     Social History Narrative    Not on file       Medications:  Available home medication information reviewed.  DULoxetine, cetirizine, citalopram, cyclobenzaprine, esomeprazole, promethazine, and traZODone    Allergies   Allergen Reactions    Gabapentin Other (See Comments)     Seizure    Morphine And Codeine Itching and Swelling    Melatonin Palpitations     Pt states it makes her heart race    Aspirin GI Intolerance    Ibuprofen GI Intolerance       Objective   Objective     Vital Signs:   Temp:   [97.9 °F (36.6 °C)-98.2 °F (36.8 °C)] 98.2 °F (36.8 °C)  Heart Rate:  [] 90  Resp:  [16] 16  BP: (123-168)/() 123/88       Physical Exam   Constitutional: Awake, alert female moving uncomfortably in bed  Eyes: PERRLA, sclerae anicteric, no conjunctival injection  HENT: NCAT, mucous membranes moist  Chest: right port in place  Neck: Supple, no thyromegaly, no lymphadenopathy, trachea midline  Respiratory: Clear to auscultation bilaterally, nonlabored respirations   Cardiovascular: RRR, no murmurs, rubs, or gallops, palpable pedal pulses bilaterally  Gastrointestinal: Positive bowel sounds, soft, mild tenderness throughout without rebound/guarding, nondistended, well healed surgical scars in place  Musculoskeletal: No bilateral ankle edema, no clubbing or cyanosis to extremities  Psychiatric: Appropriate affect, cooperative  Neurologic: Oriented x 3, strength symmetric in all extremities, Cranial Nerves grossly intact to confrontation, speech clear  Skin: No rashes    Result Review:  I have personally reviewed the results from the time of this admission to 7/8/2024 23:42 EDT and agree with these findings:  [x]  Laboratory list / accordion  []  Microbiology  [x]  Radiology    EKG/Telemetry   []  Cardiology/Vascular   []  Pathology  []  Old records  []  Other:  Most notable findings include:   Very severe VIRIDIANA, metab acidosis, hypokalemia, lactic acidosis all c/w patient story      LAB RESULTS:      Lab 07/08/24  1554 07/08/24  1330   WBC  --  4.92   HEMOGLOBIN  --  17.0*   HEMATOCRIT  --  49.7*   PLATELETS  --  138*   NEUTROS ABS  --  2.85   EOS ABS  --  0.00   MCV  --  79.8   LACTATE 2.5*  --          Lab 07/08/24  1330   SODIUM 135*   POTASSIUM 3.1*   CHLORIDE 95*   CO2 16.0*   ANION GAP 24.0*   BUN 54*   CREATININE 3.03*   EGFR 17.2*   GLUCOSE 151*   CALCIUM 9.7         Lab 07/08/24  1330   TOTAL PROTEIN 8.7*   ALBUMIN 4.6   GLOBULIN 4.1   ALT (SGPT) 18   AST (SGOT) 22   BILIRUBIN 0.4   ALK PHOS 151*    LIPASE 19                     UA          7/8/2024    17:16   Urinalysis   Squamous Epithelial Cells, UA 0-2    Specific Gravity, UA 1.014    Ketones, UA Trace    Blood, UA Trace    Leukocytes, UA Negative    Nitrite, UA Negative    RBC, UA 6-10    WBC, UA 0-2    Bacteria, UA None Seen        Microbiology Results (last 10 days)       ** No results found for the last 240 hours. **            CT Abdomen Pelvis Without Contrast    Result Date: 7/8/2024  CT ABDOMEN PELVIS WO CONTRAST Date of Exam: 7/8/2024 5:00 PM EDT Indication: abd pain nv. Comparison: CT of the abdomen and pelvis dated 3/29/2022 Technique: Axial CT images were obtained of the abdomen and pelvis without the administration of contrast. Reconstructed coronal and sagittal images were also obtained. Automated exposure control and iterative construction methods were used. Findings: Liver: Limited evaluation of the liver due to lack of IV contrast administration. Probable small cyst within the left hepatic dome. No biliary dilation is seen. Gallbladder: Surgically absent. Pancreas: Pancreas is not well evaluated due to lack of IV contrast. Spleen: Unremarkable. Adrenal glands: There is a 3.3 cm left adrenal mass with low CT density (-4 Hounsfield units) compatible with benign, lipid rich adenoma. Right adrenal gland is unremarkable. The right adrenal gland is unremarkable. Genitourinary tract: Punctate 1 mm nonobstructing calculus within the left kidney. Kidneys are otherwise unremarkable. No hydronephrosis is seen. The ureters are not well visualized. The urinary bladder is unremarkable. Pelvic organs are poorly visualized due to lack of IV contrast. Gastrointestinal tract: Limited evaluation of the hollow viscera due to lack of IV contrast administration. There is no evidence of bowel obstruction. Small hiatal hernia with surgical changes of Nissen fundoplication noted. Appendix: No findings to suggest acute appendicitis. Other findings: No free air or  free fluid is identified. No pathologically enlarged lymph nodes are seen. Mild vascular calcifications are present.. Bones and soft tissues: No acute or suspicious osseous or soft tissue lesion is identified. Gastric pacing device is implanted within the right abdominal wall. There is a fat-containing ventral hernia just above the umbilicus. Lung bases: The visualized lung bases are clear. Mild bilateral lower lobe bronchiectasis noted.     Impression: Impression: 1.Examination is limited due to lack of IV contrast administration. 2.Given this limitation, no acute abnormality is identified within the abdomen or pelvis. 3.3.3 cm lipid rich adenoma within the left adrenal gland. 4.Additional findings as detailed above. Electronically Signed: Regino Moore MD  7/8/2024 5:22 PM EDT  Workstation ID: [id number]         Assessment & Plan   Assessment & Plan       VIRIDIANA (acute kidney injury)    Geneva Haro is a 59 y.o. female w gastroparesis s/p gastric stimulator + port placement (previously followed at [facility], now without care), GERD c/b PUD who presents for 4 days of intractable nausea/vomiting. Found to have severe VIRIDIANA, metabolic acidosis, hypokalemia c/w history above    Severe VIRIDIANA 2/2 intractable nausea/vomiting/diarrhea  Hypokalemia 2/2 acute n/v  Gastroparesis  -low dose potassium replacement  -aggressive IVF, repeat labs in AM (PVR wnl)  -reglan, PPI, other antiemetics for supportive care. GI panel pending  -advance diet as able  -consider repeat CT w contrast if persistent pain but currently n/v/d as most prominent features and benign abdominal exam  -help establish OP GI provider as able, patient reports no one at [facility] managing this subsect of patients currently    Mood disorder - home celexa, hold duloxetine given kidney function    VTE Prophylaxis:  Pharmacologic & mechanical VTE prophylaxis orders are present.          CODE STATUS:    Code Status and Medical Interventions:   Ordered at: 07/08/24 5862      Level Of Support Discussed With:    Patient     Code Status (Patient has no pulse and is not breathing):    CPR (Attempt to Resuscitate)     Medical Interventions (Patient has pulse or is breathing):    Full Support       Expected Discharge   Expected Discharge Date: 7/10/2024; Expected Discharge Time:      Cony Fenton MD  07/08/24

## 2024-07-09 NOTE — PLAN OF CARE
Goal Outcome Evaluation:      Pt is aox4, on RA, and NSR on tele. Complaints of bilateral hip pain throughout shift - pain meds given from orders. Complaints of dizziness while ambulating to bathroom. Reminded to give stool sample.  at bedside. Bed locked and lowered, call bell within reach, and bed alarm on. No further requests.

## 2024-07-10 ENCOUNTER — READMISSION MANAGEMENT (OUTPATIENT)
Dept: CALL CENTER | Facility: HOSPITAL | Age: 59
End: 2024-07-10
Payer: MEDICAID

## 2024-07-10 ENCOUNTER — DOCUMENTATION (OUTPATIENT)
Dept: HOME HEALTH SERVICES | Facility: HOME HEALTHCARE | Age: 59
End: 2024-07-10
Payer: MEDICAID

## 2024-07-10 ENCOUNTER — HOME HEALTH ADMISSION (OUTPATIENT)
Dept: HOME HEALTH SERVICES | Facility: HOME HEALTHCARE | Age: 59
End: 2024-07-10
Payer: MEDICARE

## 2024-07-10 VITALS
WEIGHT: 145 LBS | BODY MASS INDEX: 22.76 KG/M2 | DIASTOLIC BLOOD PRESSURE: 90 MMHG | HEART RATE: 83 BPM | RESPIRATION RATE: 18 BRPM | HEIGHT: 67 IN | TEMPERATURE: 97.8 F | SYSTOLIC BLOOD PRESSURE: 147 MMHG | OXYGEN SATURATION: 99 %

## 2024-07-10 LAB
ANION GAP SERPL CALCULATED.3IONS-SCNC: 10 MMOL/L (ref 5–15)
BUN SERPL-MCNC: 27 MG/DL (ref 6–20)
BUN/CREAT SERPL: 21.8 (ref 7–25)
CALCIUM SPEC-SCNC: 8.4 MG/DL (ref 8.6–10.5)
CHLORIDE SERPL-SCNC: 105 MMOL/L (ref 98–107)
CO2 SERPL-SCNC: 25 MMOL/L (ref 22–29)
CREAT SERPL-MCNC: 1.24 MG/DL (ref 0.57–1)
DEPRECATED RDW RBC AUTO: 40.8 FL (ref 37–54)
EGFRCR SERPLBLD CKD-EPI 2021: 50.2 ML/MIN/1.73
ERYTHROCYTE [DISTWIDTH] IN BLOOD BY AUTOMATED COUNT: 13.3 % (ref 12.3–15.4)
GLUCOSE SERPL-MCNC: 89 MG/DL (ref 65–99)
HCT VFR BLD AUTO: 35.1 % (ref 34–46.6)
HGB BLD-MCNC: 11.5 G/DL (ref 12–15.9)
MCH RBC QN AUTO: 27.4 PG (ref 26.6–33)
MCHC RBC AUTO-ENTMCNC: 32.8 G/DL (ref 31.5–35.7)
MCV RBC AUTO: 83.8 FL (ref 79–97)
PLATELET # BLD AUTO: 96 10*3/MM3 (ref 140–450)
PMV BLD AUTO: 10.2 FL (ref 6–12)
POTASSIUM SERPL-SCNC: 3.8 MMOL/L (ref 3.5–5.2)
RBC # BLD AUTO: 4.19 10*6/MM3 (ref 3.77–5.28)
SODIUM SERPL-SCNC: 140 MMOL/L (ref 136–145)
WBC NRBC COR # BLD AUTO: 3.53 10*3/MM3 (ref 3.4–10.8)

## 2024-07-10 PROCEDURE — 85027 COMPLETE CBC AUTOMATED: CPT | Performed by: FAMILY MEDICINE

## 2024-07-10 PROCEDURE — 25010000002 HEPARIN (PORCINE) PER 1000 UNITS: Performed by: INTERNAL MEDICINE

## 2024-07-10 PROCEDURE — 25010000002 METOCLOPRAMIDE PER 10 MG: Performed by: INTERNAL MEDICINE

## 2024-07-10 PROCEDURE — 25810000003 LACTATED RINGERS PER 1000 ML: Performed by: FAMILY MEDICINE

## 2024-07-10 PROCEDURE — 97161 PT EVAL LOW COMPLEX 20 MIN: CPT

## 2024-07-10 PROCEDURE — 80048 BASIC METABOLIC PNL TOTAL CA: CPT | Performed by: FAMILY MEDICINE

## 2024-07-10 PROCEDURE — 25010000002 HEPARIN LOCK FLUSH PER 10 UNITS: Performed by: NURSE PRACTITIONER

## 2024-07-10 PROCEDURE — 99232 SBSQ HOSP IP/OBS MODERATE 35: CPT | Performed by: PHYSICIAN ASSISTANT

## 2024-07-10 PROCEDURE — 25010000002 ONDANSETRON PER 1 MG: Performed by: INTERNAL MEDICINE

## 2024-07-10 PROCEDURE — 25010000002 HYDROMORPHONE PER 4 MG: Performed by: INTERNAL MEDICINE

## 2024-07-10 PROCEDURE — 99239 HOSP IP/OBS DSCHRG MGMT >30: CPT | Performed by: NURSE PRACTITIONER

## 2024-07-10 RX ORDER — DULOXETIN HYDROCHLORIDE 60 MG/1
60 CAPSULE, DELAYED RELEASE ORAL DAILY
Status: DISCONTINUED | OUTPATIENT
Start: 2024-07-10 | End: 2024-07-10 | Stop reason: HOSPADM

## 2024-07-10 RX ORDER — SODIUM CHLORIDE 0.9 % (FLUSH) 0.9 %
10 SYRINGE (ML) INJECTION EVERY 12 HOURS SCHEDULED
Status: DISCONTINUED | OUTPATIENT
Start: 2024-07-10 | End: 2024-07-10 | Stop reason: HOSPADM

## 2024-07-10 RX ORDER — SODIUM CHLORIDE 0.9 % (FLUSH) 0.9 %
10 SYRINGE (ML) INJECTION AS NEEDED
Status: DISCONTINUED | OUTPATIENT
Start: 2024-07-10 | End: 2024-07-10 | Stop reason: HOSPADM

## 2024-07-10 RX ORDER — SODIUM CHLORIDE 0.9 % (FLUSH) 0.9 %
20 SYRINGE (ML) INJECTION AS NEEDED
Status: DISCONTINUED | OUTPATIENT
Start: 2024-07-10 | End: 2024-07-10 | Stop reason: HOSPADM

## 2024-07-10 RX ORDER — SODIUM CHLORIDE 9 MG/ML
40 INJECTION, SOLUTION INTRAVENOUS AS NEEDED
Status: DISCONTINUED | OUTPATIENT
Start: 2024-07-10 | End: 2024-07-10 | Stop reason: HOSPADM

## 2024-07-10 RX ORDER — HEPARIN SODIUM (PORCINE) LOCK FLUSH IV SOLN 100 UNIT/ML 100 UNIT/ML
5 SOLUTION INTRAVENOUS AS NEEDED
Status: DISCONTINUED | OUTPATIENT
Start: 2024-07-10 | End: 2024-07-10 | Stop reason: HOSPADM

## 2024-07-10 RX ADMIN — HEPARIN 500 UNITS: 100 SYRINGE at 15:37

## 2024-07-10 RX ADMIN — DULOXETINE HYDROCHLORIDE 60 MG: 60 CAPSULE, DELAYED RELEASE ORAL at 13:58

## 2024-07-10 RX ADMIN — OXYCODONE HYDROCHLORIDE AND ACETAMINOPHEN 1 TABLET: 5; 325 TABLET ORAL at 08:39

## 2024-07-10 RX ADMIN — CITALOPRAM HYDROBROMIDE 20 MG: 20 TABLET ORAL at 08:40

## 2024-07-10 RX ADMIN — HEPARIN SODIUM 5000 UNITS: 5000 INJECTION INTRAVENOUS; SUBCUTANEOUS at 08:40

## 2024-07-10 RX ADMIN — ONDANSETRON 4 MG: 2 INJECTION INTRAMUSCULAR; INTRAVENOUS at 08:40

## 2024-07-10 RX ADMIN — PANTOPRAZOLE SODIUM 40 MG: 40 INJECTION, POWDER, FOR SOLUTION INTRAVENOUS at 08:40

## 2024-07-10 RX ADMIN — METOCLOPRAMIDE 5 MG: 5 INJECTION, SOLUTION INTRAMUSCULAR; INTRAVENOUS at 12:28

## 2024-07-10 RX ADMIN — HYDROMORPHONE HYDROCHLORIDE 0.5 MG: 1 INJECTION, SOLUTION INTRAMUSCULAR; INTRAVENOUS; SUBCUTANEOUS at 05:48

## 2024-07-10 RX ADMIN — SODIUM CHLORIDE, POTASSIUM CHLORIDE, SODIUM LACTATE AND CALCIUM CHLORIDE 75 ML/HR: 600; 310; 30; 20 INJECTION, SOLUTION INTRAVENOUS at 01:22

## 2024-07-10 NOTE — THERAPY EVALUATION
Patient Name: Geneva Haro  : 1965    MRN: [id number]                              Today's Date: 7/10/2024       Admit Date: 2024    Visit Dx:     ICD-10-CM ICD-9-CM   1. VIRIDIANA (acute kidney injury)  N17.9 584.9   2. Intractable nausea and vomiting  R11.2 536.2     Patient Active Problem List   Diagnosis    Iron deficiency anemia    Malfunction of peripheral inserted central catheter    Fibromyalgia    Gastroparesis    Iron adverse reaction    Ganglion cyst    Anxiety and depression    Intractable nausea and vomiting    Tobacco abuse    Marijuana abuse    GERD (gastroesophageal reflux disease)    Intractable cyclical vomiting with nausea    Encounter for central line care    Generalized abdominal pain    Major depressive disorder    Port-A-Cath in place    Muscle cramps    Right foot pain    Arthritis    HNP (herniated nucleus pulposus) with myelopathy, cervical    Cervical spondylosis without myelopathy    History of left breast cancer    Acute kidney injury    Non-ulcer dyspepsia    Esophageal dysphagia    Chronic idiopathic constipation    Generalized dysmotility of intestine    Annual physical exam    Iron malabsorption    VIRIDIANA (acute kidney injury)     Past Medical History:   Diagnosis Date    Allergic At a young age    Anxiety     Arthritis     Arthritis of back     Cancer     Depression     Diabetes mellitus     Fibromyalgia, primary 33 years ago    Gall stones     GERD (gastroesophageal reflux disease)     Hearing loss     Hernia, hiatal     Hip arthrosis     History of stomach ulcers     History of transfusion     Knee swelling     Periarthritis of shoulder     Stomach problems     Ulcer of bile duct      Past Surgical History:   Procedure Laterality Date    BREAST SURGERY      CHOLECYSTECTOMY      CORONARY ARTERY BYPASS GRAFT      ENDOSCOPY N/A 2018    Procedure: ESOPHAGOGASTRODUODENOSCOPY;  Surgeon: Mark I Brunner, MD;  Location: [facility] ENDOSCOPY;  Service: Gastroenterology     GASTRIC BYPASS      x2    GASTRIC STIMULATOR IMPLANT SURGERY      HAND SURGERY  1992    HERNIA REPAIR      HERNIA REPAIR      INTERVENTIONAL RADIOLOGY PROCEDURE Bilateral 07/22/2021    Procedure: IR myelogram, cervical;  Surgeon: Jose Angel Sauer MD;  Location: [facility] INVASIVE LOCATION;  Service: Interventional Radiology;  Laterality: Bilateral;    LYMPH NODE BIOPSY      MASTECTOMY Bilateral     Left With sentinel lymph node sampling and prophylactic right mastectomy    PACEMAKER IMPLANTATION      PORTACATH PLACEMENT      SMALL INTESTINE SURGERY        General Information       Row Name 07/10/24 1043          Physical Therapy Time and Intention    Document Type evaluation  -AE     Mode of Treatment physical therapy  -AE       Row Name 07/10/24 1043          General Information    Patient Profile Reviewed yes  -AE     Prior Level of Function independent:;all household mobility;community mobility;gait;transfer;bed mobility;ADL's;driving  Pt reports giving/receiving assist with spouse as needed.  -AE     Existing Precautions/Restrictions fall;other (see comments)  fibromyalgia  -AE     Barriers to Rehab medically complex;previous functional deficit  -AE       Row Name 07/10/24 1043          Living Environment    People in Home spouse  -AE       Row Name 07/10/24 1043          Home Main Entrance    Number of Stairs, Main Entrance none  -AE     Stair Railings, Main Entrance none  -AE       Row Name 07/10/24 1043          Stairs Within Home, Primary    Number of Stairs, Within Home, Primary none  -AE     Stair Railings, Within Home, Primary none  -AE       Row Name 07/10/24 1043          Cognition    Orientation Status (Cognition) oriented x 3  -AE       Row Name 07/10/24 1043          Safety Issues, Functional Mobility    Safety Issues Affecting Function (Mobility) awareness of need for assistance;insight into deficits/self-awareness;safety precaution awareness;safety precautions  follow-through/compliance;sequencing abilities  -AE     Impairments Affecting Function (Mobility) balance;endurance/activity tolerance;strength;pain  -AE               User Key  (r) = Recorded By, (t) = Taken By, (c) = Cosigned By      Initials Name Provider Type    AE Ronen Leslie, PT Physical Therapist                   Mobility       Row Name 07/10/24 1047          Bed Mobility    Comment, (Bed Mobility) Pt received and left UIC.  -AE       Row Name 07/10/24 1047          Transfers    Comment, (Transfers) VCs for hand placement and sequencing. Pt reports nausea with mobility.  -AE       Row Name 07/10/24 1047          Sit-Stand Transfer    Sit-Stand [place] (Transfers) contact guard;1 person assist;verbal cues  -AE       Row Name 07/10/24 1047          Gait/Stairs (Locomotion)    [place] Level (Gait) contact guard;verbal cues  -AE     Distance in Feet (Gait) 200  -AE     Deviations/Abnormal Patterns (Gait) bilateral deviations;tez decreased;gait speed decreased;stride length decreased;base of support, narrow;antalgic  -AE     Bilateral Gait Deviations forward flexed posture;heel strike decreased  -AE     Comment, (Gait/Stairs) Pt demo step through gait pattern with slowed tez, decreased step length, and forward flexed posture d/t bilat hips hurting. Pt reports improvement in pain levels with increased mobility but is limiting factor with ambulation distance.  -AE               User Key  (r) = Recorded By, (t) = Taken By, (c) = Cosigned By      Initials Name Provider Type    AE Ronen Leslie PT Physical Therapist                   Obj/Interventions       Row Name 07/10/24 1055          Range of Motion Comprehensive    General Range of Motion bilateral lower extremity ROM WFL  -AE       Row Name 07/10/24 1055          Strength Comprehensive (MMT)    General Manual Muscle Testing (MMT) Assessment lower extremity strength deficits identified  -AE     Comment, General Manual Muscle Testing  (MMT) Assessment BLE grossly 4-/5  -AE       Row Name 07/10/24 1055          Balance    Balance Assessment sitting static balance;sitting dynamic balance;sit to stand dynamic balance;standing static balance;standing dynamic balance  -AE     Static Sitting Balance standby assist  -AE     Dynamic Sitting Balance contact guard  -AE     Position, Sitting Balance unsupported;sitting in chair  -AE     Sit to Stand Dynamic Balance contact guard;1-person assist;verbal cues  -AE     Static Standing Balance contact guard  -AE     Dynamic Standing Balance contact guard  -AE     Position/Device Used, Standing Balance supported  -AE       Row Name 07/10/24 1055          Sensory Assessment (Somatosensory)    Sensory Assessment (Somatosensory) LE sensation intact  -AE               User Key  (r) = Recorded By, (t) = Taken By, (c) = Cosigned By      Initials Name Provider Type    AE Ronen Leslie, PT Physical Therapist                   Goals/Plan       Row Name 07/10/24 1103          Bed Mobility Goal 1 (PT)    Activity/Assistive Device (Bed Mobility Goal 1, PT) sit to supine/supine to sit  -AE     [place] Level/Cues Needed (Bed Mobility Goal 1, PT) independent  -AE     Time Frame (Bed Mobility Goal 1, PT) short term goal (STG);5 days  -AE     Progress/Outcomes (Bed Mobility Goal 1, PT) new goal  -AE       Row Name 07/10/24 1103          Transfer Goal 1 (PT)    Activity/Assistive Device (Transfer Goal 1, PT) sit-to-stand/stand-to-sit;bed-to-chair/chair-to-bed  -AE     [place] Level/Cues Needed (Transfer Goal 1, PT) independent  -AE     Time Frame (Transfer Goal 1, PT) long term goal (LTG);10 days  -AE     Progress/Outcome (Transfer Goal 1, PT) new goal  -AE       Row Name 07/10/24 1103          Gait Training Goal 1 (PT)    Activity/Assistive Device (Gait Training Goal 1, PT) gait (walking locomotion)  -AE     [place] Level (Gait Training Goal 1, PT) independent  -AE     Distance (Gait Training Goal 1, PT) 400ft   -AE     Time Frame (Gait Training Goal 1, PT) long term goal (LTG);10 days  -AE     Progress/Outcome (Gait Training Goal 1, PT) new goal  -AE       Row Name 07/10/24 1103          Therapy Assessment/Plan (PT)    Planned Therapy Interventions (PT) balance training;bed mobility training;gait training;home exercise program;patient/family education;postural re-education;ROM (range of motion);strengthening;transfer training  -AE               User Key  (r) = Recorded By, (t) = Taken By, (c) = Cosigned By      Initials Name Provider Type    AE Ronen Leslie, PT Physical Therapist                   Clinical Impression       Row Name 07/10/24 1058          Pain    Additional Documentation Pain Scale: FACES Pre/Post-Treatment (Group)  -AE       Row Name 07/10/24 1058          Pain Scale: FACES Pre/Post-Treatment    Pain: FACES Scale, Pretreatment 2-->hurts little bit  -AE     Posttreatment Pain Rating 2-->hurts little bit  -AE     Pain Location - Side/Orientation Bilateral  -AE     Pain Location generalized  -AE     Pain Location - hip  -AE     Pre/Posttreatment Pain Comment tolerated  -AE       Row Name 07/10/24 1058          Plan of Care Review    Plan of Care Reviewed With patient  -AE     Progress no change  -AE     Outcome Evaluation Pt presents with decreased functional independence, decreased strength, and decreased activity tolerance. Pt ambulated 200ft with CGA and pushing IV pole for support. Recommend continued skilled IP PT interventions. Recommend D/C home with assist and HHPT.  -AE       Row Name 07/10/24 1058          Therapy Assessment/Plan (PT)    Patient/Family Therapy Goals Statement (PT) Return home  -AE     Rehab Potential (PT) good, to achieve stated therapy goals  -AE     Criteria for Skilled Interventions Met (PT) yes  -AE     Therapy Frequency (PT) daily  -AE     Predicted Duration of Therapy Intervention (PT) 5 days  -AE       Row Name 07/10/24 1058          Vital Signs    Pre Systolic BP Rehab  113  -AE     Pre Treatment Diastolic BP 76  -AE     Pretreatment Heart Rate (beats/min) 69  -AE     Posttreatment Heart Rate (beats/min) 78  -AE     O2 Delivery Pre Treatment room air  -AE     O2 Delivery Intra Treatment room air  -AE     O2 Delivery Post Treatment room air  -AE     Pre Patient Position Sitting  -AE     Intra Patient Position Standing  -AE     Post Patient Position Sitting  -AE       Row Name 07/10/24 1058          Positioning and Restraints    Pre-Treatment Position sitting in chair/recliner  -AE     Post Treatment Position chair  -AE     In Chair notified nsg;reclined;call light within reach;encouraged to call for assist;RUE elevated;waffle cushion;legs elevated  -AE               User Key  (r) = Recorded By, (t) = Taken By, (c) = Cosigned By      Initials Name Provider Type    Ronen Blanchard PT Physical Therapist                   Outcome Measures       Row Name 07/10/24 1103 07/10/24 0839       How much help from another person do you currently need...    Turning from your back to your side while in flat bed without using bedrails? 4  -AE 4  -DH    Moving from lying on back to sitting on the side of a flat bed without bedrails? 3  -AE 4  -DH    Moving to and from a bed to a chair (including a wheelchair)? 3  -AE 4  -DH    Standing up from a chair using your arms (e.g., wheelchair, bedside chair)? 3  -AE 4  -DH    Climbing 3-5 steps with a railing? 3  -AE 3  -DH    To walk in hospital room? 3  -AE 3  -DH    AM-PAC 6 Clicks Score (PT) 19  -AE 22  -DH    Highest Level of Mobility Goal 6 --> Walk 10 steps or more  -AE 7 --> Walk 25 feet or more  -DH      Row Name 07/10/24 1103          Functional Assessment    Outcome Measure Options AM-PAC 6 Clicks Basic Mobility (PT)  -AE               User Key  (r) = Recorded By, (t) = Taken By, (c) = Cosigned By      Initials Name Provider Type    Ronen Blanchard, BHARATH Physical Therapist    Jean Palmer, RN Registered Nurse                                  Physical Therapy Education       Title: PT OT SLP Therapies (In Progress)       Topic: Physical Therapy (In Progress)       Point: Mobility training (In Progress)       Learning Progress Summary             Patient Acceptance, E, NR by AE at 7/10/2024 0928                         Point: Home exercise program (Not Started)       Learner Progress:  Not documented in this visit.              Point: Body mechanics (In Progress)       Learning Progress Summary             Patient Acceptance, E, NR by AE at 7/10/2024 0928                         Point: Precautions (In Progress)       Learning Progress Summary             Patient Acceptance, E, NR by AE at 7/10/2024 0928                                         User Key       Initials Effective Dates Name Provider Type Discipline    AE 09/21/21 -  Ronen Leslie, PT Physical Therapist PT                  PT Recommendation and Plan  Planned Therapy Interventions (PT): balance training, bed mobility training, gait training, home exercise program, patient/family education, postural re-education, ROM (range of motion), strengthening, transfer training  Plan of Care Reviewed With: patient  Progress: no change  Outcome Evaluation: Pt presents with decreased functional independence, decreased strength, and decreased activity tolerance. Pt ambulated 200ft with CGA and pushing IV pole for support. Recommend continued skilled IP PT interventions. Recommend D/C home with assist and HHPT.     Time Calculation:   PT Evaluation Complexity  History, PT Evaluation Complexity: 1-2 personal factors and/or comorbidities  Examination of Body Systems (PT Eval Complexity): total of 3 or more elements  Clinical Presentation (PT Evaluation Complexity): stable  Clinical Decision Making (PT Evaluation Complexity): low complexity  Overall Complexity (PT Evaluation Complexity): low complexity     PT Charges       Row Name 07/10/24 1104             Time Calculation    Start Time 0928  -AE       PT Received On 07/10/24  -AE      PT Goal Re-Cert Due Date 07/20/24  -AE         Untimed Charges    PT Eval/Re-eval Minutes 47  -AE         Total Minutes    Untimed Charges Total Minutes 47  -AE       Total Minutes 47  -AE                User Key  (r) = Recorded By, (t) = Taken By, (c) = Cosigned By      Initials Name Provider Type    AE Ronen Leslie, PT Physical Therapist                  Therapy Charges for Today       Code Description Service Date Service Provider Modifiers Qty    35251610057 HC PT EVAL LOW COMPLEXITY 4 7/10/2024 Ronen Leslie, PT GP 1            PT G-Codes  Outcome Measure Options: AM-PAC 6 Clicks Basic Mobility (PT)  AM-PAC 6 Clicks Score (PT): 19  PT Discharge Summary  Anticipated Discharge Disposition (PT): home with assist, home with home health    Ronen Leslie PT  7/10/2024

## 2024-07-10 NOTE — OUTREACH NOTE
Prep Survey      Flowsheet Row Responses   [facility] patient discharged from? [place]   Is LACE score < 7 ? No   Eligibility [facility]   Date of Admission 07/08/24   Date of Discharge 07/10/24   Discharge Disposition Home-Health Care [facility]   Discharge diagnosis VIRIDIANA (acute kidney injury)   Does the patient have one of the following disease processes/diagnoses(primary or secondary)? Other   Does the patient have Home health ordered? Yes   What is the Home health agency?  [facility] for [facility] pending   Is there a DME ordered? No   Prep survey completed? Yes            Rosario ROLLINS - Registered Nurse

## 2024-07-10 NOTE — PROGRESS NOTES
"GI Daily Progress Note  Subjective:    Chief Complaint:  Nausea    Feeling better this morning.    Mild nausea.     No recurrence in emesis since admission.   Tolerating solids and protein shakes.       Objective:    /76 (BP Location: Right arm, Patient Position: Sitting)   Pulse 83   Temp 98.1 °F (36.7 °C) (Oral)   Resp 18   Ht 170.2 cm (67\")   Wt 65.8 kg (145 lb)   SpO2 99%   BMI 22.71 kg/m²     Physical Exam  Constitutional:       General: She is not in acute distress.  Cardiovascular:      Rate and Rhythm: Normal rate and regular rhythm.   Pulmonary:      Effort: Pulmonary effort is normal. No respiratory distress.   Abdominal:      General: Bowel sounds are normal. There is no distension.      Palpations: Abdomen is soft.      Tenderness: There is no abdominal tenderness.   Neurological:      Mental Status: She is alert and oriented to person, place, and time.         Lab  Lab Results   Component Value Date    WBC 3.53 07/10/2024    HGB 11.5 (L) 07/10/2024    HGB 13.7 07/09/2024    HGB 17.0 (H) 07/08/2024    MCV 83.8 07/10/2024    PLT 96 (L) 07/10/2024     Lab Results   Component Value Date    GLUCOSE 89 07/10/2024    BUN 27 (H) 07/10/2024    CREATININE 1.24 (H) 07/10/2024    EGFRIFAFRI 73 10/09/2020    BCR 21.8 07/10/2024     07/10/2024    K 3.8 07/10/2024    CO2 25.0 07/10/2024    CALCIUM 8.4 (L) 07/10/2024    PROTENTOTREF 7.1 04/09/2024    ALBUMIN 4.6 07/08/2024    ALKPHOS 151 (H) 07/08/2024    BILITOT 0.4 07/08/2024    ALT 18 07/08/2024    AST 22 07/08/2024       Assessment:    Nausea and vomiting.    Gastroparesis   GERD  Acute kidney injury, improving  Hypokalemia , resolved  Illicit drug use, Cocaine, THC      Plan:    Clinically improving.   Suspect viral etiology of acute flare.  However differential would include cannabis hyperemesis or abdominal pain related to vasoconstriction from cocaine use.      >> Discontinue illicit drug use   >> BID PPI  >> Antiemetics as needed, has only " required 1 dose of Zofran inpatient.       Follow up with Dr. Sanchez outpatient in 3-4 weeks.       ARASH Marin  07/10/24  09:41 EDT

## 2024-07-10 NOTE — PLAN OF CARE
Goal Outcome Evaluation:  Plan of Care Reviewed With: patient        Progress: no change  Outcome Evaluation: Pt presents with decreased functional independence, decreased strength, and decreased activity tolerance. Pt ambulated 200ft with CGA and pushing IV pole for support. Recommend continued skilled IP PT interventions. Recommend D/C home with assist and HHPT.      Anticipated Discharge Disposition (PT): home with assist, home with home health

## 2024-07-10 NOTE — DISCHARGE SUMMARY
[facility] Medicine Services  DISCHARGE SUMMARY    Patient Name: Geneva Haro  : 1965  MRN: [id number]    Date of Admission: 2024  2:33 PM  Date of Discharge:  7/10/2024  Primary Care Physician: Cesar Gao MD    Consults       Date and Time Order Name Status Description    2024  7:21 AM Inpatient Gastroenterology Consult Completed             Hospital Course     Presenting Problem: N/V and diffuse abd pain     Active Hospital Problems    Diagnosis  POA    **VIRIDIANA (acute kidney injury) [N17.9]  Yes      Resolved Hospital Problems   No resolved problems to display.          Hospital Course:  Geneva Haro is a 59 y.o. female  w gastroparesis s/p gastric stimulator + port placement (previously followed at [facility], now without care), GERD c/b PUD who presents for 4 days of intractable nausea/vomiting. Found to have severe VIRIDIANA, metabolic acidosis, hypokalemia c/w history above. Patient was treated with IVF's and antiemetics.      Severe VIRIDIANA 2/2 intractable nausea/vomiting/diarrhea  Hypokalemia 2/2 acute n/v  Gastroparesis  -low dose potassium replacement  -aggressive IVF, (PVR wnl)  -reglan, PPI, other antiemetics for supportive care.   -advance diet and patient tolerated without any N/V  -help establish OP GI provider as able, patient reports no one at [facility] managing this subsect of patients currently  - GI has seen and recommends PPI BID, follow up with Dr Sanchez in 3-4 weeks      Mood disorder - home celexa, resume Cymbalta since kidney function improved      +cocaine and THC on UDS  -Patient is also positive for fentanyl and oxycodone however these were given to patient on admission  -- talked with patient about how THC use increase N/V. Also discussed importance of stopping taking illicit drugs. GI felt abd pain could be related to vasoconstriction from cocaine use or cannabis hyperemesis.     Patient has impvoed and has been tolerating a diet. She  feels good enough to go home.           Discharge Follow Up Recommendations for outpatient labs/diagnostics:   Follow up with pcp one week   Follow up with GI in 3-4 weeks     Day of Discharge     HPI:   Patient is sitting up in chair in NAD. She has ate solid food and tolerated without N/V.     Review of Systems  Gen- No fevers, chills  CV- No chest pain, palpitations  Resp- No cough, dyspnea  GI- No N/V/D, abd pain      Vital Signs:   Temp:  [97.8 °F (36.6 °C)-98.5 °F (36.9 °C)] 97.8 °F (36.6 °C)  Resp:  [18] 18  BP: ()/(66-90) 147/90      Physical Exam:  Constitutional: No acute distress, awake, alert  HENT: NCAT, mucous membranes moist  Respiratory: Clear to auscultation bilaterally, respiratory effort normal room air   Cardiovascular: RRR, no murmurs, rubs, or gallops  Gastrointestinal: Positive bowel sounds, soft, nontender, nondistended  Musculoskeletal: No bilateral ankle edema  Psychiatric: Appropriate affect, cooperative  Neurologic: Oriented x 3, strength symmetric in all extremities, , speech clear  Skin: No rashes, right chest tunneled cath     Pertinent  and/or Most Recent Results     LAB RESULTS:      Lab 07/10/24  0457 07/09/24  0144 07/08/24  1554 07/08/24  1330   WBC 3.53 4.43  --  4.92   HEMOGLOBIN 11.5* 13.7  --  17.0*   HEMATOCRIT 35.1 40.9  --  49.7*   PLATELETS 96* 108*  --  138*   NEUTROS ABS  --   --   --  2.85   EOS ABS  --   --   --  0.00   MCV 83.8 81.5  --  79.8   LACTATE  --  0.9 2.5*  --          Lab 07/10/24  0457 07/09/24  0144 07/08/24  1330   SODIUM 140 138 135*   POTASSIUM 3.8 3.7 3.1*   CHLORIDE 105 103 95*   CO2 25.0 21.0* 16.0*   ANION GAP 10.0 14.0 24.0*   BUN 27* 44* 54*   CREATININE 1.24* 1.68* 3.03*   EGFR 50.2* 34.9* 17.2*   GLUCOSE 89 89 151*   CALCIUM 8.4* 8.7 9.7         Lab 07/08/24  1330   TOTAL PROTEIN 8.7*   ALBUMIN 4.6   GLOBULIN 4.1   ALT (SGPT) 18   AST (SGOT) 22   BILIRUBIN 0.4   ALK PHOS 151*   LIPASE 19                     Brief Urine Lab Results  (Last  result in the past 365 days)        Color   Clarity   Blood   Leuk Est   Nitrite   Protein   CREAT   Urine HCG        07/08/24 1716 Yellow   Cloudy   Trace   Negative   Negative   100 mg/dL (2+)                 Microbiology Results (last 10 days)       ** No results found for the last 240 hours. **            CT Abdomen Pelvis Without Contrast    Result Date: 7/8/2024  CT ABDOMEN PELVIS WO CONTRAST Date of Exam: 7/8/2024 5:00 PM EDT Indication: abd pain nv. Comparison: CT of the abdomen and pelvis dated 3/29/2022 Technique: Axial CT images were obtained of the abdomen and pelvis without the administration of contrast. Reconstructed coronal and sagittal images were also obtained. Automated exposure control and iterative construction methods were used. Findings: Liver: Limited evaluation of the liver due to lack of IV contrast administration. Probable small cyst within the left hepatic dome. No biliary dilation is seen. Gallbladder: Surgically absent. Pancreas: Pancreas is not well evaluated due to lack of IV contrast. Spleen: Unremarkable. Adrenal glands: There is a 3.3 cm left adrenal mass with low CT density (-4 Hounsfield units) compatible with benign, lipid rich adenoma. Right adrenal gland is unremarkable. The right adrenal gland is unremarkable. Genitourinary tract: Punctate 1 mm nonobstructing calculus within the left kidney. Kidneys are otherwise unremarkable. No hydronephrosis is seen. The ureters are not well visualized. The urinary bladder is unremarkable. Pelvic organs are poorly visualized due to lack of IV contrast. Gastrointestinal tract: Limited evaluation of the hollow viscera due to lack of IV contrast administration. There is no evidence of bowel obstruction. Small hiatal hernia with surgical changes of Nissen fundoplication noted. Appendix: No findings to suggest acute appendicitis. Other findings: No free air or free fluid is identified. No pathologically enlarged lymph nodes are seen. Mild  vascular calcifications are present.. Bones and soft tissues: No acute or suspicious osseous or soft tissue lesion is identified. Gastric pacing device is implanted within the right abdominal wall. There is a fat-containing ventral hernia just above the umbilicus. Lung bases: The visualized lung bases are clear. Mild bilateral lower lobe bronchiectasis noted.     Impression: 1.Examination is limited due to lack of IV contrast administration. 2.Given this limitation, no acute abnormality is identified within the abdomen or pelvis. 3.3.3 cm lipid rich adenoma within the left adrenal gland. 4.Additional findings as detailed above. Electronically Signed: Regino Moore MD  7/8/2024 5:22 PM EDT  Workstation ID: [id number]                 Plan for Follow-up of Pending Labs/Results:   Pending Labs       Order Current Status    [facility] Draw In process          Discharge Details        Discharge Medications        Continue These Medications        Instructions Start Date   cetirizine 10 MG tablet  Commonly known as: zyrTEC   10 mg, Oral, Daily      citalopram 20 MG tablet  Commonly known as: CeleXA   20 mg, Oral, Daily      cyclobenzaprine 10 MG tablet  Commonly known as: FLEXERIL   10 mg, Oral, 3 Times Daily PRN      DULoxetine 60 MG capsule  Commonly known as: Cymbalta   60 mg, Oral, Daily      esomeprazole 40 MG capsule  Commonly known as: nexIUM   40 mg, Oral, 2 Times Daily      promethazine 25 MG tablet  Commonly known as: PHENERGAN   25 mg, Oral, Every 6 Hours PRN      traZODone 50 MG tablet  Commonly known as: DESYREL   50 mg, Oral, Every Night at Bedtime               Allergies   Allergen Reactions    Gabapentin Other (See Comments)     Seizure    Morphine And Codeine Itching and Swelling    Melatonin Palpitations     Pt states it makes her heart race    Aspirin GI Intolerance    Ibuprofen GI Intolerance         Discharge Disposition:  Home or Self Care    Diet:  Hospital:  Diet Order   Procedures    Diet:  Regular/House; Fluid Consistency: Thin (IDDSI 0)       Diet Instructions       Diet: Regular/House Diet; Regular (IDDSI 7); Thin (IDDSI 0)      Discharge Diet: Regular/House Diet    Texture: Regular (IDDSI 7)    Fluid Consistency: Thin (IDDSI 0)             Activity:  Activity Instructions       Activity as Tolerated      Measure Blood Pressure              Restrictions or Other Recommendations:         CODE STATUS:    Code Status and Medical Interventions:   Ordered at: 07/08/24 1854     Level Of Support Discussed With:    Patient     Code Status (Patient has no pulse and is not breathing):    CPR (Attempt to Resuscitate)     Medical Interventions (Patient has pulse or is breathing):    Full Support       Future Appointments   Date Time Provider Department Center   7/10/2024  3:30 PM Chelly Hill DNP, APRN [facility]   10/18/2024  1:00 PM Michelle Drake APRN [facility]       Additional Instructions for the Follow-ups that You Need to Schedule       Discharge Follow-up with PCP   As directed       Currently Documented PCP:    Cesar Gao MD    PCP Phone Number:    [phone]     Follow Up Details: follow up with pcp one week        Discharge Follow-up with Specified Provider: Follow up with GI in 3-4 weeks   As directed      To: Follow up with GI in 3-4 weeks        Discharge Follow-up with Specified Provider: patient will need outpt follow up with GI who can work with gastric stimulator   As directed      To: patient will need outpt follow up with GI who can work with gastric stimulator                      JAM Harp  07/10/24      Time Spent on Discharge:  I spent  45  minutes on this discharge activity which included: face-to-face encounter with the patient, reviewing the data in the system, coordination of the care with the nursing staff as well as consultants, documentation, and entering orders.

## 2024-07-10 NOTE — PROGRESS NOTES
[facility] Medicine Services  PROGRESS NOTE    Patient Name: Geneva Haro  : 1965  MRN: [id number]    Date of Admission: 2024  Primary Care Physician: Cesar Gao MD    Subjective   Subjective     CC:  N/V and diffuse abd pain     HPI:  Patient is sitting up in chair in NAD. She is tolerating liquids. She wants to try some solid food.       Objective   Objective     Vital Signs:   Temp:  [97.8 °F (36.6 °C)-98.5 °F (36.9 °C)] 97.8 °F (36.6 °C)  Resp:  [18] 18  BP: ()/(66-90) 147/90     Physical Exam:  Constitutional: No acute distress, awake, alert  HENT: NCAT, mucous membranes moist  Respiratory: Clear to auscultation bilaterally, respiratory effort normal room air   Cardiovascular: RRR, no murmurs, rubs, or gallops  Gastrointestinal: Positive bowel sounds, soft, nontender, nondistended  Musculoskeletal: No bilateral ankle edema  Psychiatric: Appropriate affect, cooperative  Neurologic: Oriented x 3, strength symmetric in all extremities, Cranial Nerves grossly intact to confrontation, speech clear  Skin: No rashes, right chest tunneled cath       Results Reviewed:  LAB RESULTS:      Lab 07/10/24  0457 24  0144 24  1554 24  1330   WBC 3.53 4.43  --  4.92   HEMOGLOBIN 11.5* 13.7  --  17.0*   HEMATOCRIT 35.1 40.9  --  49.7*   PLATELETS 96* 108*  --  138*   NEUTROS ABS  --   --   --  2.85   EOS ABS  --   --   --  0.00   MCV 83.8 81.5  --  79.8   LACTATE  --  0.9 2.5*  --          Lab 07/10/24  0457 24  0144 24  1330   SODIUM 140 138 135*   POTASSIUM 3.8 3.7 3.1*   CHLORIDE 105 103 95*   CO2 25.0 21.0* 16.0*   ANION GAP 10.0 14.0 24.0*   BUN 27* 44* 54*   CREATININE 1.24* 1.68* 3.03*   EGFR 50.2* 34.9* 17.2*   GLUCOSE 89 89 151*   CALCIUM 8.4* 8.7 9.7         Lab 24  1330   TOTAL PROTEIN 8.7*   ALBUMIN 4.6   GLOBULIN 4.1   ALT (SGPT) 18   AST (SGOT) 22   BILIRUBIN 0.4   ALK PHOS 151*   LIPASE 19                     Brief  Urine Lab Results  (Last result in the past 365 days)        Color   Clarity   Blood   Leuk Est   Nitrite   Protein   CREAT   Urine HCG        07/08/24 1716 Yellow   Cloudy   Trace   Negative   Negative   100 mg/dL (2+)                   Microbiology Results Abnormal       None            CT Abdomen Pelvis Without Contrast    Result Date: 7/8/2024  CT ABDOMEN PELVIS WO CONTRAST Date of Exam: 7/8/2024 5:00 PM EDT Indication: abd pain nv. Comparison: CT of the abdomen and pelvis dated 3/29/2022 Technique: Axial CT images were obtained of the abdomen and pelvis without the administration of contrast. Reconstructed coronal and sagittal images were also obtained. Automated exposure control and iterative construction methods were used. Findings: Liver: Limited evaluation of the liver due to lack of IV contrast administration. Probable small cyst within the left hepatic dome. No biliary dilation is seen. Gallbladder: Surgically absent. Pancreas: Pancreas is not well evaluated due to lack of IV contrast. Spleen: Unremarkable. Adrenal glands: There is a 3.3 cm left adrenal mass with low CT density (-4 Hounsfield units) compatible with benign, lipid rich adenoma. Right adrenal gland is unremarkable. The right adrenal gland is unremarkable. Genitourinary tract: Punctate 1 mm nonobstructing calculus within the left kidney. Kidneys are otherwise unremarkable. No hydronephrosis is seen. The ureters are not well visualized. The urinary bladder is unremarkable. Pelvic organs are poorly visualized due to lack of IV contrast. Gastrointestinal tract: Limited evaluation of the hollow viscera due to lack of IV contrast administration. There is no evidence of bowel obstruction. Small hiatal hernia with surgical changes of Nissen fundoplication noted. Appendix: No findings to suggest acute appendicitis. Other findings: No free air or free fluid is identified. No pathologically enlarged lymph nodes are seen. Mild vascular calcifications  are present.. Bones and soft tissues: No acute or suspicious osseous or soft tissue lesion is identified. Gastric pacing device is implanted within the right abdominal wall. There is a fat-containing ventral hernia just above the umbilicus. Lung bases: The visualized lung bases are clear. Mild bilateral lower lobe bronchiectasis noted.     Impression: Impression: 1.Examination is limited due to lack of IV contrast administration. 2.Given this limitation, no acute abnormality is identified within the abdomen or pelvis. 3.3.3 cm lipid rich adenoma within the left adrenal gland. 4.Additional findings as detailed above. Electronically Signed: Regino Moore MD  7/8/2024 5:22 PM EDT  Workstation ID: [id number]         Current medications:  Scheduled Meds:citalopram, 20 mg, Oral, Daily  DULoxetine, 60 mg, Oral, Daily  heparin (porcine), 5,000 Units, Subcutaneous, Q12H  pantoprazole, 40 mg, Intravenous, BID AC  sodium chloride, 10 mL, Intravenous, Q12H      Continuous Infusions:lactated ringers, 75 mL/hr, Last Rate: 75 mL/hr (07/10/24 0122)      PRN Meds:.  acetaminophen **OR** acetaminophen **OR** acetaminophen    HYDROmorphone    metoclopramide    nitroglycerin    ondansetron    oxyCODONE-acetaminophen    promethazine    sodium chloride    sodium chloride    Assessment & Plan   Assessment & Plan     Active Hospital Problems    Diagnosis  POA    **VIRIDIANA (acute kidney injury) [N17.9]  Yes      Resolved Hospital Problems   No resolved problems to display.        Brief Hospital Course to date:  Geneva Haro is a 59 y.o. female  w gastroparesis s/p gastric stimulator + port placement (previously followed at [facility], now without care), GERD c/b PUD who presents for 4 days of intractable nausea/vomiting. Found to have severe VIRIDIANA, metabolic acidosis, hypokalemia c/w history above. Patient was treated with IVF's and antiemetics.      Severe VIRIDIANA 2/2 intractable nausea/vomiting/diarrhea  Hypokalemia 2/2 acute  n/v  Gastroparesis  -low dose potassium replacement  -aggressive IVF, repeat labs in AM (PVR wnl)  -reglan, PPI, other antiemetics for supportive care. GI panel pending  -advance diet as able  -help establish OP GI provider as able, patient reports no one at [facility] managing this subsect of patients currently     Mood disorder - home celexa, resume Cymbalta since kidney function improved      +cocaine and THC on UDS  -Patient is also positive for fentanyl and oxycodone however these were given to patient on admission  -- talked with patient about how THC use increase N/V. Also discussed importance of stopping taking illicit drugs     Expected Discharge Location and Transportation: home   Expected Discharge   Expected Discharge Date: 7/11/2024; Expected Discharge Time:      VTE Prophylaxis:  Pharmacologic & mechanical VTE prophylaxis orders are present.         AM-PAC 6 Clicks Score (PT): 19 (07/10/24 1103)    CODE STATUS:   Code Status and Medical Interventions:   Ordered at: 07/08/24 6015     Level Of Support Discussed With:    Patient     Code Status (Patient has no pulse and is not breathing):    CPR (Attempt to Resuscitate)     Medical Interventions (Patient has pulse or is breathing):    Full Support       Jacqueline Drake, JAM  07/10/24

## 2024-07-10 NOTE — PLAN OF CARE
Goal Outcome Evaluation:           Progress: improving       Problem: Adult Inpatient Plan of Care  Goal: Plan of Care Review  Outcome: Ongoing, Progressing  Flowsheets (Taken 7/10/2024 0653)  Progress: improving  Goal: Patient-Specific Goal (Individualized)  Outcome: Ongoing, Progressing  Goal: Absence of Hospital-Acquired Illness or Injury  Outcome: Ongoing, Progressing  Intervention: Identify and Manage Fall Risk  Recent Flowsheet Documentation  Taken 7/10/2024 0600 by Bela Arroyo, RN  Safety Promotion/Fall Prevention:   activity supervised   assistive device/personal items within reach   clutter free environment maintained   fall prevention program maintained   lighting adjusted   nonskid shoes/slippers when out of bed   room organization consistent   safety round/check completed   toileting scheduled  Taken 7/10/2024 0400 by Bela Arroyo, RN  Safety Promotion/Fall Prevention:   activity supervised   assistive device/personal items within reach   clutter free environment maintained   fall prevention program maintained   lighting adjusted   nonskid shoes/slippers when out of bed   room organization consistent   safety round/check completed   toileting scheduled  Taken 7/10/2024 0200 by Bela Arroyo, RN  Safety Promotion/Fall Prevention:   activity supervised   assistive device/personal items within reach   clutter free environment maintained   fall prevention program maintained   lighting adjusted   nonskid shoes/slippers when out of bed   room organization consistent   safety round/check completed   toileting scheduled  Taken 7/10/2024 0000 by Bela Arroyo, RN  Safety Promotion/Fall Prevention:   activity supervised   assistive device/personal items within reach   clutter free environment maintained   fall prevention program maintained   lighting adjusted   nonskid shoes/slippers when out of bed   room organization consistent   safety round/check completed   toileting scheduled  Taken 7/9/2024  2200 by Bela Arroyo RN  Safety Promotion/Fall Prevention:   activity supervised   assistive device/personal items within reach   clutter free environment maintained   fall prevention program maintained   lighting adjusted   nonskid shoes/slippers when out of bed   room organization consistent   safety round/check completed   toileting scheduled  Taken 7/9/2024 2000 by Bela Arroyo RN  Safety Promotion/Fall Prevention:   activity supervised   assistive device/personal items within reach   clutter free environment maintained   fall prevention program maintained   lighting adjusted   nonskid shoes/slippers when out of bed   room organization consistent   safety round/check completed   toileting scheduled  Taken 7/9/2024 1930 by Bela Arroyo RN  Safety Promotion/Fall Prevention:   activity supervised   assistive device/personal items within reach   clutter free environment maintained   fall prevention program maintained   lighting adjusted   nonskid shoes/slippers when out of bed   room organization consistent   safety round/check completed   toileting scheduled  Intervention: Prevent Skin Injury  Recent Flowsheet Documentation  Taken 7/10/2024 0600 by Bela Arroyo RN  Body Position: position changed independently  Taken 7/10/2024 0400 by Bela Arroyo RN  Body Position: position changed independently  Taken 7/10/2024 0200 by Bela Arroyo RN  Body Position: position changed independently  Taken 7/10/2024 0000 by Bela Arroyo RN  Body Position: position changed independently  Taken 7/9/2024 2200 by Bela Arroyo RN  Body Position: position changed independently  Taken 7/9/2024 2000 by Bela Arroyo RN  Body Position: position changed independently  Skin Protection: adhesive use limited  Taken 7/9/2024 1930 by Bela Arroyo RN  Body Position: position changed independently  Intervention: Prevent and Manage VTE (Venous Thromboembolism) Risk  Recent Flowsheet  Documentation  Taken 7/10/2024 0600 by Bela Arroyo RN  Activity Management: up in chair  Taken 7/10/2024 0400 by Bela Arroyo RN  Activity Management: activity minimized  Taken 7/10/2024 0200 by Bela Arroyo RN  Activity Management: activity minimized  Taken 7/10/2024 0000 by Bela Arroyo RN  Activity Management: activity minimized  Taken 7/9/2024 2200 by Bela Arroyo RN  Activity Management: activity minimized  Taken 7/9/2024 2000 by Bela Arroyo RN  Activity Management: activity minimized  VTE Prevention/Management: (see MAR) other (see comments)  Taken 7/9/2024 1930 by Bela Arroyo RN  Activity Management: activity encouraged  Intervention: Prevent Infection  Recent Flowsheet Documentation  Taken 7/10/2024 0600 by Bela Arroyo RN  Infection Prevention:   environmental surveillance performed   equipment surfaces disinfected   hand hygiene promoted   rest/sleep promoted   single patient room provided   visitors restricted/screened  Taken 7/10/2024 0400 by Bela Arroyo RN  Infection Prevention:   environmental surveillance performed   equipment surfaces disinfected   hand hygiene promoted   rest/sleep promoted   single patient room provided   visitors restricted/screened  Taken 7/10/2024 0200 by Bela Arroyo RN  Infection Prevention:   environmental surveillance performed   equipment surfaces disinfected   hand hygiene promoted   rest/sleep promoted   single patient room provided   visitors restricted/screened  Taken 7/10/2024 0000 by Bela Arroyo RN  Infection Prevention:   environmental surveillance performed   equipment surfaces disinfected   hand hygiene promoted   rest/sleep promoted   single patient room provided   visitors restricted/screened  Taken 7/9/2024 2200 by Bela Arroyo RN  Infection Prevention:   environmental surveillance performed   equipment surfaces disinfected   hand hygiene promoted   rest/sleep promoted   single patient room  provided   visitors restricted/screened  Taken 7/9/2024 2000 by Bela Arroyo, RN  Infection Prevention:   environmental surveillance performed   equipment surfaces disinfected   hand hygiene promoted   rest/sleep promoted   single patient room provided   visitors restricted/screened  Taken 7/9/2024 1930 by Bela Arroyo, RN  Infection Prevention:   environmental surveillance performed   equipment surfaces disinfected   hand hygiene promoted   rest/sleep promoted   single patient room provided   visitors restricted/screened  Goal: Optimal Comfort and Wellbeing  Outcome: Ongoing, Progressing  Intervention: Provide Person-Centered Care  Recent Flowsheet Documentation  Taken 7/9/2024 2000 by Bela Arroyo, RN  Trust Relationship/Rapport:   care explained   choices provided   questions answered   reassurance provided   thoughts/feelings acknowledged  Goal: Readiness for Transition of Care  Outcome: Ongoing, Progressing

## 2024-07-10 NOTE — PROGRESS NOTES
Unable to reach patient by phone. Spoke with her daughter. Verified address and contact number. Verified PCP Dr Gao. Spoke with Rupert Barker RN, [facility]-Liaison.

## 2024-07-10 NOTE — PROGRESS NOTES
Case Management Discharge Note      Final Note: Met with the patient and she was agreeable to home health physical therapy. Case management made a referral to [facility] for physical therapy and they will let case management know if they are able to accept the patient for home health physical therapy.         Selected Continued Care - Discharged on 7/10/2024 Admission date: 7/8/2024 - Discharge disposition: Home or Self Care      Destination    No services have been selected for the patient.                Durable Medical Equipment    No services have been selected for the patient.                Dialysis/Infusion    No services have been selected for the patient.                Home Medical Care    No services have been selected for the patient.                Therapy    No services have been selected for the patient.                Community Resources    No services have been selected for the patient.                Community & DME    No services have been selected for the patient.                         Final Discharge Disposition Code: 06 - home with home health care

## 2024-07-11 ENCOUNTER — TRANSITIONAL CARE MANAGEMENT TELEPHONE ENCOUNTER (OUTPATIENT)
Dept: CALL CENTER | Facility: HOSPITAL | Age: 59
End: 2024-07-11
Payer: MEDICAID

## 2024-07-11 NOTE — OUTREACH NOTE
Call Center TCM Note      Flowsheet Row Responses   [facility] patient discharged from? [place]   Does the patient have one of the following disease processes/diagnoses(primary or secondary)? Other   TCM attempt successful? Yes   Call start time 1201   Call end time 1207   Discharge diagnosis Severe VIRIDIANA 2/2 intractable nausea/vomiting/diarrhea  Hypokalemia 2/2 acute n/v  Gastroparesis   Is patient permission given to speak with other caregiver? Yes   Person spoke with today (if not patient) and relationship daughter Prince De Los Santos   Meds reviewed with patient/caregiver? Yes  [No new meds ordered at discharge. Resume usual home meds.]   Does the patient have all medications ordered at discharge? N/A   Is the patient taking all medications as directed (includes completed medication regime)? Yes   Comments PCP Dr Gao. Hospital follow up in place for 7/15  10am with Dr Gao.   Does the patient have an appointment with their PCP within 7-14 days of discharge? Yes   What is the Home health agency?  [facility] for PT   Has home health visited the patient within 72 hours of discharge? Call prior to 72 hours   Home health comments PT start of care scheduled for 7/12   Psychosocial issues? No   Did the patient receive a copy of their discharge instructions? Yes   Nursing interventions Reviewed instructions with patient  [daughter]   What is the patient's perception of their health status since discharge? Improving   Is the patient/caregiver able to teach back signs and symptoms related to disease process for when to call PCP? Yes   Is the patient/caregiver able to teach back signs and symptoms related to disease process for when to call 911? Yes   Is the patient/caregiver able to teach back the hierarchy of who to call/visit for symptoms/problems? PCP, Specialist, Home health nurse, Urgent Care, ED, 911 Yes   If the patient is a current smoker, are they able to teach back resources for cessation? Not a smoker   TCM call  completed? Yes   Wrap up additional comments 8/1/2024 12:30 PM  FOLLOW UP  [facility] GASTROENTEROLOGY Clarita Harrington APRN   Call end time 1207   Would this patient benefit from a Referral to [facility] Social Work? No   Is the patient interested in additional calls from an ambulatory ? No            Jolene Mast RN    7/11/2024, 12:10 EDT

## 2024-07-12 ENCOUNTER — HOME CARE VISIT (OUTPATIENT)
Dept: HOME HEALTH SERVICES | Facility: HOME HEALTHCARE | Age: 59
End: 2024-07-12

## 2024-07-12 ENCOUNTER — HOME CARE VISIT (OUTPATIENT)
Dept: HOME HEALTH SERVICES | Facility: HOME HEALTHCARE | Age: 59
End: 2024-07-12
Payer: MEDICARE

## 2024-07-12 ENCOUNTER — HOME CARE VISIT (OUTPATIENT)
Dept: HOME HEALTH SERVICES | Facility: HOME HEALTHCARE | Age: 59
End: 2024-07-12
Payer: MEDICAID

## 2024-07-12 VITALS
RESPIRATION RATE: 18 BRPM | DIASTOLIC BLOOD PRESSURE: 70 MMHG | OXYGEN SATURATION: 93 % | SYSTOLIC BLOOD PRESSURE: 104 MMHG | HEART RATE: 82 BPM | TEMPERATURE: 97.8 F

## 2024-07-12 PROCEDURE — G0151 HHCP-SERV OF PT,EA 15 MIN: HCPCS

## 2024-07-13 NOTE — CASE COMMUNICATION
OC Note: Pt is a 60 yo female who presented to [facility] ED following 4 days of intractable nausea/vomiting. Was found to have severe VIRIDIANA, metabolic acidosis, hypokalemia; she was admitted 7/8-10/24 for gastroparesis and treated with IVF's and antiemetics.   Home Health ordered for: disciplines PT  Reason for Hosp/Primary Dx/Co-morbidities:  gastroparesis/PMH: Mood disorder, +cocaine and THC on UDS, s/p gastric stimulator + port placement (pr eviously followed at [facility], now without care, has an appt to establish care with [facility] GI), GERD, breast CA, anxiety/depression, fibromyalgia, DM  Focus of Care: HHPT 1wk3 for gait training, balance training, therapeutic exercise, HEP instruction and pt education  Current Functional status/mobility/DME: pt ambulates with SPC with SBA  HB status/Living Arrangements: pt lives with spouse in a one story home, one small step at entry to home;  pt is homebound as she requires the use of an AD and the physical assistance of another person to safely leave home  Skin Integrity/wound status: no deficits noted  Code Status: full code  Fall Risk: moderate fall risk per [facility]  POC confirmed with Dr Gao on 7/12/24 via inbasket

## 2024-07-13 NOTE — HOME HEALTH
SOC Note: Pt is a 58 yo female who presented to [facility] ED following 4 days of intractable nausea/vomiting. Was found to have severe VIRIDIANA, metabolic acidosis, hypokalemia; she was admitted 7/8-10/24 for gastroparesis and treated with IVF's and antiemetics.   Home Health ordered for: disciplines PT  Reason for Hosp/Primary Dx/Co-morbidities:  gastroparesis/PMH: Mood disorder, +cocaine and THC on UDS, s/p gastric stimulator + port placement (previously followed at [facility], now without care, has an appt to establish care with [facility] GI), GERD, breast CA, anxiety/depression, fibromyalgia, DM  Focus of Care: HHPT 1wk3 for gait training, balance training, therapeutic exercise, HEP instruction and pt education related to Gastroparesis  Current Functional status/mobility/DME: pt ambulates with SPC with SBA  HB status/Living Arrangements: pt lives with spouse in a one story home, one small step at entry to home; pt is homebound as she requires the use of an AD and the physical assistance of another person to safely leave home  Skin Integrity/wound status: no deficits noted  Code Status: full code  Fall Risk: moderate fall risk per [facility]  POC confirmed with Dr Gao on 7/12/24 via inbasket

## 2024-07-13 NOTE — CASE COMMUNICATION
Potential major drug interactions identified with medication reconciliation:   Duloxetine and Citalopram: Additive serotonergic effects may occur during coadministration of duloxetine and selective serotonin reuptake inhibitors (SSRIs), and the risk of developing serotonin syndrome may be increased.    Trazodone and Duloxetine: Serotonergic effects of trazodone and serotonin/norepinephrine reuptake inhibitors (SNRIs) may be additive. Th e risk of serotonin syndrome/toxicity may be increased.    Citalopram and Trazodone: Additive serotonergic effects may occur during coadministration of selective serotonin reuptake inhibitors (SSRIs) and traZODone, and the risk of developing serotonin syndrome may be increased.

## 2024-07-15 RX ORDER — CITALOPRAM 20 MG/1
20 TABLET ORAL DAILY
Qty: 90 TABLET | Refills: 3 | Status: SHIPPED | OUTPATIENT
Start: 2024-07-15

## 2024-07-15 RX ORDER — DULOXETIN HYDROCHLORIDE 30 MG/1
30 CAPSULE, DELAYED RELEASE ORAL 2 TIMES DAILY
Qty: 180 CAPSULE | Refills: 3 | Status: SHIPPED | OUTPATIENT
Start: 2024-07-15 | End: 2024-07-22

## 2024-07-15 RX ORDER — TRAZODONE HYDROCHLORIDE 50 MG/1
50 TABLET ORAL
Qty: 90 TABLET | Refills: 0 | Status: SHIPPED | OUTPATIENT
Start: 2024-07-15

## 2024-07-15 NOTE — TELEPHONE ENCOUNTER
Rx Refill Note  Requested Prescriptions     Pending Prescriptions Disp Refills    DULoxetine (CYMBALTA) 30 MG capsule [Pharmacy Med Name: DULOXETINE HCL DR 30 MG CAP] 180 capsule 3     Sig: TAKE 1 CAPSULE BY MOUTH TWICE A DAY    traZODone (DESYREL) 50 MG tablet [Pharmacy Med Name: TRAZODONE 50 MG TABLET] 90 tablet 0     Sig: TAKE 1 TABLET BY MOUTH EVERYDAY AT BEDTIME    citalopram (CeleXA) 20 MG tablet [Pharmacy Med Name: CITALOPRAM HBR 20 MG TABLET] 90 tablet 3     Sig: TAKE 1 TABLET BY MOUTH EVERY DAY      Last office visit with prescribing clinician: 1/18/2024   Last telemedicine visit with prescribing clinician: Visit date not found   Next office visit with prescribing clinician: 7/15/2024                         Would you like a call back once the refill request has been completed: [] Yes [] No    If the office needs to give you a call back, can they leave a voicemail: [] Yes [] No    Julia Toledo MA  07/15/24, 08:40 EDT

## 2024-07-16 ENCOUNTER — HOME CARE VISIT (OUTPATIENT)
Dept: HOME HEALTH SERVICES | Facility: HOME HEALTHCARE | Age: 59
End: 2024-07-16
Payer: MEDICARE

## 2024-07-18 ENCOUNTER — OFFICE VISIT (OUTPATIENT)
Dept: FAMILY MEDICINE CLINIC | Facility: CLINIC | Age: 59
End: 2024-07-18
Payer: MEDICAID

## 2024-07-18 ENCOUNTER — LAB (OUTPATIENT)
Dept: LAB | Facility: HOSPITAL | Age: 59
End: 2024-07-18
Payer: MEDICAID

## 2024-07-18 VITALS
WEIGHT: 154 LBS | BODY MASS INDEX: 24.17 KG/M2 | DIASTOLIC BLOOD PRESSURE: 78 MMHG | HEIGHT: 67 IN | SYSTOLIC BLOOD PRESSURE: 128 MMHG | HEART RATE: 65 BPM | OXYGEN SATURATION: 99 %

## 2024-07-18 DIAGNOSIS — K31.84 GASTROPARESIS: Chronic | ICD-10-CM

## 2024-07-18 DIAGNOSIS — N17.9 AKI (ACUTE KIDNEY INJURY): Primary | ICD-10-CM

## 2024-07-18 DIAGNOSIS — R20.0 RIGHT ARM NUMBNESS: ICD-10-CM

## 2024-07-18 DIAGNOSIS — N17.9 AKI (ACUTE KIDNEY INJURY): ICD-10-CM

## 2024-07-18 LAB
ANION GAP SERPL CALCULATED.3IONS-SCNC: 8.4 MMOL/L (ref 5–15)
BUN SERPL-MCNC: 11 MG/DL (ref 6–20)
BUN/CREAT SERPL: 8.1 (ref 7–25)
CALCIUM SPEC-SCNC: 9.5 MG/DL (ref 8.6–10.5)
CHLORIDE SERPL-SCNC: 104 MMOL/L (ref 98–107)
CO2 SERPL-SCNC: 26.6 MMOL/L (ref 22–29)
CREAT SERPL-MCNC: 1.36 MG/DL (ref 0.57–1)
EGFRCR SERPLBLD CKD-EPI 2021: 45 ML/MIN/1.73
GLUCOSE SERPL-MCNC: 120 MG/DL (ref 65–99)
POTASSIUM SERPL-SCNC: 5 MMOL/L (ref 3.5–5.2)
SODIUM SERPL-SCNC: 139 MMOL/L (ref 136–145)

## 2024-07-18 PROCEDURE — 80048 BASIC METABOLIC PNL TOTAL CA: CPT

## 2024-07-18 PROCEDURE — 99214 OFFICE O/P EST MOD 30 MIN: CPT | Performed by: STUDENT IN AN ORGANIZED HEALTH CARE EDUCATION/TRAINING PROGRAM

## 2024-07-18 PROCEDURE — 1125F AMNT PAIN NOTED PAIN PRSNT: CPT | Performed by: STUDENT IN AN ORGANIZED HEALTH CARE EDUCATION/TRAINING PROGRAM

## 2024-07-18 PROCEDURE — 36415 COLL VENOUS BLD VENIPUNCTURE: CPT

## 2024-07-18 NOTE — PROGRESS NOTES
Transitional Care Follow Up Visit  Subjective     Geneva Janae Haro is a 59 y.o. female w gastroparesis s/p gastric stimulator and port placement, GERD c/b PUD who presents for a transitional care management visit.    Within 48 business hours after discharge our office contacted her via telephone to coordinate her care and needs.      I reviewed and discussed the details of that call along with the discharge summary, hospital problems, inpatient lab results, inpatient diagnostic studies, and consultation reports with Geneva.     Current outpatient and discharge medications have been reconciled for the patient.  Reviewed by: Risa Baig DO          7/10/2024     6:49 PM   Date of TCM Phone Call   [facility]   Date of Admission 7/8/2024   Date of Discharge 7/10/2024   Discharge Disposition [facility]     Risk for Readmission (LACE) Score: 8 (7/10/2024  6:00 AM)      History of Present Illness   Course During Hospital Stay:  Pt hospitalized 7/8-7/10 after presenting for 4 day h/o intractable nausea/vomiting. She was found to have severe VIRIDIANA, met acidosis and hypokalemia. Known gastroparesis and has had gastric stimulator placed years ago.     Today she reports improvement in GI symptoms. Has appt with GI on 8/1/24. Tolerating food/drink OK for now.  Would like to discuss ongoing neck pain, right sided numbness. Saw PCP for this back in January. Was referred to Neurology- note from April is reviewed. Had extensive laboratory testing and EMGs which showed only mild median nerve neuropathy. Blood work normal. Per documentation LP was to be considered as next step. She was started on Cymbalta 60mg and has had some improvement from this but still struggling with pain every day.  Pain is in right neck. Numbness and tingling continue down the right arm. Unable to have MRI due to her gastric stimulator- but states that she was told they could exchange for new type of stimulator that  "would allow her to do this.     The following portions of the patient's history were reviewed and updated as appropriate: allergies, current medications, past family history, past medical history, past social history, past surgical history, and problem list.    Review of Systems   Constitutional:  Positive for appetite change. Negative for fever.   Gastrointestinal:  Positive for nausea. Negative for abdominal pain.   Musculoskeletal:  Positive for myalgias and neck pain.   Neurological:  Positive for numbness.       Objective   /78 (BP Location: Left arm, Patient Position: Sitting, Cuff Size: Adult)   Pulse 65   Ht 170.2 cm (67\")   Wt 69.9 kg (154 lb)   SpO2 99%   BMI 24.12 kg/m²   Physical Exam  Constitutional:       Appearance: She is normal weight. She is not ill-appearing.   HENT:      Head: Normocephalic and atraumatic.      Mouth/Throat:      Mouth: Mucous membranes are moist.      Pharynx: Oropharynx is clear. No oropharyngeal exudate or posterior oropharyngeal erythema.   Eyes:      Extraocular Movements: Extraocular movements intact.      Conjunctiva/sclera: Conjunctivae normal.   Cardiovascular:      Rate and Rhythm: Normal rate and regular rhythm.      Heart sounds: Normal heart sounds.   Pulmonary:      Breath sounds: Normal breath sounds. No wheezing or rhonchi.   Musculoskeletal:      Cervical back: Normal range of motion and neck supple.      Comments: There is reduced ROM with right rotation of the neck and discomfort noted with this. Full with left rotation, flexion, extension. Pain w palpation of right upper trapezius.   Lymphadenopathy:      Cervical: No cervical adenopathy.   Neurological:      General: No focal deficit present.      Mental Status: She is alert.   Psychiatric:         Mood and Affect: Mood normal.         Thought Content: Thought content normal.         Assessment & Plan   Diagnoses and all orders for this visit:    1. VIRIDIANA (acute kidney injury) (Primary)  2. " Gastroparesis  Suspected etiology nausea/vomiting in setting of chronic gastroparesis. Improved. Recheck BMP today shows relatively stable renal function that is back to baseline. FU as scheduled w GI.  -     Basic metabolic panel; Future      3. Right arm numbness  Etiology remains unclear. Symptoms uncontrolled. Could trial higher dose of Cymbalta for symptom relief. Needs to FU with Neurology, missed her appt while admitted. Will call office to schedule this.       Risa Baig, DO

## 2024-07-19 NOTE — CASE COMMUNICATION
Patient missed a HHPT visit from [facility] on 7/16/24.     Reason: unable to reach pt for scheduling, no response to messages left.       For your records only.   Per CMS Guidance, MD must be notified of missed/cancelled visits; therefore the prescribed frequency was not met.

## 2024-07-23 ENCOUNTER — HOME CARE VISIT (OUTPATIENT)
Dept: HOME HEALTH SERVICES | Facility: HOME HEALTHCARE | Age: 59
End: 2024-07-23
Payer: MEDICARE

## 2024-07-23 ENCOUNTER — TELEPHONE (OUTPATIENT)
Dept: FAMILY MEDICINE CLINIC | Facility: CLINIC | Age: 59
End: 2024-07-23
Payer: MEDICAID

## 2024-07-23 NOTE — TELEPHONE ENCOUNTER
----- Message from Risa Baig sent at 7/22/2024  5:15 PM EDT -----    Please let patient know that kidney function is stable. She can try taking 90mg of the Duloxetine (Cymbalta) as we discussed at her visit until she follows up with Neurologist. Please make sure to call and schedule with Neurology.      Unable to reach patient.    Please relay.

## 2024-07-25 ENCOUNTER — HOME CARE VISIT (OUTPATIENT)
Dept: HOME HEALTH SERVICES | Facility: HOME HEALTHCARE | Age: 59
End: 2024-07-25
Payer: MEDICARE

## 2024-07-25 VITALS
TEMPERATURE: 97.7 F | HEART RATE: 71 BPM | SYSTOLIC BLOOD PRESSURE: 99 MMHG | DIASTOLIC BLOOD PRESSURE: 69 MMHG | RESPIRATION RATE: 16 BRPM | OXYGEN SATURATION: 96 %

## 2024-07-25 PROCEDURE — G0151 HHCP-SERV OF PT,EA 15 MIN: HCPCS

## 2024-07-29 NOTE — TELEPHONE ENCOUNTER
Rx Refill Note  Requested Prescriptions     Pending Prescriptions Disp Refills   • folic acid (FOLVITE) 1 MG tablet 30 tablet 9     Sig: Take 1 tablet by mouth Daily.   • citalopram (CeleXA) 20 MG tablet 90 tablet 0     Sig: Take 1 tablet by mouth Daily.   • cetirizine (zyrTEC) 10 MG tablet 90 tablet 3     Sig: Take 1 tablet by mouth Daily.   • cyclobenzaprine (FLEXERIL) 10 MG tablet 180 tablet 3     Sig: Take 1 tablet by mouth 3 (Three) Times a Day As Needed for Muscle Spasms.   • DULoxetine (CYMBALTA) 30 MG capsule 180 capsule 3     Sig: Take 1 capsule by mouth 2 (Two) Times a Day.   • esomeprazole (nexIUM) 40 MG capsule       Sig: Take 1 capsule by mouth 2 (Two) Times a Day.   • metroNIDAZOLE (FLAGYL) 500 MG tablet 21 tablet 0     Sig: Take 1 tablet by mouth 3 (Three) Times a Day.   • ondansetron (ZOFRAN) 8 MG tablet 30 tablet 6     Sig: Take 1 tablet by mouth Every 8 (Eight) Hours As Needed for Nausea or Vomiting.   • ondansetron ODT (Zofran ODT) 8 MG disintegrating tablet 90 tablet 12     Sig: Place 1 tablet on the tongue Every 8 (Eight) Hours As Needed for Nausea or Vomiting.   • prochlorperazine (COMPAZINE) 10 MG tablet 30 tablet 0     Sig: Take 1 tablet by mouth Every 6 (Six) Hours As Needed for Nausea or Vomiting.   • thiamine (VITAMIN B-1) 100 MG tablet tablet 30 tablet 9     Sig: Take 1 tablet by mouth Daily.   • traZODone (DESYREL) 50 MG tablet 90 tablet 3     Sig: Take 1 tablet by mouth Every Night.      Last office visit with prescribing clinician: 7/14/2022   Last telemedicine visit with prescribing clinician: Visit date not found   Next office visit with prescribing clinician: 2/24/2023                        Would you like a call back once the refill request has been completed: [] Yes [] No    If the office needs to give you a call back, can they leave a voicemail: [] Yes [] No    Kenia Goldman MA  02/04/23, 11:16 EST   SEE RX  LAST VISIT 06/03/24  NEXT APPT 08/19/24

## 2024-07-31 ENCOUNTER — OFFICE VISIT (OUTPATIENT)
Dept: NEUROLOGY | Facility: CLINIC | Age: 59
End: 2024-07-31
Payer: MEDICAID

## 2024-07-31 VITALS
HEIGHT: 67 IN | OXYGEN SATURATION: 98 % | HEART RATE: 88 BPM | BODY MASS INDEX: 23.54 KG/M2 | SYSTOLIC BLOOD PRESSURE: 108 MMHG | WEIGHT: 150 LBS | DIASTOLIC BLOOD PRESSURE: 72 MMHG

## 2024-07-31 DIAGNOSIS — R29.898 RIGHT ARM WEAKNESS: ICD-10-CM

## 2024-07-31 DIAGNOSIS — R20.0 RIGHT ARM NUMBNESS: ICD-10-CM

## 2024-07-31 DIAGNOSIS — R20.2 PARESTHESIA: Primary | ICD-10-CM

## 2024-07-31 DIAGNOSIS — R20.2 FACIAL PARESTHESIA: ICD-10-CM

## 2024-07-31 PROCEDURE — 1160F RVW MEDS BY RX/DR IN RCRD: CPT | Performed by: NURSE PRACTITIONER

## 2024-07-31 PROCEDURE — 99214 OFFICE O/P EST MOD 30 MIN: CPT | Performed by: NURSE PRACTITIONER

## 2024-07-31 PROCEDURE — 1159F MED LIST DOCD IN RCRD: CPT | Performed by: NURSE PRACTITIONER

## 2024-07-31 NOTE — PROGRESS NOTES
Neuro Office Visit      Encounter Date: 2024   Patient Name: Geneva Haro  : 1965   MRN: [id number]     Chief Complaint:    Chief Complaint   Patient presents with    Numbness       History of Present Illness: Geneva Haro is a 59 y.o. female who is here today in Neurology for  paresthesai    History of Present Illness  The patient presents for evaluation of multiple medical concerns.    In 2023, she experienced two falls due to loss of balance, which resulted in increased pain on her right side. She believes she injured her shoulder during the fall, as she fell a week after the second fall. Occasionally, she is unable to raise her shoulder. She has not consulted an orthopedic doctor for her shoulder. The fall resulted in her hitting her shoulder and back, but she denies any head injury during the fall. She continues to experience intermittent tingling and numbness on the right side. This morning, she experienced tingling and numbness in her entire arm and neck. She has been taking vitamin D since her breast cancer diagnosis. Duloxetine has been effective in managing her numbness, tingling, and mood, particularly at night. She has not observed facial paralysis or drooping. She is unable to lay flat due to severe acid reflux.    Supplemental Information  She has carpal tunnel syndrome in her right arm. CT of her neck showed a little bit of narrowing on some of the nerves in her neck.       Last appt 2024 w me-check labs. Start cymbalta, wear cock up splints at night. Pt is unable to have MRI due to gastric stimulator. Check labs. If all labs are negative consider LP.       Labs:lym,e micky, heavy metal, cryoglobulin, crp 1.77 ck, glen, vit d 10, vit b12, sed rate 56, RF, ACE, NMO, MOG    Paresthesia  Symptoms are the same. Still with numbness and tingling. Fell and injured right shoulder. Did not have eval of shoulder pain. Fell due to loss of balance.    PH  Right facial  numbness and tingling started Jan 2024. Woke up with symptoms. Right eye to right lip. No facial weakness or drooling. Denies dysphagia. Chronic right arm tingling for 3 months. Left sided neck pain. Denies headaches and vision changes.  Right side tingling and RUE weakness for one month. Whole arm feels alseep. Better with elevation of arm.  Symptoms started on left arm and hand and scapula in the last month.  Symptoms will develop into stabbing and cramping type pain by the end of the day  Poor balance for 2 months.     Denies vision changes, slurred speech, dysphagia, incontinence, constipation, falls, change in gait, change in mood.      MRI brain-ins would not cover.  CT Head Without Contrast (01/18/2024 15:19)-neg  EMG & Nerve Conduction Test (03/19/2024 14:00)-Median neuropathy at the wrist on the right, exceedingly mild (carpal tunnel)     No evidence for radiculopathy is seen in the right arm     Normal NCS/EMG of the left leg and back     CT Cervical Spine With Intrathecal Contrast (07/22/2021 08:16)-IMPRESSION:  Overall mild cervical spondylosis change present, most  notably involving the lower cervical levels as above with areas of  mild-to-moderate spinal canal and neuroforaminal involvement.     Takind duloxetine 30 for fibromyalgia        Labs: A1c 5.9, cbc, cmp 52.1,      Has gastric stimulator cannot have MRI.     PMH: pre-diabetes, gastroparesis, GERD, anemia, breast cancer 2014 tx'd with chemo x1. No radiation. Anxiety and depression, fibromyalgia, m  FH:-MS  SH:marijuana use + tob  Subjective      Past Medical History:   Past Medical History:   Diagnosis Date    Allergic At a young age    Anxiety     Arthritis     Arthritis of back 2000    Cancer     Depression     Diabetes mellitus     Fibromyalgia, primary 33 years ago    Gall stones     GERD (gastroesophageal reflux disease) 2008    Hearing loss     Hernia, hiatal     Hip arthrosis     History of stomach ulcers     History of transfusion      Knee swelling     Periarthritis of shoulder     Stomach problems     Ulcer of bile duct        Past Surgical History:   Past Surgical History:   Procedure Laterality Date    BREAST SURGERY      CHOLECYSTECTOMY      CORONARY ARTERY BYPASS GRAFT      ENDOSCOPY N/A 06/20/2018    Procedure: ESOPHAGOGASTRODUODENOSCOPY;  Surgeon: Mark I Brunner, MD;  Location:  [facility];  Service: Gastroenterology    GASTRIC BYPASS      x2    GASTRIC STIMULATOR IMPLANT SURGERY      HAND SURGERY  1992    HERNIA REPAIR      HERNIA REPAIR      INTERVENTIONAL RADIOLOGY PROCEDURE Bilateral 07/22/2021    Procedure: IR myelogram, cervical;  Surgeon: Jose Angel Sauer MD;  Location:  [facility];  Service: Interventional Radiology;  Laterality: Bilateral;    LYMPH NODE BIOPSY      MASTECTOMY Bilateral     Left With sentinel lymph node sampling and prophylactic right mastectomy    PACEMAKER IMPLANTATION      PORTACATH PLACEMENT      SMALL INTESTINE SURGERY         Family History:   Family History   Problem Relation Age of Onset    Breast cancer Maternal Aunt     Cancer Maternal Aunt     Pancreatic cancer Cousin     Breast cancer Cousin     Breast cancer Cousin     Heart attack Mother     Hypertension Mother     COPD Sister        Social History:   Social History     Socioeconomic History    Marital status:    Tobacco Use    Smoking status: Former     Current packs/day: 0.00     Average packs/day: 0.3 packs/day for 35.0 years (8.8 ttl pk-yrs)     Types: Cigarettes     Start date: 1/1/1986     Quit date: 1/1/2021     Years since quitting: 3.5     Passive exposure: Current    Smokeless tobacco: Never   Vaping Use    Vaping status: Never Used   Substance and Sexual Activity    Alcohol use: No    Drug use: Yes     Frequency: 6.0 times per week     Types: Marijuana    Sexual activity: Not Currently     Partners: Male     Birth control/protection: Abstinence, Post-menopausal, None, Tubal ligation       Medications:      Current Outpatient Medications:     cetirizine (zyrTEC) 10 MG tablet, Take 1 tablet by mouth Daily., Disp: 90 tablet, Rfl: 0    citalopram (CeleXA) 20 MG tablet, TAKE 1 TABLET BY MOUTH EVERY DAY, Disp: 90 tablet, Rfl: 3    cyclobenzaprine (FLEXERIL) 10 MG tablet, TAKE 1 TABLET BY MOUTH THREE TIMES A DAY AS NEEDED FOR MUSCLE SPASM, Disp: 90 tablet, Rfl: 0    DULoxetine (Cymbalta) 60 MG capsule, Take 1 capsule by mouth Daily., Disp: 90 capsule, Rfl: 1    esomeprazole (nexIUM) 40 MG capsule, TAKE 1 CAPSULE BY MOUTH TWICE A DAY, Disp: 180 capsule, Rfl: 1    promethazine (PHENERGAN) 25 MG tablet, TAKE 1 TABLET BY MOUTH EVERY 6 HOURS AS NEEDED FOR NAUSEA OR VOMITING., Disp: 120 tablet, Rfl: 1    traZODone (DESYREL) 50 MG tablet, TAKE 1 TABLET BY MOUTH EVERYDAY AT BEDTIME, Disp: 90 tablet, Rfl: 0    Allergies:   Allergies   Allergen Reactions    Gabapentin Other (See Comments)     Seizure    Morphine And Codeine Itching and Swelling    Melatonin Palpitations     Pt states it makes her heart race    Aspirin GI Intolerance    Ibuprofen GI Intolerance       PHQ-9 Total Score:     STEADI Fall Risk Assessment has not been completed.    Objective     Physical Exam:   Physical Exam  Neurological:      Mental Status: She is oriented to person, place, and time.      Coordination: Finger-Nose-Finger Test, Heel to Shin Test and Romberg Test normal.      Gait: Gait is intact.      Deep Tendon Reflexes:      Reflex Scores:       Tricep reflexes are 2+ on the right side and 2+ on the left side.       Bicep reflexes are 2+ on the right side and 2+ on the left side.       Brachioradialis reflexes are 2+ on the right side and 2+ on the left side.       Patellar reflexes are 2+ on the right side and 2+ on the left side.       Achilles reflexes are 2+ on the right side and 2+ on the left side.  Psychiatric:         Speech: Speech normal.         Neurologic Exam     Mental Status   Oriented to person, place, and time.   Follows 3 step  "commands.   Attention: normal. Concentration: normal.   Speech: speech is normal   Level of consciousness: alert  Knowledge: consistent with education.   Normal comprehension.     Cranial Nerves     CN III, IV, VI   Right pupil: Accommodation: intact.   Left pupil: Accommodation: intact.   CN III: no CN III palsy  CN VI: no CN VI palsy  Nystagmus: none   Diplopia: none  Upgaze: normal  Downgaze: normal  Conjugate gaze: present    CN VII   Facial expression full, symmetric.     CN VIII   Hearing: intact    CN XII   CN XII normal.     Motor Exam   Muscle bulk: normal  Overall muscle tone: normal    Strength   Right biceps: 4/5  Left biceps: 4/5  Right triceps: 4/5  Left triceps: 4/5  Right interossei: 4/5  Left interossei: 4/5  Right quadriceps: 4/5  Left quadriceps: 4/5  Right anterior tibial: 4/5  Left anterior tibial: 4/5  Right posterior tibial: 4/5  Left posterior tibial: 4/5    Sensory Exam   Right arm light touch: decreased from fingers  Left arm light touch: normal  Right leg light touch: decreased from toes  Left leg light touch: normal    Gait, Coordination, and Reflexes     Gait  Gait: normal    Coordination   Romberg: negative  Finger to nose coordination: normal  Heel to shin coordination: normal    Tremor   Resting tremor: absent  Action tremor: absent    Reflexes   Right brachioradialis: 2+  Left brachioradialis: 2+  Right biceps: 2+  Left biceps: 2+  Right triceps: 2+  Left triceps: 2+  Right patellar: 2+  Left patellar: 2+  Right achilles: 2+  Left achilles: 2+  Right : 2+  Left : 2+     Physical Exam        Vital Signs:   Vitals:    07/31/24 1055   BP: 108/72   Pulse: 88   SpO2: 98%   Weight: 68 kg (150 lb)   Height: 170.2 cm (67.01\")     Body mass index is 23.49 kg/m².         Assessment / Plan      Assessment/Plan:   Diagnoses and all orders for this visit:    1. Paresthesia (Primary)  -     Obtain Informed Consent; Standing  -     Notify Provider if Patient Taking Blood Thinning Agents; " Standing  -     Glucose, CSF - Cerebrospinal Fluid, Lumbar Puncture; Standing  -     Protein, CSF - Cerebrospinal Fluid, Lumbar Puncture; Standing  -     Cell Count With Differential, CSF Use CSF Tube: 1; Standing  -     Cell Count With Differential, CSF; Standing  -     CSF Tube - Cerebrospinal Fluid, Lumbar Puncture; Standing  -     CSF Tube - Cerebrospinal Fluid, Lumbar Puncture; Standing  -     IR Lumbar Puncture Diagnostic; Future  -     MS Profile & MBP, CSF and Serum - Cerebrospinal Fluid, Lumbar Puncture; Standing  -     Anaerobic Culture - Cerebrospinal Fluid, Spine, Lumbar; Standing  -     Culture, CSF - Cerebrospinal Fluid, Lumbar Puncture; Standing  -     Cryptococcal AG, CSF - Cerebrospinal Fluid, Lumbar Puncture; Standing  -     Fungus Culture - Cerebrospinal Fluid, Lumbar Puncture; Standing  -     KOH Prep - Cerebrospinal Fluid, Lumbar Puncture; Standing  -     Fungus Smear - Cerebrospinal Fluid, Lumbar Puncture; Standing  -     NON-GYN CYTOLOGY, [facility] ([facility]); Standing    2. Right arm numbness  -     Obtain Informed Consent; Standing  -     Notify Provider if Patient Taking Blood Thinning Agents; Standing  -     Glucose, CSF - Cerebrospinal Fluid, Lumbar Puncture; Standing  -     Protein, CSF - Cerebrospinal Fluid, Lumbar Puncture; Standing  -     Cell Count With Differential, CSF Use CSF Tube: 1; Standing  -     Cell Count With Differential, CSF; Standing  -     CSF Tube - Cerebrospinal Fluid, Lumbar Puncture; Standing  -     CSF Tube - Cerebrospinal Fluid, Lumbar Puncture; Standing  -     IR Lumbar Puncture Diagnostic; Future  -     MS Profile & MBP, CSF and Serum - Cerebrospinal Fluid, Lumbar Puncture; Standing  -     Anaerobic Culture - Cerebrospinal Fluid, Spine, Lumbar; Standing  -     Culture, CSF - Cerebrospinal Fluid, Lumbar Puncture; Standing  -     Cryptococcal AG, CSF - Cerebrospinal Fluid, Lumbar Puncture; Standing  -     Fungus Culture - Cerebrospinal Fluid, Lumbar  Puncture; Standing  -     KOH Prep - Cerebrospinal Fluid, Lumbar Puncture; Standing  -     Fungus Smear - Cerebrospinal Fluid, Lumbar Puncture; Standing  -     NON-GYN CYTOLOGY, [facility] ([facility]); Standing    3. Right arm weakness  -     Obtain Informed Consent; Standing  -     Notify Provider if Patient Taking Blood Thinning Agents; Standing  -     Glucose, CSF - Cerebrospinal Fluid, Lumbar Puncture; Standing  -     Protein, CSF - Cerebrospinal Fluid, Lumbar Puncture; Standing  -     Cell Count With Differential, CSF Use CSF Tube: 1; Standing  -     Cell Count With Differential, CSF; Standing  -     CSF Tube - Cerebrospinal Fluid, Lumbar Puncture; Standing  -     CSF Tube - Cerebrospinal Fluid, Lumbar Puncture; Standing  -     IR Lumbar Puncture Diagnostic; Future  -     MS Profile & MBP, CSF and Serum - Cerebrospinal Fluid, Lumbar Puncture; Standing  -     Anaerobic Culture - Cerebrospinal Fluid, Spine, Lumbar; Standing  -     Culture, CSF - Cerebrospinal Fluid, Lumbar Puncture; Standing  -     Cryptococcal AG, CSF - Cerebrospinal Fluid, Lumbar Puncture; Standing  -     Fungus Culture - Cerebrospinal Fluid, Lumbar Puncture; Standing  -     KOH Prep - Cerebrospinal Fluid, Lumbar Puncture; Standing  -     Fungus Smear - Cerebrospinal Fluid, Lumbar Puncture; Standing  -     NON-GYN CYTOLOGY, [facility] ([facility]); Standing    4. Facial paresthesia  -     IR Lumbar Puncture Diagnostic; Future       Assessment & Plan  1. Right-sided pain.  Elevated inflammatory markers were observed in her last blood work, indicative of inflammation, unrelated to any specific diseases such as arthritis, injury, obesity, or asthma. Her vitamin D level was also significantly low. She was advised to increase her vitamin D intake to normalize her level. Dietary calcium was also recommended. A spinal tap was ordered to exclude any brain-related issues. Tests for sugar, protein, and cell counts will be conducted, along with  tests for multiple sclerosis and infections. Aerobic and anaerobic cultures will be performed to check for fungus and cancer cells. She was advised to lie completely flat and drink plenty of water and Gatorade for at least 24 hours post- Spinal tap. If she does not receive a call from the radiology department within a week, she should contact us.        Patient Education:     Will order LP for numbness, tingling of face and RUE. Unable to obtain MRI due to gastric stimulator.    Reviewed medications, potential side effects and signs and symptoms to report. Discussed risk versus benefits of treatment plan with patient and/or family-including medications, labs and radiology that may be ordered. Addressed questions and concerns during visit. Patient and/or family verbalized understanding and agree with plan. Instructed to call the office with any questions and report to ER with any life-threatening symptoms.     Follow Up:   No follow-ups on file.    During this visit the following were done:  Labs Reviewed [x]    Labs Ordered [x]    Radiology Reports Reviewed [x]    Radiology Ordered [x]    PCP Records Reviewed []    Referring Provider Records Reviewed []    ER Records Reviewed []    Hospital Records Reviewed []    History Obtained From Family []    Radiology Images Reviewed []    Other Reviewed [x]    Records Requested []      Patient or patient representative verbalized consent for the use of Ambient Listening during the visit with  Chelly Hill DNP, APRN for chart documentation. 7/31/2024  11:01 EDT      Chelly Hill DNP, APRN

## 2024-08-20 ENCOUNTER — TELEPHONE (OUTPATIENT)
Dept: INFUSION THERAPY | Facility: HOSPITAL | Age: 59
End: 2024-08-20
Payer: MEDICAID

## 2024-08-20 NOTE — TELEPHONE ENCOUNTER
Pt contacted as pre-procedure phone call prior to planned LP for 8/21/24. Reviewed with patient arrival time, location, nothing to eat after MN, fluids encouraged,  needed, reviewed procedure instructions and allowed time for questions, and reviewed home medications, allergies, and medical history.

## 2024-08-21 ENCOUNTER — HOSPITAL ENCOUNTER (OUTPATIENT)
Dept: GENERAL RADIOLOGY | Facility: HOSPITAL | Age: 59
Discharge: HOME | End: 2024-08-21
Admitting: NURSE PRACTITIONER
Payer: MEDICAID

## 2024-08-21 VITALS
TEMPERATURE: 97.6 F | SYSTOLIC BLOOD PRESSURE: 109 MMHG | HEART RATE: 76 BPM | HEIGHT: 67 IN | DIASTOLIC BLOOD PRESSURE: 75 MMHG | WEIGHT: 148 LBS | OXYGEN SATURATION: 92 % | BODY MASS INDEX: 23.23 KG/M2 | RESPIRATION RATE: 18 BRPM

## 2024-08-21 DIAGNOSIS — R29.898 RIGHT ARM WEAKNESS: ICD-10-CM

## 2024-08-21 DIAGNOSIS — R20.2 PARESTHESIA: ICD-10-CM

## 2024-08-21 DIAGNOSIS — R20.2 FACIAL PARESTHESIA: ICD-10-CM

## 2024-08-21 DIAGNOSIS — R20.0 RIGHT ARM NUMBNESS: ICD-10-CM

## 2024-08-21 LAB
APPEARANCE CSF: CLEAR
APPEARANCE CSF: CLEAR
COLOR CSF: COLORLESS
COLOR CSF: COLORLESS
COLOR SPUN CSF: COLORLESS
COLOR SPUN CSF: COLORLESS
GLUCOSE CSF-MCNC: 63 MG/DL (ref 40–70)
PROT CSF-MCNC: 28.8 MG/DL (ref 15–45)
RBC # CSF MANUAL: 0 /MM3 (ref 0–5)
RBC # CSF MANUAL: 8 /MM3 (ref 0–5)
SPECIMEN VOL CSF: 10 ML
SPECIMEN VOL CSF: 10 ML
TUBE # CSF: 1
TUBE # CSF: 4
WBC # CSF MANUAL: 2 /MM3 (ref 0–5)
WBC # CSF MANUAL: 2 /MM3 (ref 0–5)

## 2024-08-21 PROCEDURE — 83873 ASSAY OF CSF PROTEIN: CPT | Performed by: NURSE PRACTITIONER

## 2024-08-21 PROCEDURE — 83916 OLIGOCLONAL BANDS: CPT | Performed by: NURSE PRACTITIONER

## 2024-08-21 PROCEDURE — 82042 OTHER SOURCE ALBUMIN QUAN EA: CPT | Performed by: NURSE PRACTITIONER

## 2024-08-21 PROCEDURE — 87075 CULTR BACTERIA EXCEPT BLOOD: CPT | Performed by: NURSE PRACTITIONER

## 2024-08-21 PROCEDURE — 87205 SMEAR GRAM STAIN: CPT | Performed by: NURSE PRACTITIONER

## 2024-08-21 PROCEDURE — 82945 GLUCOSE OTHER FLUID: CPT | Performed by: NURSE PRACTITIONER

## 2024-08-21 PROCEDURE — 87206 SMEAR FLUORESCENT/ACID STAI: CPT | Performed by: NURSE PRACTITIONER

## 2024-08-21 PROCEDURE — 87070 CULTURE OTHR SPECIMN AEROBIC: CPT | Performed by: NURSE PRACTITIONER

## 2024-08-21 PROCEDURE — 25010000002 LIDOCAINE 1 % SOLUTION: Performed by: STUDENT IN AN ORGANIZED HEALTH CARE EDUCATION/TRAINING PROGRAM

## 2024-08-21 PROCEDURE — 82040 ASSAY OF SERUM ALBUMIN: CPT | Performed by: NURSE PRACTITIONER

## 2024-08-21 PROCEDURE — 82784 ASSAY IGA/IGD/IGG/IGM EACH: CPT | Performed by: NURSE PRACTITIONER

## 2024-08-21 PROCEDURE — 89050 BODY FLUID CELL COUNT: CPT | Performed by: NURSE PRACTITIONER

## 2024-08-21 PROCEDURE — 87015 SPECIMEN INFECT AGNT CONCNTJ: CPT | Performed by: NURSE PRACTITIONER

## 2024-08-21 PROCEDURE — 87327 CRYPTOCOCCUS NEOFORM AG IA: CPT | Performed by: NURSE PRACTITIONER

## 2024-08-21 PROCEDURE — 87102 FUNGUS ISOLATION CULTURE: CPT | Performed by: NURSE PRACTITIONER

## 2024-08-21 PROCEDURE — 84157 ASSAY OF PROTEIN OTHER: CPT | Performed by: NURSE PRACTITIONER

## 2024-08-21 RX ORDER — LIDOCAINE HYDROCHLORIDE 10 MG/ML
5 INJECTION, SOLUTION INFILTRATION; PERINEURAL ONCE
Status: COMPLETED | OUTPATIENT
Start: 2024-08-21 | End: 2024-08-21

## 2024-08-21 RX ADMIN — LIDOCAINE HYDROCHLORIDE 5 ML: 10 INJECTION, SOLUTION INFILTRATION; PERINEURAL at 14:20

## 2024-08-21 NOTE — POST-PROCEDURE NOTE
Radiology Procedure    Pre-procedure: procedure, risks discussed with patient. Patient indicated understanding and consented to procedure.     Procedure Performed: lumbar puncture     IV Sedation and/or Anesthesia:  No    Complications: none    Preliminary Findings: pending    Specimen Removed: 10cc clear, colorless CSF    Estimated Blood Loss:  0ml    Post-Procedure Diagnosis: pending    Post-Procedure Plan: encourage fluids, bed rest x 2 hours    Standard Discharge Instructions Given:yes     ARASH Waldron  08/21/24  14:36 EDT

## 2024-08-22 ENCOUNTER — TELEPHONE (OUTPATIENT)
Dept: NEUROLOGY | Facility: CLINIC | Age: 59
End: 2024-08-22
Payer: MEDICAID

## 2024-08-22 ENCOUNTER — TELEPHONE (OUTPATIENT)
Dept: INFUSION THERAPY | Facility: HOSPITAL | Age: 59
End: 2024-08-22
Payer: MEDICAID

## 2024-08-22 LAB
CRYPTOC AG CSF QL LA: NEGATIVE
GIE STN SPEC: NORMAL

## 2024-08-22 NOTE — TELEPHONE ENCOUNTER
"Called patient and left vm with results.    ----- Message from Chelly Hill sent at 8/22/2024 11:40 AM EDT -----  Relay     \"Please notify pt initial lumbar puncture labs are normal. We will call when others return.\"                "

## 2024-08-23 ENCOUNTER — TELEPHONE (OUTPATIENT)
Dept: NEUROLOGY | Facility: CLINIC | Age: 59
End: 2024-08-23
Payer: MEDICAID

## 2024-08-23 LAB — REF LAB TEST METHOD: NORMAL

## 2024-08-23 NOTE — TELEPHONE ENCOUNTER
Called patient and gave results.  Patient was understanding and appreciative.    ----- Message from Chelly Hill sent at 8/23/2024 11:40 AM EDT -----  Please notify pt spinal fluid did not show any cancer cells

## 2024-08-24 LAB
BACTERIA SPEC AEROBE CULT: NORMAL
GRAM STN SPEC: NORMAL

## 2024-08-26 LAB — BACTERIA SPEC ANAEROBE CULT: NORMAL

## 2024-08-27 ENCOUNTER — TELEPHONE (OUTPATIENT)
Dept: NEUROLOGY | Facility: CLINIC | Age: 59
End: 2024-08-27
Payer: MEDICAID

## 2024-08-27 LAB
ALB CSF/SERPL: 5 {RATIO} (ref 0–8)
ALBUMIN CSF-MCNC: 16 MG/DL (ref 8–37)
ALBUMIN SERPL-MCNC: 3.5 G/DL (ref 3.8–4.9)
IGG CSF-MCNC: 2.1 MG/DL (ref 0–6.7)
IGG SERPL-MCNC: 778 MG/DL (ref 586–1602)
IGG SYNTH RATE SER+CSF CALC-MRATE: -0.4 MG/DAY
IGG/ALB CLEAR SER+CSF-RTO: 0.6 (ref 0–0.7)
IGG/ALB CSF: 0.13 {RATIO} (ref 0–0.25)
MBP CSF-MCNC: 9.8 NG/ML (ref 0–3.7)
OLIGOCLONAL BANDS.IT SER+CSF QL: ABNORMAL

## 2024-08-27 NOTE — TELEPHONE ENCOUNTER
Called patient and gave results.  She was understanding and appreciative.    ----- Message from Chelly Hill sent at 8/27/2024  3:36 PM EDT -----  Please notify pt spinal fluid is negative for MS which is good news.

## 2024-08-28 LAB — FUNGUS WND CULT: NORMAL

## 2024-08-29 ENCOUNTER — OFFICE VISIT (OUTPATIENT)
Dept: GASTROENTEROLOGY | Facility: CLINIC | Age: 59
End: 2024-08-29
Payer: MEDICAID

## 2024-08-29 VITALS
SYSTOLIC BLOOD PRESSURE: 97 MMHG | WEIGHT: 152.2 LBS | BODY MASS INDEX: 23.89 KG/M2 | TEMPERATURE: 96.9 F | HEART RATE: 88 BPM | HEIGHT: 67 IN | DIASTOLIC BLOOD PRESSURE: 60 MMHG

## 2024-08-29 DIAGNOSIS — K31.84 GASTROPARESIS: Primary | ICD-10-CM

## 2024-08-29 PROCEDURE — 99214 OFFICE O/P EST MOD 30 MIN: CPT

## 2024-08-29 PROCEDURE — 1160F RVW MEDS BY RX/DR IN RCRD: CPT

## 2024-08-29 PROCEDURE — 1159F MED LIST DOCD IN RCRD: CPT

## 2024-08-29 NOTE — PROGRESS NOTES
Office Note     Name: Geneva Haro    : 1965     MRN: [id number]     Chief Complaint  Follow-up    Subjective     History of Present Illness:  Geneva Haro is a 59 y.o. female who presents today for hospital follow-up after she presented  with intractable nausea and vomiting, which was thought to be secondary to known gastroparesis, in the setting of acute viral illness.     She has had extensive prior workup of her gastroparesis through [facility] with Dr. Jonny Flaherty at the motility center.  He moved to [place] and she is unable to continue care with him, so she is looking for someone to manage her gastroparesis.  She has gastric pacemaker which has been replaced twice.  Prior to that, she had failed medical therapy with metoclopramide and domperidone.  Is excluded from enrolling in cisapride trial secondary to being on multiple medications that prolong QT interval.  She previously had a good response to pyloric Botox injection for a brief time.  She had EGD with pyloric dilation last  with Dr. Sanchez.      She is having exacerbations about every other month, with daily nausea, usually worse in the morning. She has omitted high-fiber foods.  She is on Nexium twice daily, with ongoing GERD symptoms, particularly reflux, despite twice daily dosing.  She does not adhere to a gastroparesis diet.    She typically has constipation, but has been having looser stools since early July. She has been using Phenergan daily for n/v.     Past Medical History:   Past Medical History:   Diagnosis Date    Allergic At a young age    Anxiety     Arthritis     Arthritis of back     Cancer     Depression     Diabetes     pre-diabetic    Fibromyalgia, primary 33 years ago    Gall stones     GERD (gastroesophageal reflux disease)     Hearing loss     Hernia, hiatal     Hip arthrosis     History of stomach ulcers     History of transfusion     Knee swelling     Periarthritis of shoulder      Stomach problems     Ulcer of bile duct        Past Surgical History:   Past Surgical History:   Procedure Laterality Date    BREAST SURGERY      CHOLECYSTECTOMY      CORONARY ARTERY BYPASS GRAFT      ENDOSCOPY N/A 06/20/2018    Procedure: ESOPHAGOGASTRODUODENOSCOPY;  Surgeon: Mark I Brunner, MD;  Location:  [facility];  Service: Gastroenterology    GASTRIC BYPASS      x2    GASTRIC STIMULATOR IMPLANT SURGERY      HAND SURGERY  1992    HERNIA REPAIR      HERNIA REPAIR      INTERVENTIONAL RADIOLOGY PROCEDURE Bilateral 07/22/2021    Procedure: IR myelogram, cervical;  Surgeon: Jose Angel Sauer MD;  Location:  [facility];  Service: Interventional Radiology;  Laterality: Bilateral;    LYMPH NODE BIOPSY      MASTECTOMY Bilateral     Left With sentinel lymph node sampling and prophylactic right mastectomy    PACEMAKER IMPLANTATION      PORTACATH PLACEMENT      SMALL INTESTINE SURGERY      TUBAL ABDOMINAL LIGATION         Immunizations:   Immunization History   Administered Date(s) Administered    COVID-19 (PFIZER) BIVALENT 12+YRS 02/24/2023    Covid-19 (Pfizer) Gray Cap Monovalent 04/11/2022, 05/02/2022    Fluzone (or Fluarix & Flulaval for VFC) >6mos 02/02/2021, 02/24/2023    Hepatitis B Adult/Adolescent IM 05/24/2023    Pneumococcal Conjugate 20-Valent (PCV20) 02/24/2023    Pneumococcal Polysaccharide (PPSV23) 10/19/2020        Medications:     Current Outpatient Medications:     citalopram (CeleXA) 20 MG tablet, TAKE 1 TABLET BY MOUTH EVERY DAY, Disp: 90 tablet, Rfl: 3    cyclobenzaprine (FLEXERIL) 10 MG tablet, TAKE 1 TABLET BY MOUTH THREE TIMES A DAY AS NEEDED FOR MUSCLE SPASM, Disp: 90 tablet, Rfl: 0    DULoxetine (Cymbalta) 60 MG capsule, Take 1 capsule by mouth Daily., Disp: 90 capsule, Rfl: 1    esomeprazole (nexIUM) 40 MG capsule, TAKE 1 CAPSULE BY MOUTH TWICE A DAY, Disp: 180 capsule, Rfl: 1    promethazine (PHENERGAN) 25 MG tablet, TAKE 1 TABLET BY MOUTH EVERY 6 HOURS AS NEEDED FOR  "NAUSEA OR VOMITING., Disp: 120 tablet, Rfl: 1    traZODone (DESYREL) 50 MG tablet, TAKE 1 TABLET BY MOUTH EVERYDAY AT BEDTIME, Disp: 90 tablet, Rfl: 0    cetirizine (zyrTEC) 10 MG tablet, Take 1 tablet by mouth Daily. (Patient not taking: Reported on 8/29/2024), Disp: 90 tablet, Rfl: 0    Allergies:   Allergies   Allergen Reactions    Gabapentin Other (See Comments)     Seizure    Morphine And Codeine Itching and Swelling    Melatonin Palpitations     Pt states it makes her heart race    Aspirin GI Intolerance    Ibuprofen GI Intolerance       Family History:   Family History   Problem Relation Age of Onset    Heart attack Mother     Hypertension Mother     COPD Sister     Breast cancer Maternal Aunt     Cancer Maternal Aunt     Pancreatic cancer Cousin     Breast cancer Cousin     Breast cancer Cousin     Colon cancer Neg Hx        Social History:   Social History     Socioeconomic History    Marital status:    Tobacco Use    Smoking status: Former     Current packs/day: 0.00     Average packs/day: 0.3 packs/day for 35.0 years (8.8 ttl pk-yrs)     Types: Cigarettes     Start date: 1/1/1986     Quit date: 1/1/2021     Years since quitting: 3.6     Passive exposure: Current    Smokeless tobacco: Never   Vaping Use    Vaping status: Never Used   Substance and Sexual Activity    Alcohol use: No    Drug use: Yes     Frequency: 6.0 times per week     Types: Marijuana    Sexual activity: Not Currently     Partners: Male     Birth control/protection: Abstinence, Post-menopausal, None, Tubal ligation         Objective     Vital Signs  BP 97/60 (BP Location: Left arm, Patient Position: Sitting, Cuff Size: Adult)   Pulse 88   Temp 96.9 °F (36.1 °C) (Temporal)   Ht 170.2 cm (67\")   Wt 69 kg (152 lb 3.2 oz)   BMI 23.84 kg/m²   Estimated body mass index is 23.84 kg/m² as calculated from the following:    Height as of this encounter: 170.2 cm (67\").    Weight as of this encounter: 69 kg (152 lb 3.2 oz).    BMI is " within normal parameters. No other follow-up for BMI required.      Physical Exam  Vitals reviewed.   Constitutional:       General: She is awake.   Cardiovascular:      Rate and Rhythm: Normal rate.   Pulmonary:      Effort: Pulmonary effort is normal.   Abdominal:      Palpations: Abdomen is soft.      Tenderness: There is no abdominal tenderness.   Skin:     General: Skin is warm and dry.   Neurological:      Mental Status: She is alert.          Assessment and Plan     Assessment & Plan  Gastroparesis  Again, recommend adhering to a gastroparesis diet for management of symptoms.  Dietary guidelines discussed at length.    She has had therapeutic failure to several medications in the past, and is requesting replacement for her gastric stimulator, as she states the battery is dead.  Will make referral to Dr. Haro in [facility].  Discussed that regular marijuana use is likely exacerbating her symptoms.  It is possible that her bouts of nausea and vomiting are elated to cannabis hyperemesis syndrome.  Also recommended that she quit smoking, as this is certainly not helping her symptoms.      Orders Placed This Encounter   Procedures    Ambulatory Referral to General Surgery          Follow Up  As needed    JAM Perez GASTR[facility]0  [facility]  [address] [place] [phone]

## 2024-08-30 NOTE — ASSESSMENT & PLAN NOTE
Again, recommend adhering to a gastroparesis diet for management of symptoms.  Dietary guidelines discussed at length.    She has had therapeutic failure to several medications in the past, and is requesting replacement for her gastric stimulator, as she states the battery is dead.  Will make referral to Dr. Haro in [facility].  Discussed that regular marijuana use is likely exacerbating her symptoms.  It is possible that her bouts of nausea and vomiting are elated to cannabis hyperemesis syndrome.  Also recommended that she quit smoking, as this is certainly not helping her symptoms.

## 2024-09-04 LAB — FUNGUS WND CULT: NORMAL

## 2024-09-11 LAB — FUNGUS WND CULT: NORMAL

## 2024-09-12 RX ORDER — CYCLOBENZAPRINE HCL 10 MG
10 TABLET ORAL 3 TIMES DAILY PRN
Qty: 270 TABLET | Refills: 0 | Status: SHIPPED | OUTPATIENT
Start: 2024-09-12

## 2024-09-13 DIAGNOSIS — R20.2 PARESTHESIA: ICD-10-CM

## 2024-09-16 RX ORDER — DULOXETIN HYDROCHLORIDE 60 MG/1
60 CAPSULE, DELAYED RELEASE ORAL DAILY
Qty: 90 CAPSULE | Refills: 1 | Status: SHIPPED | OUTPATIENT
Start: 2024-09-16

## 2024-09-18 LAB — FUNGUS WND CULT: NORMAL

## 2024-09-25 LAB — FUNGUS WND CULT: NORMAL

## 2024-10-02 LAB — FUNGUS WND CULT: NORMAL

## 2024-10-08 RX ORDER — ESOMEPRAZOLE MAGNESIUM 40 MG/1
40 CAPSULE, DELAYED RELEASE ORAL 2 TIMES DAILY
Qty: 180 CAPSULE | Refills: 1 | Status: SHIPPED | OUTPATIENT
Start: 2024-10-08

## 2024-10-18 ENCOUNTER — TELEPHONE (OUTPATIENT)
Dept: ONCOLOGY | Facility: CLINIC | Age: 59
End: 2024-10-18

## 2024-10-18 ENCOUNTER — OFFICE VISIT (OUTPATIENT)
Dept: ONCOLOGY | Facility: CLINIC | Age: 59
End: 2024-10-18
Payer: MEDICAID

## 2024-10-18 ENCOUNTER — HOSPITAL ENCOUNTER (OUTPATIENT)
Dept: ONCOLOGY | Facility: HOSPITAL | Age: 59
Discharge: HOME | End: 2024-10-18
Payer: MEDICAID

## 2024-10-18 VITALS
HEART RATE: 104 BPM | DIASTOLIC BLOOD PRESSURE: 71 MMHG | HEIGHT: 67 IN | SYSTOLIC BLOOD PRESSURE: 111 MMHG | BODY MASS INDEX: 23.86 KG/M2 | WEIGHT: 152 LBS | RESPIRATION RATE: 20 BRPM | OXYGEN SATURATION: 100 % | TEMPERATURE: 97.7 F

## 2024-10-18 DIAGNOSIS — D50.8 OTHER IRON DEFICIENCY ANEMIA: ICD-10-CM

## 2024-10-18 DIAGNOSIS — D50.8 OTHER IRON DEFICIENCY ANEMIA: Primary | ICD-10-CM

## 2024-10-18 DIAGNOSIS — K31.84 GASTROPARESIS: Primary | ICD-10-CM

## 2024-10-18 DIAGNOSIS — T45.4X5D ADVERSE EFFECT OF IRON, SUBSEQUENT ENCOUNTER: ICD-10-CM

## 2024-10-18 DIAGNOSIS — K90.9 IRON MALABSORPTION: Primary | ICD-10-CM

## 2024-10-18 DIAGNOSIS — Z95.828 PORT-A-CATH IN PLACE: ICD-10-CM

## 2024-10-18 LAB
BASOPHILS # BLD AUTO: 0.02 10*3/MM3 (ref 0–0.2)
BASOPHILS NFR BLD AUTO: 0.2 % (ref 0–1.5)
DEPRECATED RDW RBC AUTO: 42.5 FL (ref 37–54)
EOSINOPHIL # BLD AUTO: 0.07 10*3/MM3 (ref 0–0.4)
EOSINOPHIL NFR BLD AUTO: 0.8 % (ref 0.3–6.2)
ERYTHROCYTE [DISTWIDTH] IN BLOOD BY AUTOMATED COUNT: 13.7 % (ref 12.3–15.4)
FERRITIN SERPL-MCNC: 272.3 NG/ML (ref 13–150)
HCT VFR BLD AUTO: 32.8 % (ref 34–46.6)
HGB BLD-MCNC: 10.9 G/DL (ref 12–15.9)
IMM GRANULOCYTES # BLD AUTO: 0.01 10*3/MM3 (ref 0–0.05)
IMM GRANULOCYTES NFR BLD AUTO: 0.1 % (ref 0–0.5)
IRON 24H UR-MRATE: 27 MCG/DL (ref 37–145)
IRON SATN MFR SERPL: 8 % (ref 20–50)
LYMPHOCYTES # BLD AUTO: 1.33 10*3/MM3 (ref 0.7–3.1)
LYMPHOCYTES NFR BLD AUTO: 15.9 % (ref 19.6–45.3)
MCH RBC QN AUTO: 27.8 PG (ref 26.6–33)
MCHC RBC AUTO-ENTMCNC: 33.2 G/DL (ref 31.5–35.7)
MCV RBC AUTO: 83.7 FL (ref 79–97)
MONOCYTES # BLD AUTO: 0.39 10*3/MM3 (ref 0.1–0.9)
MONOCYTES NFR BLD AUTO: 4.7 % (ref 5–12)
NEUTROPHILS NFR BLD AUTO: 6.54 10*3/MM3 (ref 1.7–7)
NEUTROPHILS NFR BLD AUTO: 78.3 % (ref 42.7–76)
PLATELET # BLD AUTO: 163 10*3/MM3 (ref 140–450)
PMV BLD AUTO: 9.7 FL (ref 6–12)
RBC # BLD AUTO: 3.92 10*6/MM3 (ref 3.77–5.28)
TIBC SERPL-MCNC: 359 MCG/DL (ref 298–536)
TRANSFERRIN SERPL-MCNC: 241 MG/DL (ref 200–360)
WBC NRBC COR # BLD AUTO: 8.36 10*3/MM3 (ref 3.4–10.8)

## 2024-10-18 PROCEDURE — 83540 ASSAY OF IRON: CPT | Performed by: NURSE PRACTITIONER

## 2024-10-18 PROCEDURE — 84466 ASSAY OF TRANSFERRIN: CPT | Performed by: NURSE PRACTITIONER

## 2024-10-18 PROCEDURE — 85025 COMPLETE CBC W/AUTO DIFF WBC: CPT | Performed by: NURSE PRACTITIONER

## 2024-10-18 PROCEDURE — 25010000002 HEPARIN LOCK FLUSH PER 10 UNITS: Performed by: NURSE PRACTITIONER

## 2024-10-18 PROCEDURE — 1159F MED LIST DOCD IN RCRD: CPT | Performed by: NURSE PRACTITIONER

## 2024-10-18 PROCEDURE — 1160F RVW MEDS BY RX/DR IN RCRD: CPT | Performed by: NURSE PRACTITIONER

## 2024-10-18 PROCEDURE — 82728 ASSAY OF FERRITIN: CPT | Performed by: NURSE PRACTITIONER

## 2024-10-18 PROCEDURE — 99213 OFFICE O/P EST LOW 20 MIN: CPT | Performed by: NURSE PRACTITIONER

## 2024-10-18 PROCEDURE — 36591 DRAW BLOOD OFF VENOUS DEVICE: CPT

## 2024-10-18 PROCEDURE — 1126F AMNT PAIN NOTED NONE PRSNT: CPT | Performed by: NURSE PRACTITIONER

## 2024-10-18 RX ORDER — SODIUM CHLORIDE 0.9 % (FLUSH) 0.9 %
20 SYRINGE (ML) INJECTION AS NEEDED
Status: DISCONTINUED | OUTPATIENT
Start: 2024-10-18 | End: 2024-10-19 | Stop reason: HOSPADM

## 2024-10-18 RX ORDER — HEPARIN SODIUM (PORCINE) LOCK FLUSH IV SOLN 100 UNIT/ML 100 UNIT/ML
500 SOLUTION INTRAVENOUS AS NEEDED
Status: DISCONTINUED | OUTPATIENT
Start: 2024-10-18 | End: 2024-10-19 | Stop reason: HOSPADM

## 2024-10-18 RX ORDER — SODIUM CHLORIDE 9 MG/ML
20 INJECTION, SOLUTION INTRAVENOUS ONCE
OUTPATIENT
Start: 2024-11-01

## 2024-10-18 RX ORDER — SODIUM CHLORIDE 9 MG/ML
20 INJECTION, SOLUTION INTRAVENOUS ONCE
OUTPATIENT
Start: 2024-10-25

## 2024-10-18 RX ADMIN — HEPARIN 500 UNITS: 100 SYRINGE at 14:15

## 2024-10-18 NOTE — PROGRESS NOTES
"      PROBLEM LIST:  1. Stage I breast cancer, left upper outer quadrant:   a) Original diagnosis 2010.   b) Left mastectomy with sentinel lymph node sampling and prophylactic right  mastectomy.   c) [id number] stage I.   d) Estrogen receptor and progesterone receptor positive and HER-2 negative  with low risk Oncotype.   e) Completed adjuvant endocrine therapy 2015.   2. Fibromyalgia and chronic pain.   3. Recurring iron deficiency anemia.   4. Gastroparesis.       CHIEF COMPLAINT:    Subjective     HISTORY OF PRESENT ILLNESS:   Mrs. Haro is here for follow up evaluation of history of breast cancer and iron deficiency anemia.  She required Feraheme 2024 and 2024.    She continues to have generalized pain from her fibromyalgia.  She feels like the pain from the fibromyalgia is getting worse over the last few months.    She is dealing with the loss of her daughter who  unexpectedly in July of this year.      Past Medical History, Past Surgical History, Social History, Family History have been reviewed and are without significant changes except as mentioned.    Review of Systems   A comprehensive 14 point review of systems was performed and was negative except as mentioned.    Medications:  The current medication list was reviewed in the EMR    ALLERGIES:    Allergies   Allergen Reactions    Gabapentin Other (See Comments)     Seizure    Morphine And Codeine Itching and Swelling    Melatonin Palpitations     Pt states it makes her heart race    Aspirin GI Intolerance    Ibuprofen GI Intolerance       Objective      /71 Comment: RUE  Pulse 104   Temp 97.7 °F (36.5 °C) (Infrared)   Resp 20   Ht 170.2 cm (67\")   Wt 68.9 kg (152 lb)   SpO2 100% Comment: RA  BMI 23.81 kg/m²    Vitals:    10/18/24 1311   PainSc: 0-No pain                     General: well appearing, in no acute distress   HEENT: sclera anicteric, oropharynx clear  Lymphatics: no cervical, supraclavicular, or axillary " adenopathy  Cardiovascular: regular rate and rhythm, no murmurs  Lungs: clear to auscultation bilaterally  Chest: Bilateral mastectomy incisions well-healed with no masses or skin changes.  Very sensitive to palpation bilateral chest walls and left axilla.  Abdomen: soft, nontender, nondistended.  No palpable masses or organomegaly  Extremeties: no lower extremity edema, cords or calf tenderness  Skin: no rashes, lesions, bruising, or petechiae      RECENT LABS:  Hematology WBC   Date Value Ref Range Status   07/10/2024 3.53 3.40 - 10.80 10*3/mm3 Final     Hemoglobin   Date Value Ref Range Status   07/10/2024 11.5 (L) 12.0 - 15.9 g/dL Final     Hematocrit   Date Value Ref Range Status   07/10/2024 35.1 34.0 - 46.6 % Final     MCV   Date Value Ref Range Status   07/10/2024 83.8 79.0 - 97.0 fL Final     RDW   Date Value Ref Range Status   07/10/2024 13.3 12.3 - 15.4 % Final     MPV   Date Value Ref Range Status   07/10/2024 10.2 6.0 - 12.0 fL Final     Platelets   Date Value Ref Range Status   07/10/2024 96 (L) 140 - 450 10*3/mm3 Final     Immature Grans %   Date Value Ref Range Status   01/04/2024 0.3 0.0 - 0.5 % Final     Neutrophils Absolute   Date Value Ref Range Status   07/08/2024 2.85 1.70 - 7.00 10*3/mm3 Final     Neutrophils, Absolute   Date Value Ref Range Status   01/04/2024 2.16 1.70 - 7.00 10*3/mm3 Final     Lymphocytes, Absolute   Date Value Ref Range Status   01/04/2024 1.23 0.70 - 3.10 10*3/mm3 Final     Monocytes, Absolute   Date Value Ref Range Status   01/04/2024 0.31 0.10 - 0.90 10*3/mm3 Final     Eosinophils Absolute   Date Value Ref Range Status   07/08/2024 0.00 0.00 - 0.40 10*3/mm3 Final     Eosinophils, Absolute   Date Value Ref Range Status   01/04/2024 0.09 0.00 - 0.40 10*3/mm3 Final     Basophils Absolute   Date Value Ref Range Status   07/08/2024 0.05 0.00 - 0.20 10*3/mm3 Final     Basophils, Absolute   Date Value Ref Range Status   01/04/2024 0.02 0.00 - 0.20 10*3/mm3 Final     Immature  Grans, Absolute   Date Value Ref Range Status   01/04/2024 0.01 0.00 - 0.05 10*3/mm3 Final     nRBC   Date Value Ref Range Status   07/08/2024 0.0 0.0 - 0.2 /100 WBC Final   04/03/2023 0.0 0.0 - 0.2 /100 WBC Final       Glucose   Date Value Ref Range Status   07/18/2024 120 (H) 65 - 99 mg/dL Final     Sodium   Date Value Ref Range Status   07/18/2024 139 136 - 145 mmol/L Final     Potassium   Date Value Ref Range Status   07/18/2024 5.0 3.5 - 5.2 mmol/L Final     CO2   Date Value Ref Range Status   07/18/2024 26.6 22.0 - 29.0 mmol/L Final     Chloride   Date Value Ref Range Status   07/18/2024 104 98 - 107 mmol/L Final     Anion Gap   Date Value Ref Range Status   07/18/2024 8.4 5.0 - 15.0 mmol/L Final     Creatinine   Date Value Ref Range Status   07/18/2024 1.36 (H) 0.57 - 1.00 mg/dL Final     BUN   Date Value Ref Range Status   07/18/2024 11 6 - 20 mg/dL Final     BUN/Creatinine Ratio   Date Value Ref Range Status   07/18/2024 8.1 7.0 - 25.0 Final     Calcium   Date Value Ref Range Status   07/18/2024 9.5 8.6 - 10.5 mg/dL Final     Alkaline Phosphatase   Date Value Ref Range Status   07/08/2024 151 (H) 39 - 117 U/L Final     Total Protein   Date Value Ref Range Status   07/08/2024 8.7 (H) 6.0 - 8.5 g/dL Final     ALT (SGPT)   Date Value Ref Range Status   07/08/2024 18 1 - 33 U/L Final     AST (SGOT)   Date Value Ref Range Status   07/08/2024 22 1 - 32 U/L Final     Total Bilirubin   Date Value Ref Range Status   07/08/2024 0.4 0.0 - 1.2 mg/dL Final     Albumin   Date Value Ref Range Status   08/21/2024 3.5 (L) 3.8 - 4.9 g/dL Final   07/08/2024 4.6 3.5 - 5.2 g/dL Final     Globulin   Date Value Ref Range Status   07/08/2024 4.1 gm/dL Final     Comment:     Calculated Result     A/G Ratio   Date Value Ref Range Status   04/09/2024 1.3 0.7 - 1.7 Final       LDH   Date Value Ref Range Status   06/19/2015 174 120 - 246 Units/L Final          Assessment & Plan   IMPRESSION:   1. Anemia.  She has had recurring iron  deficiency anemia. She has responded to parenteral iron in the past.  Last dose of IV Feraheme given 2/8/2024.  Will check labs today and contact her if she needs IV Feraheme again.  2. History of breast cancer. She has done well after adjuvant endocrine therapy.  Clinically she is doing well with no evidence of disease recurrence at this time.  3. Fibromyalgia.  Continue follow-up with primary care provider.        PLAN:   1.   Labs today including CBC, iron profile and ferritin levels.  2.  Follow-up in 1 year.                  Michelle Drake APRDALLIN  [facility]    10/18/2024          CC:

## 2024-10-18 NOTE — TELEPHONE ENCOUNTER
Called and informed patient that she needs more IV iron, placed orders for feraheme. She verbalized understanding.

## 2024-10-25 ENCOUNTER — HOSPITAL ENCOUNTER (OUTPATIENT)
Dept: ONCOLOGY | Facility: HOSPITAL | Age: 59
Discharge: HOME | End: 2024-10-25
Payer: MEDICAID

## 2024-10-25 VITALS
RESPIRATION RATE: 16 BRPM | SYSTOLIC BLOOD PRESSURE: 113 MMHG | BODY MASS INDEX: 24.01 KG/M2 | TEMPERATURE: 97.3 F | HEIGHT: 67 IN | HEART RATE: 95 BPM | WEIGHT: 153 LBS | DIASTOLIC BLOOD PRESSURE: 74 MMHG

## 2024-10-25 DIAGNOSIS — Z95.828 PORT-A-CATH IN PLACE: ICD-10-CM

## 2024-10-25 DIAGNOSIS — T45.4X5D ADVERSE EFFECT OF IRON, SUBSEQUENT ENCOUNTER: Primary | ICD-10-CM

## 2024-10-25 DIAGNOSIS — D50.8 OTHER IRON DEFICIENCY ANEMIA: ICD-10-CM

## 2024-10-25 DIAGNOSIS — K90.9 IRON MALABSORPTION: ICD-10-CM

## 2024-10-25 PROCEDURE — 25010000002 HEPARIN LOCK FLUSH PER 10 UNITS: Performed by: NURSE PRACTITIONER

## 2024-10-25 PROCEDURE — 96374 THER/PROPH/DIAG INJ IV PUSH: CPT

## 2024-10-25 PROCEDURE — 25010000002 FERUMOXYTOL 510 MG/17ML SOLUTION 17 ML VIAL: Performed by: NURSE PRACTITIONER

## 2024-10-25 RX ORDER — HEPARIN SODIUM (PORCINE) LOCK FLUSH IV SOLN 100 UNIT/ML 100 UNIT/ML
500 SOLUTION INTRAVENOUS AS NEEDED
Status: DISCONTINUED | OUTPATIENT
Start: 2024-10-25 | End: 2024-10-26 | Stop reason: HOSPADM

## 2024-10-25 RX ORDER — SODIUM CHLORIDE 9 MG/ML
20 INJECTION, SOLUTION INTRAVENOUS ONCE
Status: DISCONTINUED | OUTPATIENT
Start: 2024-10-25 | End: 2024-10-26 | Stop reason: HOSPADM

## 2024-10-25 RX ORDER — HEPARIN SODIUM (PORCINE) LOCK FLUSH IV SOLN 100 UNIT/ML 100 UNIT/ML
500 SOLUTION INTRAVENOUS AS NEEDED
OUTPATIENT
Start: 2024-10-25

## 2024-10-25 RX ORDER — SODIUM CHLORIDE 0.9 % (FLUSH) 0.9 %
20 SYRINGE (ML) INJECTION AS NEEDED
OUTPATIENT
Start: 2024-10-25

## 2024-10-25 RX ORDER — SODIUM CHLORIDE 0.9 % (FLUSH) 0.9 %
10 SYRINGE (ML) INJECTION AS NEEDED
OUTPATIENT
Start: 2024-10-25

## 2024-10-25 RX ADMIN — HEPARIN 500 UNITS: 100 SYRINGE at 14:50

## 2024-10-25 RX ADMIN — FERUMOXYTOL 510 MG: 510 INJECTION INTRAVENOUS at 14:05

## 2024-11-01 ENCOUNTER — HOSPITAL ENCOUNTER (OUTPATIENT)
Dept: ONCOLOGY | Facility: HOSPITAL | Age: 59
Discharge: HOME | End: 2024-11-01
Payer: MEDICAID

## 2024-11-01 VITALS
WEIGHT: 152 LBS | BODY MASS INDEX: 23.86 KG/M2 | HEIGHT: 67 IN | HEART RATE: 76 BPM | RESPIRATION RATE: 18 BRPM | TEMPERATURE: 97.7 F | SYSTOLIC BLOOD PRESSURE: 106 MMHG | DIASTOLIC BLOOD PRESSURE: 74 MMHG

## 2024-11-01 DIAGNOSIS — Z95.828 PORT-A-CATH IN PLACE: ICD-10-CM

## 2024-11-01 DIAGNOSIS — T45.4X5D ADVERSE EFFECT OF IRON, SUBSEQUENT ENCOUNTER: Primary | ICD-10-CM

## 2024-11-01 DIAGNOSIS — D50.8 OTHER IRON DEFICIENCY ANEMIA: ICD-10-CM

## 2024-11-01 DIAGNOSIS — K90.9 IRON MALABSORPTION: ICD-10-CM

## 2024-11-01 PROCEDURE — 25010000002 HEPARIN LOCK FLUSH PER 10 UNITS: Performed by: NURSE PRACTITIONER

## 2024-11-01 PROCEDURE — 96374 THER/PROPH/DIAG INJ IV PUSH: CPT

## 2024-11-01 PROCEDURE — 25010000002 FERUMOXYTOL 510 MG/17ML SOLUTION 17 ML VIAL: Performed by: NURSE PRACTITIONER

## 2024-11-01 RX ORDER — SODIUM CHLORIDE 0.9 % (FLUSH) 0.9 %
10 SYRINGE (ML) INJECTION AS NEEDED
OUTPATIENT
Start: 2024-11-01

## 2024-11-01 RX ORDER — HEPARIN SODIUM (PORCINE) LOCK FLUSH IV SOLN 100 UNIT/ML 100 UNIT/ML
500 SOLUTION INTRAVENOUS AS NEEDED
OUTPATIENT
Start: 2024-11-01

## 2024-11-01 RX ORDER — SODIUM CHLORIDE 0.9 % (FLUSH) 0.9 %
20 SYRINGE (ML) INJECTION AS NEEDED
OUTPATIENT
Start: 2024-11-01

## 2024-11-01 RX ORDER — HEPARIN SODIUM (PORCINE) LOCK FLUSH IV SOLN 100 UNIT/ML 100 UNIT/ML
500 SOLUTION INTRAVENOUS AS NEEDED
Status: DISCONTINUED | OUTPATIENT
Start: 2024-11-01 | End: 2024-11-02 | Stop reason: HOSPADM

## 2024-11-01 RX ADMIN — FERUMOXYTOL 510 MG: 510 INJECTION INTRAVENOUS at 13:00

## 2024-11-01 RX ADMIN — HEPARIN 500 UNITS: 100 SYRINGE at 14:12

## 2024-11-06 ENCOUNTER — OFFICE VISIT (OUTPATIENT)
Dept: FAMILY MEDICINE CLINIC | Facility: CLINIC | Age: 59
End: 2024-11-06
Payer: MEDICAID

## 2024-11-06 ENCOUNTER — HOSPITAL ENCOUNTER (OUTPATIENT)
Dept: GENERAL RADIOLOGY | Facility: HOSPITAL | Age: 59
Discharge: HOME | End: 2024-11-06
Admitting: INTERNAL MEDICINE
Payer: MEDICAID

## 2024-11-06 VITALS
DIASTOLIC BLOOD PRESSURE: 78 MMHG | OXYGEN SATURATION: 99 % | HEART RATE: 105 BPM | SYSTOLIC BLOOD PRESSURE: 112 MMHG | HEIGHT: 67 IN | WEIGHT: 151.2 LBS | BODY MASS INDEX: 23.73 KG/M2

## 2024-11-06 DIAGNOSIS — M25.511 ACUTE PAIN OF RIGHT SHOULDER: ICD-10-CM

## 2024-11-06 DIAGNOSIS — Z23 IMMUNIZATION DUE: Primary | ICD-10-CM

## 2024-11-06 DIAGNOSIS — F41.9 ANXIETY AND DEPRESSION: ICD-10-CM

## 2024-11-06 DIAGNOSIS — F32.A ANXIETY AND DEPRESSION: ICD-10-CM

## 2024-11-06 DIAGNOSIS — R73.03 PRE-DIABETES: ICD-10-CM

## 2024-11-06 PROBLEM — F51.01 PRIMARY INSOMNIA: Status: ACTIVE | Noted: 2024-11-06

## 2024-11-06 LAB
EXPIRATION DATE: ABNORMAL
HBA1C MFR BLD: 5.9 % (ref 4.5–5.7)
Lab: ABNORMAL

## 2024-11-06 PROCEDURE — 99214 OFFICE O/P EST MOD 30 MIN: CPT | Performed by: INTERNAL MEDICINE

## 2024-11-06 PROCEDURE — 73030 X-RAY EXAM OF SHOULDER: CPT

## 2024-11-06 PROCEDURE — 90471 IMMUNIZATION ADMIN: CPT | Performed by: INTERNAL MEDICINE

## 2024-11-06 PROCEDURE — 90656 IIV3 VACC NO PRSV 0.5 ML IM: CPT | Performed by: INTERNAL MEDICINE

## 2024-11-06 PROCEDURE — 1125F AMNT PAIN NOTED PAIN PRSNT: CPT | Performed by: INTERNAL MEDICINE

## 2024-11-06 PROCEDURE — 83036 HEMOGLOBIN GLYCOSYLATED A1C: CPT | Performed by: INTERNAL MEDICINE

## 2024-11-06 PROCEDURE — 3044F HG A1C LEVEL LT 7.0%: CPT | Performed by: INTERNAL MEDICINE

## 2024-11-06 RX ORDER — TRAZODONE HYDROCHLORIDE 150 MG/1
150 TABLET ORAL NIGHTLY
Qty: 90 TABLET | Refills: 2 | Status: SHIPPED | OUTPATIENT
Start: 2024-11-06

## 2024-11-06 NOTE — PROGRESS NOTES
Geneva Haro  1965  [id number]  Patient Care Team:  Cesar Gao MD as PCP - General (Internal Medicine)  Darion Figueroa MD as Consulting Physician (Gastroenterology)  Jazlyn Maguire APRN as Nurse Practitioner (Nurse Practitioner)    Geneva Haro is a 59 y.o. female here today for follow up.     This patient is accompanied by their self who contributes to the history of their care.    Chief Complaint:    Chief Complaint   Patient presents with    Insomnia    Shoulder Pain     Rt shoulder. Fell about two months ago.        History of Present Illness:  I have reviewed and/or updated the patient's past medical, past surgical, family, social history, problem list and allergies as appropriate.   Ms. Haro is here complaining of worsening insomnia.  She has been taking trazodone 50 mg nightly for years.  It is unfortunately not working anymore. Stopped working about 8 months ago. Previous would take and be aslepe within 3o min. Now taking hours. She then has early am awakening and then cannot go back to sleep. She plays solitaire at night. Sh ehas not tried other agents for sleep.     She also sustained trauma falling injuring her right shoulder about 2 months ago.  She did not go to the emergency room, urgent treatment center or here for evaluation.  Pain is posterior scapula.  Pain with all range of motion.  (She does have underlying fibromyalgia and this is worsened she has not been sleeping) she has shoulder pain superior aspect radiating to the posterior aspect with internal/external rotation crossing over as well as elevation.  She continues to watch carbs given her gastroparesis and history of prediabetes denies any polyuria dips.  Review of Systems   Constitutional:  Positive for fever.   Gastrointestinal: Negative.    Musculoskeletal:  Positive for arthralgias and myalgias.   Neurological: Negative.    Psychiatric/Behavioral:  Positive for sleep disturbance.   "      Vitals:    11/06/24 1347   BP: 112/78   Pulse: 105   SpO2: 99%   Weight: 68.6 kg (151 lb 3.2 oz)   Height: 170.2 cm (67.01\")   PainSc:   6   PainLoc: Shoulder     Body mass index is 23.68 kg/m².    Physical Exam  Vitals and nursing note reviewed.   Constitutional:       General: She is not in acute distress.     Appearance: She is well-developed. She is not diaphoretic.   HENT:      Head: Normocephalic and atraumatic.      Right Ear: External ear normal.      Left Ear: External ear normal.      Mouth/Throat:      Pharynx: No oropharyngeal exudate.   Eyes:      General: No scleral icterus.        Right eye: No discharge.      Conjunctiva/sclera: Conjunctivae normal.   Neck:      Thyroid: No thyromegaly.      Vascular: No JVD.      Trachea: No tracheal deviation.   Cardiovascular:      Rate and Rhythm: Normal rate and regular rhythm.      Heart sounds: Normal heart sounds.      Comments: PMI nondisplaced  Pulmonary:      Effort: Pulmonary effort is normal.      Breath sounds: Normal breath sounds. No wheezing or rales.   Abdominal:      General: Bowel sounds are normal.      Palpations: Abdomen is soft.      Tenderness: There is no abdominal tenderness. There is no guarding or rebound.   Musculoskeletal:      Cervical back: Normal range of motion and neck supple.      Comments: She has marked limited range of motion with external as well as internal rotation and abduction passive and active.  Arm elevation passive and active is very painful as well.  I do not detect any crepitus.  There is some AC point tenderness.   Lymphadenopathy:      Cervical: No cervical adenopathy.   Skin:     General: Skin is warm and dry.      Capillary Refill: Capillary refill takes less than 2 seconds.      Coloration: Skin is not pale.      Findings: No rash.   Neurological:      Mental Status: She is alert and oriented to person, place, and time.      Motor: No abnormal muscle tone.      Coordination: Coordination normal. "   Psychiatric:         Judgment: Judgment normal.         Procedures    Results Review:    I reviewed the patient's new clinical results.  Narrative & Impression   Right Shoulder X-Ray     Indication: Pain     Study:  AP, axillary lateral, and scapular Y views     Comparison: None     Findings:  No acute fractures are visualized  No bony lesions are visualized.  Normal soft tissue appearance  AC joint: Moderate hypertrophic joint space narrowing  Glenohumeral joint: Minimal joint space narrowing.  There is an early osteophyte off the inferior glenoid.  Acromion type: 2        Impression:    No acute bony abnormalities noted  Type II acromion  Moderate AC joint arthritis  Small osteophyte inferior glenoid     Assessment/Plan: C stable at 5.9    Problem List Items Addressed This Visit       Anxiety and depression    Relevant Medications    citalopram (CeleXA) 20 MG tablet    DULoxetine (CYMBALTA) 60 MG capsule    traZODone (DESYREL) 150 MG tablet    Acute pain of right shoulder    Relevant Orders    XR Shoulder 2+ View Right    Ambulatory Referral to Orthopedic Surgery     Other Visit Diagnoses       Immunization due    -  Primary    Relevant Orders    Fluzone >6mos (Completed)    Pre-diabetes        Relevant Orders    POC Glycosylated Hemoglobin (Hb A1C) (Completed)        Will try increasing her Cymbalta to 150 mg at bedtime.  If this does not work we would add Ambien.  Patient is in agreement.  Tylenol for pain avoid NSAIDs secondary to gastroparesis.    Plan of care reviewed with patient at the conclusion of today's visit. Education was provided regarding diagnosis and management.  Patient verbalizes understanding of and agreement with management plan.    No follow-ups on file.    Cesar Gao MD      Please note than portions of this note were completed [facility] a Voice Recognition Program

## 2024-11-08 ENCOUNTER — TELEPHONE (OUTPATIENT)
Dept: FAMILY MEDICINE CLINIC | Facility: CLINIC | Age: 59
End: 2024-11-08
Payer: MEDICAID

## 2024-11-08 NOTE — TELEPHONE ENCOUNTER
----- Message from Cesar Gao sent at 11/7/2024 10:48 PM EST -----    Mild shoulder arthritis.  Await ortho    Unable to reach patient.    Please relay.

## 2024-11-11 NOTE — TELEPHONE ENCOUNTER
Name: Geneva Haro      Relationship: Self      Best Callback Number: [phone]      [facility] PROVIDED THE RELAY MESSAGE FROM THE OFFICE    ----- Message from Cesar Gao sent at 11/7/2024 10:48 PM EST -----     Mild shoulder arthritis.  Await ortho     Unable to reach patient.     Please relay.                PATIENT: VOICED UNDERSTANDING AND HAS NO FURTHER QUESTIONS AT THIS TIME    ADDITIONAL INFORMATION:

## 2024-11-22 RX ORDER — TRAZODONE HYDROCHLORIDE 50 MG/1
50 TABLET, FILM COATED ORAL
Qty: 90 TABLET | Refills: 0 | OUTPATIENT
Start: 2024-11-22

## 2024-11-27 ENCOUNTER — HOSPITAL ENCOUNTER (OUTPATIENT)
Dept: ONCOLOGY | Facility: HOSPITAL | Age: 59
Discharge: HOME | End: 2024-11-27
Admitting: NURSE PRACTITIONER
Payer: MEDICAID

## 2024-11-27 VITALS
DIASTOLIC BLOOD PRESSURE: 77 MMHG | BODY MASS INDEX: 23.23 KG/M2 | HEIGHT: 67 IN | WEIGHT: 148 LBS | TEMPERATURE: 97.3 F | SYSTOLIC BLOOD PRESSURE: 113 MMHG | HEART RATE: 87 BPM | RESPIRATION RATE: 16 BRPM

## 2024-11-27 DIAGNOSIS — Z95.828 PORT-A-CATH IN PLACE: Primary | ICD-10-CM

## 2024-11-27 PROCEDURE — 25010000002 HEPARIN LOCK FLUSH PER 10 UNITS: Performed by: NURSE PRACTITIONER

## 2024-11-27 PROCEDURE — 96523 IRRIG DRUG DELIVERY DEVICE: CPT

## 2024-11-27 RX ORDER — SODIUM CHLORIDE 0.9 % (FLUSH) 0.9 %
10 SYRINGE (ML) INJECTION AS NEEDED
OUTPATIENT
Start: 2024-11-27

## 2024-11-27 RX ORDER — HEPARIN SODIUM (PORCINE) LOCK FLUSH IV SOLN 100 UNIT/ML 100 UNIT/ML
500 SOLUTION INTRAVENOUS AS NEEDED
Status: DISCONTINUED | OUTPATIENT
Start: 2024-11-27 | End: 2024-11-28 | Stop reason: HOSPADM

## 2024-11-27 RX ORDER — SODIUM CHLORIDE 0.9 % (FLUSH) 0.9 %
20 SYRINGE (ML) INJECTION AS NEEDED
OUTPATIENT
Start: 2024-11-27

## 2024-11-27 RX ORDER — HEPARIN SODIUM (PORCINE) LOCK FLUSH IV SOLN 100 UNIT/ML 100 UNIT/ML
500 SOLUTION INTRAVENOUS AS NEEDED
OUTPATIENT
Start: 2024-11-27

## 2024-11-27 RX ADMIN — HEPARIN 500 UNITS: 100 SYRINGE at 13:55

## 2024-12-04 RX ORDER — CETIRIZINE HYDROCHLORIDE 10 MG/1
10 TABLET ORAL DAILY
Qty: 90 TABLET | Refills: 0 | Status: SHIPPED | OUTPATIENT
Start: 2024-12-04

## 2024-12-04 NOTE — TELEPHONE ENCOUNTER
Rx Refill Note  Requested Prescriptions     Pending Prescriptions Disp Refills    cetirizine (zyrTEC) 10 MG tablet [Pharmacy Med Name: CETIRIZINE HCL 10 MG TABLET] 90 tablet 0     Sig: TAKE 1 TABLET BY MOUTH EVERY DAY      Last office visit with prescribing clinician: 11/6/2024   Last telemedicine visit with prescribing clinician: Visit date not found   Next office visit with prescribing clinician: Visit date not found                         Would you like a call back once the refill request has been completed: [] Yes [] No    If the office needs to give you a call back, can they leave a voicemail: [] Yes [] No    Julia Toledo MA  12/04/24, 10:18 EST

## 2024-12-06 RX ORDER — CETIRIZINE HYDROCHLORIDE 10 MG/1
10 TABLET ORAL DAILY
Qty: 90 TABLET | Refills: 0 | OUTPATIENT
Start: 2024-12-06

## 2024-12-19 RX ORDER — CYCLOBENZAPRINE HCL 10 MG
10 TABLET ORAL 3 TIMES DAILY PRN
Qty: 270 TABLET | Refills: 0 | Status: SHIPPED | OUTPATIENT
Start: 2024-12-19

## 2024-12-19 NOTE — TELEPHONE ENCOUNTER
Rx Refill Note  Requested Prescriptions     Pending Prescriptions Disp Refills    cyclobenzaprine (FLEXERIL) 10 MG tablet [Pharmacy Med Name: CYCLOBENZAPRINE 10 MG TABLET] 270 tablet 0     Sig: TAKE 1 TABLET BY MOUTH THREE TIMES A DAY AS NEEDED FOR MUSCLE SPASM      Last office visit with prescribing clinician: 11/6/2024   Last telemedicine visit with prescribing clinician: Visit date not found   Next office visit with prescribing clinician: Visit date not found                         Would you like a call back once the refill request has been completed: [] Yes [] No    If the office needs to give you a call back, can they leave a voicemail: [] Yes [] No    Julia Toledo MA  12/19/24, 09:57 EST

## 2025-01-02 RX ORDER — TRAZODONE HYDROCHLORIDE 50 MG/1
50 TABLET, FILM COATED ORAL
Qty: 90 TABLET | Refills: 0 | OUTPATIENT
Start: 2025-01-02

## 2025-01-02 NOTE — TELEPHONE ENCOUNTER
Rx Refill Note  Requested Prescriptions     Pending Prescriptions Disp Refills    traZODone (DESYREL) 50 MG tablet [Pharmacy Med Name: TRAZODONE 50 MG TABLET] 90 tablet 0     Sig: TAKE 1 TABLET BY MOUTH EVERYDAY AT BEDTIME      Last office visit with prescribing clinician: 11/6/2024   Last telemedicine visit with prescribing clinician: Visit date not found   Next office visit with prescribing clinician: Visit date not found                         Would you like a call back once the refill request has been completed: [] Yes [] No    If the office needs to give you a call back, can they leave a voicemail: [] Yes [] No    Julia Toledo MA  01/02/25, 08:35 EST

## 2025-01-04 DIAGNOSIS — R20.2 PARESTHESIA: ICD-10-CM

## 2025-01-06 RX ORDER — DULOXETIN HYDROCHLORIDE 60 MG/1
60 CAPSULE, DELAYED RELEASE ORAL DAILY
Qty: 30 CAPSULE | Refills: 0 | Status: SHIPPED | OUTPATIENT
Start: 2025-01-06

## 2025-01-06 NOTE — TELEPHONE ENCOUNTER
Rx Refill Note  Requested Prescriptions     Pending Prescriptions Disp Refills    DULoxetine (CYMBALTA) 60 MG capsule [Pharmacy Med Name: DULOXETINE HCL DR 60 MG CAP] 90 capsule 1     Sig: TAKE 1 CAPSULE BY MOUTH EVERY DAY      Last filled: 9/16/24 90 with 1 refill.  Last office visit with prescribing clinician: 7/31/2024      Next office visit with prescribing clinician: Visit date not found     Sent in 30 with 0 refills.  Patient needs appointment for further refills.    Tiffani Johns MA  01/06/25, 10:59 EST

## 2025-01-07 RX ORDER — CYCLOBENZAPRINE HCL 10 MG
10 TABLET ORAL 3 TIMES DAILY PRN
Qty: 270 TABLET | Refills: 0 | Status: SHIPPED | OUTPATIENT
Start: 2025-01-07

## 2025-01-07 NOTE — TELEPHONE ENCOUNTER
Rx Refill Note  Requested Prescriptions     Pending Prescriptions Disp Refills    cyclobenzaprine (FLEXERIL) 10 MG tablet [Pharmacy Med Name: CYCLOBENZAPRINE 10 MG TABLET] 270 tablet 0     Sig: TAKE 1 TABLET BY MOUTH THREE TIMES A DAY AS NEEDED FOR MUSCLE SPASM      Last office visit with prescribing clinician: 11/6/2024   Last telemedicine visit with prescribing clinician: Visit date not found   Next office visit with prescribing clinician: Visit date not found                         Would you like a call back once the refill request has been completed: [] Yes [] No    If the office needs to give you a call back, can they leave a voicemail: [] Yes [] No    Julia Toledo MA  01/07/25, 12:43 EST

## 2025-02-10 RX ORDER — ESOMEPRAZOLE MAGNESIUM 40 MG/1
40 CAPSULE, DELAYED RELEASE ORAL 2 TIMES DAILY
Qty: 180 CAPSULE | Refills: 1 | Status: SHIPPED | OUTPATIENT
Start: 2025-02-10

## 2025-02-18 ENCOUNTER — TELEPHONE (OUTPATIENT)
Dept: FAMILY MEDICINE CLINIC | Facility: CLINIC | Age: 60
End: 2025-02-18

## 2025-02-18 RX ORDER — TRAZODONE HYDROCHLORIDE 50 MG/1
50 TABLET, FILM COATED ORAL
Qty: 90 TABLET | Refills: 0 | OUTPATIENT
Start: 2025-02-18

## 2025-02-18 NOTE — TELEPHONE ENCOUNTER
Caller: Geneva Haro    Relationship: Self    Best call back number: [phone]     What medication are you requesting: PATIENT STATES HER PHARMACY STATED THAT HER INSURANCE WILL NOT COVER     esomeprazole (nexIUM) 40 MG capsule   PLEASE CALL IN A DIFFERENT MEDICATION THAT THEY WILL COVER.    If a prescription is needed, what is your preferred pharmacy and phone number: [facility]Y #[id number] - [place], [place] - [address] - [phone] [facility] [phone] FX     Additional notes:PLEASE CALL IF ANY QUESTIONS OR WHEN COMPLETED.

## 2025-02-19 DIAGNOSIS — K31.84 GASTROPARESIS: Primary | ICD-10-CM

## 2025-02-19 RX ORDER — PANTOPRAZOLE SODIUM 40 MG/1
40 TABLET, DELAYED RELEASE ORAL 2 TIMES DAILY
Qty: 180 TABLET | Refills: 3 | Status: SHIPPED | OUTPATIENT
Start: 2025-02-19

## 2025-06-01 DIAGNOSIS — R20.2 PARESTHESIA: ICD-10-CM

## 2025-06-02 RX ORDER — DULOXETIN HYDROCHLORIDE 60 MG/1
60 CAPSULE, DELAYED RELEASE ORAL DAILY
Qty: 30 CAPSULE | Refills: 0 | OUTPATIENT
Start: 2025-06-02

## 2025-06-02 NOTE — TELEPHONE ENCOUNTER
Rx Refill Note  Requested Prescriptions     Pending Prescriptions Disp Refills    DULoxetine (CYMBALTA) 60 MG capsule [Pharmacy Med Name: DULOXETINE HCL DR 60 MG CAP] 30 capsule 0     Sig: TAKE 1 CAPSULE BY MOUTH EVERY DAY      Last filled: 1/6/25 30 with 0 refills.   Last office visit with prescribing clinician: 7/31/2024      Next office visit with prescribing clinician: Visit date not found     Patient needs appointment for further refills.    Tiffani Johns MA  06/02/25, 08:10 EDT

## 2025-07-16 ENCOUNTER — OFFICE VISIT (OUTPATIENT)
Dept: FAMILY MEDICINE CLINIC | Facility: CLINIC | Age: 60
End: 2025-07-16
Payer: MEDICARE

## 2025-07-16 VITALS
DIASTOLIC BLOOD PRESSURE: 80 MMHG | WEIGHT: 178 LBS | OXYGEN SATURATION: 98 % | HEART RATE: 95 BPM | BODY MASS INDEX: 27.94 KG/M2 | SYSTOLIC BLOOD PRESSURE: 118 MMHG | HEIGHT: 67 IN

## 2025-07-16 DIAGNOSIS — M79.7 FIBROMYALGIA: Chronic | ICD-10-CM

## 2025-07-16 DIAGNOSIS — K31.84 GASTROPARESIS: ICD-10-CM

## 2025-07-16 DIAGNOSIS — R20.2 PARESTHESIA: ICD-10-CM

## 2025-07-16 DIAGNOSIS — M70.21 OLECRANON BURSITIS, RIGHT ELBOW: Primary | ICD-10-CM

## 2025-07-16 PROCEDURE — 1125F AMNT PAIN NOTED PAIN PRSNT: CPT | Performed by: INTERNAL MEDICINE

## 2025-07-16 PROCEDURE — 99214 OFFICE O/P EST MOD 30 MIN: CPT | Performed by: INTERNAL MEDICINE

## 2025-07-16 PROCEDURE — G2211 COMPLEX E/M VISIT ADD ON: HCPCS | Performed by: INTERNAL MEDICINE

## 2025-07-16 RX ORDER — ESOMEPRAZOLE MAGNESIUM 40 MG/1
40 CAPSULE, DELAYED RELEASE ORAL 2 TIMES DAILY
Qty: 180 CAPSULE | Refills: 2 | Status: SHIPPED | OUTPATIENT
Start: 2025-07-16

## 2025-07-16 RX ORDER — CETIRIZINE HYDROCHLORIDE 10 MG/1
10 TABLET ORAL DAILY
Qty: 90 TABLET | Refills: 0 | Status: SHIPPED | OUTPATIENT
Start: 2025-07-16

## 2025-07-16 RX ORDER — CITALOPRAM HYDROBROMIDE 20 MG/1
20 TABLET ORAL DAILY
Qty: 90 TABLET | Refills: 3 | Status: SHIPPED | OUTPATIENT
Start: 2025-07-16

## 2025-07-16 RX ORDER — METHYLPREDNISOLONE 4 MG/1
TABLET ORAL
Qty: 1 EACH | Refills: 0 | Status: SHIPPED | OUTPATIENT
Start: 2025-07-16

## 2025-07-16 RX ORDER — DULOXETIN HYDROCHLORIDE 60 MG/1
60 CAPSULE, DELAYED RELEASE ORAL DAILY
Qty: 30 CAPSULE | Refills: 0 | Status: SHIPPED | OUTPATIENT
Start: 2025-07-16

## 2025-07-16 NOTE — PROGRESS NOTES
"Geneva Haro  1965  [id number]  Patient Care Team:  Cesar Gao MD as PCP - General (Internal Medicine)  Darion Figueroa MD as Consulting Physician (Gastroenterology)  Jazlyn Maguire APRN as Nurse Practitioner (Nurse Practitioner)    Geneva Haro is a 60 y.o. female here today for follow up.     This patient is accompanied by their self who contributes to the history of their care.    Chief Complaint:    Chief Complaint   Patient presents with    Mass     On Rt elbow        History of Present Illness:  I have reviewed and/or updated the patient's past medical, past surgical, family, social history, problem list and allergies as appropriate.     She reports 2 weeks painnful right elbow- She thinks she hit it on a corner.  She does rest her elbow on hard surfaces frequently. It does feel warm and hot at time. Painful to touch. Size has slightly smaller. She denies fevers or chills. There has been no drainage.  Send prandial fullness and nauseousness.  Also has seen Dr. Nuñez.  Insurance would not approve Nexium twice daily which helped her reflux.  We have been trying Protonix however she has had significant dyspepsia bloating and reflux with this.  She would still like to see the motility clinic.  It is believed that her generator is felt to be follow-up.    She also needs refill on her Cymbalta as well as citalopram.  Cymbalta is being used fsuccessfully for fibromyalgia.    Review of Systems   Constitutional:  Positive for fatigue.   Eyes: Negative.    Respiratory: Negative.     Gastrointestinal:  Positive for abdominal distention, nausea and GERD.   Endocrine: Negative.    Genitourinary: Negative.    Musculoskeletal:  Positive for arthralgias and joint swelling.       Vitals:    07/16/25 1326   BP: 118/80   Pulse: 95   SpO2: 98%   Weight: 80.7 kg (178 lb)   Height: 170.2 cm (67.01\")     Body mass index is 27.87 kg/m².    Physical Exam  Vitals reviewed.   Constitutional:  "      Appearance: She is well-developed.   HENT:      Head: Normocephalic and atraumatic.   Eyes:      Conjunctiva/sclera: Conjunctivae normal.   Neck:      Vascular: No JVD.   Cardiovascular:      Rate and Rhythm: Normal rate and regular rhythm.      Heart sounds: Normal heart sounds. No murmur heard.     No friction rub. No gallop.   Pulmonary:      Effort: Pulmonary effort is normal. No respiratory distress.      Breath sounds: Normal breath sounds. No wheezing or rales.   Chest:      Chest wall: No tenderness.   Abdominal:      General: There is distension.      Palpations: Abdomen is soft.      Tenderness: There is no abdominal tenderness.   Musculoskeletal:         General: Normal range of motion.      Comments: Range of motion is full.  There is tense the olecranon.  It is minimally warm.  No erythema.  Slightly tender to palpate however   Skin:     General: Skin is warm and dry.   Neurological:      Mental Status: She is alert and oriented to person, place, and time.   Psychiatric:         Mood and Affect: Mood normal.         Behavior: Behavior normal.         Procedures    Results Review:    I reviewed the patient's new clinical results.    Assessment/Plan:    Problem List Items Addressed This Visit       Fibromyalgia (Chronic)    Overview   And chronic pain         Current Assessment & Plan   Stable on current dose of Cymbalta trazodone and Celexa. She will continue her efforts in physical activity.          Gastroparesis (Chronic)    Current Assessment & Plan   [facility] ( Failed stimulatir generator.          Relevant Medications    promethazine (PHENERGAN) 25 MG tablet    esomeprazole (nexIUM) 40 MG capsule    Other Relevant Orders    Ambulatory Referral to Gastroenterology (Completed)    Olecranon bursitis, right elbow - Primary    Current Assessment & Plan   Compression ice 20 minutes on 20 minutes off.  Medrol Dosepak x 1.  Compression wrap with Ace  bandage.  Consider Ortho referral if swelling does not resolve.         Relevant Medications    methylPREDNISolone (MEDROL) 4 MG dose pack     Other Visit Diagnoses         Paresthesia        Relevant Medications    DULoxetine (CYMBALTA) 60 MG capsule            Plan of care reviewed with patient at the conclusion of today's visit. Education was provided regarding diagnosis and management.  Patient verbalizes understanding of and agreement with management plan.    No follow-ups on file.    Cesar Gao MD      Please note than portions of this note were completed [facility] a Voice Recognition Program

## 2025-07-16 NOTE — ASSESSMENT & PLAN NOTE
Stable on current dose of Cymbalta trazodone and Celexa. She will continue her efforts in physical activity.

## 2025-07-16 NOTE — ASSESSMENT & PLAN NOTE
[facility] ( Failed stimulatir generator.

## 2025-07-16 NOTE — ASSESSMENT & PLAN NOTE
Compression ice 20 minutes on 20 minutes off.  Medrol Dosepak x 1.  Compression wrap with Ace bandage.  Consider Ortho referral if swelling does not resolve.

## 2025-07-28 RX ORDER — CITALOPRAM HYDROBROMIDE 20 MG/1
20 TABLET ORAL DAILY
Qty: 90 TABLET | Refills: 3 | OUTPATIENT
Start: 2025-07-28

## 2025-07-31 ENCOUNTER — APPOINTMENT (OUTPATIENT)
Facility: HOSPITAL | Age: 60
End: 2025-07-31
Payer: MEDICAID

## 2025-07-31 ENCOUNTER — HOSPITAL ENCOUNTER (OUTPATIENT)
Facility: HOSPITAL | Age: 60
Setting detail: OBSERVATION
Discharge: HOME | End: 2025-08-01
Attending: STUDENT IN AN ORGANIZED HEALTH CARE EDUCATION/TRAINING PROGRAM | Admitting: STUDENT IN AN ORGANIZED HEALTH CARE EDUCATION/TRAINING PROGRAM
Payer: MEDICAID

## 2025-07-31 DIAGNOSIS — K52.9 COLITIS: ICD-10-CM

## 2025-07-31 DIAGNOSIS — R55 VASOVAGAL SYNCOPE: ICD-10-CM

## 2025-07-31 DIAGNOSIS — N17.9 AKI (ACUTE KIDNEY INJURY): Primary | ICD-10-CM

## 2025-07-31 DIAGNOSIS — F19.90 SUBSTANCE USE DISORDER: ICD-10-CM

## 2025-07-31 PROBLEM — N18.9 ACUTE ON CHRONIC RENAL FAILURE: Status: ACTIVE | Noted: 2025-07-31

## 2025-07-31 LAB
ALBUMIN SERPL-MCNC: 4.3 G/DL (ref 3.5–5.2)
ALBUMIN/GLOB SERPL: 1.4 G/DL
ALP SERPL-CCNC: 123 U/L (ref 39–117)
ALT SERPL W P-5'-P-CCNC: 13 U/L (ref 1–33)
AMORPH URATE CRY URNS QL MICRO: ABNORMAL /HPF
AMPHET+METHAMPHET UR QL: NEGATIVE
AMPHETAMINES UR QL: NEGATIVE
ANION GAP SERPL CALCULATED.3IONS-SCNC: 13.5 MMOL/L (ref 5–15)
AST SERPL-CCNC: 18 U/L (ref 1–32)
BACTERIA UR QL AUTO: ABNORMAL /HPF
BARBITURATES UR QL SCN: NEGATIVE
BASOPHILS # BLD AUTO: 0.01 10*3/MM3 (ref 0–0.2)
BASOPHILS NFR BLD AUTO: 0.2 % (ref 0–1.5)
BENZODIAZ UR QL SCN: NEGATIVE
BILIRUB SERPL-MCNC: 0.4 MG/DL (ref 0–1.2)
BILIRUB UR QL STRIP: ABNORMAL
BUN SERPL-MCNC: 24.6 MG/DL (ref 8–23)
BUN/CREAT SERPL: 12.5 (ref 7–25)
BUPRENORPHINE SERPL-MCNC: NEGATIVE NG/ML
CALCIUM SPEC-SCNC: 9.3 MG/DL (ref 8.6–10.5)
CANNABINOIDS SERPL QL: POSITIVE
CHLORIDE SERPL-SCNC: 102 MMOL/L (ref 98–107)
CLARITY UR: ABNORMAL
CO2 SERPL-SCNC: 22.5 MMOL/L (ref 22–29)
COCAINE UR QL: POSITIVE
COLOR UR: YELLOW
CREAT SERPL-MCNC: 1.97 MG/DL (ref 0.57–1)
D-LACTATE SERPL-SCNC: 2.1 MMOL/L (ref 0.5–2)
DEPRECATED RDW RBC AUTO: 39.3 FL (ref 37–54)
EGFRCR SERPLBLD CKD-EPI 2021: 28.6 ML/MIN/1.73
EOSINOPHIL # BLD AUTO: 0.01 10*3/MM3 (ref 0–0.4)
EOSINOPHIL NFR BLD AUTO: 0.2 % (ref 0.3–6.2)
ERYTHROCYTE [DISTWIDTH] IN BLOOD BY AUTOMATED COUNT: 13.3 % (ref 12.3–15.4)
ETHANOL BLD-MCNC: <10 MG/DL (ref 0–10)
FENTANYL UR-MCNC: NEGATIVE NG/ML
GEN 5 1HR TROPONIN T REFLEX: 13 NG/L
GLOBULIN UR ELPH-MCNC: 3.1 GM/DL
GLUCOSE SERPL-MCNC: 161 MG/DL (ref 65–99)
GLUCOSE UR STRIP-MCNC: NEGATIVE MG/DL
GRAN CASTS URNS QL MICRO: ABNORMAL /LPF
HCT VFR BLD AUTO: 39 % (ref 34–46.6)
HGB BLD-MCNC: 13.1 G/DL (ref 12–15.9)
HGB UR QL STRIP.AUTO: ABNORMAL
HOLD SPECIMEN: NORMAL
HYALINE CASTS UR QL AUTO: ABNORMAL /LPF
IMM GRANULOCYTES # BLD AUTO: 0.02 10*3/MM3 (ref 0–0.05)
IMM GRANULOCYTES NFR BLD AUTO: 0.4 % (ref 0–0.5)
KETONES UR QL STRIP: ABNORMAL
LEUKOCYTE ESTERASE UR QL STRIP.AUTO: NEGATIVE
LYMPHOCYTES # BLD AUTO: 0.73 10*3/MM3 (ref 0.7–3.1)
LYMPHOCYTES NFR BLD AUTO: 13.2 % (ref 19.6–45.3)
MAGNESIUM SERPL-MCNC: 2.6 MG/DL (ref 1.6–2.4)
MCH RBC QN AUTO: 27 PG (ref 26.6–33)
MCHC RBC AUTO-ENTMCNC: 33.6 G/DL (ref 31.5–35.7)
MCV RBC AUTO: 80.2 FL (ref 79–97)
METHADONE UR QL SCN: NEGATIVE
MONOCYTES # BLD AUTO: 0.35 10*3/MM3 (ref 0.1–0.9)
MONOCYTES NFR BLD AUTO: 6.4 % (ref 5–12)
NEUTROPHILS NFR BLD AUTO: 4.39 10*3/MM3 (ref 1.7–7)
NEUTROPHILS NFR BLD AUTO: 79.6 % (ref 42.7–76)
NITRITE UR QL STRIP: NEGATIVE
OPIATES UR QL: NEGATIVE
OXYCODONE UR QL SCN: NEGATIVE
PCP UR QL SCN: NEGATIVE
PH UR STRIP.AUTO: 6 [PH] (ref 5–8)
PLATELET # BLD AUTO: 178 10*3/MM3 (ref 140–450)
PMV BLD AUTO: 9.7 FL (ref 6–12)
POTASSIUM SERPL-SCNC: 3.5 MMOL/L (ref 3.5–5.2)
PROT SERPL-MCNC: 7.4 G/DL (ref 6–8.5)
PROT UR QL STRIP: ABNORMAL
RBC # BLD AUTO: 4.86 10*6/MM3 (ref 3.77–5.28)
RBC # UR STRIP: ABNORMAL /HPF
REF LAB TEST METHOD: ABNORMAL
SODIUM SERPL-SCNC: 138 MMOL/L (ref 136–145)
SP GR UR STRIP: 1.02 (ref 1–1.03)
SQUAMOUS #/AREA URNS HPF: ABNORMAL /HPF
TRICYCLICS UR QL SCN: POSITIVE
TROPONIN T NUMERIC DELTA: 0 NG/L
TROPONIN T SERPL HS-MCNC: 13 NG/L
UROBILINOGEN UR QL STRIP: ABNORMAL
WBC # UR STRIP: ABNORMAL /HPF
WBC NRBC COR # BLD AUTO: 5.51 10*3/MM3 (ref 3.4–10.8)
WHOLE BLOOD HOLD COAG: NORMAL
WHOLE BLOOD HOLD SPECIMEN: NORMAL

## 2025-07-31 PROCEDURE — 36415 COLL VENOUS BLD VENIPUNCTURE: CPT

## 2025-07-31 PROCEDURE — 71275 CT ANGIOGRAPHY CHEST: CPT

## 2025-07-31 PROCEDURE — 85025 COMPLETE CBC W/AUTO DIFF WBC: CPT | Performed by: STUDENT IN AN ORGANIZED HEALTH CARE EDUCATION/TRAINING PROGRAM

## 2025-07-31 PROCEDURE — 99285 EMERGENCY DEPT VISIT HI MDM: CPT | Performed by: STUDENT IN AN ORGANIZED HEALTH CARE EDUCATION/TRAINING PROGRAM

## 2025-07-31 PROCEDURE — 70450 CT HEAD/BRAIN W/O DYE: CPT

## 2025-07-31 PROCEDURE — 25510000001 IOPAMIDOL PER 1 ML: Performed by: STUDENT IN AN ORGANIZED HEALTH CARE EDUCATION/TRAINING PROGRAM

## 2025-07-31 PROCEDURE — 93005 ELECTROCARDIOGRAM TRACING: CPT | Performed by: STUDENT IN AN ORGANIZED HEALTH CARE EDUCATION/TRAINING PROGRAM

## 2025-07-31 PROCEDURE — 80053 COMPREHEN METABOLIC PANEL: CPT | Performed by: STUDENT IN AN ORGANIZED HEALTH CARE EDUCATION/TRAINING PROGRAM

## 2025-07-31 PROCEDURE — 82077 ASSAY SPEC XCP UR&BREATH IA: CPT | Performed by: STUDENT IN AN ORGANIZED HEALTH CARE EDUCATION/TRAINING PROGRAM

## 2025-07-31 PROCEDURE — G0378 HOSPITAL OBSERVATION PER HR: HCPCS

## 2025-07-31 PROCEDURE — 81001 URINALYSIS AUTO W/SCOPE: CPT | Performed by: STUDENT IN AN ORGANIZED HEALTH CARE EDUCATION/TRAINING PROGRAM

## 2025-07-31 PROCEDURE — 83605 ASSAY OF LACTIC ACID: CPT | Performed by: STUDENT IN AN ORGANIZED HEALTH CARE EDUCATION/TRAINING PROGRAM

## 2025-07-31 PROCEDURE — 80307 DRUG TEST PRSMV CHEM ANLYZR: CPT | Performed by: STUDENT IN AN ORGANIZED HEALTH CARE EDUCATION/TRAINING PROGRAM

## 2025-07-31 PROCEDURE — 83036 HEMOGLOBIN GLYCOSYLATED A1C: CPT | Performed by: NURSE PRACTITIONER

## 2025-07-31 PROCEDURE — 84484 ASSAY OF TROPONIN QUANT: CPT | Performed by: STUDENT IN AN ORGANIZED HEALTH CARE EDUCATION/TRAINING PROGRAM

## 2025-07-31 PROCEDURE — 25810000003 SODIUM CHLORIDE 0.9 % SOLUTION: Performed by: STUDENT IN AN ORGANIZED HEALTH CARE EDUCATION/TRAINING PROGRAM

## 2025-07-31 PROCEDURE — 83735 ASSAY OF MAGNESIUM: CPT | Performed by: STUDENT IN AN ORGANIZED HEALTH CARE EDUCATION/TRAINING PROGRAM

## 2025-07-31 PROCEDURE — 74177 CT ABD & PELVIS W/CONTRAST: CPT

## 2025-07-31 PROCEDURE — 71045 X-RAY EXAM CHEST 1 VIEW: CPT

## 2025-07-31 PROCEDURE — P9612 CATHETERIZE FOR URINE SPEC: HCPCS

## 2025-07-31 RX ORDER — SODIUM CHLORIDE 0.9 % (FLUSH) 0.9 %
10 SYRINGE (ML) INJECTION AS NEEDED
Status: DISCONTINUED | OUTPATIENT
Start: 2025-07-31 | End: 2025-08-01 | Stop reason: HOSPADM

## 2025-07-31 RX ORDER — SODIUM CHLORIDE 9 MG/ML
40 INJECTION, SOLUTION INTRAVENOUS AS NEEDED
Status: DISCONTINUED | OUTPATIENT
Start: 2025-07-31 | End: 2025-08-01 | Stop reason: HOSPADM

## 2025-07-31 RX ORDER — ONDANSETRON 2 MG/ML
4 INJECTION INTRAMUSCULAR; INTRAVENOUS EVERY 6 HOURS PRN
Status: DISCONTINUED | OUTPATIENT
Start: 2025-07-31 | End: 2025-08-01 | Stop reason: HOSPADM

## 2025-07-31 RX ORDER — PANTOPRAZOLE SODIUM 40 MG/1
40 TABLET, DELAYED RELEASE ORAL
Status: DISCONTINUED | OUTPATIENT
Start: 2025-08-01 | End: 2025-08-01 | Stop reason: HOSPADM

## 2025-07-31 RX ORDER — SODIUM CHLORIDE 9 MG/ML
125 INJECTION, SOLUTION INTRAVENOUS CONTINUOUS
Status: DISCONTINUED | OUTPATIENT
Start: 2025-08-01 | End: 2025-08-01 | Stop reason: HOSPADM

## 2025-07-31 RX ORDER — SODIUM CHLORIDE 0.9 % (FLUSH) 0.9 %
10 SYRINGE (ML) INJECTION AS NEEDED
Status: DISCONTINUED | OUTPATIENT
Start: 2025-07-31 | End: 2025-08-01 | Stop reason: SDUPTHER

## 2025-07-31 RX ORDER — IOPAMIDOL 755 MG/ML
100 INJECTION, SOLUTION INTRAVASCULAR
Status: COMPLETED | OUTPATIENT
Start: 2025-07-31 | End: 2025-07-31

## 2025-07-31 RX ORDER — DULOXETIN HYDROCHLORIDE 30 MG/1
60 CAPSULE, DELAYED RELEASE ORAL DAILY
Status: DISCONTINUED | OUTPATIENT
Start: 2025-08-01 | End: 2025-08-01 | Stop reason: HOSPADM

## 2025-07-31 RX ORDER — CITALOPRAM HYDROBROMIDE 20 MG/1
20 TABLET ORAL DAILY
Status: DISCONTINUED | OUTPATIENT
Start: 2025-08-01 | End: 2025-08-01 | Stop reason: HOSPADM

## 2025-07-31 RX ORDER — SODIUM CHLORIDE 0.9 % (FLUSH) 0.9 %
10 SYRINGE (ML) INJECTION EVERY 12 HOURS SCHEDULED
Status: DISCONTINUED | OUTPATIENT
Start: 2025-08-01 | End: 2025-08-01 | Stop reason: HOSPADM

## 2025-07-31 RX ORDER — ACETAMINOPHEN 500 MG
1000 TABLET ORAL EVERY 4 HOURS PRN
Status: DISCONTINUED | OUTPATIENT
Start: 2025-07-31 | End: 2025-08-01 | Stop reason: HOSPADM

## 2025-07-31 RX ADMIN — SODIUM CHLORIDE 1000 ML: 9 INJECTION, SOLUTION INTRAVENOUS at 21:55

## 2025-07-31 RX ADMIN — IOPAMIDOL 70 ML: 755 INJECTION, SOLUTION INTRAVENOUS at 23:00

## 2025-08-01 ENCOUNTER — READMISSION MANAGEMENT (OUTPATIENT)
Dept: CALL CENTER | Facility: HOSPITAL | Age: 60
End: 2025-08-01
Payer: MEDICAID

## 2025-08-01 VITALS
DIASTOLIC BLOOD PRESSURE: 87 MMHG | TEMPERATURE: 98.5 F | HEART RATE: 95 BPM | BODY MASS INDEX: 27.62 KG/M2 | WEIGHT: 176 LBS | HEIGHT: 67 IN | OXYGEN SATURATION: 99 % | RESPIRATION RATE: 11 BRPM | SYSTOLIC BLOOD PRESSURE: 121 MMHG

## 2025-08-01 LAB
ALBUMIN SERPL-MCNC: 3.9 G/DL (ref 3.5–5.2)
ALBUMIN/GLOB SERPL: 1.3 G/DL
ALP SERPL-CCNC: 108 U/L (ref 39–117)
ALT SERPL W P-5'-P-CCNC: 11 U/L (ref 1–33)
ANION GAP SERPL CALCULATED.3IONS-SCNC: 11.8 MMOL/L (ref 5–15)
AST SERPL-CCNC: 16 U/L (ref 1–32)
BASOPHILS # BLD AUTO: 0.01 10*3/MM3 (ref 0–0.2)
BASOPHILS NFR BLD AUTO: 0.2 % (ref 0–1.5)
BILIRUB SERPL-MCNC: 0.4 MG/DL (ref 0–1.2)
BUN SERPL-MCNC: 20.9 MG/DL (ref 8–23)
BUN/CREAT SERPL: 14.1 (ref 7–25)
CALCIUM SPEC-SCNC: 8.7 MG/DL (ref 8.6–10.5)
CHLORIDE SERPL-SCNC: 106 MMOL/L (ref 98–107)
CO2 SERPL-SCNC: 22.2 MMOL/L (ref 22–29)
CREAT SERPL-MCNC: 1.48 MG/DL (ref 0.57–1)
D-LACTATE SERPL-SCNC: 0.7 MMOL/L (ref 0.5–2)
DEPRECATED RDW RBC AUTO: 39.7 FL (ref 37–54)
EGFRCR SERPLBLD CKD-EPI 2021: 40.4 ML/MIN/1.73
EOSINOPHIL # BLD AUTO: 0.04 10*3/MM3 (ref 0–0.4)
EOSINOPHIL NFR BLD AUTO: 0.7 % (ref 0.3–6.2)
ERYTHROCYTE [DISTWIDTH] IN BLOOD BY AUTOMATED COUNT: 13.2 % (ref 12.3–15.4)
GLOBULIN UR ELPH-MCNC: 2.9 GM/DL
GLUCOSE SERPL-MCNC: 89 MG/DL (ref 65–99)
HBA1C MFR BLD: 5.59 % (ref 4.8–5.6)
HCT VFR BLD AUTO: 36.4 % (ref 34–46.6)
HGB BLD-MCNC: 12.1 G/DL (ref 12–15.9)
IMM GRANULOCYTES # BLD AUTO: 0.01 10*3/MM3 (ref 0–0.05)
IMM GRANULOCYTES NFR BLD AUTO: 0.2 % (ref 0–0.5)
LYMPHOCYTES # BLD AUTO: 1.83 10*3/MM3 (ref 0.7–3.1)
LYMPHOCYTES NFR BLD AUTO: 30.1 % (ref 19.6–45.3)
MCH RBC QN AUTO: 26.8 PG (ref 26.6–33)
MCHC RBC AUTO-ENTMCNC: 33.2 G/DL (ref 31.5–35.7)
MCV RBC AUTO: 80.7 FL (ref 79–97)
MONOCYTES # BLD AUTO: 0.59 10*3/MM3 (ref 0.1–0.9)
MONOCYTES NFR BLD AUTO: 9.7 % (ref 5–12)
NEUTROPHILS NFR BLD AUTO: 3.59 10*3/MM3 (ref 1.7–7)
NEUTROPHILS NFR BLD AUTO: 59.1 % (ref 42.7–76)
PLATELET # BLD AUTO: 158 10*3/MM3 (ref 140–450)
PMV BLD AUTO: 9.9 FL (ref 6–12)
POTASSIUM SERPL-SCNC: 3.4 MMOL/L (ref 3.5–5.2)
POTASSIUM SERPL-SCNC: 4.1 MMOL/L (ref 3.5–5.2)
PROT SERPL-MCNC: 6.8 G/DL (ref 6–8.5)
QT INTERVAL: 336 MS
QT INTERVAL: 352 MS
QTC INTERVAL: 454 MS
QTC INTERVAL: 468 MS
RBC # BLD AUTO: 4.51 10*6/MM3 (ref 3.77–5.28)
SODIUM SERPL-SCNC: 140 MMOL/L (ref 136–145)
WBC NRBC COR # BLD AUTO: 6.07 10*3/MM3 (ref 3.4–10.8)

## 2025-08-01 PROCEDURE — 83605 ASSAY OF LACTIC ACID: CPT | Performed by: STUDENT IN AN ORGANIZED HEALTH CARE EDUCATION/TRAINING PROGRAM

## 2025-08-01 PROCEDURE — G0378 HOSPITAL OBSERVATION PER HR: HCPCS

## 2025-08-01 PROCEDURE — 25010000002 HEPARIN LOCK FLUSH PER 10 UNITS: Performed by: PHYSICIAN ASSISTANT

## 2025-08-01 PROCEDURE — 84132 ASSAY OF SERUM POTASSIUM: CPT | Performed by: PHYSICIAN ASSISTANT

## 2025-08-01 PROCEDURE — 85025 COMPLETE CBC W/AUTO DIFF WBC: CPT | Performed by: NURSE PRACTITIONER

## 2025-08-01 PROCEDURE — 25810000003 SODIUM CHLORIDE 0.9 % SOLUTION: Performed by: NURSE PRACTITIONER

## 2025-08-01 PROCEDURE — 80053 COMPREHEN METABOLIC PANEL: CPT | Performed by: NURSE PRACTITIONER

## 2025-08-01 RX ORDER — HYDROXYZINE HYDROCHLORIDE 25 MG/1
25 TABLET, FILM COATED ORAL ONCE
Status: COMPLETED | OUTPATIENT
Start: 2025-08-01 | End: 2025-08-01

## 2025-08-01 RX ORDER — TRAZODONE HYDROCHLORIDE 50 MG/1
50 TABLET ORAL NIGHTLY PRN
Status: DISCONTINUED | OUTPATIENT
Start: 2025-08-01 | End: 2025-08-01 | Stop reason: HOSPADM

## 2025-08-01 RX ORDER — HEPARIN SODIUM (PORCINE) LOCK FLUSH IV SOLN 100 UNIT/ML 100 UNIT/ML
500 SOLUTION INTRAVENOUS ONCE
Status: COMPLETED | OUTPATIENT
Start: 2025-08-01 | End: 2025-08-01

## 2025-08-01 RX ORDER — ALUMINA, MAGNESIA, AND SIMETHICONE 2400; 2400; 240 MG/30ML; MG/30ML; MG/30ML
15 SUSPENSION ORAL EVERY 6 HOURS PRN
Status: DISCONTINUED | OUTPATIENT
Start: 2025-08-01 | End: 2025-08-01 | Stop reason: HOSPADM

## 2025-08-01 RX ORDER — POTASSIUM CHLORIDE 1500 MG/1
40 TABLET, EXTENDED RELEASE ORAL EVERY 4 HOURS
Status: DISCONTINUED | OUTPATIENT
Start: 2025-08-01 | End: 2025-08-01 | Stop reason: HOSPADM

## 2025-08-01 RX ADMIN — SODIUM CHLORIDE 125 ML/HR: 9 INJECTION, SOLUTION INTRAVENOUS at 02:00

## 2025-08-01 RX ADMIN — HEPARIN 500 UNITS: 100 SYRINGE at 13:40

## 2025-08-01 RX ADMIN — POTASSIUM CHLORIDE 40 MEQ: 1500 TABLET, EXTENDED RELEASE ORAL at 07:46

## 2025-08-01 RX ADMIN — PANTOPRAZOLE SODIUM 40 MG: 40 TABLET, DELAYED RELEASE ORAL at 07:46

## 2025-08-01 RX ADMIN — Medication 10 ML: at 00:55

## 2025-08-01 RX ADMIN — ALUMINUM HYDROXIDE, MAGNESIUM HYDROXIDE, AND DIMETHICONE 15 ML: 400; 400; 40 SUSPENSION ORAL at 13:59

## 2025-08-01 RX ADMIN — CITALOPRAM HYDROBROMIDE 20 MG: 20 TABLET ORAL at 07:46

## 2025-08-01 RX ADMIN — HYDROXYZINE HYDROCHLORIDE 25 MG: 25 TABLET, FILM COATED ORAL at 08:52

## 2025-08-01 RX ADMIN — ALUMINUM HYDROXIDE, MAGNESIUM HYDROXIDE, AND DIMETHICONE 15 ML: 400; 400; 40 SUSPENSION ORAL at 08:52

## 2025-08-01 RX ADMIN — DULOXETINE 60 MG: 30 CAPSULE, DELAYED RELEASE ORAL at 07:46

## 2025-08-01 NOTE — DISCHARGE SUMMARY
[facility] CDU  DISCHARGE SUMMARY      Patient Name: Geneva Haro  : 1965  MRN: [id number]    Date of Admission: 2025  Date of Discharge:  25   Primary Care Physician: Cesar Gao MD      Presenting Problem:   Acute on chronic renal failure [N17.9, N18.9]    Active and Resolved Hospital Problems:  Active Hospital Problems    Diagnosis POA    **Acute on chronic renal failure [N17.9, N18.9] Yes      Resolved Hospital Problems   No resolved problems to display.         Hospital Course:   Geneva Haro is a 60-year-old female with a past medical history of breast cancer and distant history of seizures not currently treated.  She has been many years since her previous seizure.  She presented to the ER with an episode of unresponsiveness while sitting in bed with her .  This episode was short lasted and she was slightly disoriented after, but there was a quick return to baseline.  Prior to this episode, she has had several days of nausea, vomiting, diarrhea that has now improved.  Patient reports that she has been having increasing fatigue for the past 2 days.  She denies any fevers, chills, body aches or abdominal pain.    While in the emergency department, patient had an evaluation including CBC, CMP, magnesium, urinalysis, UDS, CT abdomen pelvis, lactic acid.  CBC revealed a creatinine of 1.97, BUN of 24.6, and GFR of 28.6.  Upon chart review the patient has a history of chronic kidney disease and her baseline creatinine is around 1.3.  UDS was pertinent for polysubstance abuse with cocaine and marijuana.  Lactic acid was elevated at 2.1.  CT scan revealed evidence of colitis.  Lab findings pointed to a syncopal episode as the most likely cause of her episode prior to arrival due to her decreased volume status following recent colitis.  She was then admitted to the CDU for rehydration and monitoring of her acute on chronic kidney injury.    In the CDU,  patient was able to sleep through the night without any complications.  She received maintenance fluids overnight to assist in management of her acute on chronic kidney injury.  Morning labs revealed a potassium of 3.4 which was corrected.  Repeat level was 4.1.  Morning labs also revealed resolution of her lactic acidosis from 2.1-0.7.  The patient's creatinine had improved from 1.97-1.48 after administration of fluids overnight.  Overnight, the patient was slightly unsteady on ambulation to and from the bathroom.  Prior to discharge today, the patient was ambulated without any difficulties or stability.  Patient will be discharged home to follow-up appropriately with her primary care provider for recheck of her kidney function values and further management outpatient.  She is encouraged to continue with her at home medication regimen and to drink plenty of fluids over the next few days as her symptoms continue to improve.    Nurses Notes reviewed and agree, including vitals, allergies, social history and prior medical history.     REVIEW OF SYSTEMS: All systems reviewed and not pertinent unless noted.  Review of Systems   Gastrointestinal:  Positive for diarrhea.   All other systems reviewed and are negative.      Past Medical History:   Diagnosis Date    Allergic At a young age    Anxiety     Arthritis     Arthritis of back 2000    Breast cancer     Cancer     Depression     Diabetes     pre-diabetic    Ectopic pregnancy     Fibromyalgia, primary 33 years ago    Gall stones     GERD (gastroesophageal reflux disease) 2008    Hearing loss     Hernia, hiatal     Hip arthrosis     History of stomach ulcers     History of transfusion     Knee swelling     Ovarian cyst     Periarthritis of shoulder     Stomach problems     Ulcer of bile duct     Urinary tract infection     Varicella 1970       Allergies:    Gabapentin, Morphine and codeine, Melatonin, Aspirin, and Ibuprofen      Past Surgical History:   Procedure  "Laterality Date    BREAST BIOPSY      BREAST SURGERY      CHOLECYSTECTOMY      CORONARY ARTERY BYPASS GRAFT      D & C WITH SUCTION      ENDOSCOPY N/A 2018    Procedure: ESOPHAGOGASTRODUODENOSCOPY;  Surgeon: Mark I Brunner, MD;  Location:  [facility];  Service: Gastroenterology    GASTRIC BYPASS      x2    GASTRIC STIMULATOR IMPLANT SURGERY      HAND SURGERY      HERNIA REPAIR      HERNIA REPAIR      INTERVENTIONAL RADIOLOGY PROCEDURE Bilateral 2021    Procedure: IR myelogram, cervical;  Surgeon: Jose Angel Sauer MD;  Location:  [facility];  Service: Interventional Radiology;  Laterality: Bilateral;    LYMPH NODE BIOPSY      MASTECTOMY Bilateral     Left With sentinel lymph node sampling and prophylactic right mastectomy    OTHER SURGICAL HISTORY  2024    \"Spinal Tap\"    PACEMAKER IMPLANTATION      PORTACATH PLACEMENT      SMALL INTESTINE SURGERY      TUBAL ABDOMINAL LIGATION      WISDOM TOOTH EXTRACTION           Social History     Socioeconomic History    Marital status:    Tobacco Use    Smoking status: Former     Current packs/day: 0.00     Average packs/day: 0.3 packs/day for 35.0 years (8.8 ttl pk-yrs)     Types: Cigarettes     Start date: 1986     Quit date: 2021     Years since quittin.5     Passive exposure: Current    Smokeless tobacco: Never   Vaping Use    Vaping status: Never Used   Substance and Sexual Activity    Alcohol use: No    Drug use: Yes     Frequency: 6.0 times per week     Types: Marijuana    Sexual activity: Not Currently     Partners: Male     Birth control/protection: Abstinence, Post-menopausal, None, Tubal ligation         Family History   Problem Relation Age of Onset    Heart attack Mother     Hypertension Mother     COPD Sister     Breast cancer Maternal Aunt     Cancer Maternal Aunt     Pancreatic cancer Cousin     Breast cancer Cousin     Breast cancer Cousin     Colon cancer Neg Hx        Objective  Physical " "Exam:  /87 (BP Location: Left arm, Patient Position: Lying)   Pulse 95   Temp 98.5 °F (36.9 °C) (Oral)   Resp 11   Ht 170.2 cm (67\")   Wt 79.8 kg (176 lb)   SpO2 99%   BMI 27.57 kg/m²      Physical Exam  Vitals and nursing note reviewed.   Constitutional:       General: She is not in acute distress.     Appearance: She is not ill-appearing, toxic-appearing or diaphoretic.   HENT:      Head: Normocephalic and atraumatic.   Eyes:      Conjunctiva/sclera: Conjunctivae normal.   Cardiovascular:      Rate and Rhythm: Normal rate and regular rhythm.      Heart sounds: Normal heart sounds. No murmur heard.     No friction rub. No gallop.   Pulmonary:      Effort: Pulmonary effort is normal. No respiratory distress.      Breath sounds: Normal breath sounds. No wheezing, rhonchi or rales.   Abdominal:      General: Abdomen is flat.      Palpations: Abdomen is soft.      Tenderness: There is no abdominal tenderness.   Musculoskeletal:         General: Normal range of motion.      Right lower leg: No edema.      Left lower leg: No edema.   Skin:     General: Skin is warm and dry.      Capillary Refill: Capillary refill takes less than 2 seconds.   Neurological:      Mental Status: She is alert and oriented to person, place, and time.      Gait: Gait normal.   Psychiatric:         Mood and Affect: Mood normal.         Behavior: Behavior normal.       CT Angiogram Chest Pulmonary Embolism  Result Date: 7/31/2025  CT ANGIOGRAM CHEST PULMONARY EMBOLISM, CT ABDOMEN PELVIS W CONTRAST Date of Exam: 7/31/2025 10:57 PM EDT Indication: Dyspnea/tachycardia/syncope/AMS/Abdominal pain. Comparison: CTA chest 3/29/2022. CT abdomen and pelvis 7/8/2024 Technique: Axial CT images were obtained of the chest after the uneventful intravenous administration of 70 cc Isovue-370 utilizing pulmonary embolism protocol.  In addition, a 3-D volume rendered image was created for interpretation.  Reconstructed coronal and sagittal images were " also obtained. Automated exposure control and iterative construction methods were used. Findings: CTA chest: Pulmonary arteries:Adequate opacification of the pulmonary arteries. No evidence of acute pulmonary embolism. Mild enlargement of the central pulmonary arteries. Aorta: Unremarkable. Lungs and Pleura: The lungs are clear. No pleural effusion or pneumothorax. The airways are patent. Mediastinum/Fany: Scattered calcified lymph nodes. No definite lymphadenopathy or suspicious mass. Adequate position of right sided port. Small hiatal hernia Heart: Normal in size. No pericardial effusion. Normal RV/LV ratio. Soft Tissue: Unremarkable. Bones: No acute osseous abnormality. Degenerative changes noted throughout the visualized spine. CT abdomen and pelvis: Liver: Liver is normal in size and CT density. No focal lesions. Gallbladder: Status post cholecystectomy. Bile Ducts: No billiary dcutal dilation. Spleen: Spleen is normal in size and CT density. Pancreas: Pancreas is normal. There is no evidence of pancreatic mass or peripancreatic fluid. Adrenals: Left adrenal gland nodule measuring 2.9 x 3.4 cm is stable and was previously characterized as an adenoma. Kidneys: Scattered cysts. Otherwise unremarkable Gastrointestinal: Mild diffuse colonic wall thickening, nonspecific. Otherwise unremarkable. No significant distention to suggest bowel obstruction. Bladder: The bladder is normal. Pelvis:  No suspecious mass. Peritoneum/Mesentery: No fluid collection, ascities, or free air.   Lymph Nodes: No lymphadenopathy. Vasculature: Unremarkable Abdominal Wall: Abdominal wall gastric stimulator is noted with the leads intact. Otherwise unremarkable. Bony Structures: No acute osseous abnormality     Impression: Impression: 1.No evidence of pulmonary embolism. 2.Mild enlargement of the central pulmonary arteries, which can be seen with pulmonary hypertension. 3.Mild diffuse colonic wall thickening, nonspecific. Correlate for any  signs or symptoms of colitis. 4.Stable left adrenal adenoma. 5.Small hiatal hernia. Electronically Signed: Jayson Truong DO  7/31/2025 11:12 PM EDT  Workstation ID: [id number]    CT Abdomen Pelvis With Contrast  Result Date: 7/31/2025  CT ANGIOGRAM CHEST PULMONARY EMBOLISM, CT ABDOMEN PELVIS W CONTRAST Date of Exam: 7/31/2025 10:57 PM EDT Indication: Dyspnea/tachycardia/syncope/AMS/Abdominal pain. Comparison: CTA chest 3/29/2022. CT abdomen and pelvis 7/8/2024 Technique: Axial CT images were obtained of the chest after the uneventful intravenous administration of 70 cc Isovue-370 utilizing pulmonary embolism protocol.  In addition, a 3-D volume rendered image was created for interpretation.  Reconstructed coronal and sagittal images were also obtained. Automated exposure control and iterative construction methods were used. Findings: CTA chest: Pulmonary arteries:Adequate opacification of the pulmonary arteries. No evidence of acute pulmonary embolism. Mild enlargement of the central pulmonary arteries. Aorta: Unremarkable. Lungs and Pleura: The lungs are clear. No pleural effusion or pneumothorax. The airways are patent. Mediastinum/Fany: Scattered calcified lymph nodes. No definite lymphadenopathy or suspicious mass. Adequate position of right sided port. Small hiatal hernia Heart: Normal in size. No pericardial effusion. Normal RV/LV ratio. Soft Tissue: Unremarkable. Bones: No acute osseous abnormality. Degenerative changes noted throughout the visualized spine. CT abdomen and pelvis: Liver: Liver is normal in size and CT density. No focal lesions. Gallbladder: Status post cholecystectomy. Bile Ducts: No billiary dcutal dilation. Spleen: Spleen is normal in size and CT density. Pancreas: Pancreas is normal. There is no evidence of pancreatic mass or peripancreatic fluid. Adrenals: Left adrenal gland nodule measuring 2.9 x 3.4 cm is stable and was previously characterized as an adenoma. Kidneys: Scattered  cysts. Otherwise unremarkable Gastrointestinal: Mild diffuse colonic wall thickening, nonspecific. Otherwise unremarkable. No significant distention to suggest bowel obstruction. Bladder: The bladder is normal. Pelvis:  No suspecious mass. Peritoneum/Mesentery: No fluid collection, ascities, or free air.   Lymph Nodes: No lymphadenopathy. Vasculature: Unremarkable Abdominal Wall: Abdominal wall gastric stimulator is noted with the leads intact. Otherwise unremarkable. Bony Structures: No acute osseous abnormality     Impression: Impression: 1.No evidence of pulmonary embolism. 2.Mild enlargement of the central pulmonary arteries, which can be seen with pulmonary hypertension. 3.Mild diffuse colonic wall thickening, nonspecific. Correlate for any signs or symptoms of colitis. 4.Stable left adrenal adenoma. 5.Small hiatal hernia. Electronically Signed: Jayson Truong DO  7/31/2025 11:12 PM EDT  Workstation ID: [id number]    CT Head Without Contrast  Result Date: 7/31/2025  CT HEAD WO CONTRAST Date of Exam: 7/31/2025 10:57 PM EDT Indication: Dyspnea/tachycardia/syncope/AMS/Abdominal pain. Comparison: 1/19/2024. Technique: Axial CT images were obtained of the head without contrast administration.  Automated exposure control and iterative construction methods were used. Findings: There is no evidence of hemorrhage. There is no mass effect or midline shift. There is no extracerebral collection. Ventricles are normal in size and configuration for patient's stated age.  Posterior fossa is within normal limits. Calvarium and skull base appear intact.   Visualized sinuses show no air fluid levels. Visualized orbits are unremarkable.     Impression: Impression: No acute intracranial process. Electronically Signed: Sena Torres MD  7/31/2025 11:11 PM EDT  Workstation ID: [id number]    XR Chest 1 View  Result Date: 7/31/2025  XR CHEST 1 VW Date of Exam: 7/31/2025 9:17 PM EDT Indication: Weak/Dizzy/AMS triage protocol Comparison:  "Chest radiograph 3/15/2016. Findings: Right chest port tip terminates over the superior vena cava. Cardiomediastinal silhouette is within normal limits. No focal consolidation. No pleural effusion or pneumothorax. Osseous structures are unremarkable.     Impression: Impression: No acute cardiopulmonary findings. Electronically Signed: Davi Boo MD  7/31/2025 9:42 PM EDT  Workstation ID: [id number]           Lab 08/01/25  0031 07/31/25  2100   LACTATE 0.7 2.1*       No results found for: \"SITE\", \"ALLENTEST\", \"PHART\", \"JWV2BTR\", \"PO2ART\", \"LHE8JKF\", \"BASEEXCESS\", \"D9LNXQJS\", \"HGBBG\", \"HCTABG\", \"OXYHEMOGLOBI\", \"METHHGBN\", \"CARBOXYHGB\", \"CO2CT\", \"BAROMETRIC\", \"MODALITY\", \"FIO2\"    Results from last 7 days   Lab Units 07/31/25  2204 07/31/25  2100   HSTROP T ng/L 13 13       Results from last 7 days   Lab Units 08/01/25  0606 07/31/25  2100   WBC 10*3/mm3 6.07 5.51   HEMOGLOBIN g/dL 12.1 13.1   HEMATOCRIT % 36.4 39.0   PLATELETS 10*3/mm3 158 178       Results from last 7 days   Lab Units 08/01/25  1149 08/01/25  0606 07/31/25  2100   SODIUM mmol/L  --  140 138   POTASSIUM mmol/L 4.1 3.4* 3.5   CHLORIDE mmol/L  --  106 102   CO2 mmol/L  --  22.2 22.5   BUN mg/dL  --  20.9 24.6*   CREATININE mg/dL  --  1.48* 1.97*   CALCIUM mg/dL  --  8.7 9.3   BILIRUBIN mg/dL  --  0.4 0.4   ALK PHOS U/L  --  108 123*   ALT (SGPT) U/L  --  11 13   AST (SGOT) U/L  --  16 18   GLUCOSE mg/dL  --  89 161*       Lab Results   Component Value Date    CHOL 163 04/07/2022    TRIG 142 04/07/2022    HDL 56 04/07/2022    LDL 82 04/07/2022               ED Disposition       ED Disposition   Decision to Admit    Condition   --    Comment   --                    Discharge Medication List:      Your medication list        CONTINUE taking these medications        Instructions Last Dose Given Next Dose Due   cetirizine 10 MG tablet  Commonly known as: zyrTEC      TAKE 1 TABLET BY MOUTH EVERY DAY       citalopram 20 MG tablet  Commonly known as: CeleXA   "    Take 1 tablet by mouth Daily. Indications: Major Depressive Disorder       cyclobenzaprine 10 MG tablet  Commonly known as: FLEXERIL      TAKE 1 TABLET BY MOUTH THREE TIMES A DAY AS NEEDED FOR MUSCLE SPASM       DULoxetine 60 MG capsule  Commonly known as: CYMBALTA      Take 1 capsule by mouth Daily.       esomeprazole 40 MG capsule  Commonly known as: nexIUM      Take 1 capsule by mouth 2 (Two) Times a Day.       methylPREDNISolone 4 MG dose pack  Commonly known as: MEDROL      Take as directed on package instructions.       promethazine 25 MG tablet  Commonly known as: PHENERGAN      TAKE 1 TABLET BY MOUTH EVERY 6 HOURS AS NEEDED FOR NAUSEA OR VOMITING.       traZODone 150 MG tablet  Commonly known as: DESYREL      Take 1 tablet by mouth Every Night.                 Randi Roberts PA-C   08/01/25   15:51 EDT       Time Spent on Discharge:  I spent  30 minutes on this discharge activity which included: face-to-face encounter with the patient, reviewing the data in the system, coordination of the care with the nursing staff as well as consultants, documentation, and entering orders. The patient spent a total of 15 hours admitted in the CDU.

## 2025-08-01 NOTE — ED NOTES
" Geneva Haro    Nursing Report ED to Floor:  Mental status: a&ox4 GCS 14 (confused at times)  Ambulatory status: hasn't got up in ED  Oxygen Therapy:  RA  Cardiac Rhythm: NSR  Admitted from: home/er  Safety Concerns:  n/a  Precautions: n/a  Social Issues: n/a  ED Room #:  08    ED Nurse Phone Extension - [phone] or may call [phone]      HPI:   Chief Complaint   Patient presents with    Altered Mental Status       Past Medical History:  Past Medical History:   Diagnosis Date    Allergic At a young age    Anxiety     Arthritis     Arthritis of back 2000    Breast cancer     Cancer     Depression     Diabetes     pre-diabetic    Ectopic pregnancy     Fibromyalgia, primary 33 years ago    Gall stones     GERD (gastroesophageal reflux disease) 2008    Hearing loss     Hernia, hiatal     Hip arthrosis     History of stomach ulcers     History of transfusion     Knee swelling     Ovarian cyst     Periarthritis of shoulder     Stomach problems     Ulcer of bile duct     Urinary tract infection     Varicella 1970        Past Surgical History:  Past Surgical History:   Procedure Laterality Date    BREAST BIOPSY  2007    BREAST SURGERY      CHOLECYSTECTOMY      CORONARY ARTERY BYPASS GRAFT      D & C WITH SUCTION      ENDOSCOPY N/A 06/20/2018    Procedure: ESOPHAGOGASTRODUODENOSCOPY;  Surgeon: Mark I Brunner, MD;  Location:  [facility];  Service: Gastroenterology    GASTRIC BYPASS      x2    GASTRIC STIMULATOR IMPLANT SURGERY      HAND SURGERY  1992    HERNIA REPAIR      HERNIA REPAIR      INTERVENTIONAL RADIOLOGY PROCEDURE Bilateral 07/22/2021    Procedure: IR myelogram, cervical;  Surgeon: Jose Angel Sauer MD;  Location:  [facility];  Service: Interventional Radiology;  Laterality: Bilateral;    LYMPH NODE BIOPSY      MASTECTOMY Bilateral     Left With sentinel lymph node sampling and prophylactic right mastectomy    OTHER SURGICAL HISTORY  07/2024    \"Spinal Tap\"    PACEMAKER IMPLANTATION      " PORTACATH PLACEMENT      SMALL INTESTINE SURGERY      TUBAL ABDOMINAL LIGATION      WISDOM TOOTH EXTRACTION  1982        Admitting Doctor:   Lebron Salcedo MD    Consulting Provider(s):  Consults       No orders found from 7/2/2025 to 8/1/2025.             Admitting Diagnosis:   There were no encounter diagnoses.    Most Recent Vitals:   Vitals:    07/31/25 2200 07/31/25 2230 07/31/25 2330 07/31/25 2335   BP: 129/94 114/85 (!) 141/101    BP Location:       Patient Position:       Pulse: 106 94 97 92   Resp:       Temp:       TempSrc:       SpO2: 100% 100% 100% 100%   Weight:       Height:           Active LDAs/IV Access:   Lines, Drains & Airways       Active LDAs       Name Placement date Placement time Site Days    External Urinary Catheter 07/31/25 2124  --  less than 1    Single Lumen Implantable Port Right Subclavian --  --  Subclavian  --                    Labs (abnormal labs have a star):   Labs Reviewed   COMPREHENSIVE METABOLIC PANEL - Abnormal; Notable for the following components:       Result Value    Glucose 161 (*)     BUN 24.6 (*)     Creatinine 1.97 (*)     Alkaline Phosphatase 123 (*)     eGFR 28.6 (*)     All other components within normal limits    Narrative:     GFR Categories in Chronic Kidney Disease (CKD)              GFR Category          GFR (mL/min/1.73)    Interpretation  G1                    90 or greater        Normal or high (1)  G2                    60-89                Mild decrease (1)  G3a                   45-59                Mild to moderate decrease  G3b                   30-44                Moderate to severe decrease  G4                    15-29                Severe decrease  G5                    14 or less           Kidney failure    (1)In the absence of evidence of kidney disease, neither GFR category G1 or G2 fulfill the criteria for CKD.    eGFR calculation 2021 CKD-EPI creatinine equation, which does not include race as a factor   MAGNESIUM - Abnormal; Notable  for the following components:    Magnesium 2.6 (*)     All other components within normal limits   URINALYSIS W/ MICROSCOPIC IF INDICATED (NO CULTURE) - Abnormal; Notable for the following components:    Appearance, UA Slightly Cloudy (*)     Ketones, UA 15 mg/dL (1+) (*)     Bilirubin, UA Small (1+) (*)     Blood, UA Small (1+) (*)     Protein, UA 30 mg/dL (1+) (*)     All other components within normal limits   CBC WITH AUTO DIFFERENTIAL - Abnormal; Notable for the following components:    Neutrophil % 79.6 (*)     Lymphocyte % 13.2 (*)     Eosinophil % 0.2 (*)     All other components within normal limits   URINALYSIS, MICROSCOPIC ONLY - Abnormal; Notable for the following components:    Bacteria, UA Trace (*)     All other components within normal limits   URINE DRUG SCREEN - Abnormal; Notable for the following components:    THC, Screen, Urine Positive (*)     Cocaine Screen, Urine Positive (*)     Tricyclic Antidepressants Screen Positive (*)     All other components within normal limits    Narrative:     Cutoff For Drugs Screened:    Amphetamines               500 ng/ml  Barbiturates               200 ng/ml  Benzodiazepines            150 ng/ml  Cocaine                    150 ng/ml  Methadone                  200 ng/ml  Opiates                    100 ng/ml  Phencyclidine               25 ng/ml  THC                         50 ng/ml  Methamphetamine            500 ng/ml  Tricyclic Antidepressants  300 ng/ml  Oxycodone                  100 ng/ml  Buprenorphine               10 ng/ml    The normal value for all drugs tested is negative. This report includes unconfirmed screening results, with the cutoff values listed, to be used for medical treatment purposes only.  Unconfirmed results must not be used for non-medical purposes such as employment or legal testing.  Clinical consideration should be applied to any drug of abuse test, particularly when unconfirmed results are used.     LACTIC ACID, PLASMA - Abnormal;  Notable for the following components:    Lactate 2.1 (*)     All other components within normal limits   TROPONIN - Normal   ETHANOL - Normal    Narrative:     Not for legal purposes.   HIGH SENSITIVITIY TROPONIN T 1HR - Normal    Narrative:     High Sensitive Troponin T Reference Range:  <14.0 ng/L- Negative Female for AMI  <22.0 ng/L- Negative Male for AMI  >=14 - Abnormal Female indicating possible myocardial injury.  >=22 - Abnormal Male indicating possible myocardial injury.   Clinicians would have to utilize clinical acumen, EKG, Troponin, and serial changes to determine if it is an Acute Myocardial Infarction or myocardial injury due to an underlying chronic condition.        FENTANYL, URINE - Normal    Narrative:     Negative Threshold:      Fentanyl 5 ng/mL     The normal value for the drug tested is negative. This report includes final unconfirmed screening results to be used for medical treatment purposes only. Unconfirmed results must not be used for non-medical purposes such as employment or legal testing. Clinical consideration should be applied to any drug of abuse test, particularly when unconfirmed results are used.          RAINBOW DRAW    Narrative:     The following orders were created for panel order [place] Draw.  Procedure                               Abnormality         Status                     ---------                               -----------         ------                     Green Top (Gel)[[id number]]                                  Final result               Lavender Top[[id number]]                                     Final result               Gold Top - SST[[id number]]                                   Final result               Verde Top[[id number]]                                         Final result               Light Blue Top[[id number]]                                   Final result                 Please view results for these tests on the individual orders.   LACTIC ACID,  REFLEX   COMPREHENSIVE METABOLIC PANEL   CBC WITH AUTO DIFFERENTIAL   HEMOGLOBIN A1C   CBC AND DIFFERENTIAL    Narrative:     The following orders were created for panel order CBC & Differential.  Procedure                               Abnormality         Status                     ---------                               -----------         ------                     CBC Auto Differential[[id number]]        Abnormal            Final result                 Please view results for these tests on the individual orders.   GREEN TOP   LAVENDER TOP   GOLD TOP - SST   GRAY TOP   LIGHT BLUE TOP       Meds Given in ED:   Medications   sodium chloride 0.9 % flush 10 mL (has no administration in time range)   sodium chloride 0.9 % flush 10 mL (has no administration in time range)   sodium chloride 0.9 % flush 10 mL (has no administration in time range)   sodium chloride 0.9 % flush 10 mL (has no administration in time range)   sodium chloride 0.9 % infusion 40 mL (has no administration in time range)   Potassium Replacement - Follow Nurse / BPA Driven Protocol (has no administration in time range)   Magnesium Standard Dose Replacement - Follow Nurse / BPA Driven Protocol (has no administration in time range)   Phosphorus Replacement - Follow Nurse / BPA Driven Protocol (has no administration in time range)   Calcium Replacement - Follow Nurse / BPA Driven Protocol (has no administration in time range)   sodium chloride 0.9 % infusion (has no administration in time range)   acetaminophen (TYLENOL) tablet 1,000 mg (has no administration in time range)   ondansetron (ZOFRAN) injection 4 mg (has no administration in time range)   citalopram (CeleXA) tablet 20 mg (has no administration in time range)   DULoxetine (CYMBALTA) DR capsule 60 mg (has no administration in time range)   pantoprazole (PROTONIX) EC tablet 40 mg (has no administration in time range)   sodium chloride 0.9 % bolus 1,000 mL (1,000 mL Intravenous New Bag 7/31/25  2155)   iopamidol (ISOVUE-370) 76 % injection 100 mL (70 mL Intravenous Given 7/31/25 2300)     [START ON 8/1/2025] sodium chloride, 100 mL/hr         Last NIH score:  Interval: baseline  1a. Level of Consciousness: 0-->Alert, keenly responsive  1b. LOC Questions: 0-->Answers both questions correctly  1c. LOC Commands: 0-->Performs both tasks correctly  2. Best Gaze: 0-->Normal  3. Visual: 0-->No visual loss  4. Facial Palsy: 0-->Normal symmetrical movements  5a. Motor Arm, Left: 0-->No drift, limb holds 90 (or 45) degrees for full 10 secs  5b. Motor Arm, Right: 0-->No drift, limb holds 90 (or 45) degrees for full 10 secs  6a. Motor Leg, Left: 0-->No drift, leg holds 30 degree position for full 5 secs  6b. Motor Leg, Right: 0-->No drift, leg holds 30 degree position for full 5 secs  7. Limb Ataxia: 0-->Absent  8. Sensory: 0-->Normal, no sensory loss  9. Best Language: 1-->Mild-to-moderate aphasia, some obvious loss of fluency or facility of comprehension, without significant limitation on ideas expressed or form of expression. Reduction of speech and/or comprehension, however, makes conversation. . . (see row details)  10. Dysarthria: 0-->Normal  11. Extinction and Inattention (formerly Neglect): 0-->No abnormality    Total (NIH Stroke Scale): 1     Dysphagia screening results:  Patient Factors Component (Dysphagia:Stroke or Rule-out)  Best Eye Response: 4-->(E4) spontaneous (07/31/25 2123)  Best Motor Response: 6-->(M6) obeys commands (07/31/25 2123)  Best Verbal Response: 4-->(V4) confused (Patient confused at times. Pt is able to answer all orientation questions. Patient could not remember purpose of port along with other pt hx.) (07/31/25 2123)  Joann Coma Scale Score: 14 (07/31/25 2123)     [place] Coma Scale:  No data recorded     CIWA:        Restraint Type:            Isolation Status:  No active isolations

## 2025-08-01 NOTE — OUTREACH NOTE
Prep Survey      Flowsheet Row Responses   [facility] patient discharged from? [place]   Is LACE score < 7 ? Yes   Eligibility [facility]   Date of Admission 07/31/25   Date of Discharge 08/01/25   Discharge Disposition Home or Self Care   Discharge diagnosis Acute on chronic renal failure   Does the patient have one of the following disease processes/diagnoses(primary or secondary)? Other   Does the patient have Home health ordered? No   Is there a DME ordered? No   Prep survey completed? Yes            Shasha FERNANDO - Registered Nurse

## 2025-08-01 NOTE — H&P
[facility]   HISTORY AND PHYSICAL    Patient Name: Geneva Haro  : 1965  MRN: [id number]  Primary Care Physician:  Cesar Gao MD  Date of admission: 2025    Subjective   Subjective     Chief Complaint:     Altered Mental Status  Symptoms: fatigue        Review of Systems   Constitutional:  Positive for activity change and fatigue.   HENT: Negative.     Respiratory: Negative.     Cardiovascular: Negative.    Gastrointestinal: Negative.    Genitourinary: Negative.    Musculoskeletal: Negative.    Skin: Negative.    Neurological:  Positive for dizziness.        Personal History     Past Medical History:   Diagnosis Date    Allergic At a young age    Anxiety     Arthritis     Arthritis of back     Breast cancer     Cancer     Depression     Diabetes     pre-diabetic    Ectopic pregnancy     Fibromyalgia, primary 33 years ago    Gall stones     GERD (gastroesophageal reflux disease)     Hearing loss     Hernia, hiatal     Hip arthrosis     History of stomach ulcers     History of transfusion     Knee swelling     Ovarian cyst     Periarthritis of shoulder     Stomach problems     Ulcer of bile duct     Urinary tract infection     Varicella        Past Surgical History:   Procedure Laterality Date    BREAST BIOPSY      BREAST SURGERY      CHOLECYSTECTOMY      CORONARY ARTERY BYPASS GRAFT      D & C WITH SUCTION      ENDOSCOPY N/A 2018    Procedure: ESOPHAGOGASTRODUODENOSCOPY;  Surgeon: Mark I Brunner, MD;  Location:  [facility];  Service: Gastroenterology    GASTRIC BYPASS      x2    GASTRIC STIMULATOR IMPLANT SURGERY      HAND SURGERY      HERNIA REPAIR      HERNIA REPAIR      INTERVENTIONAL RADIOLOGY PROCEDURE Bilateral 2021    Procedure: IR myelogram, cervical;  Surgeon: Jose Angel Sauer MD;  Location:  [facility];  Service: Interventional Radiology;  Laterality: Bilateral;    LYMPH NODE BIOPSY      MASTECTOMY Bilateral     Left  "With sentinel lymph node sampling and prophylactic right mastectomy    OTHER SURGICAL HISTORY  07/2024    \"Spinal Tap\"    PACEMAKER IMPLANTATION      PORTACATH PLACEMENT      SMALL INTESTINE SURGERY      TUBAL ABDOMINAL LIGATION      WISDOM TOOTH EXTRACTION  1982       Family History: family history includes Breast cancer in her cousin, cousin, and maternal aunt; COPD in her sister; Cancer in her maternal aunt; Heart attack in her mother; Hypertension in her mother; Pancreatic cancer in her cousin. Otherwise pertinent FHx was reviewed and not pertinent to current issue.    Social History:  reports that she quit smoking about 4 years ago. Her smoking use included cigarettes. She started smoking about 39 years ago. She has a 8.8 pack-year smoking history. She has been exposed to tobacco smoke. She has never used smokeless tobacco. She reports current drug use. Frequency: 6.00 times per week. Drug: Marijuana. She reports that she does not drink alcohol.    Home Medications:  DULoxetine, cetirizine, citalopram, cyclobenzaprine, esomeprazole, methylPREDNISolone, promethazine, and traZODone    Allergies:  Allergies   Allergen Reactions    Gabapentin Other (See Comments)     Seizure    Morphine And Codeine Itching and Swelling    Melatonin Palpitations     Pt states it makes her heart race    Aspirin GI Intolerance    Ibuprofen GI Intolerance       Objective    Objective     Vitals:   Temp:  [98.4 °F (36.9 °C)] 98.4 °F (36.9 °C)  Heart Rate:  [] 92  Resp:  [22] 22  BP: (107-141)/() 141/101    Physical Exam  Vitals and nursing note reviewed.   Constitutional:       Appearance: Normal appearance.   HENT:      Head: Normocephalic and atraumatic.   Cardiovascular:      Rate and Rhythm: Normal rate and regular rhythm.   Pulmonary:      Effort: Pulmonary effort is normal.      Breath sounds: Normal breath sounds.   Abdominal:      General: Bowel sounds are normal.      Palpations: Abdomen is soft.   Musculoskeletal:  "        General: Normal range of motion.   Skin:     General: Skin is warm and dry.   Neurological:      General: No focal deficit present.      Mental Status: She is alert and oriented to person, place, and time.   Psychiatric:      Comments: Anxious appearing         Result Review    Result Review:  I have personally reviewed the results from the time of this admission to 7/31/2025 23:41 EDT and agree with these findings:  [x]  Laboratory list / accordion  []  Microbiology  [x]  Radiology  [x]  EKG/Telemetry   []  Cardiology/Vascular   []  Pathology  [x]  Old records  []  Other:        Assessment & Plan   Assessment / Plan     Brief Patient Summary:  Geneva Haro is a 60 y.o. female old female presents history of breast cancer and distant history of seizures not currently treated many years since her previous with an episode of unresponsiveness while sitting in bed with her . This was short lasted for quick return to baseline slightly disoriented after she has had several days of nausea vomiting diarrhea that actually has now improved states has been increasing fatigue as of yesterday. Denies fevers chills or bodyaches no severe abdominal pain at this time. Patient to be admitted to CDU for continued observation and medical management for AMS and acute on chronic renal failure.    Active Hospital Problems:  Active Hospital Problems    Diagnosis     **Acute on chronic renal failure      Plan:     #Acute on chronic renal failure  -Creatinine 1.97, BUN 24.6, GFR 28.6 (baseline approx 1.3)  -NS @ 125 cc/hr  -Renally dose medications  -Trend renal indices with AM labs    #Lactic acidosis  -Lactate 2.1 --> 0.7  -NS @ 125 cc/hr  -Resolved    #Polysubstance abuse  -UDS: positive for cocaine and THC    #Depression with anxiety  -Continue home Citalopram, Duloxetine    #GERD  -Continue home Protonix    #PPX  -SCDs  -Protonix    CODE STATUS:    Code Status (Patient has no pulse and is not breathing): CPR  (Attempt to Resuscitate)  Medical Interventions (Patient has pulse or is breathing): Full Support  Level Of Support Discussed With: Patient    Admission Status:  I believe this patient meets observation status.    Anish Jon, APRN

## 2025-08-01 NOTE — FSED PROVIDER NOTE
Subjective   History of Present Illness  60-year-old female presents history of breast cancer and distant history of seizures not currently treated many years since her previous with an episode of unresponsiveness while sitting in bed with her .  This was short lasted for quick return to baseline slightly disoriented after she has had several days of nausea vomiting diarrhea that actually has now improved states has been increasing fatigue as of yesterday.  Denies fevers chills or bodyaches no severe abdominal pain at this time.    History provided by:  Patient      Review of Systems   All other systems reviewed and are negative.      Past Medical History:   Diagnosis Date    Allergic At a young age    Anxiety     Arthritis     Arthritis of back 2000    Breast cancer     Cancer     Depression     Diabetes     pre-diabetic    Ectopic pregnancy     Fibromyalgia, primary 33 years ago    Gall stones     GERD (gastroesophageal reflux disease) 2008    Hearing loss     Hernia, hiatal     Hip arthrosis     History of stomach ulcers     History of transfusion     Knee swelling     Ovarian cyst     Periarthritis of shoulder     Stomach problems     Ulcer of bile duct     Urinary tract infection     Varicella 1970       Allergies   Allergen Reactions    Gabapentin Other (See Comments)     Seizure    Morphine And Codeine Itching and Swelling    Melatonin Palpitations     Pt states it makes her heart race    Aspirin GI Intolerance    Ibuprofen GI Intolerance       Past Surgical History:   Procedure Laterality Date    BREAST BIOPSY  2007    BREAST SURGERY      CHOLECYSTECTOMY      CORONARY ARTERY BYPASS GRAFT      D & C WITH SUCTION      ENDOSCOPY N/A 06/20/2018    Procedure: ESOPHAGOGASTRODUODENOSCOPY;  Surgeon: Mark I Brunner, MD;  Location: [facility] ENDOSCOPY;  Service: Gastroenterology    GASTRIC BYPASS      x2    GASTRIC STIMULATOR IMPLANT SURGERY      HAND SURGERY  1992    HERNIA REPAIR      HERNIA REPAIR       "INTERVENTIONAL RADIOLOGY PROCEDURE Bilateral 2021    Procedure: IR myelogram, cervical;  Surgeon: Jose Angel Sauer MD;  Location: [facility] INVASIVE LOCATION;  Service: Interventional Radiology;  Laterality: Bilateral;    LYMPH NODE BIOPSY      MASTECTOMY Bilateral     Left With sentinel lymph node sampling and prophylactic right mastectomy    OTHER SURGICAL HISTORY  2024    \"Spinal Tap\"    PACEMAKER IMPLANTATION      PORTACATH PLACEMENT      SMALL INTESTINE SURGERY      TUBAL ABDOMINAL LIGATION      WISDOM TOOTH EXTRACTION  1982       Family History   Problem Relation Age of Onset    Heart attack Mother     Hypertension Mother     COPD Sister     Breast cancer Maternal Aunt     Cancer Maternal Aunt     Pancreatic cancer Cousin     Breast cancer Cousin     Breast cancer Cousin     Colon cancer Neg Hx        Social History     Socioeconomic History    Marital status:    Tobacco Use    Smoking status: Former     Current packs/day: 0.00     Average packs/day: 0.3 packs/day for 35.0 years (8.8 ttl pk-yrs)     Types: Cigarettes     Start date: 1986     Quit date: 2021     Years since quittin.5     Passive exposure: Current    Smokeless tobacco: Never   Vaping Use    Vaping status: Never Used   Substance and Sexual Activity    Alcohol use: No    Drug use: Yes     Frequency: 6.0 times per week     Types: Marijuana    Sexual activity: Not Currently     Partners: Male     Birth control/protection: Abstinence, Post-menopausal, None, Tubal ligation           Objective   Physical Exam  Vitals and nursing note reviewed.   Constitutional:       Appearance: She is not ill-appearing, toxic-appearing or diaphoretic.   HENT:      Head: Normocephalic.   Eyes:      Extraocular Movements: Extraocular movements intact.      Right eye: Normal extraocular motion and no nystagmus.      Left eye: Normal extraocular motion and no nystagmus.      Pupils: Pupils are equal, round, and reactive to light. "   Cardiovascular:      Rate and Rhythm: Tachycardia present.      Heart sounds: Normal heart sounds.   Pulmonary:      Effort: Pulmonary effort is normal.      Breath sounds: Normal breath sounds.   Abdominal:      General: There is no distension.      Palpations: Abdomen is soft.      Tenderness: There is no abdominal tenderness.   Skin:     General: Skin is warm and dry.      Capillary Refill: Capillary refill takes less than 2 seconds.   Neurological:      Mental Status: She is alert and oriented to person, place, and time.      Cranial Nerves: No cranial nerve deficit.      Sensory: No sensory deficit.      Motor: No weakness.         Procedures           ED Course  ED Course as of 08/01/25 0646   Fri Aug 01, 2025   0646 Potassium(!): 3.4 [JN]   0646 Creatinine(!): 1.48 [JN]   0646 Sodium: 140 [JN]   0646 Ketones, UA(!): 15 mg/dL (1+) [JN]   0646 Magnesium(!): 2.6 [JN]      ED Course User Index  [JN] Lebron Salcedo MD                          Total (NIH Stroke Scale): 0                Medical Decision Making  60-year-old female presents with short unresponsive episode with recent vomiting and diarrhea differential includes colitis, vasovagal syncope, seizure, dehydration, VIRIDIANA, UTI, and others    Patient tested positive for polysubstance abuse with cocaine and marijuana amongst others she has a significant VIRIDIANA evidence of colitis on CT scan but otherwise stable lactate is only 2 do not suspect this to be a tonic-clonic seizure more likely a syncopal episode given her evidence of decreased volume status following recent colitis.  Will admit her to the CDU for rehydration and monitoring kidney function.  Her mental status has cleared she is not disoriented at time of admission    Problems Addressed:  VIRIDIANA (acute kidney injury): complicated acute illness or injury  Colitis: complicated acute illness or injury  Substance use disorder: complicated acute illness or injury  Vasovagal syncope: complicated acute illness  or injury    Amount and/or Complexity of Data Reviewed  Labs: ordered.  Radiology: ordered.  ECG/medicine tests: ordered.    Risk  Prescription drug management.  Decision regarding hospitalization.        Final diagnoses:   VIRIDIANA (acute kidney injury)   Vasovagal syncope   Colitis   Substance use disorder       ED Disposition  ED Disposition       ED Disposition   Decision to Admit    Condition   --    Comment   --               No follow-up provider specified.       Medication List      No changes were made to your prescriptions during this visit.

## 2025-08-04 ENCOUNTER — TRANSITIONAL CARE MANAGEMENT TELEPHONE ENCOUNTER (OUTPATIENT)
Dept: CALL CENTER | Facility: HOSPITAL | Age: 60
End: 2025-08-04
Payer: MEDICAID

## 2025-08-05 ENCOUNTER — TRANSITIONAL CARE MANAGEMENT TELEPHONE ENCOUNTER (OUTPATIENT)
Dept: CALL CENTER | Facility: HOSPITAL | Age: 60
End: 2025-08-05
Payer: MEDICAID

## 2025-08-21 ENCOUNTER — OFFICE VISIT (OUTPATIENT)
Dept: FAMILY MEDICINE CLINIC | Facility: CLINIC | Age: 60
End: 2025-08-21
Payer: MEDICAID

## 2025-08-21 ENCOUNTER — LAB (OUTPATIENT)
Dept: LAB | Facility: HOSPITAL | Age: 60
End: 2025-08-21
Payer: MEDICAID

## 2025-08-21 VITALS
DIASTOLIC BLOOD PRESSURE: 82 MMHG | HEIGHT: 67 IN | SYSTOLIC BLOOD PRESSURE: 122 MMHG | BODY MASS INDEX: 27.44 KG/M2 | OXYGEN SATURATION: 91 % | WEIGHT: 174.8 LBS | HEART RATE: 107 BPM

## 2025-08-21 DIAGNOSIS — K31.84 GASTROPARESIS: ICD-10-CM

## 2025-08-21 DIAGNOSIS — K52.9 COLITIS: ICD-10-CM

## 2025-08-21 DIAGNOSIS — R20.2 PARESTHESIA: ICD-10-CM

## 2025-08-21 DIAGNOSIS — N17.9 ACUTE RENAL FAILURE SUPERIMPOSED ON STAGE 3A CHRONIC KIDNEY DISEASE, UNSPECIFIED ACUTE RENAL FAILURE TYPE: ICD-10-CM

## 2025-08-21 DIAGNOSIS — Z09 HOSPITAL DISCHARGE FOLLOW-UP: ICD-10-CM

## 2025-08-21 DIAGNOSIS — N17.9 AKI (ACUTE KIDNEY INJURY): Primary | ICD-10-CM

## 2025-08-21 DIAGNOSIS — M25.421 EFFUSION OF RIGHT OLECRANON BURSA: ICD-10-CM

## 2025-08-21 DIAGNOSIS — N18.31 ACUTE RENAL FAILURE SUPERIMPOSED ON STAGE 3A CHRONIC KIDNEY DISEASE, UNSPECIFIED ACUTE RENAL FAILURE TYPE: ICD-10-CM

## 2025-08-21 RX ORDER — PANTOPRAZOLE SODIUM 40 MG/1
40 TABLET, DELAYED RELEASE ORAL DAILY
Qty: 90 TABLET | Refills: 1 | Status: SHIPPED | OUTPATIENT
Start: 2025-08-21

## 2025-08-21 RX ORDER — PROMETHAZINE HYDROCHLORIDE 6.25 MG/5ML
25 SYRUP ORAL 4 TIMES DAILY PRN
Qty: 150 ML | Refills: 2 | Status: SHIPPED | OUTPATIENT
Start: 2025-08-21

## 2025-08-21 RX ORDER — METOCLOPRAMIDE 10 MG/1
10 TABLET ORAL
Qty: 120 TABLET | Refills: 2 | Status: SHIPPED | OUTPATIENT
Start: 2025-08-21

## 2025-08-21 RX ORDER — DULOXETIN HYDROCHLORIDE 60 MG/1
60 CAPSULE, DELAYED RELEASE ORAL DAILY
Qty: 30 CAPSULE | Refills: 0 | Status: SHIPPED | OUTPATIENT
Start: 2025-08-21

## 2025-08-22 LAB
QT INTERVAL: 336 MS
QT INTERVAL: 352 MS
QTC INTERVAL: 454 MS
QTC INTERVAL: 468 MS

## 2025-08-23 ENCOUNTER — RESULTS FOLLOW-UP (OUTPATIENT)
Dept: FAMILY MEDICINE CLINIC | Facility: CLINIC | Age: 60
End: 2025-08-23
Payer: MEDICAID

## 2025-08-27 ENCOUNTER — TELEPHONE (OUTPATIENT)
Dept: FAMILY MEDICINE CLINIC | Facility: CLINIC | Age: 60
End: 2025-08-27
Payer: MEDICAID

## (undated) DEVICE — AIRWY 90MM NO9

## (undated) DEVICE — MEDI-VAC YANKAUER SUCTION HANDLE W/BULBOUS TIP: Brand: CARDINAL HEALTH

## (undated) DEVICE — TRY L/P SFTY A/20G

## (undated) DEVICE — CANNULA,OXY,ADULT,SUPERSOFT,W/7'TUB,UC: Brand: MEDLINE

## (undated) DEVICE — THE BITE BLOCK MAXI, LATEX FREE STRAP IS USED TO PROTECT THE ENDOSCOPE INSERTION TUBE FROM BEING BITTEN BY THE PATIENT.

## (undated) DEVICE — SINGLE-USE BIOPSY FORCEPS: Brand: RADIAL JAW 4

## (undated) DEVICE — MEDI-VAC NON-CONDUCTIVE SUCTION TUBING: Brand: CARDINAL HEALTH